# Patient Record
Sex: MALE | Race: WHITE | Employment: OTHER | URBAN - METROPOLITAN AREA
[De-identification: names, ages, dates, MRNs, and addresses within clinical notes are randomized per-mention and may not be internally consistent; named-entity substitution may affect disease eponyms.]

---

## 2017-01-01 ENCOUNTER — APPOINTMENT (OUTPATIENT)
Dept: GENERAL RADIOLOGY | Age: 67
DRG: 329 | End: 2017-01-01
Attending: PHYSICIAN ASSISTANT
Payer: MEDICARE

## 2017-01-01 ENCOUNTER — ANESTHESIA (OUTPATIENT)
Dept: SURGERY | Age: 67
DRG: 329 | End: 2017-01-01
Payer: MEDICARE

## 2017-01-01 ENCOUNTER — APPOINTMENT (OUTPATIENT)
Dept: GENERAL RADIOLOGY | Age: 67
DRG: 981 | End: 2017-01-01
Attending: INTERNAL MEDICINE
Payer: MEDICARE

## 2017-01-01 ENCOUNTER — APPOINTMENT (OUTPATIENT)
Dept: CT IMAGING | Age: 67
DRG: 981 | End: 2017-01-01
Attending: INTERNAL MEDICINE
Payer: MEDICARE

## 2017-01-01 ENCOUNTER — HOSPITAL ENCOUNTER (INPATIENT)
Age: 67
LOS: 5 days | DRG: 981 | End: 2017-10-26
Attending: EMERGENCY MEDICINE | Admitting: INTERNAL MEDICINE
Payer: MEDICARE

## 2017-01-01 ENCOUNTER — HOSPITAL ENCOUNTER (INPATIENT)
Age: 67
LOS: 2 days | Discharge: NURSING, REHAB OR LONG-TERM CARE | DRG: 683 | End: 2017-03-03
Attending: EMERGENCY MEDICINE | Admitting: INTERNAL MEDICINE
Payer: MEDICARE

## 2017-01-01 ENCOUNTER — OFFICE VISIT (OUTPATIENT)
Dept: CARDIOLOGY CLINIC | Age: 67
End: 2017-01-01

## 2017-01-01 ENCOUNTER — APPOINTMENT (OUTPATIENT)
Dept: GENERAL RADIOLOGY | Age: 67
DRG: 329 | End: 2017-01-01
Attending: UROLOGY
Payer: MEDICARE

## 2017-01-01 ENCOUNTER — TELEPHONE (OUTPATIENT)
Dept: CARDIOLOGY CLINIC | Age: 67
End: 2017-01-01

## 2017-01-01 ENCOUNTER — APPOINTMENT (OUTPATIENT)
Dept: CT IMAGING | Age: 67
DRG: 329 | End: 2017-01-01
Attending: PHYSICIAN ASSISTANT
Payer: MEDICARE

## 2017-01-01 ENCOUNTER — PATIENT OUTREACH (OUTPATIENT)
Dept: CASE MANAGEMENT | Age: 67
End: 2017-01-01

## 2017-01-01 ENCOUNTER — HOSPITAL ENCOUNTER (INPATIENT)
Age: 67
LOS: 4 days | Discharge: NURSING, REHAB OR LONG-TERM CARE | DRG: 247 | End: 2017-07-10
Attending: EMERGENCY MEDICINE | Admitting: INTERNAL MEDICINE
Payer: MEDICARE

## 2017-01-01 ENCOUNTER — HOSPITAL ENCOUNTER (OUTPATIENT)
Dept: MRI IMAGING | Age: 67
Discharge: HOME | End: 2017-08-29
Attending: INTERNAL MEDICINE
Payer: MEDICARE

## 2017-01-01 ENCOUNTER — APPOINTMENT (OUTPATIENT)
Dept: GENERAL RADIOLOGY | Age: 67
End: 2017-01-01
Attending: EMERGENCY MEDICINE
Payer: MEDICARE

## 2017-01-01 ENCOUNTER — APPOINTMENT (OUTPATIENT)
Dept: GENERAL RADIOLOGY | Age: 67
DRG: 329 | End: 2017-01-01
Attending: SURGERY
Payer: MEDICARE

## 2017-01-01 ENCOUNTER — HOSPITAL ENCOUNTER (EMERGENCY)
Age: 67
Discharge: HOME | End: 2017-05-26
Attending: EMERGENCY MEDICINE
Payer: MEDICARE

## 2017-01-01 ENCOUNTER — APPOINTMENT (OUTPATIENT)
Dept: GENERAL RADIOLOGY | Age: 67
DRG: 689 | End: 2017-01-01
Attending: EMERGENCY MEDICINE
Payer: MEDICARE

## 2017-01-01 ENCOUNTER — APPOINTMENT (OUTPATIENT)
Dept: GENERAL RADIOLOGY | Age: 67
DRG: 981 | End: 2017-01-01
Attending: EMERGENCY MEDICINE
Payer: MEDICARE

## 2017-01-01 ENCOUNTER — APPOINTMENT (OUTPATIENT)
Dept: GENERAL RADIOLOGY | Age: 67
DRG: 981 | End: 2017-01-01
Attending: RADIOLOGY
Payer: MEDICARE

## 2017-01-01 ENCOUNTER — OFFICE VISIT (OUTPATIENT)
Dept: SURGERY | Age: 67
End: 2017-01-01

## 2017-01-01 ENCOUNTER — APPOINTMENT (OUTPATIENT)
Dept: GENERAL RADIOLOGY | Age: 67
DRG: 247 | End: 2017-01-01
Attending: EMERGENCY MEDICINE
Payer: MEDICARE

## 2017-01-01 ENCOUNTER — APPOINTMENT (OUTPATIENT)
Dept: ULTRASOUND IMAGING | Age: 67
DRG: 683 | End: 2017-01-01
Attending: EMERGENCY MEDICINE
Payer: MEDICARE

## 2017-01-01 ENCOUNTER — APPOINTMENT (OUTPATIENT)
Dept: INTERVENTIONAL RADIOLOGY/VASCULAR | Age: 67
DRG: 981 | End: 2017-01-01
Attending: INTERNAL MEDICINE
Payer: MEDICARE

## 2017-01-01 ENCOUNTER — APPOINTMENT (OUTPATIENT)
Dept: INTERVENTIONAL RADIOLOGY/VASCULAR | Age: 67
DRG: 981 | End: 2017-01-01
Attending: UROLOGY
Payer: MEDICARE

## 2017-01-01 ENCOUNTER — PATIENT OUTREACH (OUTPATIENT)
Dept: INTERNAL MEDICINE CLINIC | Age: 67
End: 2017-01-01

## 2017-01-01 ENCOUNTER — HOSPITAL ENCOUNTER (OUTPATIENT)
Age: 67
Discharge: NURSING, REHAB OR LONG-TERM CARE | End: 2017-03-16
Attending: PHYSICAL MEDICINE & REHABILITATION | Admitting: PHYSICAL MEDICINE & REHABILITATION

## 2017-01-01 ENCOUNTER — HOSPITAL ENCOUNTER (INPATIENT)
Age: 67
LOS: 1 days | Discharge: NURSING, REHAB OR LONG-TERM CARE | DRG: 689 | End: 2017-05-16
Attending: EMERGENCY MEDICINE | Admitting: FAMILY MEDICINE
Payer: MEDICARE

## 2017-01-01 ENCOUNTER — ANESTHESIA EVENT (OUTPATIENT)
Dept: SURGERY | Age: 67
DRG: 329 | End: 2017-01-01
Payer: MEDICARE

## 2017-01-01 ENCOUNTER — HOSPITAL ENCOUNTER (EMERGENCY)
Age: 67
Discharge: HOME | End: 2017-07-30
Attending: EMERGENCY MEDICINE | Admitting: EMERGENCY MEDICINE
Payer: MEDICARE

## 2017-01-01 ENCOUNTER — APPOINTMENT (OUTPATIENT)
Dept: ULTRASOUND IMAGING | Age: 67
DRG: 981 | End: 2017-01-01
Attending: INTERNAL MEDICINE
Payer: MEDICARE

## 2017-01-01 ENCOUNTER — APPOINTMENT (OUTPATIENT)
Dept: CT IMAGING | Age: 67
DRG: 689 | End: 2017-01-01
Attending: FAMILY MEDICINE
Payer: MEDICARE

## 2017-01-01 ENCOUNTER — APPOINTMENT (OUTPATIENT)
Dept: CT IMAGING | Age: 67
DRG: 689 | End: 2017-01-01
Attending: EMERGENCY MEDICINE
Payer: MEDICARE

## 2017-01-01 ENCOUNTER — HOSPITAL ENCOUNTER (INPATIENT)
Age: 67
LOS: 15 days | Discharge: NURSING, REHAB OR LONG-TERM CARE | DRG: 329 | End: 2017-05-03
Attending: EMERGENCY MEDICINE | Admitting: SURGERY
Payer: MEDICARE

## 2017-01-01 VITALS
DIASTOLIC BLOOD PRESSURE: 70 MMHG | WEIGHT: 210 LBS | SYSTOLIC BLOOD PRESSURE: 162 MMHG | HEART RATE: 80 BPM | BODY MASS INDEX: 31.1 KG/M2 | HEIGHT: 69 IN

## 2017-01-01 VITALS
RESPIRATION RATE: 15 BRPM | SYSTOLIC BLOOD PRESSURE: 119 MMHG | HEART RATE: 92 BPM | WEIGHT: 230 LBS | HEIGHT: 73 IN | TEMPERATURE: 98.1 F | BODY MASS INDEX: 30.48 KG/M2 | OXYGEN SATURATION: 95 % | DIASTOLIC BLOOD PRESSURE: 60 MMHG

## 2017-01-01 VITALS
OXYGEN SATURATION: 100 % | HEART RATE: 90 BPM | RESPIRATION RATE: 18 BRPM | DIASTOLIC BLOOD PRESSURE: 86 MMHG | TEMPERATURE: 98 F | SYSTOLIC BLOOD PRESSURE: 120 MMHG | WEIGHT: 220 LBS | BODY MASS INDEX: 31.5 KG/M2 | HEIGHT: 70 IN

## 2017-01-01 VITALS
HEART RATE: 94 BPM | HEIGHT: 68 IN | SYSTOLIC BLOOD PRESSURE: 127 MMHG | OXYGEN SATURATION: 98 % | WEIGHT: 213.1 LBS | TEMPERATURE: 98.1 F | DIASTOLIC BLOOD PRESSURE: 59 MMHG | BODY MASS INDEX: 32.3 KG/M2 | RESPIRATION RATE: 21 BRPM

## 2017-01-01 VITALS
OXYGEN SATURATION: 98 % | BODY MASS INDEX: 31.37 KG/M2 | SYSTOLIC BLOOD PRESSURE: 114 MMHG | TEMPERATURE: 98.1 F | HEIGHT: 68 IN | DIASTOLIC BLOOD PRESSURE: 67 MMHG | HEART RATE: 99 BPM | WEIGHT: 207 LBS | RESPIRATION RATE: 18 BRPM

## 2017-01-01 VITALS
WEIGHT: 217.59 LBS | BODY MASS INDEX: 32.23 KG/M2 | DIASTOLIC BLOOD PRESSURE: 29 MMHG | HEIGHT: 69 IN | TEMPERATURE: 99.2 F | OXYGEN SATURATION: 94 % | SYSTOLIC BLOOD PRESSURE: 50 MMHG

## 2017-01-01 VITALS
HEIGHT: 69 IN | OXYGEN SATURATION: 96 % | SYSTOLIC BLOOD PRESSURE: 127 MMHG | HEART RATE: 101 BPM | RESPIRATION RATE: 16 BRPM | BODY MASS INDEX: 31.1 KG/M2 | WEIGHT: 210 LBS | TEMPERATURE: 98.2 F | DIASTOLIC BLOOD PRESSURE: 57 MMHG

## 2017-01-01 VITALS
DIASTOLIC BLOOD PRESSURE: 63 MMHG | HEART RATE: 83 BPM | HEIGHT: 69 IN | RESPIRATION RATE: 18 BRPM | BODY MASS INDEX: 31.86 KG/M2 | SYSTOLIC BLOOD PRESSURE: 123 MMHG | OXYGEN SATURATION: 97 % | WEIGHT: 215.12 LBS | TEMPERATURE: 98.3 F

## 2017-01-01 VITALS
SYSTOLIC BLOOD PRESSURE: 110 MMHG | BODY MASS INDEX: 31.37 KG/M2 | DIASTOLIC BLOOD PRESSURE: 60 MMHG | HEART RATE: 80 BPM | RESPIRATION RATE: 16 BRPM | HEIGHT: 68 IN | WEIGHT: 207 LBS | OXYGEN SATURATION: 99 %

## 2017-01-01 DIAGNOSIS — K26.9 DUODENAL ULCER: ICD-10-CM

## 2017-01-01 DIAGNOSIS — R31.9 URINARY TRACT INFECTION WITH HEMATURIA, SITE UNSPECIFIED: ICD-10-CM

## 2017-01-01 DIAGNOSIS — R31.9 HEMATURIA: Primary | ICD-10-CM

## 2017-01-01 DIAGNOSIS — E78.5 HYPERLIPIDEMIA, UNSPECIFIED HYPERLIPIDEMIA TYPE: ICD-10-CM

## 2017-01-01 DIAGNOSIS — R31.9 HEMATURIA, UNSPECIFIED TYPE: ICD-10-CM

## 2017-01-01 DIAGNOSIS — N39.0 URINARY TRACT INFECTION WITH HEMATURIA, SITE UNSPECIFIED: ICD-10-CM

## 2017-01-01 DIAGNOSIS — I21.4 NSTEMI (NON-ST ELEVATED MYOCARDIAL INFARCTION) (HCC): Primary | ICD-10-CM

## 2017-01-01 DIAGNOSIS — I20.0 UNSTABLE ANGINA (HCC): Primary | ICD-10-CM

## 2017-01-01 DIAGNOSIS — E87.1 HYPONATREMIA: ICD-10-CM

## 2017-01-01 DIAGNOSIS — I47.1 SVT (SUPRAVENTRICULAR TACHYCARDIA) (HCC): ICD-10-CM

## 2017-01-01 DIAGNOSIS — Z95.5 S/P CORONARY ARTERY STENT PLACEMENT: ICD-10-CM

## 2017-01-01 DIAGNOSIS — E78.5 HYPERLIPIDEMIA, UNSPECIFIED HYPERLIPIDEMIA TYPE: Primary | ICD-10-CM

## 2017-01-01 DIAGNOSIS — E87.5 ACUTE HYPERKALEMIA: ICD-10-CM

## 2017-01-01 DIAGNOSIS — R53.81 DEBILITATED: ICD-10-CM

## 2017-01-01 DIAGNOSIS — Z98.890 HISTORY OF ESOPHAGOGASTRODUODENOSCOPY (EGD): Primary | ICD-10-CM

## 2017-01-01 DIAGNOSIS — E11.9 TYPE 2 DIABETES MELLITUS WITHOUT COMPLICATION, UNSPECIFIED LONG TERM INSULIN USE STATUS: ICD-10-CM

## 2017-01-01 DIAGNOSIS — R93.41 ABNORMAL CT SCAN, BLADDER: ICD-10-CM

## 2017-01-01 DIAGNOSIS — N30.01 ACUTE CYSTITIS WITH HEMATURIA: ICD-10-CM

## 2017-01-01 DIAGNOSIS — R07.9 CHEST PAIN, UNSPECIFIED TYPE: ICD-10-CM

## 2017-01-01 DIAGNOSIS — E87.5 ACUTE HYPERKALEMIA: Primary | ICD-10-CM

## 2017-01-01 DIAGNOSIS — K26.5 PERFORATED DUODENAL BULB ULCER (HCC): Primary | ICD-10-CM

## 2017-01-01 DIAGNOSIS — N30.00 ACUTE CYSTITIS WITHOUT HEMATURIA: Primary | ICD-10-CM

## 2017-01-01 DIAGNOSIS — N17.9 ACUTE RENAL FAILURE, UNSPECIFIED ACUTE RENAL FAILURE TYPE (HCC): ICD-10-CM

## 2017-01-01 DIAGNOSIS — I10 ESSENTIAL HYPERTENSION: ICD-10-CM

## 2017-01-01 DIAGNOSIS — D64.9 ANEMIA, UNSPECIFIED TYPE: ICD-10-CM

## 2017-01-01 DIAGNOSIS — R19.8 PERFORATED VISCUS: Primary | ICD-10-CM

## 2017-01-01 LAB
25(OH)D3 SERPL-MCNC: 31.4 NG/ML (ref 30–100)
ABO + RH BLD: NORMAL
ALBUMIN SERPL BCP-MCNC: 2 G/DL (ref 3.5–5)
ALBUMIN SERPL BCP-MCNC: 2.7 G/DL (ref 3.5–5)
ALBUMIN SERPL BCP-MCNC: 2.9 G/DL (ref 3.5–5)
ALBUMIN SERPL BCP-MCNC: 3.1 G/DL (ref 3.5–5)
ALBUMIN SERPL BCP-MCNC: 3.2 G/DL (ref 3.5–5)
ALBUMIN SERPL BCP-MCNC: 3.3 G/DL (ref 3.5–5)
ALBUMIN SERPL BCP-MCNC: 3.4 G/DL (ref 3.5–5)
ALBUMIN SERPL BCP-MCNC: 3.4 G/DL (ref 3.5–5)
ALBUMIN SERPL BCP-MCNC: 4 G/DL (ref 3.5–5)
ALBUMIN SERPL ELPH-MCNC: 2.8 G/DL (ref 2.9–4.4)
ALBUMIN SERPL-MCNC: 1.9 G/DL (ref 3.5–5)
ALBUMIN SERPL-MCNC: 2.4 G/DL (ref 3.5–5)
ALBUMIN SERPL-MCNC: 2.6 G/DL (ref 3.5–5)
ALBUMIN/GLOB SERPL: 0.4 {RATIO} (ref 1.1–2.2)
ALBUMIN/GLOB SERPL: 0.5 {RATIO} (ref 1.1–2.2)
ALBUMIN/GLOB SERPL: 0.6 {RATIO} (ref 1.1–2.2)
ALBUMIN/GLOB SERPL: 0.7 {RATIO} (ref 1.1–2.2)
ALBUMIN/GLOB SERPL: 0.8 {RATIO} (ref 0.7–1.7)
ALBUMIN/GLOB SERPL: 0.8 {RATIO} (ref 1.1–2.2)
ALBUMIN/GLOB SERPL: 0.8 {RATIO} (ref 1.1–2.2)
ALBUMIN/GLOB SERPL: 0.9 {RATIO} (ref 1.1–2.2)
ALP SERPL-CCNC: 150 U/L (ref 45–117)
ALP SERPL-CCNC: 46 U/L (ref 45–117)
ALP SERPL-CCNC: 57 U/L (ref 45–117)
ALP SERPL-CCNC: 58 U/L (ref 45–117)
ALP SERPL-CCNC: 66 U/L (ref 45–117)
ALP SERPL-CCNC: 67 U/L (ref 45–117)
ALP SERPL-CCNC: 68 U/L (ref 45–117)
ALP SERPL-CCNC: 71 U/L (ref 45–117)
ALP SERPL-CCNC: 72 U/L (ref 45–117)
ALP SERPL-CCNC: 75 U/L (ref 45–117)
ALP SERPL-CCNC: 78 U/L (ref 45–117)
ALP SERPL-CCNC: 88 U/L (ref 45–117)
ALP SERPL-CCNC: 88 U/L (ref 45–117)
ALPHA1 GLOB SERPL ELPH-MCNC: 0.3 G/DL (ref 0–0.4)
ALPHA2 GLOB SERPL ELPH-MCNC: 1 G/DL (ref 0.4–1)
ALT SERPL-CCNC: 13 U/L (ref 12–78)
ALT SERPL-CCNC: 14 U/L (ref 12–78)
ALT SERPL-CCNC: 14 U/L (ref 12–78)
ALT SERPL-CCNC: 16 U/L (ref 12–78)
ALT SERPL-CCNC: 17 U/L (ref 12–78)
ALT SERPL-CCNC: 19 U/L (ref 12–78)
ALT SERPL-CCNC: 19 U/L (ref 12–78)
ALT SERPL-CCNC: 20 U/L (ref 12–78)
ALT SERPL-CCNC: 21 U/L (ref 12–78)
ALT SERPL-CCNC: 23 U/L (ref 12–78)
ALT SERPL-CCNC: 26 U/L (ref 12–78)
ALT SERPL-CCNC: 36 U/L (ref 12–78)
ALT SERPL-CCNC: 41 U/L (ref 12–78)
AMMONIA PLAS-SCNC: <10 UMOL/L
ANA SER QL: NEGATIVE
ANA TITR SER IF: NEGATIVE {TITER}
ANION GAP BLD CALC-SCNC: 10 MMOL/L (ref 5–15)
ANION GAP BLD CALC-SCNC: 11 MMOL/L (ref 5–15)
ANION GAP BLD CALC-SCNC: 11 MMOL/L (ref 5–15)
ANION GAP BLD CALC-SCNC: 12 MMOL/L (ref 5–15)
ANION GAP BLD CALC-SCNC: 13 MMOL/L (ref 5–15)
ANION GAP BLD CALC-SCNC: 13 MMOL/L (ref 5–15)
ANION GAP BLD CALC-SCNC: 20 MMOL/L (ref 5–15)
ANION GAP BLD CALC-SCNC: 5 MMOL/L (ref 5–15)
ANION GAP BLD CALC-SCNC: 6 MMOL/L (ref 5–15)
ANION GAP BLD CALC-SCNC: 6 MMOL/L (ref 5–15)
ANION GAP BLD CALC-SCNC: 7 MMOL/L (ref 5–15)
ANION GAP BLD CALC-SCNC: 8 MMOL/L (ref 5–15)
ANION GAP BLD CALC-SCNC: 9 MMOL/L (ref 5–15)
ANION GAP SERPL CALC-SCNC: 12 MMOL/L (ref 5–15)
ANION GAP SERPL CALC-SCNC: 12 MMOL/L (ref 5–15)
ANION GAP SERPL CALC-SCNC: 19 MMOL/L (ref 5–15)
ANION GAP SERPL CALC-SCNC: 6 MMOL/L (ref 5–15)
ANION GAP SERPL CALC-SCNC: 6 MMOL/L (ref 5–15)
ANION GAP SERPL CALC-SCNC: 8 MMOL/L (ref 5–15)
ANION GAP SERPL CALC-SCNC: 9 MMOL/L (ref 5–15)
APPEARANCE UR: ABNORMAL
APTT PPP: 30.5 SEC (ref 22.1–32.5)
APTT PPP: 44.1 SEC (ref 22.1–32.5)
ARTERIAL PATENCY WRIST A: YES
AST SERPL W P-5'-P-CCNC: 10 U/L (ref 15–37)
AST SERPL W P-5'-P-CCNC: 14 U/L (ref 15–37)
AST SERPL W P-5'-P-CCNC: 14 U/L (ref 15–37)
AST SERPL W P-5'-P-CCNC: 15 U/L (ref 15–37)
AST SERPL W P-5'-P-CCNC: 16 U/L (ref 15–37)
AST SERPL W P-5'-P-CCNC: 18 U/L (ref 15–37)
AST SERPL W P-5'-P-CCNC: 19 U/L (ref 15–37)
AST SERPL W P-5'-P-CCNC: 21 U/L (ref 15–37)
AST SERPL W P-5'-P-CCNC: 23 U/L (ref 15–37)
AST SERPL W P-5'-P-CCNC: 26 U/L (ref 15–37)
AST SERPL-CCNC: 13 U/L (ref 15–37)
AST SERPL-CCNC: 23 U/L (ref 15–37)
AST SERPL-CCNC: 46 U/L (ref 15–37)
ATRIAL RATE: 100 BPM
ATRIAL RATE: 105 BPM
ATRIAL RATE: 133 BPM
ATRIAL RATE: 138 BPM
ATRIAL RATE: 81 BPM
ATRIAL RATE: 82 BPM
ATRIAL RATE: 82 BPM
ATRIAL RATE: 89 BPM
ATRIAL RATE: 92 BPM
B-GLOBULIN SERPL ELPH-MCNC: 0.9 G/DL (ref 0.7–1.3)
BACTERIA SPEC CULT: ABNORMAL
BACTERIA SPEC CULT: NORMAL
BACTERIA URNS QL MICRO: ABNORMAL /HPF
BACTERIA URNS QL MICRO: NEGATIVE /HPF
BASE DEFICIT BLDA-SCNC: 0.8 MMOL/L
BASE DEFICIT BLDA-SCNC: 10.4 MMOL/L
BASE DEFICIT BLDA-SCNC: 13.4 MMOL/L
BASOPHILS # BLD AUTO: 0 K/UL (ref 0–0.1)
BASOPHILS # BLD: 0 % (ref 0–1)
BASOPHILS # BLD: 0 K/UL
BASOPHILS # BLD: 0 K/UL (ref 0–0.1)
BASOPHILS # BLD: 0 K/UL (ref 0–0.1)
BASOPHILS # BLD: 1 % (ref 0–1)
BASOPHILS NFR BLD: 0 %
BASOPHILS NFR BLD: 0 % (ref 0–1)
BASOPHILS NFR BLD: 0 % (ref 0–1)
BDY SITE: ABNORMAL
BILIRUB DIRECT SERPL-MCNC: 0.2 MG/DL (ref 0–0.2)
BILIRUB SERPL-MCNC: 0.3 MG/DL (ref 0.2–1)
BILIRUB SERPL-MCNC: 0.5 MG/DL (ref 0.2–1)
BILIRUB SERPL-MCNC: 0.7 MG/DL (ref 0.2–1)
BILIRUB SERPL-MCNC: 0.8 MG/DL (ref 0.2–1)
BILIRUB SERPL-MCNC: 0.8 MG/DL (ref 0.2–1)
BILIRUB SERPL-MCNC: 0.9 MG/DL (ref 0.2–1)
BILIRUB SERPL-MCNC: 0.9 MG/DL (ref 0.2–1)
BILIRUB SERPL-MCNC: 1.3 MG/DL (ref 0.2–1)
BILIRUB SERPL-MCNC: 1.5 MG/DL (ref 0.2–1)
BILIRUB SERPL-MCNC: 1.6 MG/DL (ref 0.2–1)
BILIRUB SERPL-MCNC: 2 MG/DL (ref 0.2–1)
BILIRUB UR QL CFM: ABNORMAL
BILIRUB UR QL: NEGATIVE
BLD PROD TYP BPU: NORMAL
BLOOD GROUP ANTIBODIES SERPL: NORMAL
BPU ID: NORMAL
BUN BLD-MCNC: 52 MG/DL (ref 9–20)
BUN SERPL-MCNC: 15 MG/DL (ref 6–20)
BUN SERPL-MCNC: 18 MG/DL (ref 6–20)
BUN SERPL-MCNC: 19 MG/DL (ref 6–20)
BUN SERPL-MCNC: 20 MG/DL (ref 6–20)
BUN SERPL-MCNC: 21 MG/DL (ref 6–20)
BUN SERPL-MCNC: 24 MG/DL (ref 6–20)
BUN SERPL-MCNC: 26 MG/DL (ref 6–20)
BUN SERPL-MCNC: 26 MG/DL (ref 6–20)
BUN SERPL-MCNC: 27 MG/DL (ref 6–20)
BUN SERPL-MCNC: 27 MG/DL (ref 6–20)
BUN SERPL-MCNC: 29 MG/DL (ref 6–20)
BUN SERPL-MCNC: 30 MG/DL (ref 6–20)
BUN SERPL-MCNC: 30 MG/DL (ref 6–20)
BUN SERPL-MCNC: 32 MG/DL (ref 6–20)
BUN SERPL-MCNC: 33 MG/DL (ref 6–20)
BUN SERPL-MCNC: 34 MG/DL (ref 6–20)
BUN SERPL-MCNC: 34 MG/DL (ref 6–20)
BUN SERPL-MCNC: 38 MG/DL (ref 6–20)
BUN SERPL-MCNC: 39 MG/DL (ref 6–20)
BUN SERPL-MCNC: 46 MG/DL (ref 6–20)
BUN SERPL-MCNC: 46 MG/DL (ref 6–20)
BUN SERPL-MCNC: 56 MG/DL (ref 6–20)
BUN SERPL-MCNC: 73 MG/DL (ref 6–20)
BUN SERPL-MCNC: 74 MG/DL (ref 6–20)
BUN SERPL-MCNC: 78 MG/DL (ref 6–20)
BUN/CREAT SERPL: 10 (ref 12–20)
BUN/CREAT SERPL: 11 (ref 12–20)
BUN/CREAT SERPL: 13 (ref 12–20)
BUN/CREAT SERPL: 13 (ref 12–20)
BUN/CREAT SERPL: 15 (ref 12–20)
BUN/CREAT SERPL: 16 (ref 12–20)
BUN/CREAT SERPL: 18 (ref 12–20)
BUN/CREAT SERPL: 19 (ref 12–20)
BUN/CREAT SERPL: 19 (ref 12–20)
BUN/CREAT SERPL: 22 (ref 12–20)
BUN/CREAT SERPL: 22 (ref 12–20)
BUN/CREAT SERPL: 23 (ref 12–20)
BUN/CREAT SERPL: 24 (ref 12–20)
BUN/CREAT SERPL: 24 (ref 12–20)
BUN/CREAT SERPL: 25 (ref 12–20)
BUN/CREAT SERPL: 27 (ref 12–20)
BUN/CREAT SERPL: 27 (ref 12–20)
BUN/CREAT SERPL: 28 (ref 12–20)
BUN/CREAT SERPL: 30 (ref 12–20)
BUN/CREAT SERPL: 31 (ref 12–20)
BUN/CREAT SERPL: 8 (ref 12–20)
C-ANCA TITR SER IF: NORMAL TITER
C3 SERPL-MCNC: 144 MG/DL (ref 82–167)
C4 SERPL-MCNC: 26 MG/DL (ref 14–44)
CA-I BLD-MCNC: 1.12 MMOL/L (ref 1.12–1.32)
CALCIUM SERPL-MCNC: 7.2 MG/DL (ref 8.5–10.1)
CALCIUM SERPL-MCNC: 7.4 MG/DL (ref 8.5–10.1)
CALCIUM SERPL-MCNC: 7.4 MG/DL (ref 8.5–10.1)
CALCIUM SERPL-MCNC: 7.7 MG/DL (ref 8.5–10.1)
CALCIUM SERPL-MCNC: 7.9 MG/DL (ref 8.5–10.1)
CALCIUM SERPL-MCNC: 8.3 MG/DL (ref 8.5–10.1)
CALCIUM SERPL-MCNC: 8.3 MG/DL (ref 8.5–10.1)
CALCIUM SERPL-MCNC: 8.4 MG/DL (ref 8.5–10.1)
CALCIUM SERPL-MCNC: 8.5 MG/DL (ref 8.5–10.1)
CALCIUM SERPL-MCNC: 8.5 MG/DL (ref 8.5–10.1)
CALCIUM SERPL-MCNC: 8.6 MG/DL (ref 8.5–10.1)
CALCIUM SERPL-MCNC: 8.7 MG/DL (ref 8.5–10.1)
CALCIUM SERPL-MCNC: 8.7 MG/DL (ref 8.5–10.1)
CALCIUM SERPL-MCNC: 8.8 MG/DL (ref 8.5–10.1)
CALCIUM SERPL-MCNC: 8.9 MG/DL (ref 8.5–10.1)
CALCIUM SERPL-MCNC: 9 MG/DL (ref 8.5–10.1)
CALCIUM SERPL-MCNC: 9.1 MG/DL (ref 8.5–10.1)
CALCIUM SERPL-MCNC: 9.2 MG/DL (ref 8.5–10.1)
CALCIUM SERPL-MCNC: 9.5 MG/DL (ref 8.5–10.1)
CALCIUM SERPL-MCNC: 9.6 MG/DL (ref 8.5–10.1)
CALCIUM SERPL-MCNC: 9.6 MG/DL (ref 8.5–10.1)
CALCULATED P AXIS, ECG09: 20 DEGREES
CALCULATED P AXIS, ECG09: 21 DEGREES
CALCULATED P AXIS, ECG09: 23 DEGREES
CALCULATED P AXIS, ECG09: 30 DEGREES
CALCULATED P AXIS, ECG09: 31 DEGREES
CALCULATED P AXIS, ECG09: 31 DEGREES
CALCULATED P AXIS, ECG09: 36 DEGREES
CALCULATED P AXIS, ECG09: 54 DEGREES
CALCULATED P AXIS, ECG09: 97 DEGREES
CALCULATED R AXIS, ECG10: -24 DEGREES
CALCULATED R AXIS, ECG10: -27 DEGREES
CALCULATED R AXIS, ECG10: -28 DEGREES
CALCULATED R AXIS, ECG10: -30 DEGREES
CALCULATED R AXIS, ECG10: -35 DEGREES
CALCULATED T AXIS, ECG11: -174 DEGREES
CALCULATED T AXIS, ECG11: -6 DEGREES
CALCULATED T AXIS, ECG11: -9 DEGREES
CALCULATED T AXIS, ECG11: 1 DEGREES
CALCULATED T AXIS, ECG11: 15 DEGREES
CALCULATED T AXIS, ECG11: 19 DEGREES
CALCULATED T AXIS, ECG11: 19 DEGREES
CALCULATED T AXIS, ECG11: 41 DEGREES
CALCULATED T AXIS, ECG11: 69 DEGREES
CC UR VC: ABNORMAL
CC UR VC: NORMAL
CHLORIDE BLD-SCNC: 95 MMOL/L (ref 98–107)
CHLORIDE SERPL-SCNC: 100 MMOL/L (ref 97–108)
CHLORIDE SERPL-SCNC: 101 MMOL/L (ref 97–108)
CHLORIDE SERPL-SCNC: 101 MMOL/L (ref 97–108)
CHLORIDE SERPL-SCNC: 102 MMOL/L (ref 97–108)
CHLORIDE SERPL-SCNC: 103 MMOL/L (ref 97–108)
CHLORIDE SERPL-SCNC: 104 MMOL/L (ref 97–108)
CHLORIDE SERPL-SCNC: 105 MMOL/L (ref 97–108)
CHLORIDE SERPL-SCNC: 106 MMOL/L (ref 97–108)
CHLORIDE SERPL-SCNC: 108 MMOL/L (ref 97–108)
CHLORIDE SERPL-SCNC: 108 MMOL/L (ref 97–108)
CHLORIDE SERPL-SCNC: 109 MMOL/L (ref 97–108)
CHLORIDE SERPL-SCNC: 113 MMOL/L (ref 97–108)
CHLORIDE SERPL-SCNC: 113 MMOL/L (ref 97–108)
CHLORIDE SERPL-SCNC: 115 MMOL/L (ref 97–108)
CHLORIDE SERPL-SCNC: 90 MMOL/L (ref 97–108)
CHLORIDE SERPL-SCNC: 95 MMOL/L (ref 97–108)
CHLORIDE SERPL-SCNC: 97 MMOL/L (ref 97–108)
CHOLEST SERPL-MCNC: 249 MG/DL
CK MB CFR SERPL CALC: 11.6 % (ref 0–2.5)
CK MB CFR SERPL CALC: 12.4 % (ref 0–2.5)
CK MB CFR SERPL CALC: 2.5 % (ref 0–2.5)
CK MB CFR SERPL CALC: 6.7 % (ref 0–2.5)
CK MB SERPL-MCNC: 10.1 NG/ML (ref 5–25)
CK MB SERPL-MCNC: 14.5 NG/ML (ref 5–25)
CK MB SERPL-MCNC: 3.9 NG/ML (ref 5–25)
CK MB SERPL-MCNC: 6 NG/ML (ref 5–25)
CK SERPL-CCNC: 117 U/L (ref 39–308)
CK SERPL-CCNC: 156 U/L (ref 39–308)
CK SERPL-CCNC: 36 U/L (ref 39–308)
CK SERPL-CCNC: 61 U/L (ref 39–308)
CK SERPL-CCNC: 68 U/L (ref 39–308)
CK SERPL-CCNC: 87 U/L (ref 39–308)
CK SERPL-CCNC: 89 U/L (ref 39–308)
CO2 BLD-SCNC: 19 MMOL/L (ref 21–32)
CO2 SERPL-SCNC: 15 MMOL/L (ref 21–32)
CO2 SERPL-SCNC: 16 MMOL/L (ref 21–32)
CO2 SERPL-SCNC: 18 MMOL/L (ref 21–32)
CO2 SERPL-SCNC: 18 MMOL/L (ref 21–32)
CO2 SERPL-SCNC: 19 MMOL/L (ref 21–32)
CO2 SERPL-SCNC: 19 MMOL/L (ref 21–32)
CO2 SERPL-SCNC: 20 MMOL/L (ref 21–32)
CO2 SERPL-SCNC: 21 MMOL/L (ref 21–32)
CO2 SERPL-SCNC: 22 MMOL/L (ref 21–32)
CO2 SERPL-SCNC: 23 MMOL/L (ref 21–32)
CO2 SERPL-SCNC: 24 MMOL/L (ref 21–32)
CO2 SERPL-SCNC: 25 MMOL/L (ref 21–32)
CO2 SERPL-SCNC: 26 MMOL/L (ref 21–32)
CO2 SERPL-SCNC: 27 MMOL/L (ref 21–32)
CO2 SERPL-SCNC: 28 MMOL/L (ref 21–32)
CO2 SERPL-SCNC: 31 MMOL/L (ref 21–32)
COLOR UR: ABNORMAL
CORTIS SERPL-MCNC: 33.8 UG/DL
CREAT BLD-MCNC: 2.8 MG/DL (ref 0.6–1.3)
CREAT SERPL-MCNC: 0.93 MG/DL (ref 0.7–1.3)
CREAT SERPL-MCNC: 0.96 MG/DL (ref 0.7–1.3)
CREAT SERPL-MCNC: 1.03 MG/DL (ref 0.7–1.3)
CREAT SERPL-MCNC: 1.03 MG/DL (ref 0.7–1.3)
CREAT SERPL-MCNC: 1.05 MG/DL (ref 0.7–1.3)
CREAT SERPL-MCNC: 1.07 MG/DL (ref 0.7–1.3)
CREAT SERPL-MCNC: 1.08 MG/DL (ref 0.7–1.3)
CREAT SERPL-MCNC: 1.1 MG/DL (ref 0.7–1.3)
CREAT SERPL-MCNC: 1.11 MG/DL (ref 0.7–1.3)
CREAT SERPL-MCNC: 1.13 MG/DL (ref 0.7–1.3)
CREAT SERPL-MCNC: 1.14 MG/DL (ref 0.7–1.3)
CREAT SERPL-MCNC: 1.15 MG/DL (ref 0.7–1.3)
CREAT SERPL-MCNC: 1.17 MG/DL (ref 0.7–1.3)
CREAT SERPL-MCNC: 1.27 MG/DL (ref 0.7–1.3)
CREAT SERPL-MCNC: 1.3 MG/DL (ref 0.7–1.3)
CREAT SERPL-MCNC: 1.31 MG/DL (ref 0.7–1.3)
CREAT SERPL-MCNC: 1.43 MG/DL (ref 0.7–1.3)
CREAT SERPL-MCNC: 1.49 MG/DL (ref 0.7–1.3)
CREAT SERPL-MCNC: 1.83 MG/DL (ref 0.7–1.3)
CREAT SERPL-MCNC: 2 MG/DL (ref 0.7–1.3)
CREAT SERPL-MCNC: 2.05 MG/DL (ref 0.7–1.3)
CREAT SERPL-MCNC: 2.46 MG/DL (ref 0.7–1.3)
CREAT SERPL-MCNC: 2.58 MG/DL (ref 0.7–1.3)
CREAT SERPL-MCNC: 2.63 MG/DL (ref 0.7–1.3)
CREAT SERPL-MCNC: 2.83 MG/DL (ref 0.7–1.3)
CREAT SERPL-MCNC: 3.05 MG/DL (ref 0.7–1.3)
CREAT SERPL-MCNC: 3.15 MG/DL (ref 0.7–1.3)
CREAT SERPL-MCNC: 3.16 MG/DL (ref 0.7–1.3)
CREAT SERPL-MCNC: 3.21 MG/DL (ref 0.7–1.3)
CREAT SERPL-MCNC: 3.9 MG/DL (ref 0.7–1.3)
CREAT SERPL-MCNC: 3.94 MG/DL (ref 0.7–1.3)
CREAT UR-MCNC: 152 MG/DL
CROSSMATCH RESULT,%XM: NORMAL
CRYOGLOB SER QL 1D COLD INC: NORMAL
DIAGNOSIS, 93000: NORMAL
DIFFERENTIAL METHOD BLD: ABNORMAL
DSDNA AB SER-ACNC: 1 IU/ML (ref 0–9)
EOSINOPHIL # BLD: 0 K/UL (ref 0–0.4)
EOSINOPHIL # BLD: 0 K/UL (ref 0–0.4)
EOSINOPHIL # BLD: 0.1 K/UL (ref 0–0.4)
EOSINOPHIL # BLD: 0.2 K/UL
EOSINOPHIL # BLD: 0.2 K/UL (ref 0–0.4)
EOSINOPHIL # BLD: 0.3 K/UL (ref 0–0.4)
EOSINOPHIL # BLD: 0.4 K/UL (ref 0–0.4)
EOSINOPHIL # BLD: 0.4 K/UL (ref 0–0.4)
EOSINOPHIL # BLD: 0.5 K/UL (ref 0–0.4)
EOSINOPHIL #/AREA URNS HPF: 0 /[HPF]
EOSINOPHIL NFR BLD: 0 % (ref 0–7)
EOSINOPHIL NFR BLD: 0 % (ref 0–7)
EOSINOPHIL NFR BLD: 1 % (ref 0–7)
EOSINOPHIL NFR BLD: 2 %
EOSINOPHIL NFR BLD: 3 % (ref 0–7)
EOSINOPHIL NFR BLD: 3 % (ref 0–7)
EOSINOPHIL NFR BLD: 4 % (ref 0–7)
EOSINOPHIL NFR BLD: 6 % (ref 0–7)
EPAP/CPAP/PEEP, PAPEEP: 6
EPAP/CPAP/PEEP, PAPEEP: 6
EPITH CASTS URNS QL MICRO: ABNORMAL /LPF
ERYTHROCYTE [DISTWIDTH] IN BLOOD BY AUTOMATED COUNT: 13.1 % (ref 11.5–14.5)
ERYTHROCYTE [DISTWIDTH] IN BLOOD BY AUTOMATED COUNT: 13.2 % (ref 11.5–14.5)
ERYTHROCYTE [DISTWIDTH] IN BLOOD BY AUTOMATED COUNT: 13.3 % (ref 11.5–14.5)
ERYTHROCYTE [DISTWIDTH] IN BLOOD BY AUTOMATED COUNT: 13.5 % (ref 11.5–14.5)
ERYTHROCYTE [DISTWIDTH] IN BLOOD BY AUTOMATED COUNT: 13.6 % (ref 11.5–14.5)
ERYTHROCYTE [DISTWIDTH] IN BLOOD BY AUTOMATED COUNT: 13.7 % (ref 11.5–14.5)
ERYTHROCYTE [DISTWIDTH] IN BLOOD BY AUTOMATED COUNT: 13.9 % (ref 11.5–14.5)
ERYTHROCYTE [DISTWIDTH] IN BLOOD BY AUTOMATED COUNT: 13.9 % (ref 11.5–14.5)
ERYTHROCYTE [DISTWIDTH] IN BLOOD BY AUTOMATED COUNT: 14.2 % (ref 11.5–14.5)
ERYTHROCYTE [DISTWIDTH] IN BLOOD BY AUTOMATED COUNT: 14.2 % (ref 11.5–14.5)
ERYTHROCYTE [DISTWIDTH] IN BLOOD BY AUTOMATED COUNT: 14.3 % (ref 11.5–14.5)
ERYTHROCYTE [DISTWIDTH] IN BLOOD BY AUTOMATED COUNT: 14.3 % (ref 11.5–14.5)
ERYTHROCYTE [DISTWIDTH] IN BLOOD BY AUTOMATED COUNT: 14.4 % (ref 11.5–14.5)
ERYTHROCYTE [DISTWIDTH] IN BLOOD BY AUTOMATED COUNT: 14.4 % (ref 11.5–14.5)
ERYTHROCYTE [DISTWIDTH] IN BLOOD BY AUTOMATED COUNT: 14.6 % (ref 11.5–14.5)
ERYTHROCYTE [DISTWIDTH] IN BLOOD BY AUTOMATED COUNT: 14.6 % (ref 11.5–14.5)
ERYTHROCYTE [DISTWIDTH] IN BLOOD BY AUTOMATED COUNT: 14.8 % (ref 11.5–14.5)
ERYTHROCYTE [DISTWIDTH] IN BLOOD BY AUTOMATED COUNT: 14.9 % (ref 11.5–14.5)
ERYTHROCYTE [DISTWIDTH] IN BLOOD BY AUTOMATED COUNT: 15 % (ref 11.5–14.5)
ERYTHROCYTE [DISTWIDTH] IN BLOOD BY AUTOMATED COUNT: 15.1 % (ref 11.5–14.5)
ERYTHROCYTE [DISTWIDTH] IN BLOOD BY AUTOMATED COUNT: 15.1 % (ref 11.5–14.5)
ERYTHROCYTE [DISTWIDTH] IN BLOOD BY AUTOMATED COUNT: 15.2 % (ref 11.5–14.5)
ERYTHROCYTE [DISTWIDTH] IN BLOOD BY AUTOMATED COUNT: 15.3 % (ref 11.5–14.5)
ERYTHROCYTE [DISTWIDTH] IN BLOOD BY AUTOMATED COUNT: 15.4 % (ref 11.5–14.5)
ERYTHROCYTE [DISTWIDTH] IN BLOOD BY AUTOMATED COUNT: 15.4 % (ref 11.5–14.5)
ERYTHROCYTE [DISTWIDTH] IN BLOOD BY AUTOMATED COUNT: 15.7 % (ref 11.5–14.5)
ERYTHROCYTE [DISTWIDTH] IN BLOOD BY AUTOMATED COUNT: 16.1 % (ref 11.5–14.5)
EST. AVERAGE GLUCOSE BLD GHB EST-MCNC: 140 MG/DL
EST. AVERAGE GLUCOSE BLD GHB EST-MCNC: 143 MG/DL
EST. AVERAGE GLUCOSE BLD GHB EST-MCNC: 151 MG/DL
FIBRINOGEN PPP-MCNC: 492 MG/DL (ref 200–475)
FIO2 ON VENT: 100 %
GAMMA GLOB SERPL ELPH-MCNC: 1.5 G/DL (ref 0.4–1.8)
GAS FLOW.O2 SETTING OXYMISER: 20 L/MIN
GAS FLOW.O2 SETTING OXYMISER: 20 L/MIN
GAS FLOW.O2 SETTING OXYMISER: 30 L/MIN
GBM IGG SER-ACNC: 6 UNITS (ref 0–20)
GLOBULIN SER CALC-MCNC: 3.7 G/DL (ref 2.2–3.9)
GLOBULIN SER CALC-MCNC: 4 G/DL (ref 2–4)
GLOBULIN SER CALC-MCNC: 4.3 G/DL (ref 2–4)
GLOBULIN SER CALC-MCNC: 4.3 G/DL (ref 2–4)
GLOBULIN SER CALC-MCNC: 4.4 G/DL (ref 2–4)
GLOBULIN SER CALC-MCNC: 4.6 G/DL (ref 2–4)
GLOBULIN SER CALC-MCNC: 4.7 G/DL (ref 2–4)
GLOBULIN SER CALC-MCNC: 5.2 G/DL (ref 2–4)
GLOBULIN SER CALC-MCNC: 5.2 G/DL (ref 2–4)
GLOBULIN SER CALC-MCNC: 5.3 G/DL (ref 2–4)
GLOBULIN SER CALC-MCNC: 5.5 G/DL (ref 2–4)
GLOBULIN SER CALC-MCNC: 5.5 G/DL (ref 2–4)
GLOBULIN SER CALC-MCNC: 5.6 G/DL (ref 2–4)
GLOBULIN SER CALC-MCNC: 5.9 G/DL (ref 2–4)
GLUCOSE BLD STRIP.AUTO-MCNC: 100 MG/DL (ref 65–100)
GLUCOSE BLD STRIP.AUTO-MCNC: 101 MG/DL (ref 65–100)
GLUCOSE BLD STRIP.AUTO-MCNC: 103 MG/DL (ref 65–100)
GLUCOSE BLD STRIP.AUTO-MCNC: 104 MG/DL (ref 65–100)
GLUCOSE BLD STRIP.AUTO-MCNC: 104 MG/DL (ref 65–100)
GLUCOSE BLD STRIP.AUTO-MCNC: 105 MG/DL (ref 65–100)
GLUCOSE BLD STRIP.AUTO-MCNC: 106 MG/DL (ref 65–100)
GLUCOSE BLD STRIP.AUTO-MCNC: 107 MG/DL (ref 65–100)
GLUCOSE BLD STRIP.AUTO-MCNC: 108 MG/DL (ref 65–100)
GLUCOSE BLD STRIP.AUTO-MCNC: 109 MG/DL (ref 65–100)
GLUCOSE BLD STRIP.AUTO-MCNC: 113 MG/DL (ref 65–100)
GLUCOSE BLD STRIP.AUTO-MCNC: 115 MG/DL (ref 65–100)
GLUCOSE BLD STRIP.AUTO-MCNC: 116 MG/DL (ref 65–100)
GLUCOSE BLD STRIP.AUTO-MCNC: 117 MG/DL (ref 65–100)
GLUCOSE BLD STRIP.AUTO-MCNC: 118 MG/DL (ref 65–100)
GLUCOSE BLD STRIP.AUTO-MCNC: 119 MG/DL (ref 65–100)
GLUCOSE BLD STRIP.AUTO-MCNC: 119 MG/DL (ref 65–100)
GLUCOSE BLD STRIP.AUTO-MCNC: 121 MG/DL (ref 65–100)
GLUCOSE BLD STRIP.AUTO-MCNC: 121 MG/DL (ref 65–100)
GLUCOSE BLD STRIP.AUTO-MCNC: 122 MG/DL (ref 65–100)
GLUCOSE BLD STRIP.AUTO-MCNC: 122 MG/DL (ref 65–100)
GLUCOSE BLD STRIP.AUTO-MCNC: 124 MG/DL (ref 65–100)
GLUCOSE BLD STRIP.AUTO-MCNC: 125 MG/DL (ref 65–100)
GLUCOSE BLD STRIP.AUTO-MCNC: 126 MG/DL (ref 65–100)
GLUCOSE BLD STRIP.AUTO-MCNC: 126 MG/DL (ref 65–100)
GLUCOSE BLD STRIP.AUTO-MCNC: 127 MG/DL (ref 65–100)
GLUCOSE BLD STRIP.AUTO-MCNC: 127 MG/DL (ref 65–100)
GLUCOSE BLD STRIP.AUTO-MCNC: 128 MG/DL (ref 65–100)
GLUCOSE BLD STRIP.AUTO-MCNC: 130 MG/DL (ref 65–100)
GLUCOSE BLD STRIP.AUTO-MCNC: 131 MG/DL (ref 65–100)
GLUCOSE BLD STRIP.AUTO-MCNC: 132 MG/DL (ref 65–100)
GLUCOSE BLD STRIP.AUTO-MCNC: 133 MG/DL (ref 65–100)
GLUCOSE BLD STRIP.AUTO-MCNC: 135 MG/DL (ref 65–100)
GLUCOSE BLD STRIP.AUTO-MCNC: 135 MG/DL (ref 65–100)
GLUCOSE BLD STRIP.AUTO-MCNC: 136 MG/DL (ref 65–100)
GLUCOSE BLD STRIP.AUTO-MCNC: 137 MG/DL (ref 65–100)
GLUCOSE BLD STRIP.AUTO-MCNC: 138 MG/DL (ref 65–100)
GLUCOSE BLD STRIP.AUTO-MCNC: 140 MG/DL (ref 65–100)
GLUCOSE BLD STRIP.AUTO-MCNC: 141 MG/DL (ref 65–100)
GLUCOSE BLD STRIP.AUTO-MCNC: 146 MG/DL (ref 65–100)
GLUCOSE BLD STRIP.AUTO-MCNC: 150 MG/DL (ref 65–100)
GLUCOSE BLD STRIP.AUTO-MCNC: 151 MG/DL (ref 65–100)
GLUCOSE BLD STRIP.AUTO-MCNC: 151 MG/DL (ref 65–100)
GLUCOSE BLD STRIP.AUTO-MCNC: 154 MG/DL (ref 65–100)
GLUCOSE BLD STRIP.AUTO-MCNC: 156 MG/DL (ref 65–100)
GLUCOSE BLD STRIP.AUTO-MCNC: 157 MG/DL (ref 65–100)
GLUCOSE BLD STRIP.AUTO-MCNC: 160 MG/DL (ref 65–100)
GLUCOSE BLD STRIP.AUTO-MCNC: 162 MG/DL (ref 65–100)
GLUCOSE BLD STRIP.AUTO-MCNC: 165 MG/DL (ref 65–100)
GLUCOSE BLD STRIP.AUTO-MCNC: 165 MG/DL (ref 65–100)
GLUCOSE BLD STRIP.AUTO-MCNC: 167 MG/DL (ref 65–100)
GLUCOSE BLD STRIP.AUTO-MCNC: 169 MG/DL (ref 65–100)
GLUCOSE BLD STRIP.AUTO-MCNC: 174 MG/DL (ref 65–100)
GLUCOSE BLD STRIP.AUTO-MCNC: 179 MG/DL (ref 65–100)
GLUCOSE BLD STRIP.AUTO-MCNC: 181 MG/DL (ref 65–100)
GLUCOSE BLD STRIP.AUTO-MCNC: 184 MG/DL (ref 65–100)
GLUCOSE BLD STRIP.AUTO-MCNC: 191 MG/DL (ref 65–100)
GLUCOSE BLD STRIP.AUTO-MCNC: 193 MG/DL (ref 65–100)
GLUCOSE BLD STRIP.AUTO-MCNC: 194 MG/DL (ref 65–100)
GLUCOSE BLD STRIP.AUTO-MCNC: 196 MG/DL (ref 65–100)
GLUCOSE BLD STRIP.AUTO-MCNC: 196 MG/DL (ref 65–100)
GLUCOSE BLD STRIP.AUTO-MCNC: 198 MG/DL (ref 65–100)
GLUCOSE BLD STRIP.AUTO-MCNC: 200 MG/DL (ref 65–100)
GLUCOSE BLD STRIP.AUTO-MCNC: 205 MG/DL (ref 65–100)
GLUCOSE BLD STRIP.AUTO-MCNC: 206 MG/DL (ref 65–100)
GLUCOSE BLD STRIP.AUTO-MCNC: 210 MG/DL (ref 65–100)
GLUCOSE BLD STRIP.AUTO-MCNC: 216 MG/DL (ref 65–100)
GLUCOSE BLD STRIP.AUTO-MCNC: 218 MG/DL (ref 65–100)
GLUCOSE BLD STRIP.AUTO-MCNC: 222 MG/DL (ref 65–100)
GLUCOSE BLD STRIP.AUTO-MCNC: 223 MG/DL (ref 65–100)
GLUCOSE BLD STRIP.AUTO-MCNC: 240 MG/DL (ref 65–100)
GLUCOSE BLD STRIP.AUTO-MCNC: 242 MG/DL (ref 65–100)
GLUCOSE BLD STRIP.AUTO-MCNC: 242 MG/DL (ref 65–100)
GLUCOSE BLD STRIP.AUTO-MCNC: 253 MG/DL (ref 65–100)
GLUCOSE BLD STRIP.AUTO-MCNC: 264 MG/DL (ref 65–100)
GLUCOSE BLD STRIP.AUTO-MCNC: 270 MG/DL (ref 65–100)
GLUCOSE BLD STRIP.AUTO-MCNC: 275 MG/DL (ref 65–100)
GLUCOSE BLD STRIP.AUTO-MCNC: 279 MG/DL (ref 65–100)
GLUCOSE BLD STRIP.AUTO-MCNC: 282 MG/DL (ref 65–100)
GLUCOSE BLD STRIP.AUTO-MCNC: 285 MG/DL (ref 65–100)
GLUCOSE BLD STRIP.AUTO-MCNC: 287 MG/DL (ref 65–100)
GLUCOSE BLD STRIP.AUTO-MCNC: 288 MG/DL (ref 65–100)
GLUCOSE BLD STRIP.AUTO-MCNC: 299 MG/DL (ref 65–100)
GLUCOSE BLD STRIP.AUTO-MCNC: 300 MG/DL (ref 65–100)
GLUCOSE BLD STRIP.AUTO-MCNC: 306 MG/DL (ref 65–100)
GLUCOSE BLD STRIP.AUTO-MCNC: 307 MG/DL (ref 65–100)
GLUCOSE BLD STRIP.AUTO-MCNC: 40 MG/DL (ref 65–100)
GLUCOSE BLD STRIP.AUTO-MCNC: 41 MG/DL (ref 65–100)
GLUCOSE BLD STRIP.AUTO-MCNC: 42 MG/DL (ref 65–100)
GLUCOSE BLD STRIP.AUTO-MCNC: 523 MG/DL (ref 65–100)
GLUCOSE BLD STRIP.AUTO-MCNC: 54 MG/DL (ref 65–100)
GLUCOSE BLD STRIP.AUTO-MCNC: 55 MG/DL (ref 65–100)
GLUCOSE BLD STRIP.AUTO-MCNC: 75 MG/DL (ref 65–100)
GLUCOSE BLD STRIP.AUTO-MCNC: 78 MG/DL (ref 65–100)
GLUCOSE BLD STRIP.AUTO-MCNC: 78 MG/DL (ref 65–100)
GLUCOSE BLD STRIP.AUTO-MCNC: 80 MG/DL (ref 65–100)
GLUCOSE BLD STRIP.AUTO-MCNC: 81 MG/DL (ref 65–100)
GLUCOSE BLD STRIP.AUTO-MCNC: 84 MG/DL (ref 65–100)
GLUCOSE BLD STRIP.AUTO-MCNC: 85 MG/DL (ref 65–100)
GLUCOSE BLD STRIP.AUTO-MCNC: 86 MG/DL (ref 65–100)
GLUCOSE BLD STRIP.AUTO-MCNC: 88 MG/DL (ref 65–100)
GLUCOSE BLD STRIP.AUTO-MCNC: 89 MG/DL (ref 65–100)
GLUCOSE BLD STRIP.AUTO-MCNC: 92 MG/DL (ref 65–100)
GLUCOSE BLD STRIP.AUTO-MCNC: 94 MG/DL (ref 65–100)
GLUCOSE BLD STRIP.AUTO-MCNC: 95 MG/DL (ref 65–100)
GLUCOSE BLD STRIP.AUTO-MCNC: 97 MG/DL (ref 65–100)
GLUCOSE BLD STRIP.AUTO-MCNC: 97 MG/DL (ref 65–100)
GLUCOSE BLD-MCNC: 123 MG/DL (ref 65–100)
GLUCOSE SERPL-MCNC: 110 MG/DL (ref 65–100)
GLUCOSE SERPL-MCNC: 113 MG/DL (ref 65–100)
GLUCOSE SERPL-MCNC: 114 MG/DL (ref 65–100)
GLUCOSE SERPL-MCNC: 115 MG/DL (ref 65–100)
GLUCOSE SERPL-MCNC: 119 MG/DL (ref 65–100)
GLUCOSE SERPL-MCNC: 120 MG/DL (ref 65–100)
GLUCOSE SERPL-MCNC: 125 MG/DL (ref 65–100)
GLUCOSE SERPL-MCNC: 130 MG/DL (ref 65–100)
GLUCOSE SERPL-MCNC: 130 MG/DL (ref 65–100)
GLUCOSE SERPL-MCNC: 132 MG/DL (ref 65–100)
GLUCOSE SERPL-MCNC: 138 MG/DL (ref 65–100)
GLUCOSE SERPL-MCNC: 140 MG/DL (ref 65–100)
GLUCOSE SERPL-MCNC: 146 MG/DL (ref 65–100)
GLUCOSE SERPL-MCNC: 153 MG/DL (ref 65–100)
GLUCOSE SERPL-MCNC: 166 MG/DL (ref 65–100)
GLUCOSE SERPL-MCNC: 186 MG/DL (ref 65–100)
GLUCOSE SERPL-MCNC: 188 MG/DL (ref 65–100)
GLUCOSE SERPL-MCNC: 221 MG/DL (ref 65–100)
GLUCOSE SERPL-MCNC: 243 MG/DL (ref 65–100)
GLUCOSE SERPL-MCNC: 268 MG/DL (ref 65–100)
GLUCOSE SERPL-MCNC: 269 MG/DL (ref 65–100)
GLUCOSE SERPL-MCNC: 62 MG/DL (ref 65–100)
GLUCOSE SERPL-MCNC: 77 MG/DL (ref 65–100)
GLUCOSE SERPL-MCNC: 82 MG/DL (ref 65–100)
GLUCOSE SERPL-MCNC: 88 MG/DL (ref 65–100)
GLUCOSE SERPL-MCNC: 89 MG/DL (ref 65–100)
GLUCOSE SERPL-MCNC: 93 MG/DL (ref 65–100)
GLUCOSE SERPL-MCNC: 94 MG/DL (ref 65–100)
GLUCOSE SERPL-MCNC: 94 MG/DL (ref 65–100)
GLUCOSE SERPL-MCNC: 96 MG/DL (ref 65–100)
GLUCOSE SERPL-MCNC: 97 MG/DL (ref 65–100)
GLUCOSE SERPL-MCNC: 98 MG/DL (ref 65–100)
GLUCOSE SERPL-MCNC: 99 MG/DL (ref 65–100)
GLUCOSE UR STRIP.AUTO-MCNC: NEGATIVE MG/DL
H PYLORI IGA SER-ACNC: 13.3 UNITS (ref 0–8.9)
H PYLORI IGG SER IA-ACNC: <0.9 U/ML (ref 0–0.8)
HBA1C MFR BLD: 6.5 % (ref 4.2–6.3)
HBA1C MFR BLD: 6.6 % (ref 4.2–6.3)
HBA1C MFR BLD: 6.9 % (ref 4.2–6.3)
HBV SURFACE AB SER QL: NONREACTIVE
HBV SURFACE AB SER-ACNC: <3.1 MIU/ML
HCO3 BLDA-SCNC: 14 MMOL/L (ref 22–26)
HCO3 BLDA-SCNC: 15 MMOL/L (ref 22–26)
HCO3 BLDA-SCNC: 21 MMOL/L (ref 22–26)
HCT VFR BLD AUTO: 18.6 % (ref 36.6–50.3)
HCT VFR BLD AUTO: 18.8 % (ref 36.6–50.3)
HCT VFR BLD AUTO: 22.8 % (ref 36.6–50.3)
HCT VFR BLD AUTO: 23.3 % (ref 36.6–50.3)
HCT VFR BLD AUTO: 23.3 % (ref 36.6–50.3)
HCT VFR BLD AUTO: 24 % (ref 36.6–50.3)
HCT VFR BLD AUTO: 25.2 % (ref 36.6–50.3)
HCT VFR BLD AUTO: 25.5 % (ref 36.6–50.3)
HCT VFR BLD AUTO: 26.5 % (ref 36.6–50.3)
HCT VFR BLD AUTO: 26.6 % (ref 36.6–50.3)
HCT VFR BLD AUTO: 26.7 % (ref 36.6–50.3)
HCT VFR BLD AUTO: 29.3 % (ref 36.6–50.3)
HCT VFR BLD AUTO: 29.4 % (ref 36.6–50.3)
HCT VFR BLD AUTO: 29.4 % (ref 36.6–50.3)
HCT VFR BLD AUTO: 29.6 % (ref 36.6–50.3)
HCT VFR BLD AUTO: 30 % (ref 36.6–50.3)
HCT VFR BLD AUTO: 30.2 % (ref 36.6–50.3)
HCT VFR BLD AUTO: 30.4 % (ref 36.6–50.3)
HCT VFR BLD AUTO: 30.4 % (ref 36.6–50.3)
HCT VFR BLD AUTO: 30.5 % (ref 36.6–50.3)
HCT VFR BLD AUTO: 30.6 % (ref 36.6–50.3)
HCT VFR BLD AUTO: 31.1 % (ref 36.6–50.3)
HCT VFR BLD AUTO: 31.1 % (ref 36.6–50.3)
HCT VFR BLD AUTO: 31.4 % (ref 36.6–50.3)
HCT VFR BLD AUTO: 31.6 % (ref 36.6–50.3)
HCT VFR BLD AUTO: 31.8 % (ref 36.6–50.3)
HCT VFR BLD AUTO: 32.1 % (ref 36.6–50.3)
HCT VFR BLD AUTO: 32.5 % (ref 36.6–50.3)
HCT VFR BLD AUTO: 34 % (ref 36.6–50.3)
HCT VFR BLD AUTO: 34.1 % (ref 36.6–50.3)
HCT VFR BLD AUTO: 34.4 % (ref 36.6–50.3)
HCT VFR BLD AUTO: 36.1 % (ref 36.6–50.3)
HCT VFR BLD CALC: 35 % (ref 36.6–50.3)
HCV AB SERPL QL IA: NONREACTIVE
HCV COMMENT,HCGAC: NORMAL
HDLC SERPL-MCNC: 30 MG/DL
HDLC SERPL: 8.3 {RATIO} (ref 0–5)
HGB BLD-MCNC: 10 G/DL (ref 12.1–17)
HGB BLD-MCNC: 10.1 G/DL (ref 12.1–17)
HGB BLD-MCNC: 10.1 G/DL (ref 12.1–17)
HGB BLD-MCNC: 10.2 G/DL (ref 12.1–17)
HGB BLD-MCNC: 10.4 G/DL (ref 12.1–17)
HGB BLD-MCNC: 10.4 G/DL (ref 12.1–17)
HGB BLD-MCNC: 10.5 G/DL (ref 12.1–17)
HGB BLD-MCNC: 10.7 G/DL (ref 12.1–17)
HGB BLD-MCNC: 10.7 G/DL (ref 12.1–17)
HGB BLD-MCNC: 10.8 G/DL (ref 12.1–17)
HGB BLD-MCNC: 10.9 G/DL (ref 12.1–17)
HGB BLD-MCNC: 11.3 G/DL (ref 12.1–17)
HGB BLD-MCNC: 11.6 G/DL (ref 12.1–17)
HGB BLD-MCNC: 11.9 GM/DL (ref 12.1–17)
HGB BLD-MCNC: 12.6 G/DL (ref 12.1–17)
HGB BLD-MCNC: 6.1 G/DL (ref 12.1–17)
HGB BLD-MCNC: 6.2 G/DL (ref 12.1–17)
HGB BLD-MCNC: 7.3 G/DL (ref 12.1–17)
HGB BLD-MCNC: 7.6 G/DL (ref 12.1–17)
HGB BLD-MCNC: 7.7 G/DL (ref 12.1–17)
HGB BLD-MCNC: 7.8 G/DL (ref 12.1–17)
HGB BLD-MCNC: 7.9 G/DL (ref 12.1–17)
HGB BLD-MCNC: 8.7 G/DL (ref 12.1–17)
HGB BLD-MCNC: 8.7 G/DL (ref 12.1–17)
HGB BLD-MCNC: 8.8 G/DL (ref 12.1–17)
HGB BLD-MCNC: 8.8 G/DL (ref 12.1–17)
HGB BLD-MCNC: 9 G/DL (ref 12.1–17)
HGB BLD-MCNC: 9.4 G/DL (ref 12.1–17)
HGB BLD-MCNC: 9.5 G/DL (ref 12.1–17)
HGB BLD-MCNC: 9.7 G/DL (ref 12.1–17)
HGB BLD-MCNC: 9.7 G/DL (ref 12.1–17)
HGB BLD-MCNC: 9.9 G/DL (ref 12.1–17)
HGB UR QL STRIP: ABNORMAL
INR PPP: 1.1 (ref 0.9–1.1)
INR PPP: 1.2 (ref 0.9–1.1)
INR PPP: 1.4 (ref 0.9–1.1)
INR PPP: 1.4 (ref 0.9–1.1)
KETONES UR QL STRIP.AUTO: ABNORMAL MG/DL
KETONES UR QL STRIP.AUTO: ABNORMAL MG/DL
KETONES UR QL STRIP.AUTO: NEGATIVE MG/DL
LACTATE SERPL-SCNC: 0.6 MMOL/L (ref 0.4–2)
LACTATE SERPL-SCNC: 1.3 MMOL/L (ref 0.4–2)
LACTATE SERPL-SCNC: 1.3 MMOL/L (ref 0.4–2)
LACTATE SERPL-SCNC: 1.4 MMOL/L (ref 0.4–2)
LACTATE SERPL-SCNC: 1.4 MMOL/L (ref 0.4–2)
LACTATE SERPL-SCNC: 12.6 MMOL/L (ref 0.4–2)
LDLC SERPL CALC-MCNC: 182.8 MG/DL (ref 0–100)
LEUKOCYTE ESTERASE UR QL STRIP.AUTO: ABNORMAL
LIPASE SERPL-CCNC: 177 U/L (ref 73–393)
LIPASE SERPL-CCNC: 228 U/L (ref 73–393)
LIPID PROFILE,FLP: ABNORMAL
LYMPHOCYTES # BLD AUTO: 10 % (ref 12–49)
LYMPHOCYTES # BLD AUTO: 14 % (ref 12–49)
LYMPHOCYTES # BLD AUTO: 16 % (ref 12–49)
LYMPHOCYTES # BLD AUTO: 18 % (ref 12–49)
LYMPHOCYTES # BLD AUTO: 18 % (ref 12–49)
LYMPHOCYTES # BLD AUTO: 23 % (ref 12–49)
LYMPHOCYTES # BLD AUTO: 27 % (ref 12–49)
LYMPHOCYTES # BLD AUTO: 28 % (ref 12–49)
LYMPHOCYTES # BLD AUTO: 4 % (ref 12–49)
LYMPHOCYTES # BLD AUTO: 5 % (ref 12–49)
LYMPHOCYTES # BLD AUTO: 8 % (ref 12–49)
LYMPHOCYTES # BLD: 0.5 K/UL (ref 0.8–3.5)
LYMPHOCYTES # BLD: 0.6 K/UL
LYMPHOCYTES # BLD: 0.8 K/UL (ref 0.8–3.5)
LYMPHOCYTES # BLD: 0.8 K/UL (ref 0.8–3.5)
LYMPHOCYTES # BLD: 1.1 K/UL (ref 0.8–3.5)
LYMPHOCYTES # BLD: 1.3 K/UL (ref 0.8–3.5)
LYMPHOCYTES # BLD: 1.3 K/UL (ref 0.8–3.5)
LYMPHOCYTES # BLD: 1.6 K/UL (ref 0.8–3.5)
LYMPHOCYTES # BLD: 1.6 K/UL (ref 0.8–3.5)
LYMPHOCYTES # BLD: 1.7 K/UL (ref 0.8–3.5)
LYMPHOCYTES # BLD: 1.8 K/UL (ref 0.8–3.5)
LYMPHOCYTES # BLD: 1.9 K/UL (ref 0.8–3.5)
LYMPHOCYTES # BLD: 2 K/UL (ref 0.8–3.5)
LYMPHOCYTES # BLD: 2.5 K/UL (ref 0.8–3.5)
LYMPHOCYTES NFR BLD: 11 % (ref 12–49)
LYMPHOCYTES NFR BLD: 13 % (ref 12–49)
LYMPHOCYTES NFR BLD: 5 %
M PROTEIN SERPL ELPH-MCNC: ABNORMAL G/DL
MAGNESIUM SERPL-MCNC: 1.1 MG/DL (ref 1.6–2.4)
MAGNESIUM SERPL-MCNC: 1.2 MG/DL (ref 1.6–2.4)
MAGNESIUM SERPL-MCNC: 1.3 MG/DL (ref 1.6–2.4)
MAGNESIUM SERPL-MCNC: 1.8 MG/DL (ref 1.6–2.4)
MAGNESIUM SERPL-MCNC: 1.9 MG/DL (ref 1.6–2.4)
MAGNESIUM SERPL-MCNC: 2 MG/DL (ref 1.6–2.4)
MAGNESIUM SERPL-MCNC: 2 MG/DL (ref 1.6–2.4)
MAGNESIUM SERPL-MCNC: 2.2 MG/DL (ref 1.6–2.4)
MAGNESIUM SERPL-MCNC: 2.3 MG/DL (ref 1.6–2.4)
MAGNESIUM SERPL-MCNC: 3.2 MG/DL (ref 1.6–2.4)
MCH RBC QN AUTO: 26.9 PG (ref 26–34)
MCH RBC QN AUTO: 27 PG (ref 26–34)
MCH RBC QN AUTO: 27.3 PG (ref 26–34)
MCH RBC QN AUTO: 27.4 PG (ref 26–34)
MCH RBC QN AUTO: 27.5 PG (ref 26–34)
MCH RBC QN AUTO: 27.8 PG (ref 26–34)
MCH RBC QN AUTO: 27.9 PG (ref 26–34)
MCH RBC QN AUTO: 28.1 PG (ref 26–34)
MCH RBC QN AUTO: 28.3 PG (ref 26–34)
MCH RBC QN AUTO: 28.4 PG (ref 26–34)
MCH RBC QN AUTO: 28.5 PG (ref 26–34)
MCH RBC QN AUTO: 28.8 PG (ref 26–34)
MCH RBC QN AUTO: 28.9 PG (ref 26–34)
MCH RBC QN AUTO: 28.9 PG (ref 26–34)
MCH RBC QN AUTO: 29 PG (ref 26–34)
MCH RBC QN AUTO: 29.1 PG (ref 26–34)
MCH RBC QN AUTO: 29.2 PG (ref 26–34)
MCH RBC QN AUTO: 29.3 PG (ref 26–34)
MCH RBC QN AUTO: 29.3 PG (ref 26–34)
MCH RBC QN AUTO: 29.5 PG (ref 26–34)
MCH RBC QN AUTO: 29.8 PG (ref 26–34)
MCH RBC QN AUTO: 30 PG (ref 26–34)
MCH RBC QN AUTO: 30 PG (ref 26–34)
MCHC RBC AUTO-ENTMCNC: 31.3 G/DL (ref 30–36.5)
MCHC RBC AUTO-ENTMCNC: 31.4 G/DL (ref 30–36.5)
MCHC RBC AUTO-ENTMCNC: 31.7 G/DL (ref 30–36.5)
MCHC RBC AUTO-ENTMCNC: 31.8 G/DL (ref 30–36.5)
MCHC RBC AUTO-ENTMCNC: 31.9 G/DL (ref 30–36.5)
MCHC RBC AUTO-ENTMCNC: 32 G/DL (ref 30–36.5)
MCHC RBC AUTO-ENTMCNC: 32 G/DL (ref 30–36.5)
MCHC RBC AUTO-ENTMCNC: 32.1 G/DL (ref 30–36.5)
MCHC RBC AUTO-ENTMCNC: 32.3 G/DL (ref 30–36.5)
MCHC RBC AUTO-ENTMCNC: 32.4 G/DL (ref 30–36.5)
MCHC RBC AUTO-ENTMCNC: 32.5 G/DL (ref 30–36.5)
MCHC RBC AUTO-ENTMCNC: 32.8 G/DL (ref 30–36.5)
MCHC RBC AUTO-ENTMCNC: 32.8 G/DL (ref 30–36.5)
MCHC RBC AUTO-ENTMCNC: 33 G/DL (ref 30–36.5)
MCHC RBC AUTO-ENTMCNC: 33.1 G/DL (ref 30–36.5)
MCHC RBC AUTO-ENTMCNC: 33.3 G/DL (ref 30–36.5)
MCHC RBC AUTO-ENTMCNC: 33.4 G/DL (ref 30–36.5)
MCHC RBC AUTO-ENTMCNC: 33.5 G/DL (ref 30–36.5)
MCHC RBC AUTO-ENTMCNC: 33.7 G/DL (ref 30–36.5)
MCHC RBC AUTO-ENTMCNC: 33.9 G/DL (ref 30–36.5)
MCHC RBC AUTO-ENTMCNC: 34 G/DL (ref 30–36.5)
MCHC RBC AUTO-ENTMCNC: 34.1 G/DL (ref 30–36.5)
MCHC RBC AUTO-ENTMCNC: 34.1 G/DL (ref 30–36.5)
MCHC RBC AUTO-ENTMCNC: 34.2 G/DL (ref 30–36.5)
MCHC RBC AUTO-ENTMCNC: 34.5 G/DL (ref 30–36.5)
MCHC RBC AUTO-ENTMCNC: 34.8 G/DL (ref 30–36.5)
MCHC RBC AUTO-ENTMCNC: 34.9 G/DL (ref 30–36.5)
MCV RBC AUTO: 82.4 FL (ref 80–99)
MCV RBC AUTO: 83 FL (ref 80–99)
MCV RBC AUTO: 84 FL (ref 80–99)
MCV RBC AUTO: 84.6 FL (ref 80–99)
MCV RBC AUTO: 84.7 FL (ref 80–99)
MCV RBC AUTO: 85.1 FL (ref 80–99)
MCV RBC AUTO: 85.4 FL (ref 80–99)
MCV RBC AUTO: 85.7 FL (ref 80–99)
MCV RBC AUTO: 85.9 FL (ref 80–99)
MCV RBC AUTO: 86 FL (ref 80–99)
MCV RBC AUTO: 86 FL (ref 80–99)
MCV RBC AUTO: 86.1 FL (ref 80–99)
MCV RBC AUTO: 86.2 FL (ref 80–99)
MCV RBC AUTO: 86.2 FL (ref 80–99)
MCV RBC AUTO: 86.4 FL (ref 80–99)
MCV RBC AUTO: 86.4 FL (ref 80–99)
MCV RBC AUTO: 86.5 FL (ref 80–99)
MCV RBC AUTO: 86.6 FL (ref 80–99)
MCV RBC AUTO: 86.6 FL (ref 80–99)
MCV RBC AUTO: 86.7 FL (ref 80–99)
MCV RBC AUTO: 87.2 FL (ref 80–99)
MCV RBC AUTO: 87.5 FL (ref 80–99)
MCV RBC AUTO: 87.6 FL (ref 80–99)
MCV RBC AUTO: 87.6 FL (ref 80–99)
MCV RBC AUTO: 89.9 FL (ref 80–99)
MONOCYTES # BLD: 0.3 K/UL
MONOCYTES # BLD: 0.4 K/UL (ref 0–1)
MONOCYTES # BLD: 0.7 K/UL (ref 0–1)
MONOCYTES # BLD: 0.8 K/UL (ref 0–1)
MONOCYTES # BLD: 0.9 K/UL (ref 0–1)
MONOCYTES # BLD: 1 K/UL (ref 0–1)
MONOCYTES # BLD: 1.1 K/UL (ref 0–1)
MONOCYTES # BLD: 1.4 K/UL (ref 0–1)
MONOCYTES # BLD: 1.8 K/UL (ref 0–1)
MONOCYTES NFR BLD AUTO: 10 % (ref 5–13)
MONOCYTES NFR BLD AUTO: 10 % (ref 5–13)
MONOCYTES NFR BLD AUTO: 11 % (ref 5–13)
MONOCYTES NFR BLD AUTO: 12 % (ref 5–13)
MONOCYTES NFR BLD AUTO: 12 % (ref 5–13)
MONOCYTES NFR BLD AUTO: 14 % (ref 5–13)
MONOCYTES NFR BLD AUTO: 3 % (ref 5–13)
MONOCYTES NFR BLD AUTO: 6 % (ref 5–13)
MONOCYTES NFR BLD AUTO: 8 % (ref 5–13)
MONOCYTES NFR BLD AUTO: 8 % (ref 5–13)
MONOCYTES NFR BLD AUTO: 9 % (ref 5–13)
MONOCYTES NFR BLD: 10 % (ref 5–13)
MONOCYTES NFR BLD: 3 %
MONOCYTES NFR BLD: 9 % (ref 5–13)
MUCOUS THREADS URNS QL MICRO: ABNORMAL /LPF
NEUTS BAND NFR BLD MANUAL: 16 %
NEUTS SEG # BLD: 10.1 K/UL
NEUTS SEG # BLD: 12.5 K/UL (ref 1.8–8)
NEUTS SEG # BLD: 14.7 K/UL (ref 1.8–8)
NEUTS SEG # BLD: 3.7 K/UL (ref 1.8–8)
NEUTS SEG # BLD: 4.7 K/UL (ref 1.8–8)
NEUTS SEG # BLD: 5.5 K/UL (ref 1.8–8)
NEUTS SEG # BLD: 5.9 K/UL (ref 1.8–8)
NEUTS SEG # BLD: 7 K/UL (ref 1.8–8)
NEUTS SEG # BLD: 7.4 K/UL (ref 1.8–8)
NEUTS SEG # BLD: 7.6 K/UL (ref 1.8–8)
NEUTS SEG # BLD: 8.3 K/UL (ref 1.8–8)
NEUTS SEG # BLD: 8.7 K/UL (ref 1.8–8)
NEUTS SEG # BLD: 9.3 K/UL (ref 1.8–8)
NEUTS SEG # BLD: 9.9 K/UL (ref 1.8–8)
NEUTS SEG NFR BLD AUTO: 54 % (ref 32–75)
NEUTS SEG NFR BLD AUTO: 59 % (ref 32–75)
NEUTS SEG NFR BLD AUTO: 60 % (ref 32–75)
NEUTS SEG NFR BLD AUTO: 67 % (ref 32–75)
NEUTS SEG NFR BLD AUTO: 71 % (ref 32–75)
NEUTS SEG NFR BLD AUTO: 73 % (ref 32–75)
NEUTS SEG NFR BLD AUTO: 73 % (ref 32–75)
NEUTS SEG NFR BLD AUTO: 75 % (ref 32–75)
NEUTS SEG NFR BLD AUTO: 85 % (ref 32–75)
NEUTS SEG NFR BLD AUTO: 87 % (ref 32–75)
NEUTS SEG NFR BLD AUTO: 93 % (ref 32–75)
NEUTS SEG NFR BLD: 74 %
NEUTS SEG NFR BLD: 74 % (ref 32–75)
NEUTS SEG NFR BLD: 79 % (ref 32–75)
NITRITE UR QL STRIP.AUTO: NEGATIVE
NITRITE UR QL STRIP.AUTO: POSITIVE
P-ANCA ATYPICAL TITR SER IF: NORMAL TITER
P-ANCA TITR SER IF: NORMAL TITER
P-R INTERVAL, ECG05: 162 MS
P-R INTERVAL, ECG05: 180 MS
P-R INTERVAL, ECG05: 182 MS
P-R INTERVAL, ECG05: 184 MS
P-R INTERVAL, ECG05: 190 MS
P-R INTERVAL, ECG05: 190 MS
P-R INTERVAL, ECG05: 198 MS
P-R INTERVAL, ECG05: 204 MS
P-R INTERVAL, ECG05: 280 MS
PCO2 BLDA: 28 MMHG (ref 35–45)
PCO2 BLDA: 29 MMHG (ref 35–45)
PCO2 BLDA: 46 MMHG (ref 35–45)
PH BLDA: 7.14 [PH] (ref 7.35–7.45)
PH BLDA: 7.32 [PH] (ref 7.35–7.45)
PH BLDA: 7.49 [PH] (ref 7.35–7.45)
PH UR STRIP: 5.5 [PH] (ref 5–8)
PH UR STRIP: 6 [PH] (ref 5–8)
PH UR STRIP: 6 [PH] (ref 5–8)
PH UR STRIP: 7 [PH] (ref 5–8)
PHOSPHATE SERPL-MCNC: 2.6 MG/DL (ref 2.6–4.7)
PHOSPHATE SERPL-MCNC: 2.7 MG/DL (ref 2.6–4.7)
PHOSPHATE SERPL-MCNC: 2.8 MG/DL (ref 2.6–4.7)
PHOSPHATE SERPL-MCNC: 3.2 MG/DL (ref 2.6–4.7)
PHOSPHATE SERPL-MCNC: 3.5 MG/DL (ref 2.6–4.7)
PHOSPHATE SERPL-MCNC: 3.8 MG/DL (ref 2.6–4.7)
PHOSPHATE SERPL-MCNC: 4 MG/DL (ref 2.6–4.7)
PLATELET # BLD AUTO: 148 K/UL (ref 150–400)
PLATELET # BLD AUTO: 204 K/UL (ref 150–400)
PLATELET # BLD AUTO: 220 K/UL (ref 150–400)
PLATELET # BLD AUTO: 227 K/UL (ref 150–400)
PLATELET # BLD AUTO: 236 K/UL (ref 150–400)
PLATELET # BLD AUTO: 247 K/UL (ref 150–400)
PLATELET # BLD AUTO: 256 K/UL (ref 150–400)
PLATELET # BLD AUTO: 261 K/UL (ref 150–400)
PLATELET # BLD AUTO: 263 K/UL (ref 150–400)
PLATELET # BLD AUTO: 267 K/UL (ref 150–400)
PLATELET # BLD AUTO: 280 K/UL (ref 150–400)
PLATELET # BLD AUTO: 280 K/UL (ref 150–400)
PLATELET # BLD AUTO: 281 K/UL (ref 150–400)
PLATELET # BLD AUTO: 284 K/UL (ref 150–400)
PLATELET # BLD AUTO: 292 K/UL (ref 150–400)
PLATELET # BLD AUTO: 302 K/UL (ref 150–400)
PLATELET # BLD AUTO: 304 K/UL (ref 150–400)
PLATELET # BLD AUTO: 305 K/UL (ref 150–400)
PLATELET # BLD AUTO: 306 K/UL (ref 150–400)
PLATELET # BLD AUTO: 324 K/UL (ref 150–400)
PLATELET # BLD AUTO: 356 K/UL (ref 150–400)
PLATELET # BLD AUTO: 358 K/UL (ref 150–400)
PLATELET # BLD AUTO: 391 K/UL (ref 150–400)
PLATELET # BLD AUTO: 418 K/UL (ref 150–400)
PLATELET # BLD AUTO: 466 K/UL (ref 150–400)
PLATELET # BLD AUTO: 467 K/UL (ref 150–400)
PLATELET # BLD AUTO: 475 K/UL (ref 150–400)
PO2 BLDA: 109 MMHG (ref 80–100)
PO2 BLDA: 45 MMHG (ref 80–100)
PO2 BLDA: 60 MMHG (ref 80–100)
POTASSIUM BLD-SCNC: 4.6 MMOL/L (ref 3.5–5.1)
POTASSIUM SERPL-SCNC: 3.4 MMOL/L (ref 3.5–5.1)
POTASSIUM SERPL-SCNC: 4 MMOL/L (ref 3.5–5.1)
POTASSIUM SERPL-SCNC: 4.1 MMOL/L (ref 3.5–5.1)
POTASSIUM SERPL-SCNC: 4.2 MMOL/L (ref 3.5–5.1)
POTASSIUM SERPL-SCNC: 4.3 MMOL/L (ref 3.5–5.1)
POTASSIUM SERPL-SCNC: 4.4 MMOL/L (ref 3.5–5.1)
POTASSIUM SERPL-SCNC: 4.5 MMOL/L (ref 3.5–5.1)
POTASSIUM SERPL-SCNC: 4.6 MMOL/L (ref 3.5–5.1)
POTASSIUM SERPL-SCNC: 4.7 MMOL/L (ref 3.5–5.1)
POTASSIUM SERPL-SCNC: 4.9 MMOL/L (ref 3.5–5.1)
POTASSIUM SERPL-SCNC: 4.9 MMOL/L (ref 3.5–5.1)
POTASSIUM SERPL-SCNC: 5 MMOL/L (ref 3.5–5.1)
POTASSIUM SERPL-SCNC: 5.1 MMOL/L (ref 3.5–5.1)
POTASSIUM SERPL-SCNC: 5.4 MMOL/L (ref 3.5–5.1)
POTASSIUM SERPL-SCNC: 6.1 MMOL/L (ref 3.5–5.1)
PROT SERPL-MCNC: 6.5 G/DL (ref 6.4–8.2)
PROT SERPL-MCNC: 6.5 G/DL (ref 6–8.5)
PROT SERPL-MCNC: 7 G/DL (ref 6.4–8.2)
PROT SERPL-MCNC: 7.3 G/DL (ref 6.4–8.2)
PROT SERPL-MCNC: 7.5 G/DL (ref 6.4–8.2)
PROT SERPL-MCNC: 7.7 G/DL (ref 6.4–8.2)
PROT SERPL-MCNC: 8 G/DL (ref 6.4–8.2)
PROT SERPL-MCNC: 8.1 G/DL (ref 6.4–8.2)
PROT SERPL-MCNC: 8.2 G/DL (ref 6.4–8.2)
PROT SERPL-MCNC: 8.3 G/DL (ref 6.4–8.2)
PROT SERPL-MCNC: 8.3 G/DL (ref 6.4–8.2)
PROT SERPL-MCNC: 8.4 G/DL (ref 6.4–8.2)
PROT SERPL-MCNC: 8.5 G/DL (ref 6.4–8.2)
PROT SERPL-MCNC: 8.8 G/DL (ref 6.4–8.2)
PROT UR STRIP-MCNC: 100 MG/DL
PROT UR STRIP-MCNC: >300 MG/DL
PROTHROMBIN TIME: 11 SEC (ref 9–11.1)
PROTHROMBIN TIME: 12.1 SEC (ref 9–11.1)
PROTHROMBIN TIME: 14.6 SEC (ref 9–11.1)
PROTHROMBIN TIME: 14.7 SEC (ref 9–11.1)
Q-T INTERVAL, ECG07: 162 MS
Q-T INTERVAL, ECG07: 272 MS
Q-T INTERVAL, ECG07: 330 MS
Q-T INTERVAL, ECG07: 336 MS
Q-T INTERVAL, ECG07: 336 MS
Q-T INTERVAL, ECG07: 342 MS
Q-T INTERVAL, ECG07: 348 MS
Q-T INTERVAL, ECG07: 350 MS
Q-T INTERVAL, ECG07: 372 MS
QRS DURATION, ECG06: 70 MS
QRS DURATION, ECG06: 74 MS
QRS DURATION, ECG06: 80 MS
QRS DURATION, ECG06: 82 MS
QRS DURATION, ECG06: 86 MS
QRS DURATION, ECG06: 86 MS
QTC CALCULATION (BEZET), ECG08: 245 MS
QTC CALCULATION (BEZET), ECG08: 404 MS
QTC CALCULATION (BEZET), ECG08: 404 MS
QTC CALCULATION (BEZET), ECG08: 408 MS
QTC CALCULATION (BEZET), ECG08: 415 MS
QTC CALCULATION (BEZET), ECG08: 416 MS
QTC CALCULATION (BEZET), ECG08: 433 MS
QTC CALCULATION (BEZET), ECG08: 434 MS
QTC CALCULATION (BEZET), ECG08: 436 MS
RBC # BLD AUTO: 2.17 M/UL (ref 4.1–5.7)
RBC # BLD AUTO: 2.66 M/UL (ref 4.1–5.7)
RBC # BLD AUTO: 2.67 M/UL (ref 4.1–5.7)
RBC # BLD AUTO: 2.67 M/UL (ref 4.1–5.7)
RBC # BLD AUTO: 2.93 M/UL (ref 4.1–5.7)
RBC # BLD AUTO: 3.05 M/UL (ref 4.1–5.7)
RBC # BLD AUTO: 3.4 M/UL (ref 4.1–5.7)
RBC # BLD AUTO: 3.4 M/UL (ref 4.1–5.7)
RBC # BLD AUTO: 3.42 M/UL (ref 4.1–5.7)
RBC # BLD AUTO: 3.43 M/UL (ref 4.1–5.7)
RBC # BLD AUTO: 3.46 M/UL (ref 4.1–5.7)
RBC # BLD AUTO: 3.47 M/UL (ref 4.1–5.7)
RBC # BLD AUTO: 3.49 M/UL (ref 4.1–5.7)
RBC # BLD AUTO: 3.51 M/UL (ref 4.1–5.7)
RBC # BLD AUTO: 3.53 M/UL (ref 4.1–5.7)
RBC # BLD AUTO: 3.59 M/UL (ref 4.1–5.7)
RBC # BLD AUTO: 3.59 M/UL (ref 4.1–5.7)
RBC # BLD AUTO: 3.6 M/UL (ref 4.1–5.7)
RBC # BLD AUTO: 3.67 M/UL (ref 4.1–5.7)
RBC # BLD AUTO: 3.76 M/UL (ref 4.1–5.7)
RBC # BLD AUTO: 3.77 M/UL (ref 4.1–5.7)
RBC # BLD AUTO: 3.77 M/UL (ref 4.1–5.7)
RBC # BLD AUTO: 3.81 M/UL (ref 4.1–5.7)
RBC # BLD AUTO: 3.96 M/UL (ref 4.1–5.7)
RBC # BLD AUTO: 4 M/UL (ref 4.1–5.7)
RBC # BLD AUTO: 4.06 M/UL (ref 4.1–5.7)
RBC # BLD AUTO: 4.35 M/UL (ref 4.1–5.7)
RBC #/AREA URNS HPF: >100 /HPF (ref 0–5)
RBC #/AREA URNS HPF: ABNORMAL /HPF (ref 0–5)
RBC #/AREA URNS HPF: ABNORMAL /HPF (ref 0–5)
RBC MORPH BLD: ABNORMAL
SAO2 % BLD: 68 % (ref 92–97)
SAO2 % BLD: 90 % (ref 92–97)
SAO2 % BLD: 98 % (ref 92–97)
SAO2% DEVICE SAO2% SENSOR NAME: ABNORMAL
SERVICE CMNT-IMP: ABNORMAL
SERVICE CMNT-IMP: NORMAL
SODIUM BLD-SCNC: 128 MMOL/L (ref 136–145)
SODIUM SERPL-SCNC: 124 MMOL/L (ref 136–145)
SODIUM SERPL-SCNC: 130 MMOL/L (ref 136–145)
SODIUM SERPL-SCNC: 131 MMOL/L (ref 136–145)
SODIUM SERPL-SCNC: 132 MMOL/L (ref 136–145)
SODIUM SERPL-SCNC: 132 MMOL/L (ref 136–145)
SODIUM SERPL-SCNC: 134 MMOL/L (ref 136–145)
SODIUM SERPL-SCNC: 135 MMOL/L (ref 136–145)
SODIUM SERPL-SCNC: 136 MMOL/L (ref 136–145)
SODIUM SERPL-SCNC: 137 MMOL/L (ref 136–145)
SODIUM SERPL-SCNC: 138 MMOL/L (ref 136–145)
SODIUM SERPL-SCNC: 139 MMOL/L (ref 136–145)
SODIUM SERPL-SCNC: 139 MMOL/L (ref 136–145)
SODIUM SERPL-SCNC: 140 MMOL/L (ref 136–145)
SODIUM SERPL-SCNC: 141 MMOL/L (ref 136–145)
SODIUM SERPL-SCNC: 141 MMOL/L (ref 136–145)
SODIUM SERPL-SCNC: 142 MMOL/L (ref 136–145)
SODIUM UR-SCNC: 57 MMOL/L
SP GR UR REFRACTOMETRY: 1.01 (ref 1–1.03)
SP GR UR REFRACTOMETRY: 1.02 (ref 1–1.03)
SPECIMEN EXP DATE BLD: NORMAL
SPECIMEN SITE: ABNORMAL
STATUS OF UNIT,%ST: NORMAL
STREP DNASE B SER-ACNC: 841 U/ML (ref 0–120)
THERAPEUTIC RANGE,PTTT: ABNORMAL SECS (ref 58–77)
THERAPEUTIC RANGE,PTTT: NORMAL SECS (ref 58–77)
TRIGL SERPL-MCNC: 181 MG/DL (ref ?–150)
TROPONIN I SERPL-MCNC: 1.12 NG/ML
TROPONIN I SERPL-MCNC: 2.5 NG/ML
TROPONIN I SERPL-MCNC: 4.54 NG/ML
TROPONIN I SERPL-MCNC: 7.09 NG/ML
TROPONIN I SERPL-MCNC: <0.04 NG/ML
TSH SERPL DL<=0.05 MIU/L-ACNC: 0.46 UIU/ML (ref 0.36–3.74)
TSH SERPL DL<=0.05 MIU/L-ACNC: 0.81 UIU/ML (ref 0.36–3.74)
TSH SERPL DL<=0.05 MIU/L-ACNC: 1.06 UIU/ML (ref 0.36–3.74)
UA: UC IF INDICATED,UAUC: ABNORMAL
UNIT DIVISION, %UDIV: 0
UROBILINOGEN UR QL STRIP.AUTO: 0.2 EU/DL (ref 0.2–1)
UROBILINOGEN UR QL STRIP.AUTO: 1 EU/DL (ref 0.2–1)
VANCOMYCIN SERPL-MCNC: 24.4 UG/ML
VENTILATION MODE VENT: ABNORMAL
VENTILATION MODE VENT: ABNORMAL
VENTRICULAR RATE, ECG03: 100 BPM
VENTRICULAR RATE, ECG03: 105 BPM
VENTRICULAR RATE, ECG03: 133 BPM
VENTRICULAR RATE, ECG03: 138 BPM
VENTRICULAR RATE, ECG03: 81 BPM
VENTRICULAR RATE, ECG03: 82 BPM
VENTRICULAR RATE, ECG03: 82 BPM
VENTRICULAR RATE, ECG03: 89 BPM
VENTRICULAR RATE, ECG03: 92 BPM
VLDLC SERPL CALC-MCNC: 36.2 MG/DL
VT SETTING VENT: 400 ML
VT SETTING VENT: 400 ML
WBC # BLD AUTO: 10 K/UL (ref 4.1–11.1)
WBC # BLD AUTO: 10.4 K/UL (ref 4.1–11.1)
WBC # BLD AUTO: 10.4 K/UL (ref 4.1–11.1)
WBC # BLD AUTO: 10.7 K/UL (ref 4.1–11.1)
WBC # BLD AUTO: 10.8 K/UL (ref 4.1–11.1)
WBC # BLD AUTO: 10.8 K/UL (ref 4.1–11.1)
WBC # BLD AUTO: 10.9 K/UL (ref 4.1–11.1)
WBC # BLD AUTO: 11.2 K/UL (ref 4.1–11.1)
WBC # BLD AUTO: 12.1 K/UL (ref 4.1–11.1)
WBC # BLD AUTO: 12.3 K/UL (ref 4.1–11.1)
WBC # BLD AUTO: 12.9 K/UL (ref 4.1–11.1)
WBC # BLD AUTO: 13.3 K/UL (ref 4.1–11.1)
WBC # BLD AUTO: 13.4 K/UL (ref 4.1–11.1)
WBC # BLD AUTO: 13.5 K/UL (ref 4.1–11.1)
WBC # BLD AUTO: 13.7 K/UL (ref 4.1–11.1)
WBC # BLD AUTO: 14.5 K/UL (ref 4.1–11.1)
WBC # BLD AUTO: 15.7 K/UL (ref 4.1–11.1)
WBC # BLD AUTO: 16.2 K/UL (ref 4.1–11.1)
WBC # BLD AUTO: 16.9 K/UL (ref 4.1–11.1)
WBC # BLD AUTO: 6.8 K/UL (ref 4.1–11.1)
WBC # BLD AUTO: 7.7 K/UL (ref 4.1–11.1)
WBC # BLD AUTO: 8.9 K/UL (ref 4.1–11.1)
WBC # BLD AUTO: 9 K/UL (ref 4.1–11.1)
WBC # BLD AUTO: 9.3 K/UL (ref 4.1–11.1)
WBC # BLD AUTO: 9.4 K/UL (ref 4.1–11.1)
WBC # BLD AUTO: 9.5 K/UL (ref 4.1–11.1)
WBC # BLD AUTO: 9.7 K/UL (ref 4.1–11.1)
WBC MORPH BLD: ABNORMAL
WBC URNS QL MICRO: >100 /HPF (ref 0–4)
WBC URNS QL MICRO: ABNORMAL /HPF (ref 0–4)
YEAST BUDDING URNS QL: PRESENT
YEAST URNS QL MICRO: PRESENT

## 2017-01-01 PROCEDURE — 94762 N-INVAS EAR/PLS OXIMTRY CONT: CPT

## 2017-01-01 PROCEDURE — C1769 GUIDE WIRE: HCPCS

## 2017-01-01 PROCEDURE — 83036 HEMOGLOBIN GLYCOSYLATED A1C: CPT | Performed by: INTERNAL MEDICINE

## 2017-01-01 PROCEDURE — 65660000000 HC RM CCU STEPDOWN

## 2017-01-01 PROCEDURE — C1725 CATH, TRANSLUMIN NON-LASER: HCPCS

## 2017-01-01 PROCEDURE — 82962 GLUCOSE BLOOD TEST: CPT

## 2017-01-01 PROCEDURE — C9113 INJ PANTOPRAZOLE SODIUM, VIA: HCPCS | Performed by: SURGERY

## 2017-01-01 PROCEDURE — 36415 COLL VENOUS BLD VENIPUNCTURE: CPT | Performed by: FAMILY MEDICINE

## 2017-01-01 PROCEDURE — 85027 COMPLETE CBC AUTOMATED: CPT | Performed by: INTERNAL MEDICINE

## 2017-01-01 PROCEDURE — 77030034849

## 2017-01-01 PROCEDURE — 71010 XR CHEST PORT: CPT

## 2017-01-01 PROCEDURE — 77030008771 HC TU NG SALEM SUMP -A

## 2017-01-01 PROCEDURE — 74176 CT ABD & PELVIS W/O CONTRAST: CPT

## 2017-01-01 PROCEDURE — 74011250636 HC RX REV CODE- 250/636: Performed by: EMERGENCY MEDICINE

## 2017-01-01 PROCEDURE — 74011250637 HC RX REV CODE- 250/637: Performed by: PHYSICAL MEDICINE & REHABILITATION

## 2017-01-01 PROCEDURE — 93005 ELECTROCARDIOGRAM TRACING: CPT

## 2017-01-01 PROCEDURE — 80048 BASIC METABOLIC PNL TOTAL CA: CPT | Performed by: FAMILY MEDICINE

## 2017-01-01 PROCEDURE — 74011250637 HC RX REV CODE- 250/637: Performed by: INTERNAL MEDICINE

## 2017-01-01 PROCEDURE — 77030011256 HC DRSG MEPILEX <16IN NO BORD MOLN -A

## 2017-01-01 PROCEDURE — 84100 ASSAY OF PHOSPHORUS: CPT | Performed by: INTERNAL MEDICINE

## 2017-01-01 PROCEDURE — 74011250636 HC RX REV CODE- 250/636

## 2017-01-01 PROCEDURE — 74011250636 HC RX REV CODE- 250/636: Performed by: PHYSICAL MEDICINE & REHABILITATION

## 2017-01-01 PROCEDURE — 90935 HEMODIALYSIS ONE EVALUATION: CPT

## 2017-01-01 PROCEDURE — 74011636637 HC RX REV CODE- 636/637: Performed by: SURGERY

## 2017-01-01 PROCEDURE — 83516 IMMUNOASSAY NONANTIBODY: CPT | Performed by: FAMILY MEDICINE

## 2017-01-01 PROCEDURE — 85025 COMPLETE CBC W/AUTO DIFF WBC: CPT | Performed by: EMERGENCY MEDICINE

## 2017-01-01 PROCEDURE — 74011000250 HC RX REV CODE- 250: Performed by: SURGERY

## 2017-01-01 PROCEDURE — 81001 URINALYSIS AUTO W/SCOPE: CPT | Performed by: INTERNAL MEDICINE

## 2017-01-01 PROCEDURE — 74011000250 HC RX REV CODE- 250: Performed by: INTERNAL MEDICINE

## 2017-01-01 PROCEDURE — 74011636637 HC RX REV CODE- 636/637: Performed by: FAMILY MEDICINE

## 2017-01-01 PROCEDURE — 36592 COLLECT BLOOD FROM PICC: CPT

## 2017-01-01 PROCEDURE — 83735 ASSAY OF MAGNESIUM: CPT | Performed by: INTERNAL MEDICINE

## 2017-01-01 PROCEDURE — 85025 COMPLETE CBC W/AUTO DIFF WBC: CPT | Performed by: INTERNAL MEDICINE

## 2017-01-01 PROCEDURE — 74011250636 HC RX REV CODE- 250/636: Performed by: SURGERY

## 2017-01-01 PROCEDURE — 74011250636 HC RX REV CODE- 250/636: Performed by: FAMILY MEDICINE

## 2017-01-01 PROCEDURE — 87086 URINE CULTURE/COLONY COUNT: CPT | Performed by: INTERNAL MEDICINE

## 2017-01-01 PROCEDURE — 77010033678 HC OXYGEN DAILY

## 2017-01-01 PROCEDURE — 84100 ASSAY OF PHOSPHORUS: CPT | Performed by: FAMILY MEDICINE

## 2017-01-01 PROCEDURE — 76010000149 HC OR TIME 1 TO 1.5 HR: Performed by: UROLOGY

## 2017-01-01 PROCEDURE — B2151ZZ FLUOROSCOPY OF LEFT HEART USING LOW OSMOLAR CONTRAST: ICD-10-PCS | Performed by: INTERNAL MEDICINE

## 2017-01-01 PROCEDURE — 83605 ASSAY OF LACTIC ACID: CPT | Performed by: FAMILY MEDICINE

## 2017-01-01 PROCEDURE — 74011000258 HC RX REV CODE- 258: Performed by: FAMILY MEDICINE

## 2017-01-01 PROCEDURE — 80048 BASIC METABOLIC PNL TOTAL CA: CPT | Performed by: SURGERY

## 2017-01-01 PROCEDURE — 81001 URINALYSIS AUTO W/SCOPE: CPT | Performed by: PHYSICIAN ASSISTANT

## 2017-01-01 PROCEDURE — 36415 COLL VENOUS BLD VENIPUNCTURE: CPT | Performed by: EMERGENCY MEDICINE

## 2017-01-01 PROCEDURE — 80048 BASIC METABOLIC PNL TOTAL CA: CPT | Performed by: INTERNAL MEDICINE

## 2017-01-01 PROCEDURE — 74011250636 HC RX REV CODE- 250/636: Performed by: INTERNAL MEDICINE

## 2017-01-01 PROCEDURE — 74011000258 HC RX REV CODE- 258: Performed by: SURGERY

## 2017-01-01 PROCEDURE — 87075 CULTR BACTERIA EXCEPT BLOOD: CPT | Performed by: RADIOLOGY

## 2017-01-01 PROCEDURE — 36415 COLL VENOUS BLD VENIPUNCTURE: CPT | Performed by: INTERNAL MEDICINE

## 2017-01-01 PROCEDURE — 83690 ASSAY OF LIPASE: CPT | Performed by: PHYSICIAN ASSISTANT

## 2017-01-01 PROCEDURE — 74420 UROGRAPHY RTRGR +-KUB: CPT

## 2017-01-01 PROCEDURE — 97535 SELF CARE MNGMENT TRAINING: CPT | Performed by: OCCUPATIONAL THERAPIST

## 2017-01-01 PROCEDURE — 74011250636 HC RX REV CODE- 250/636: Performed by: ANESTHESIOLOGY

## 2017-01-01 PROCEDURE — G8988 SELF CARE GOAL STATUS: HCPCS | Performed by: OCCUPATIONAL THERAPIST

## 2017-01-01 PROCEDURE — 36415 COLL VENOUS BLD VENIPUNCTURE: CPT | Performed by: SURGERY

## 2017-01-01 PROCEDURE — 84484 ASSAY OF TROPONIN QUANT: CPT | Performed by: EMERGENCY MEDICINE

## 2017-01-01 PROCEDURE — G8987 SELF CARE CURRENT STATUS: HCPCS

## 2017-01-01 PROCEDURE — 85027 COMPLETE CBC AUTOMATED: CPT | Performed by: PHYSICAL MEDICINE & REHABILITATION

## 2017-01-01 PROCEDURE — 80202 ASSAY OF VANCOMYCIN: CPT | Performed by: INTERNAL MEDICINE

## 2017-01-01 PROCEDURE — 77030011267 HC ELECTRD BLD COVD -A: Performed by: SURGERY

## 2017-01-01 PROCEDURE — 84300 ASSAY OF URINE SODIUM: CPT | Performed by: INTERNAL MEDICINE

## 2017-01-01 PROCEDURE — 82595 ASSAY OF CRYOGLOBULIN: CPT | Performed by: FAMILY MEDICINE

## 2017-01-01 PROCEDURE — 77030010507 HC ADH SKN DERMBND J&J -B: Performed by: SURGERY

## 2017-01-01 PROCEDURE — 74011000258 HC RX REV CODE- 258: Performed by: INTERNAL MEDICINE

## 2017-01-01 PROCEDURE — 85610 PROTHROMBIN TIME: CPT | Performed by: INTERNAL MEDICINE

## 2017-01-01 PROCEDURE — 99285 EMERGENCY DEPT VISIT HI MDM: CPT

## 2017-01-01 PROCEDURE — 84484 ASSAY OF TROPONIN QUANT: CPT | Performed by: INTERNAL MEDICINE

## 2017-01-01 PROCEDURE — 96374 THER/PROPH/DIAG INJ IV PUSH: CPT

## 2017-01-01 PROCEDURE — 74011250637 HC RX REV CODE- 250/637: Performed by: NURSE PRACTITIONER

## 2017-01-01 PROCEDURE — 74011250636 HC RX REV CODE- 250/636: Performed by: NURSE PRACTITIONER

## 2017-01-01 PROCEDURE — 74011000250 HC RX REV CODE- 250: Performed by: EMERGENCY MEDICINE

## 2017-01-01 PROCEDURE — 74011636320 HC RX REV CODE- 636/320: Performed by: UROLOGY

## 2017-01-01 PROCEDURE — 36415 COLL VENOUS BLD VENIPUNCTURE: CPT | Performed by: NURSE PRACTITIONER

## 2017-01-01 PROCEDURE — 74011250636 HC RX REV CODE- 250/636: Performed by: RADIOLOGY

## 2017-01-01 PROCEDURE — 77030011266 HC ELECTRD BLD INSL COVD -A: Performed by: SURGERY

## 2017-01-01 PROCEDURE — 76010000132 HC OR TIME 2.5 TO 3 HR: Performed by: SURGERY

## 2017-01-01 PROCEDURE — 74011636320 HC RX REV CODE- 636/320: Performed by: INTERNAL MEDICINE

## 2017-01-01 PROCEDURE — 5A1935Z RESPIRATORY VENTILATION, LESS THAN 24 CONSECUTIVE HOURS: ICD-10-PCS | Performed by: INTERNAL MEDICINE

## 2017-01-01 PROCEDURE — 86160 COMPLEMENT ANTIGEN: CPT | Performed by: FAMILY MEDICINE

## 2017-01-01 PROCEDURE — 74011000250 HC RX REV CODE- 250: Performed by: FAMILY MEDICINE

## 2017-01-01 PROCEDURE — 36569 INSJ PICC 5 YR+ W/O IMAGING: CPT | Performed by: INTERNAL MEDICINE

## 2017-01-01 PROCEDURE — 87086 URINE CULTURE/COLONY COUNT: CPT | Performed by: EMERGENCY MEDICINE

## 2017-01-01 PROCEDURE — 96361 HYDRATE IV INFUSION ADD-ON: CPT

## 2017-01-01 PROCEDURE — 77030018836 HC SOL IRR NACL ICUM -A: Performed by: SURGERY

## 2017-01-01 PROCEDURE — 76060000036 HC ANESTHESIA 2.5 TO 3 HR: Performed by: SURGERY

## 2017-01-01 PROCEDURE — 85730 THROMBOPLASTIN TIME PARTIAL: CPT | Performed by: EMERGENCY MEDICINE

## 2017-01-01 PROCEDURE — 74011000258 HC RX REV CODE- 258: Performed by: EMERGENCY MEDICINE

## 2017-01-01 PROCEDURE — 77030015766

## 2017-01-01 PROCEDURE — G8978 MOBILITY CURRENT STATUS: HCPCS

## 2017-01-01 PROCEDURE — 76937 US GUIDE VASCULAR ACCESS: CPT

## 2017-01-01 PROCEDURE — 82550 ASSAY OF CK (CPK): CPT | Performed by: FAMILY MEDICINE

## 2017-01-01 PROCEDURE — 77030019702 HC WRP THER MENM -C: Performed by: SURGERY

## 2017-01-01 PROCEDURE — 36600 WITHDRAWAL OF ARTERIAL BLOOD: CPT

## 2017-01-01 PROCEDURE — 80053 COMPREHEN METABOLIC PANEL: CPT | Performed by: FAMILY MEDICINE

## 2017-01-01 PROCEDURE — 97116 GAIT TRAINING THERAPY: CPT

## 2017-01-01 PROCEDURE — 77030012407 HC DRN WND BARD -B: Performed by: SURGERY

## 2017-01-01 PROCEDURE — 96365 THER/PROPH/DIAG IV INF INIT: CPT

## 2017-01-01 PROCEDURE — 027034Z DILATION OF CORONARY ARTERY, ONE ARTERY WITH DRUG-ELUTING INTRALUMINAL DEVICE, PERCUTANEOUS APPROACH: ICD-10-PCS | Performed by: INTERNAL MEDICINE

## 2017-01-01 PROCEDURE — 77030010221 HC SPLNT WR POS TELE -B

## 2017-01-01 PROCEDURE — 97530 THERAPEUTIC ACTIVITIES: CPT

## 2017-01-01 PROCEDURE — 74011000250 HC RX REV CODE- 250

## 2017-01-01 PROCEDURE — 81001 URINALYSIS AUTO W/SCOPE: CPT | Performed by: EMERGENCY MEDICINE

## 2017-01-01 PROCEDURE — 0BH17EZ INSERTION OF ENDOTRACHEAL AIRWAY INTO TRACHEA, VIA NATURAL OR ARTIFICIAL OPENING: ICD-10-PCS | Performed by: INTERNAL MEDICINE

## 2017-01-01 PROCEDURE — 77030018846 HC SOL IRR STRL H20 ICUM -A: Performed by: SURGERY

## 2017-01-01 PROCEDURE — 51798 US URINE CAPACITY MEASURE: CPT

## 2017-01-01 PROCEDURE — 82140 ASSAY OF AMMONIA: CPT | Performed by: FAMILY MEDICINE

## 2017-01-01 PROCEDURE — 82803 BLOOD GASES ANY COMBINATION: CPT | Performed by: INTERNAL MEDICINE

## 2017-01-01 PROCEDURE — 86038 ANTINUCLEAR ANTIBODIES: CPT | Performed by: FAMILY MEDICINE

## 2017-01-01 PROCEDURE — 83036 HEMOGLOBIN GLYCOSYLATED A1C: CPT | Performed by: PHYSICAL MEDICINE & REHABILITATION

## 2017-01-01 PROCEDURE — 82550 ASSAY OF CK (CPK): CPT | Performed by: EMERGENCY MEDICINE

## 2017-01-01 PROCEDURE — 77030005520 HC CATH URETH FOL38 BARD -A

## 2017-01-01 PROCEDURE — 74011000250 HC RX REV CODE- 250: Performed by: RADIOLOGY

## 2017-01-01 PROCEDURE — 77030018832 HC SOL IRR H20 ICUM -A: Performed by: UROLOGY

## 2017-01-01 PROCEDURE — 80069 RENAL FUNCTION PANEL: CPT | Performed by: INTERNAL MEDICINE

## 2017-01-01 PROCEDURE — 74000 XR ABD PORT  1 V: CPT

## 2017-01-01 PROCEDURE — 77030037878 HC DRSG MEPILEX >48IN BORD MOLN -B

## 2017-01-01 PROCEDURE — 84443 ASSAY THYROID STIM HORMONE: CPT | Performed by: INTERNAL MEDICINE

## 2017-01-01 PROCEDURE — 77030011640 HC PAD GRND REM COVD -A: Performed by: SURGERY

## 2017-01-01 PROCEDURE — 77030005546 HC CATH URETH FOL 3W BARD -A

## 2017-01-01 PROCEDURE — 97166 OT EVAL MOD COMPLEX 45 MIN: CPT | Performed by: OCCUPATIONAL THERAPIST

## 2017-01-01 PROCEDURE — 77030018836 HC SOL IRR NACL ICUM -A

## 2017-01-01 PROCEDURE — 76060000033 HC ANESTHESIA 1 TO 1.5 HR: Performed by: UROLOGY

## 2017-01-01 PROCEDURE — 81001 URINALYSIS AUTO W/SCOPE: CPT | Performed by: PHYSICAL MEDICINE & REHABILITATION

## 2017-01-01 PROCEDURE — 84100 ASSAY OF PHOSPHORUS: CPT | Performed by: SURGERY

## 2017-01-01 PROCEDURE — 0DH67UZ INSERTION OF FEEDING DEVICE INTO STOMACH, VIA NATURAL OR ARTIFICIAL OPENING: ICD-10-PCS | Performed by: SURGERY

## 2017-01-01 PROCEDURE — 77030020061 HC IV BLD WRMR ADMIN SET 3M -B: Performed by: ANESTHESIOLOGY

## 2017-01-01 PROCEDURE — 77030002966 HC SUT PDS J&J -A: Performed by: SURGERY

## 2017-01-01 PROCEDURE — 77030008684 HC TU ET CUF COVD -B: Performed by: ANESTHESIOLOGY

## 2017-01-01 PROCEDURE — 74011000250 HC RX REV CODE- 250: Performed by: NURSE PRACTITIONER

## 2017-01-01 PROCEDURE — P9045 ALBUMIN (HUMAN), 5%, 250 ML: HCPCS

## 2017-01-01 PROCEDURE — 77030008543 HC TBNG MON PRSS MRTM -A

## 2017-01-01 PROCEDURE — 74011250637 HC RX REV CODE- 250/637: Performed by: FAMILY MEDICINE

## 2017-01-01 PROCEDURE — 77030019698 HC SYR ANGI MDLON MRTM -A

## 2017-01-01 PROCEDURE — 77030010548 HC BG WND DRN ARMD -B

## 2017-01-01 PROCEDURE — 86215 DEOXYRIBONUCLEASE ANTIBODY: CPT | Performed by: FAMILY MEDICINE

## 2017-01-01 PROCEDURE — 83735 ASSAY OF MAGNESIUM: CPT | Performed by: SURGERY

## 2017-01-01 PROCEDURE — 77030004549 HC CATH ANGI DX PRF MRTM -A

## 2017-01-01 PROCEDURE — C1894 INTRO/SHEATH, NON-LASER: HCPCS

## 2017-01-01 PROCEDURE — 36415 COLL VENOUS BLD VENIPUNCTURE: CPT | Performed by: PHYSICIAN ASSISTANT

## 2017-01-01 PROCEDURE — 77030010547 HC BG URIN W/UMETER -A

## 2017-01-01 PROCEDURE — C1758 CATHETER, URETERAL: HCPCS | Performed by: UROLOGY

## 2017-01-01 PROCEDURE — C1752 CATH,HEMODIALYSIS,SHORT-TERM: HCPCS

## 2017-01-01 PROCEDURE — 74011636637 HC RX REV CODE- 636/637: Performed by: NURSE PRACTITIONER

## 2017-01-01 PROCEDURE — 80048 BASIC METABOLIC PNL TOTAL CA: CPT | Performed by: PHYSICAL MEDICINE & REHABILITATION

## 2017-01-01 PROCEDURE — 77030018719 HC DRSG PTCH ANTIMIC J&J -A

## 2017-01-01 PROCEDURE — 87040 BLOOD CULTURE FOR BACTERIA: CPT | Performed by: EMERGENCY MEDICINE

## 2017-01-01 PROCEDURE — 83605 ASSAY OF LACTIC ACID: CPT | Performed by: PHYSICIAN ASSISTANT

## 2017-01-01 PROCEDURE — 02HV33Z INSERTION OF INFUSION DEVICE INTO SUPERIOR VENA CAVA, PERCUTANEOUS APPROACH: ICD-10-PCS | Performed by: SURGERY

## 2017-01-01 PROCEDURE — C9113 INJ PANTOPRAZOLE SODIUM, VIA: HCPCS | Performed by: INTERNAL MEDICINE

## 2017-01-01 PROCEDURE — P9016 RBC LEUKOCYTES REDUCED: HCPCS | Performed by: INTERNAL MEDICINE

## 2017-01-01 PROCEDURE — 85027 COMPLETE CBC AUTOMATED: CPT | Performed by: FAMILY MEDICINE

## 2017-01-01 PROCEDURE — 50432 PLMT NEPHROSTOMY CATHETER: CPT

## 2017-01-01 PROCEDURE — 82306 VITAMIN D 25 HYDROXY: CPT | Performed by: PHYSICAL MEDICINE & REHABILITATION

## 2017-01-01 PROCEDURE — 87086 URINE CULTURE/COLONY COUNT: CPT | Performed by: SURGERY

## 2017-01-01 PROCEDURE — 77030029065 HC DRSG HEMO QCLOT ZMED -B

## 2017-01-01 PROCEDURE — 80053 COMPREHEN METABOLIC PANEL: CPT | Performed by: EMERGENCY MEDICINE

## 2017-01-01 PROCEDURE — 83735 ASSAY OF MAGNESIUM: CPT | Performed by: EMERGENCY MEDICINE

## 2017-01-01 PROCEDURE — 74011250637 HC RX REV CODE- 250/637

## 2017-01-01 PROCEDURE — 97112 NEUROMUSCULAR REEDUCATION: CPT

## 2017-01-01 PROCEDURE — B2111ZZ FLUOROSCOPY OF MULTIPLE CORONARY ARTERIES USING LOW OSMOLAR CONTRAST: ICD-10-PCS | Performed by: INTERNAL MEDICINE

## 2017-01-01 PROCEDURE — 82550 ASSAY OF CK (CPK): CPT | Performed by: INTERNAL MEDICINE

## 2017-01-01 PROCEDURE — 83605 ASSAY OF LACTIC ACID: CPT | Performed by: INTERNAL MEDICINE

## 2017-01-01 PROCEDURE — 77030005538 HC CATH URETH FOL44 BARD -B

## 2017-01-01 PROCEDURE — 74000 XR ABD (KUB): CPT

## 2017-01-01 PROCEDURE — 80053 COMPREHEN METABOLIC PANEL: CPT | Performed by: PHYSICIAN ASSISTANT

## 2017-01-01 PROCEDURE — 0T768DZ DILATION OF RIGHT URETER WITH INTRALUMINAL DEVICE, VIA NATURAL OR ARTIFICIAL OPENING ENDOSCOPIC: ICD-10-PCS | Performed by: UROLOGY

## 2017-01-01 PROCEDURE — C1887 CATHETER, GUIDING: HCPCS

## 2017-01-01 PROCEDURE — 0T133JD BYPASS RIGHT KIDNEY PELVIS TO CUTANEOUS WITH SYNTHETIC SUBSTITUTE, PERCUTANEOUS APPROACH: ICD-10-PCS | Performed by: RADIOLOGY

## 2017-01-01 PROCEDURE — 74011250637 HC RX REV CODE- 250/637: Performed by: EMERGENCY MEDICINE

## 2017-01-01 PROCEDURE — 77030010545

## 2017-01-01 PROCEDURE — 96375 TX/PRO/DX INJ NEW DRUG ADDON: CPT

## 2017-01-01 PROCEDURE — 36600 WITHDRAWAL OF ARTERIAL BLOOD: CPT | Performed by: INTERNAL MEDICINE

## 2017-01-01 PROCEDURE — 36569 INSJ PICC 5 YR+ W/O IMAGING: CPT | Performed by: SURGERY

## 2017-01-01 PROCEDURE — 77030032490 HC SLV COMPR SCD KNE COVD -B: Performed by: UROLOGY

## 2017-01-01 PROCEDURE — 77030019569 HC BND COMPR RAD TERU -B

## 2017-01-01 PROCEDURE — 70450 CT HEAD/BRAIN W/O DYE: CPT

## 2017-01-01 PROCEDURE — 83690 ASSAY OF LIPASE: CPT | Performed by: EMERGENCY MEDICINE

## 2017-01-01 PROCEDURE — 77030018846 HC SOL IRR STRL H20 ICUM -A

## 2017-01-01 PROCEDURE — 97162 PT EVAL MOD COMPLEX 30 MIN: CPT

## 2017-01-01 PROCEDURE — C1751 CATH, INF, PER/CENT/MIDLINE: HCPCS

## 2017-01-01 PROCEDURE — 83735 ASSAY OF MAGNESIUM: CPT | Performed by: FAMILY MEDICINE

## 2017-01-01 PROCEDURE — 77030032490 HC SLV COMPR SCD KNE COVD -B: Performed by: SURGERY

## 2017-01-01 PROCEDURE — 83605 ASSAY OF LACTIC ACID: CPT | Performed by: EMERGENCY MEDICINE

## 2017-01-01 PROCEDURE — 85018 HEMOGLOBIN: CPT | Performed by: INTERNAL MEDICINE

## 2017-01-01 PROCEDURE — 77030009378 HC DEV TORQ OLCT F/GWIRE MANIP COOK -A

## 2017-01-01 PROCEDURE — 94002 VENT MGMT INPAT INIT DAY: CPT

## 2017-01-01 PROCEDURE — 77030019563 HC DEV ATTCH FEED HOLL -A

## 2017-01-01 PROCEDURE — C2617 STENT, NON-COR, TEM W/O DEL: HCPCS | Performed by: UROLOGY

## 2017-01-01 PROCEDURE — 0DU907Z SUPPLEMENT DUODENUM WITH AUTOLOGOUS TISSUE SUBSTITUTE, OPEN APPROACH: ICD-10-PCS | Performed by: SURGERY

## 2017-01-01 PROCEDURE — 97165 OT EVAL LOW COMPLEX 30 MIN: CPT

## 2017-01-01 PROCEDURE — 77030008771 HC TU NG SALEM SUMP -A: Performed by: ANESTHESIOLOGY

## 2017-01-01 PROCEDURE — 77030002996 HC SUT SLK J&J -A

## 2017-01-01 PROCEDURE — 82533 TOTAL CORTISOL: CPT | Performed by: INTERNAL MEDICINE

## 2017-01-01 PROCEDURE — 93306 TTE W/DOPPLER COMPLETE: CPT

## 2017-01-01 PROCEDURE — 74011000258 HC RX REV CODE- 258: Performed by: NURSE PRACTITIONER

## 2017-01-01 PROCEDURE — 87040 BLOOD CULTURE FOR BACTERIA: CPT | Performed by: INTERNAL MEDICINE

## 2017-01-01 PROCEDURE — 94003 VENT MGMT INPAT SUBQ DAY: CPT

## 2017-01-01 PROCEDURE — 36415 COLL VENOUS BLD VENIPUNCTURE: CPT | Performed by: PHYSICAL MEDICINE & REHABILITATION

## 2017-01-01 PROCEDURE — G8979 MOBILITY GOAL STATUS: HCPCS

## 2017-01-01 PROCEDURE — 74011000258 HC RX REV CODE- 258

## 2017-01-01 PROCEDURE — 86706 HEP B SURFACE ANTIBODY: CPT | Performed by: INTERNAL MEDICINE

## 2017-01-01 PROCEDURE — 74011636320 HC RX REV CODE- 636/320

## 2017-01-01 PROCEDURE — 87086 URINE CULTURE/COLONY COUNT: CPT | Performed by: PHYSICIAN ASSISTANT

## 2017-01-01 PROCEDURE — 74011250636 HC RX REV CODE- 250/636: Performed by: UROLOGY

## 2017-01-01 PROCEDURE — 36430 TRANSFUSION BLD/BLD COMPNT: CPT

## 2017-01-01 PROCEDURE — C1874 STENT, COATED/COV W/DEL SYS: HCPCS

## 2017-01-01 PROCEDURE — 3E0436Z INTRODUCTION OF NUTRITIONAL SUBSTANCE INTO CENTRAL VEIN, PERCUTANEOUS APPROACH: ICD-10-PCS | Performed by: SURGERY

## 2017-01-01 PROCEDURE — 85025 COMPLETE CBC W/AUTO DIFF WBC: CPT | Performed by: PHYSICIAN ASSISTANT

## 2017-01-01 PROCEDURE — 02H633Z INSERTION OF INFUSION DEVICE INTO RIGHT ATRIUM, PERCUTANEOUS APPROACH: ICD-10-PCS | Performed by: RADIOLOGY

## 2017-01-01 PROCEDURE — 77030012468 HC VLV BLEEDBK CNTRL ABBT -B

## 2017-01-01 PROCEDURE — 99218 HC RM OBSERVATION: CPT

## 2017-01-01 PROCEDURE — 74011250637 HC RX REV CODE- 250/637: Performed by: SURGERY

## 2017-01-01 PROCEDURE — 85025 COMPLETE CBC W/AUTO DIFF WBC: CPT | Performed by: SURGERY

## 2017-01-01 PROCEDURE — C1729 CATH, DRAINAGE: HCPCS

## 2017-01-01 PROCEDURE — 77030019908 HC STETH ESOPH SIMS -A: Performed by: ANESTHESIOLOGY

## 2017-01-01 PROCEDURE — 80061 LIPID PANEL: CPT | Performed by: FAMILY MEDICINE

## 2017-01-01 PROCEDURE — 74011250636 HC RX REV CODE- 250/636: Performed by: PHYSICIAN ASSISTANT

## 2017-01-01 PROCEDURE — 77030018832 HC SOL IRR H20 ICUM -A: Performed by: SURGERY

## 2017-01-01 PROCEDURE — 87205 SMEAR GRAM STAIN: CPT | Performed by: RADIOLOGY

## 2017-01-01 PROCEDURE — 76210000006 HC OR PH I REC 0.5 TO 1 HR: Performed by: SURGERY

## 2017-01-01 PROCEDURE — 77030013567 HC DRN WND RESERV BARD -A: Performed by: SURGERY

## 2017-01-01 PROCEDURE — 83036 HEMOGLOBIN GLYCOSYLATED A1C: CPT | Performed by: HOSPITALIST

## 2017-01-01 PROCEDURE — 77030018786 HC NDL GD F/USND BARD -B

## 2017-01-01 PROCEDURE — 74011000258 HC RX REV CODE- 258: Performed by: GENERAL ACUTE CARE HOSPITAL

## 2017-01-01 PROCEDURE — 74011250637 HC RX REV CODE- 250/637: Performed by: HOSPITALIST

## 2017-01-01 PROCEDURE — 80053 COMPREHEN METABOLIC PANEL: CPT | Performed by: PHYSICAL MEDICINE & REHABILITATION

## 2017-01-01 PROCEDURE — 76210000016 HC OR PH I REC 1 TO 1.5 HR: Performed by: UROLOGY

## 2017-01-01 PROCEDURE — 85014 HEMATOCRIT: CPT | Performed by: INTERNAL MEDICINE

## 2017-01-01 PROCEDURE — 99153 MOD SED SAME PHYS/QHP EA: CPT

## 2017-01-01 PROCEDURE — 97161 PT EVAL LOW COMPLEX 20 MIN: CPT

## 2017-01-01 PROCEDURE — 83735 ASSAY OF MAGNESIUM: CPT | Performed by: PHYSICAL MEDICINE & REHABILITATION

## 2017-01-01 PROCEDURE — 86225 DNA ANTIBODY NATIVE: CPT | Performed by: FAMILY MEDICINE

## 2017-01-01 PROCEDURE — 87205 SMEAR GRAM STAIN: CPT | Performed by: INTERNAL MEDICINE

## 2017-01-01 PROCEDURE — 74011636320 HC RX REV CODE- 636/320: Performed by: RADIOLOGY

## 2017-01-01 PROCEDURE — 85610 PROTHROMBIN TIME: CPT | Performed by: EMERGENCY MEDICINE

## 2017-01-01 PROCEDURE — 30233N1 TRANSFUSION OF NONAUTOLOGOUS RED BLOOD CELLS INTO PERIPHERAL VEIN, PERCUTANEOUS APPROACH: ICD-10-PCS | Performed by: INTERNAL MEDICINE

## 2017-01-01 PROCEDURE — 51700 IRRIGATION OF BLADDER: CPT

## 2017-01-01 PROCEDURE — 86677 HELICOBACTER PYLORI ANTIBODY: CPT | Performed by: SURGERY

## 2017-01-01 PROCEDURE — 4A023N7 MEASUREMENT OF CARDIAC SAMPLING AND PRESSURE, LEFT HEART, PERCUTANEOUS APPROACH: ICD-10-PCS | Performed by: INTERNAL MEDICINE

## 2017-01-01 PROCEDURE — 83735 ASSAY OF MAGNESIUM: CPT | Performed by: NURSE PRACTITIONER

## 2017-01-01 PROCEDURE — 84155 ASSAY OF PROTEIN SERUM: CPT | Performed by: FAMILY MEDICINE

## 2017-01-01 PROCEDURE — C1892 INTRO/SHEATH,FIXED,PEEL-AWAY: HCPCS

## 2017-01-01 PROCEDURE — P9047 ALBUMIN (HUMAN), 25%, 50ML: HCPCS | Performed by: INTERNAL MEDICINE

## 2017-01-01 PROCEDURE — 80053 COMPREHEN METABOLIC PANEL: CPT | Performed by: INTERNAL MEDICINE

## 2017-01-01 PROCEDURE — G8987 SELF CARE CURRENT STATUS: HCPCS | Performed by: OCCUPATIONAL THERAPIST

## 2017-01-01 PROCEDURE — 80047 BASIC METABLC PNL IONIZED CA: CPT

## 2017-01-01 PROCEDURE — 86900 BLOOD TYPING SEROLOGIC ABO: CPT | Performed by: INTERNAL MEDICINE

## 2017-01-01 PROCEDURE — 74011000250 HC RX REV CODE- 250: Performed by: GENERAL ACUTE CARE HOSPITAL

## 2017-01-01 PROCEDURE — 77030021678 HC GLIDESCP STAT DISP VERT -B

## 2017-01-01 PROCEDURE — A9579 GAD-BASE MR CONTRAST NOS,1ML: HCPCS | Performed by: INTERNAL MEDICINE

## 2017-01-01 PROCEDURE — 87086 URINE CULTURE/COLONY COUNT: CPT | Performed by: PHYSICAL MEDICINE & REHABILITATION

## 2017-01-01 PROCEDURE — C1769 GUIDE WIRE: HCPCS | Performed by: SURGERY

## 2017-01-01 PROCEDURE — 86706 HEP B SURFACE ANTIBODY: CPT | Performed by: FAMILY MEDICINE

## 2017-01-01 PROCEDURE — 84443 ASSAY THYROID STIM HORMONE: CPT | Performed by: FAMILY MEDICINE

## 2017-01-01 PROCEDURE — 85027 COMPLETE CBC AUTOMATED: CPT | Performed by: SURGERY

## 2017-01-01 PROCEDURE — 76001 XR FLUOROSCOPY OVER 60 MINUTES: CPT

## 2017-01-01 PROCEDURE — 77030012961 HC IRR KT CYSTO/TUR ICUM -A: Performed by: UROLOGY

## 2017-01-01 PROCEDURE — 77030008027

## 2017-01-01 PROCEDURE — 82570 ASSAY OF URINE CREATININE: CPT | Performed by: INTERNAL MEDICINE

## 2017-01-01 PROCEDURE — 80048 BASIC METABOLIC PNL TOTAL CA: CPT | Performed by: NURSE PRACTITIONER

## 2017-01-01 PROCEDURE — 71020 XR CHEST PA LAT: CPT

## 2017-01-01 PROCEDURE — 80053 COMPREHEN METABOLIC PANEL: CPT | Performed by: SURGERY

## 2017-01-01 PROCEDURE — 76770 US EXAM ABDO BACK WALL COMP: CPT

## 2017-01-01 PROCEDURE — 82330 ASSAY OF CALCIUM: CPT | Performed by: INTERNAL MEDICINE

## 2017-01-01 PROCEDURE — 77030019697 HC SYR ANGI INFL MRTM -B

## 2017-01-01 PROCEDURE — 51702 INSERT TEMP BLADDER CATH: CPT

## 2017-01-01 PROCEDURE — 97530 THERAPEUTIC ACTIVITIES: CPT | Performed by: OCCUPATIONAL THERAPIST

## 2017-01-01 PROCEDURE — 77030008467 HC STPLR SKN COVD -B: Performed by: SURGERY

## 2017-01-01 PROCEDURE — 80076 HEPATIC FUNCTION PANEL: CPT | Performed by: INTERNAL MEDICINE

## 2017-01-01 PROCEDURE — 77030021678 HC GLIDESCP STAT DISP VERT -B: Performed by: ANESTHESIOLOGY

## 2017-01-01 PROCEDURE — 74011000250 HC RX REV CODE- 250: Performed by: PHYSICAL MEDICINE & REHABILITATION

## 2017-01-01 PROCEDURE — 87040 BLOOD CULTURE FOR BACTERIA: CPT | Performed by: SURGERY

## 2017-01-01 PROCEDURE — 88305 TISSUE EXAM BY PATHOLOGIST: CPT | Performed by: SURGERY

## 2017-01-01 PROCEDURE — 75561 CARDIAC MRI FOR MORPH W/DYE: CPT

## 2017-01-01 PROCEDURE — 97110 THERAPEUTIC EXERCISES: CPT

## 2017-01-01 PROCEDURE — 86803 HEPATITIS C AB TEST: CPT | Performed by: FAMILY MEDICINE

## 2017-01-01 PROCEDURE — 77030034849: Performed by: SURGERY

## 2017-01-01 PROCEDURE — 84484 ASSAY OF TROPONIN QUANT: CPT | Performed by: PHYSICIAN ASSISTANT

## 2017-01-01 PROCEDURE — 0DB90ZX EXCISION OF DUODENUM, OPEN APPROACH, DIAGNOSTIC: ICD-10-PCS | Performed by: SURGERY

## 2017-01-01 PROCEDURE — BT1D1ZZ FLUOROSCOPY OF RIGHT KIDNEY, URETER AND BLADDER USING LOW OSMOLAR CONTRAST: ICD-10-PCS | Performed by: UROLOGY

## 2017-01-01 PROCEDURE — 97535 SELF CARE MNGMENT TRAINING: CPT

## 2017-01-01 PROCEDURE — 84443 ASSAY THYROID STIM HORMONE: CPT | Performed by: SURGERY

## 2017-01-01 PROCEDURE — 86256 FLUORESCENT ANTIBODY TITER: CPT | Performed by: FAMILY MEDICINE

## 2017-01-01 PROCEDURE — 77030019927 HC TBNG IRR CYSTO BAXT -A: Performed by: SURGERY

## 2017-01-01 PROCEDURE — 85025 COMPLETE CBC W/AUTO DIFF WBC: CPT | Performed by: FAMILY MEDICINE

## 2017-01-01 PROCEDURE — 77030027138 HC INCENT SPIROMETER -A

## 2017-01-01 PROCEDURE — 77030013079 HC BLNKT BAIR HGGR 3M -A: Performed by: ANESTHESIOLOGY

## 2017-01-01 PROCEDURE — 77030002996 HC SUT SLK J&J -A: Performed by: SURGERY

## 2017-01-01 PROCEDURE — 86923 COMPATIBILITY TEST ELECTRIC: CPT | Performed by: INTERNAL MEDICINE

## 2017-01-01 PROCEDURE — 0T143JD BYPASS LEFT KIDNEY PELVIS TO CUTANEOUS WITH SYNTHETIC SUBSTITUTE, PERCUTANEOUS APPROACH: ICD-10-PCS | Performed by: RADIOLOGY

## 2017-01-01 PROCEDURE — 97165 OT EVAL LOW COMPLEX 30 MIN: CPT | Performed by: OCCUPATIONAL THERAPIST

## 2017-01-01 PROCEDURE — 77030031139 HC SUT VCRL2 J&J -A: Performed by: SURGERY

## 2017-01-01 DEVICE — URETERAL STENT
Type: IMPLANTABLE DEVICE | Site: URETER | Status: FUNCTIONAL
Brand: CONTOUR™

## 2017-01-01 RX ORDER — MORPHINE SULFATE 10 MG/ML
INJECTION, SOLUTION INTRAMUSCULAR; INTRAVENOUS AS NEEDED
Status: DISCONTINUED | OUTPATIENT
Start: 2017-01-01 | End: 2017-01-01 | Stop reason: HOSPADM

## 2017-01-01 RX ORDER — LEVOFLOXACIN 500 MG/1
500 TABLET, FILM COATED ORAL DAILY
COMMUNITY
End: 2017-01-01

## 2017-01-01 RX ORDER — ONDANSETRON 2 MG/ML
4 INJECTION INTRAMUSCULAR; INTRAVENOUS
Status: DISCONTINUED | OUTPATIENT
Start: 2017-01-01 | End: 2017-01-01 | Stop reason: HOSPADM

## 2017-01-01 RX ORDER — HEPARIN 100 UNIT/ML
300 SYRINGE INTRAVENOUS AS NEEDED
Status: DISCONTINUED | OUTPATIENT
Start: 2017-01-01 | End: 2017-01-01 | Stop reason: HOSPADM

## 2017-01-01 RX ORDER — SODIUM BICARBONATE 1 MEQ/ML
50 SYRINGE (ML) INTRAVENOUS
Status: COMPLETED | OUTPATIENT
Start: 2017-01-01 | End: 2017-01-01

## 2017-01-01 RX ORDER — SODIUM BICARBONATE 1 MEQ/ML
50 SYRINGE (ML) INTRAVENOUS ONCE
Status: DISCONTINUED | OUTPATIENT
Start: 2017-01-01 | End: 2017-01-01 | Stop reason: HOSPADM

## 2017-01-01 RX ORDER — SODIUM BICARBONATE 1 MEQ/ML
50 SYRINGE (ML) INTRAVENOUS ONCE
Status: COMPLETED | OUTPATIENT
Start: 2017-01-01 | End: 2017-01-01

## 2017-01-01 RX ORDER — ONDANSETRON 2 MG/ML
INJECTION INTRAMUSCULAR; INTRAVENOUS AS NEEDED
Status: DISCONTINUED | OUTPATIENT
Start: 2017-01-01 | End: 2017-01-01 | Stop reason: HOSPADM

## 2017-01-01 RX ORDER — MAGNESIUM SULFATE 100 %
4 CRYSTALS MISCELLANEOUS AS NEEDED
Status: DISCONTINUED | OUTPATIENT
Start: 2017-01-01 | End: 2017-01-01 | Stop reason: SDUPTHER

## 2017-01-01 RX ORDER — LIDOCAINE HYDROCHLORIDE 10 MG/ML
INJECTION INFILTRATION; PERINEURAL
Status: DISCONTINUED
Start: 2017-01-01 | End: 2017-01-01 | Stop reason: HOSPADM

## 2017-01-01 RX ORDER — AMOXICILLIN 250 MG
1 CAPSULE ORAL DAILY
Status: DISCONTINUED | OUTPATIENT
Start: 2017-01-01 | End: 2017-01-01 | Stop reason: HOSPADM

## 2017-01-01 RX ORDER — SODIUM CHLORIDE 0.9 % (FLUSH) 0.9 %
5-10 SYRINGE (ML) INJECTION EVERY 8 HOURS
Status: DISCONTINUED | OUTPATIENT
Start: 2017-01-01 | End: 2017-01-01 | Stop reason: HOSPADM

## 2017-01-01 RX ORDER — METOPROLOL TARTRATE 25 MG/1
12.5 TABLET, FILM COATED ORAL EVERY 12 HOURS
Status: DISCONTINUED | OUTPATIENT
Start: 2017-01-01 | End: 2017-01-01

## 2017-01-01 RX ORDER — GABAPENTIN 300 MG/1
300 CAPSULE ORAL 2 TIMES DAILY
Status: DISCONTINUED | OUTPATIENT
Start: 2017-01-01 | End: 2017-01-01 | Stop reason: HOSPADM

## 2017-01-01 RX ORDER — METOPROLOL TARTRATE 5 MG/5ML
2.5 INJECTION INTRAVENOUS
Status: DISCONTINUED | OUTPATIENT
Start: 2017-01-01 | End: 2017-01-01 | Stop reason: HOSPADM

## 2017-01-01 RX ORDER — HEPARIN SODIUM 200 [USP'U]/100ML
200 INJECTION, SOLUTION INTRAVENOUS ONCE
Status: DISPENSED | OUTPATIENT
Start: 2017-01-01 | End: 2017-01-01

## 2017-01-01 RX ORDER — HEPARIN SODIUM 200 [USP'U]/100ML
500 INJECTION, SOLUTION INTRAVENOUS ONCE
Status: COMPLETED | OUTPATIENT
Start: 2017-01-01 | End: 2017-01-01

## 2017-01-01 RX ORDER — METOPROLOL TARTRATE 25 MG/1
12.5 TABLET, FILM COATED ORAL 2 TIMES DAILY
COMMUNITY

## 2017-01-01 RX ORDER — SODIUM BICARBONATE 1 MEQ/ML
SYRINGE (ML) INTRAVENOUS
Status: DISCONTINUED
Start: 2017-01-01 | End: 2017-01-01 | Stop reason: HOSPADM

## 2017-01-01 RX ORDER — METOPROLOL TARTRATE 25 MG/1
12.5 TABLET, FILM COATED ORAL 2 TIMES DAILY
Status: DISCONTINUED | OUTPATIENT
Start: 2017-01-01 | End: 2017-01-01 | Stop reason: HOSPADM

## 2017-01-01 RX ORDER — MIDAZOLAM HYDROCHLORIDE 1 MG/ML
.5-2 INJECTION, SOLUTION INTRAMUSCULAR; INTRAVENOUS
Status: DISCONTINUED | OUTPATIENT
Start: 2017-01-01 | End: 2017-01-01

## 2017-01-01 RX ORDER — FLUCONAZOLE 100 MG/1
100 TABLET ORAL DAILY
Qty: 2 TAB | Refills: 0 | Status: SHIPPED
Start: 2017-01-01 | End: 2017-01-01

## 2017-01-01 RX ORDER — FACIAL-BODY WIPES
10 EACH TOPICAL
Status: ON HOLD | COMMUNITY
End: 2017-01-01

## 2017-01-01 RX ORDER — METOPROLOL TARTRATE 50 MG/1
50 TABLET ORAL EVERY 12 HOURS
Status: DISCONTINUED | OUTPATIENT
Start: 2017-01-01 | End: 2017-01-01 | Stop reason: HOSPADM

## 2017-01-01 RX ORDER — HEPARIN 100 UNIT/ML
300 SYRINGE INTRAVENOUS AS NEEDED
Status: CANCELLED | OUTPATIENT
Start: 2017-01-01

## 2017-01-01 RX ORDER — METOPROLOL TARTRATE 25 MG/1
25 TABLET, FILM COATED ORAL 2 TIMES DAILY
Status: DISCONTINUED | OUTPATIENT
Start: 2017-01-01 | End: 2017-01-01 | Stop reason: HOSPADM

## 2017-01-01 RX ORDER — ENOXAPARIN SODIUM 100 MG/ML
40 INJECTION SUBCUTANEOUS DAILY
Status: DISCONTINUED | OUTPATIENT
Start: 2017-01-01 | End: 2017-01-01 | Stop reason: HOSPADM

## 2017-01-01 RX ORDER — FLUCONAZOLE 150 MG/1
150 TABLET ORAL
Qty: 1 TAB | Refills: 1 | Status: SHIPPED | OUTPATIENT
Start: 2017-01-01 | End: 2017-01-01

## 2017-01-01 RX ORDER — LIDOCAINE HYDROCHLORIDE 20 MG/ML
20 INJECTION, SOLUTION INFILTRATION; PERINEURAL ONCE
Status: COMPLETED | OUTPATIENT
Start: 2017-01-01 | End: 2017-01-01

## 2017-01-01 RX ORDER — DEXTROSE 50 % IN WATER (D50W) INTRAVENOUS SYRINGE
12.5-25 AS NEEDED
Status: DISCONTINUED | OUTPATIENT
Start: 2017-01-01 | End: 2017-01-01 | Stop reason: HOSPADM

## 2017-01-01 RX ORDER — DEXTROSE, SODIUM CHLORIDE, AND POTASSIUM CHLORIDE 5; .45; .15 G/100ML; G/100ML; G/100ML
25 INJECTION INTRAVENOUS CONTINUOUS
Status: DISCONTINUED | OUTPATIENT
Start: 2017-01-01 | End: 2017-01-01

## 2017-01-01 RX ORDER — OXYBUTYNIN CHLORIDE 5 MG/1
10 TABLET, EXTENDED RELEASE ORAL DAILY
Status: DISCONTINUED | OUTPATIENT
Start: 2017-01-01 | End: 2017-01-01 | Stop reason: HOSPADM

## 2017-01-01 RX ORDER — COLESEVELAM 180 1/1
1250 TABLET ORAL 2 TIMES DAILY WITH MEALS
Status: DISCONTINUED | OUTPATIENT
Start: 2017-01-01 | End: 2017-01-01

## 2017-01-01 RX ORDER — ATORVASTATIN CALCIUM 40 MG/1
40 TABLET, FILM COATED ORAL
Status: DISCONTINUED | OUTPATIENT
Start: 2017-01-01 | End: 2017-01-01 | Stop reason: HOSPADM

## 2017-01-01 RX ORDER — SUCCINYLCHOLINE CHLORIDE 20 MG/ML
INJECTION INTRAMUSCULAR; INTRAVENOUS AS NEEDED
Status: DISCONTINUED | OUTPATIENT
Start: 2017-01-01 | End: 2017-01-01 | Stop reason: HOSPADM

## 2017-01-01 RX ORDER — LIDOCAINE HYDROCHLORIDE 10 MG/ML
1-30 INJECTION, SOLUTION EPIDURAL; INFILTRATION; INTRACAUDAL; PERINEURAL
Status: DISCONTINUED | OUTPATIENT
Start: 2017-01-01 | End: 2017-01-01

## 2017-01-01 RX ORDER — SODIUM CHLORIDE 0.9 % (FLUSH) 0.9 %
5-10 SYRINGE (ML) INJECTION AS NEEDED
Status: DISCONTINUED | OUTPATIENT
Start: 2017-01-01 | End: 2017-01-01 | Stop reason: HOSPADM

## 2017-01-01 RX ORDER — ACETAMINOPHEN 500 MG
500 TABLET ORAL
Status: DISCONTINUED | OUTPATIENT
Start: 2017-01-01 | End: 2017-01-01 | Stop reason: HOSPADM

## 2017-01-01 RX ORDER — ALBUMIN HUMAN 50 G/1000ML
SOLUTION INTRAVENOUS AS NEEDED
Status: DISCONTINUED | OUTPATIENT
Start: 2017-01-01 | End: 2017-01-01 | Stop reason: HOSPADM

## 2017-01-01 RX ORDER — PHENAZOPYRIDINE HYDROCHLORIDE 100 MG/1
200 TABLET, FILM COATED ORAL
Status: COMPLETED | OUTPATIENT
Start: 2017-01-01 | End: 2017-01-01

## 2017-01-01 RX ORDER — ALBUMIN HUMAN 50 G/1000ML
50 SOLUTION INTRAVENOUS
Status: COMPLETED | OUTPATIENT
Start: 2017-01-01 | End: 2017-01-01

## 2017-01-01 RX ORDER — TAMSULOSIN HYDROCHLORIDE 0.4 MG/1
0.4 CAPSULE ORAL DAILY
Status: DISCONTINUED | OUTPATIENT
Start: 2017-01-01 | End: 2017-01-01

## 2017-01-01 RX ORDER — INSULIN LISPRO 100 [IU]/ML
INJECTION, SOLUTION INTRAVENOUS; SUBCUTANEOUS
Status: DISCONTINUED | OUTPATIENT
Start: 2017-01-01 | End: 2017-01-01

## 2017-01-01 RX ORDER — CEPHALEXIN 500 MG/1
500 CAPSULE ORAL 4 TIMES DAILY
Qty: 28 CAP | Refills: 0 | Status: SHIPPED | OUTPATIENT
Start: 2017-01-01 | End: 2017-01-01

## 2017-01-01 RX ORDER — METOPROLOL TARTRATE 5 MG/5ML
15 INJECTION INTRAVENOUS ONCE
Status: COMPLETED | OUTPATIENT
Start: 2017-01-01 | End: 2017-01-01

## 2017-01-01 RX ORDER — LIDOCAINE HYDROCHLORIDE 20 MG/ML
INJECTION, SOLUTION EPIDURAL; INFILTRATION; INTRACAUDAL; PERINEURAL AS NEEDED
Status: DISCONTINUED | OUTPATIENT
Start: 2017-01-01 | End: 2017-01-01 | Stop reason: HOSPADM

## 2017-01-01 RX ORDER — DEXTROSE 50 % IN WATER (D50W) INTRAVENOUS SYRINGE
12.5-25 AS NEEDED
Status: DISCONTINUED | OUTPATIENT
Start: 2017-01-01 | End: 2017-01-01 | Stop reason: SDUPTHER

## 2017-01-01 RX ORDER — LIDOCAINE HYDROCHLORIDE 10 MG/ML
INJECTION, SOLUTION EPIDURAL; INFILTRATION; INTRACAUDAL; PERINEURAL
Status: COMPLETED
Start: 2017-01-01 | End: 2017-01-01

## 2017-01-01 RX ORDER — INSULIN LISPRO 100 [IU]/ML
INJECTION, SOLUTION INTRAVENOUS; SUBCUTANEOUS EVERY 6 HOURS
Status: DISCONTINUED | OUTPATIENT
Start: 2017-01-01 | End: 2017-01-01

## 2017-01-01 RX ORDER — PROPOFOL 10 MG/ML
INJECTION, EMULSION INTRAVENOUS AS NEEDED
Status: DISCONTINUED | OUTPATIENT
Start: 2017-01-01 | End: 2017-01-01 | Stop reason: HOSPADM

## 2017-01-01 RX ORDER — NALOXONE HYDROCHLORIDE 0.4 MG/ML
0.4 INJECTION, SOLUTION INTRAMUSCULAR; INTRAVENOUS; SUBCUTANEOUS AS NEEDED
Status: DISCONTINUED | OUTPATIENT
Start: 2017-01-01 | End: 2017-01-01 | Stop reason: HOSPADM

## 2017-01-01 RX ORDER — AMOXICILLIN 500 MG/1
1000 TABLET, FILM COATED ORAL 2 TIMES DAILY
Qty: 40 TAB | Refills: 0 | Status: SHIPPED | OUTPATIENT
Start: 2017-01-01 | End: 2017-01-01

## 2017-01-01 RX ORDER — FLUCONAZOLE 100 MG/1
150 TABLET ORAL
Status: COMPLETED | OUTPATIENT
Start: 2017-01-01 | End: 2017-01-01

## 2017-01-01 RX ORDER — SODIUM CHLORIDE 0.9 % (FLUSH) 0.9 %
10 SYRINGE (ML) INJECTION AS NEEDED
Status: DISCONTINUED | OUTPATIENT
Start: 2017-01-01 | End: 2017-01-01 | Stop reason: HOSPADM

## 2017-01-01 RX ORDER — PHENYLEPHRINE HYDROCHLORIDE 10 MG/ML
INJECTION INTRAVENOUS
Status: COMPLETED
Start: 2017-01-01 | End: 2017-01-01

## 2017-01-01 RX ORDER — DILTIAZEM HYDROCHLORIDE 120 MG/1
120 CAPSULE, COATED, EXTENDED RELEASE ORAL DAILY
Status: DISCONTINUED | OUTPATIENT
Start: 2017-01-01 | End: 2017-01-01 | Stop reason: HOSPADM

## 2017-01-01 RX ORDER — DIGOXIN 0.25 MG/ML
500 INJECTION INTRAMUSCULAR; INTRAVENOUS
Status: DISCONTINUED | OUTPATIENT
Start: 2017-01-01 | End: 2017-01-01

## 2017-01-01 RX ORDER — SODIUM CHLORIDE 0.9 % (FLUSH) 0.9 %
10 SYRINGE (ML) INJECTION EVERY 24 HOURS
Status: CANCELLED | OUTPATIENT
Start: 2017-01-01

## 2017-01-01 RX ORDER — METOPROLOL TARTRATE 5 MG/5ML
5 INJECTION INTRAVENOUS EVERY 6 HOURS
Status: DISCONTINUED | OUTPATIENT
Start: 2017-01-01 | End: 2017-01-01

## 2017-01-01 RX ORDER — SODIUM CHLORIDE 9 MG/ML
100 INJECTION, SOLUTION INTRAVENOUS CONTINUOUS
Status: DISCONTINUED | OUTPATIENT
Start: 2017-01-01 | End: 2017-01-01

## 2017-01-01 RX ORDER — SODIUM CHLORIDE 9 MG/ML
250 INJECTION, SOLUTION INTRAVENOUS AS NEEDED
Status: DISCONTINUED | OUTPATIENT
Start: 2017-01-01 | End: 2017-01-01 | Stop reason: HOSPADM

## 2017-01-01 RX ORDER — SODIUM CHLORIDE, SODIUM LACTATE, POTASSIUM CHLORIDE, CALCIUM CHLORIDE 600; 310; 30; 20 MG/100ML; MG/100ML; MG/100ML; MG/100ML
25 INJECTION, SOLUTION INTRAVENOUS CONTINUOUS
Status: DISCONTINUED | OUTPATIENT
Start: 2017-01-01 | End: 2017-01-01 | Stop reason: HOSPADM

## 2017-01-01 RX ORDER — FACIAL-BODY WIPES
10 EACH TOPICAL DAILY PRN
Status: DISCONTINUED | OUTPATIENT
Start: 2017-01-01 | End: 2017-01-01 | Stop reason: HOSPADM

## 2017-01-01 RX ORDER — CALCIUM CHLORIDE INJECTION 100 MG/ML
INJECTION, SOLUTION INTRAVENOUS
Status: COMPLETED | OUTPATIENT
Start: 2017-01-01 | End: 2017-01-01

## 2017-01-01 RX ORDER — BUMETANIDE 0.25 MG/ML
1 INJECTION INTRAMUSCULAR; INTRAVENOUS ONCE
Status: COMPLETED | OUTPATIENT
Start: 2017-01-01 | End: 2017-01-01

## 2017-01-01 RX ORDER — FUROSEMIDE 10 MG/ML
20 INJECTION INTRAMUSCULAR; INTRAVENOUS ONCE
Status: COMPLETED | OUTPATIENT
Start: 2017-01-01 | End: 2017-01-01

## 2017-01-01 RX ORDER — FENTANYL CITRATE 50 UG/ML
50 INJECTION, SOLUTION INTRAMUSCULAR; INTRAVENOUS AS NEEDED
Status: CANCELLED | OUTPATIENT
Start: 2017-01-01

## 2017-01-01 RX ORDER — METOPROLOL TARTRATE 5 MG/5ML
7.5 INJECTION INTRAVENOUS EVERY 6 HOURS
Status: DISCONTINUED | OUTPATIENT
Start: 2017-01-01 | End: 2017-01-01

## 2017-01-01 RX ORDER — HEPARIN SODIUM 1000 [USP'U]/ML
INJECTION, SOLUTION INTRAVENOUS; SUBCUTANEOUS
Status: COMPLETED
Start: 2017-01-01 | End: 2017-01-01

## 2017-01-01 RX ORDER — COLESEVELAM 180 1/1
1250 TABLET ORAL 2 TIMES DAILY WITH MEALS
Status: DISCONTINUED | OUTPATIENT
Start: 2017-01-01 | End: 2017-01-01 | Stop reason: HOSPADM

## 2017-01-01 RX ORDER — TIMOLOL MALEATE 2.5 MG/ML
1-2 SOLUTION/ DROPS OPHTHALMIC 2 TIMES DAILY
Status: DISCONTINUED | OUTPATIENT
Start: 2017-01-01 | End: 2017-01-01 | Stop reason: HOSPADM

## 2017-01-01 RX ORDER — ENOXAPARIN SODIUM 100 MG/ML
40 INJECTION SUBCUTANEOUS EVERY 24 HOURS
Status: DISCONTINUED | OUTPATIENT
Start: 2017-01-01 | End: 2017-01-01 | Stop reason: HOSPADM

## 2017-01-01 RX ORDER — INSULIN LISPRO 100 [IU]/ML
INJECTION, SOLUTION INTRAVENOUS; SUBCUTANEOUS
Status: DISCONTINUED | OUTPATIENT
Start: 2017-01-01 | End: 2017-01-01 | Stop reason: HOSPADM

## 2017-01-01 RX ORDER — METOPROLOL TARTRATE 25 MG/1
12.5 TABLET, FILM COATED ORAL ONCE
Status: COMPLETED | OUTPATIENT
Start: 2017-01-01 | End: 2017-01-01

## 2017-01-01 RX ORDER — GABAPENTIN 300 MG/1
300 CAPSULE ORAL EVERY 12 HOURS
Status: DISCONTINUED | OUTPATIENT
Start: 2017-01-01 | End: 2017-01-01

## 2017-01-01 RX ORDER — ASPIRIN 325 MG
TABLET ORAL
Status: DISCONTINUED
Start: 2017-01-01 | End: 2017-01-01 | Stop reason: HOSPADM

## 2017-01-01 RX ORDER — SODIUM CHLORIDE 450 MG/100ML
100 INJECTION, SOLUTION INTRAVENOUS CONTINUOUS
Status: DISCONTINUED | OUTPATIENT
Start: 2017-01-01 | End: 2017-01-01

## 2017-01-01 RX ORDER — LEVOFLOXACIN 250 MG/1
250 TABLET ORAL EVERY 24 HOURS
Status: COMPLETED | OUTPATIENT
Start: 2017-01-01 | End: 2017-01-01

## 2017-01-01 RX ORDER — LIDOCAINE HYDROCHLORIDE 10 MG/ML
0.1 INJECTION, SOLUTION EPIDURAL; INFILTRATION; INTRACAUDAL; PERINEURAL AS NEEDED
Status: DISCONTINUED | OUTPATIENT
Start: 2017-01-01 | End: 2017-01-01 | Stop reason: HOSPADM

## 2017-01-01 RX ORDER — MIDAZOLAM HYDROCHLORIDE 1 MG/ML
INJECTION, SOLUTION INTRAMUSCULAR; INTRAVENOUS
Status: COMPLETED
Start: 2017-01-01 | End: 2017-01-01

## 2017-01-01 RX ORDER — PANTOPRAZOLE SODIUM 40 MG/1
40 TABLET, DELAYED RELEASE ORAL 2 TIMES DAILY
Status: DISCONTINUED | OUTPATIENT
Start: 2017-01-01 | End: 2017-01-01

## 2017-01-01 RX ORDER — SODIUM CHLORIDE 0.9 % (FLUSH) 0.9 %
10-40 SYRINGE (ML) INJECTION EVERY 8 HOURS
Status: DISCONTINUED | OUTPATIENT
Start: 2017-01-01 | End: 2017-01-01 | Stop reason: HOSPADM

## 2017-01-01 RX ORDER — SODIUM CHLORIDE, SODIUM LACTATE, POTASSIUM CHLORIDE, CALCIUM CHLORIDE 600; 310; 30; 20 MG/100ML; MG/100ML; MG/100ML; MG/100ML
25 INJECTION, SOLUTION INTRAVENOUS CONTINUOUS
Status: CANCELLED | OUTPATIENT
Start: 2017-01-01 | End: 2017-01-01

## 2017-01-01 RX ORDER — SODIUM CHLORIDE 9 MG/ML
250 INJECTION, SOLUTION INTRAVENOUS AS NEEDED
Status: DISCONTINUED | OUTPATIENT
Start: 2017-01-01 | End: 2017-01-01 | Stop reason: ALTCHOICE

## 2017-01-01 RX ORDER — SODIUM CHLORIDE 0.9 % (FLUSH) 0.9 %
10-30 SYRINGE (ML) INJECTION AS NEEDED
Status: CANCELLED | OUTPATIENT
Start: 2017-01-01

## 2017-01-01 RX ORDER — FENTANYL CITRATE 50 UG/ML
50 INJECTION, SOLUTION INTRAMUSCULAR; INTRAVENOUS AS NEEDED
Status: DISCONTINUED | OUTPATIENT
Start: 2017-01-01 | End: 2017-01-01 | Stop reason: HOSPADM

## 2017-01-01 RX ORDER — SODIUM BICARBONATE 1 MEQ/ML
SYRINGE (ML) INTRAVENOUS
Status: COMPLETED | OUTPATIENT
Start: 2017-01-01 | End: 2017-01-01

## 2017-01-01 RX ORDER — ONDANSETRON 2 MG/ML
1 INJECTION INTRAMUSCULAR; INTRAVENOUS ONCE
Status: COMPLETED | OUTPATIENT
Start: 2017-01-01 | End: 2017-01-01

## 2017-01-01 RX ORDER — SODIUM CHLORIDE 9 MG/ML
75 INJECTION, SOLUTION INTRAVENOUS CONTINUOUS
Status: DISCONTINUED | OUTPATIENT
Start: 2017-01-01 | End: 2017-01-01

## 2017-01-01 RX ORDER — FLUCONAZOLE 2 MG/ML
200 INJECTION, SOLUTION INTRAVENOUS EVERY 24 HOURS
Status: DISCONTINUED | OUTPATIENT
Start: 2017-01-01 | End: 2017-01-01

## 2017-01-01 RX ORDER — HYDROMORPHONE HYDROCHLORIDE 1 MG/ML
0.5 INJECTION, SOLUTION INTRAMUSCULAR; INTRAVENOUS; SUBCUTANEOUS
Status: DISCONTINUED | OUTPATIENT
Start: 2017-01-01 | End: 2017-01-01 | Stop reason: HOSPADM

## 2017-01-01 RX ORDER — TIMOLOL MALEATE 2.5 MG/ML
1 SOLUTION/ DROPS OPHTHALMIC 2 TIMES DAILY
Status: DISCONTINUED | OUTPATIENT
Start: 2017-01-01 | End: 2017-01-01 | Stop reason: HOSPADM

## 2017-01-01 RX ORDER — LEVOFLOXACIN 500 MG/1
500 TABLET, FILM COATED ORAL ONCE
Status: COMPLETED | OUTPATIENT
Start: 2017-01-01 | End: 2017-01-01

## 2017-01-01 RX ORDER — ONDANSETRON 2 MG/ML
4 INJECTION INTRAMUSCULAR; INTRAVENOUS
Status: COMPLETED | OUTPATIENT
Start: 2017-01-01 | End: 2017-01-01

## 2017-01-01 RX ORDER — HEPARIN SODIUM 200 [USP'U]/100ML
INJECTION, SOLUTION INTRAVENOUS
Status: COMPLETED
Start: 2017-01-01 | End: 2017-01-01

## 2017-01-01 RX ORDER — SODIUM BICARBONATE 1 MEQ/ML
SYRINGE (ML) INTRAVENOUS
Status: DISPENSED
Start: 2017-01-01 | End: 2017-01-01

## 2017-01-01 RX ORDER — LEVOFLOXACIN 5 MG/ML
500 INJECTION, SOLUTION INTRAVENOUS ONCE
Status: COMPLETED | OUTPATIENT
Start: 2017-01-01 | End: 2017-01-01

## 2017-01-01 RX ORDER — CALCIUM GLUCONATE 94 MG/ML
1 INJECTION, SOLUTION INTRAVENOUS
Status: COMPLETED | OUTPATIENT
Start: 2017-01-01 | End: 2017-01-01

## 2017-01-01 RX ORDER — PROPOFOL 10 MG/ML
5-50 VIAL (ML) INTRAVENOUS
Status: DISCONTINUED | OUTPATIENT
Start: 2017-01-01 | End: 2017-01-01 | Stop reason: HOSPADM

## 2017-01-01 RX ORDER — SODIUM CHLORIDE 50 MG/ML
1 SOLUTION/ DROPS OPHTHALMIC 3 TIMES DAILY
COMMUNITY

## 2017-01-01 RX ORDER — FINASTERIDE 5 MG/1
5 TABLET, FILM COATED ORAL DAILY
Status: DISCONTINUED | OUTPATIENT
Start: 2017-01-01 | End: 2017-01-01

## 2017-01-01 RX ORDER — PANTOPRAZOLE SODIUM 40 MG/1
40 TABLET, DELAYED RELEASE ORAL 2 TIMES DAILY
COMMUNITY

## 2017-01-01 RX ORDER — MAGNESIUM SULFATE 100 %
16 CRYSTALS MISCELLANEOUS AS NEEDED
Status: DISCONTINUED | OUTPATIENT
Start: 2017-01-01 | End: 2017-01-01 | Stop reason: HOSPADM

## 2017-01-01 RX ORDER — LIDOCAINE HYDROCHLORIDE 20 MG/ML
30 INJECTION, SOLUTION INFILTRATION; PERINEURAL ONCE
Status: ACTIVE | OUTPATIENT
Start: 2017-01-01 | End: 2017-01-01

## 2017-01-01 RX ORDER — ASPIRIN 81 MG/1
81 TABLET ORAL DAILY
Status: DISCONTINUED | OUTPATIENT
Start: 2017-01-01 | End: 2017-01-01

## 2017-01-01 RX ORDER — VERAPAMIL HYDROCHLORIDE 2.5 MG/ML
2.5 INJECTION, SOLUTION INTRAVENOUS ONCE
Status: COMPLETED | OUTPATIENT
Start: 2017-01-01 | End: 2017-01-01

## 2017-01-01 RX ORDER — MAGNESIUM SULFATE HEPTAHYDRATE 40 MG/ML
2 INJECTION, SOLUTION INTRAVENOUS
Status: COMPLETED | OUTPATIENT
Start: 2017-01-01 | End: 2017-01-01

## 2017-01-01 RX ORDER — HYDROMORPHONE HYDROCHLORIDE 1 MG/ML
.2-.5 INJECTION, SOLUTION INTRAMUSCULAR; INTRAVENOUS; SUBCUTANEOUS
Status: DISCONTINUED | OUTPATIENT
Start: 2017-01-01 | End: 2017-01-01 | Stop reason: HOSPADM

## 2017-01-01 RX ORDER — LABETALOL HYDROCHLORIDE 5 MG/ML
10 INJECTION, SOLUTION INTRAVENOUS
Status: DISCONTINUED | OUTPATIENT
Start: 2017-01-01 | End: 2017-01-01 | Stop reason: HOSPADM

## 2017-01-01 RX ORDER — BUTALBITAL, ACETAMINOPHEN AND CAFFEINE 50; 325; 40 MG/1; MG/1; MG/1
1 TABLET ORAL
Status: ON HOLD | COMMUNITY
End: 2017-01-01

## 2017-01-01 RX ORDER — CYCLOSPORINE 0.5 MG/ML
1 EMULSION OPHTHALMIC 2 TIMES DAILY
Status: DISCONTINUED | OUTPATIENT
Start: 2017-01-01 | End: 2017-01-01 | Stop reason: HOSPADM

## 2017-01-01 RX ORDER — INSULIN LISPRO 100 [IU]/ML
INJECTION, SOLUTION INTRAVENOUS; SUBCUTANEOUS
Status: DISCONTINUED | OUTPATIENT
Start: 2017-01-01 | End: 2017-01-01 | Stop reason: SDUPTHER

## 2017-01-01 RX ORDER — ASPIRIN 325 MG
325 TABLET ORAL ONCE
Status: COMPLETED | OUTPATIENT
Start: 2017-01-01 | End: 2017-01-01

## 2017-01-01 RX ORDER — MAGNESIUM SULFATE 1 G/100ML
1 INJECTION INTRAVENOUS ONCE
Status: DISCONTINUED | OUTPATIENT
Start: 2017-01-01 | End: 2017-01-01 | Stop reason: SDUPTHER

## 2017-01-01 RX ORDER — SODIUM BICARBONATE 1 MEQ/ML
100 SYRINGE (ML) INTRAVENOUS ONCE
Status: DISCONTINUED | OUTPATIENT
Start: 2017-01-01 | End: 2017-01-01 | Stop reason: HOSPADM

## 2017-01-01 RX ORDER — METOPROLOL TARTRATE 25 MG/1
12.5 TABLET, FILM COATED ORAL 2 TIMES DAILY
Status: DISCONTINUED | OUTPATIENT
Start: 2017-01-01 | End: 2017-01-01

## 2017-01-01 RX ORDER — MAGNESIUM SULFATE 100 %
4 CRYSTALS MISCELLANEOUS AS NEEDED
Status: DISCONTINUED | OUTPATIENT
Start: 2017-01-01 | End: 2017-01-01 | Stop reason: HOSPADM

## 2017-01-01 RX ORDER — SODIUM CHLORIDE 0.9 % (FLUSH) 0.9 %
SYRINGE (ML) INJECTION
Status: COMPLETED
Start: 2017-01-01 | End: 2017-01-01

## 2017-01-01 RX ORDER — ADHESIVE BANDAGE
30 BANDAGE TOPICAL DAILY PRN
Status: ON HOLD | COMMUNITY
End: 2017-01-01

## 2017-01-01 RX ORDER — ATORVASTATIN CALCIUM 40 MG/1
40 TABLET, FILM COATED ORAL
Status: DISCONTINUED | OUTPATIENT
Start: 2017-01-01 | End: 2017-01-01

## 2017-01-01 RX ORDER — FENTANYL CITRATE 50 UG/ML
25 INJECTION, SOLUTION INTRAMUSCULAR; INTRAVENOUS
Status: CANCELLED | OUTPATIENT
Start: 2017-01-01

## 2017-01-01 RX ORDER — PROPOFOL 10 MG/ML
INJECTION, EMULSION INTRAVENOUS
Status: COMPLETED
Start: 2017-01-01 | End: 2017-01-01

## 2017-01-01 RX ORDER — LISINOPRIL 5 MG/1
10 TABLET ORAL DAILY
Status: DISCONTINUED | OUTPATIENT
Start: 2017-01-01 | End: 2017-01-01 | Stop reason: HOSPADM

## 2017-01-01 RX ORDER — DILTIAZEM HYDROCHLORIDE 5 MG/ML
10 INJECTION INTRAVENOUS
Status: DISCONTINUED | OUTPATIENT
Start: 2017-01-01 | End: 2017-01-01

## 2017-01-01 RX ORDER — PHENAZOPYRIDINE HYDROCHLORIDE 100 MG/1
100 TABLET, FILM COATED ORAL 3 TIMES DAILY
Qty: 6 TAB | Refills: 0 | Status: SHIPPED | OUTPATIENT
Start: 2017-01-01 | End: 2017-01-01

## 2017-01-01 RX ORDER — SODIUM CHLORIDE 0.9 % (FLUSH) 0.9 %
10 SYRINGE (ML) INJECTION EVERY 24 HOURS
Status: DISCONTINUED | OUTPATIENT
Start: 2017-01-01 | End: 2017-01-01 | Stop reason: HOSPADM

## 2017-01-01 RX ORDER — MORPHINE SULFATE 4 MG/ML
2 INJECTION, SOLUTION INTRAMUSCULAR; INTRAVENOUS
Status: DISCONTINUED | OUTPATIENT
Start: 2017-01-01 | End: 2017-01-01

## 2017-01-01 RX ORDER — MULTIVIT WITH MINERALS/HERBS
1 TABLET ORAL DAILY
COMMUNITY

## 2017-01-01 RX ORDER — FLUCONAZOLE 100 MG/1
100 TABLET ORAL DAILY
Status: DISCONTINUED | OUTPATIENT
Start: 2017-01-01 | End: 2017-01-01 | Stop reason: HOSPADM

## 2017-01-01 RX ORDER — ACETAMINOPHEN 325 MG/1
650 TABLET ORAL
Status: DISCONTINUED | OUTPATIENT
Start: 2017-01-01 | End: 2017-01-01 | Stop reason: HOSPADM

## 2017-01-01 RX ORDER — MORPHINE SULFATE 4 MG/ML
2 INJECTION INTRAVENOUS
Status: DISCONTINUED | OUTPATIENT
Start: 2017-01-01 | End: 2017-01-01

## 2017-01-01 RX ORDER — ONDANSETRON 2 MG/ML
1 INJECTION INTRAMUSCULAR; INTRAVENOUS ONCE
Status: DISCONTINUED | OUTPATIENT
Start: 2017-01-01 | End: 2017-01-01

## 2017-01-01 RX ORDER — NEOSTIGMINE METHYLSULFATE 1 MG/ML
INJECTION INTRAVENOUS AS NEEDED
Status: DISCONTINUED | OUTPATIENT
Start: 2017-01-01 | End: 2017-01-01 | Stop reason: HOSPADM

## 2017-01-01 RX ORDER — KETOROLAC TROMETHAMINE 30 MG/ML
15 INJECTION, SOLUTION INTRAMUSCULAR; INTRAVENOUS
Status: COMPLETED | OUTPATIENT
Start: 2017-01-01 | End: 2017-01-01

## 2017-01-01 RX ORDER — VANCOMYCIN HYDROCHLORIDE
1250
Status: DISCONTINUED | OUTPATIENT
Start: 2017-01-01 | End: 2017-01-01 | Stop reason: DRUGHIGH

## 2017-01-01 RX ORDER — VERAPAMIL HYDROCHLORIDE 2.5 MG/ML
INJECTION, SOLUTION INTRAVENOUS
Status: COMPLETED
Start: 2017-01-01 | End: 2017-01-01

## 2017-01-01 RX ORDER — OXYBUTYNIN CHLORIDE 5 MG/1
5 TABLET, EXTENDED RELEASE ORAL DAILY
Status: DISCONTINUED | OUTPATIENT
Start: 2017-01-01 | End: 2017-01-01

## 2017-01-01 RX ORDER — METOPROLOL TARTRATE 5 MG/5ML
10 INJECTION INTRAVENOUS EVERY 6 HOURS
Status: DISCONTINUED | OUTPATIENT
Start: 2017-01-01 | End: 2017-01-01

## 2017-01-01 RX ORDER — ALBUMIN HUMAN 250 G/1000ML
12.5 SOLUTION INTRAVENOUS EVERY 6 HOURS
Status: COMPLETED | OUTPATIENT
Start: 2017-01-01 | End: 2017-01-01

## 2017-01-01 RX ORDER — ACETAMINOPHEN 650 MG/1
650 SUPPOSITORY RECTAL
Status: DISCONTINUED | OUTPATIENT
Start: 2017-01-01 | End: 2017-01-01 | Stop reason: HOSPADM

## 2017-01-01 RX ORDER — OXYCODONE AND ACETAMINOPHEN 5; 325 MG/1; MG/1
1 TABLET ORAL
Status: DISCONTINUED | OUTPATIENT
Start: 2017-01-01 | End: 2017-01-01 | Stop reason: HOSPADM

## 2017-01-01 RX ORDER — DILTIAZEM HYDROCHLORIDE 30 MG/1
30 TABLET, FILM COATED ORAL ONCE
Qty: 1 TAB | Refills: 0 | Status: SHIPPED | OUTPATIENT
Start: 2017-01-01 | End: 2017-01-01

## 2017-01-01 RX ORDER — SODIUM CHLORIDE 9 MG/ML
125 INJECTION, SOLUTION INTRAVENOUS CONTINUOUS
Status: DISCONTINUED | OUTPATIENT
Start: 2017-01-01 | End: 2017-01-01

## 2017-01-01 RX ORDER — DILTIAZEM HYDROCHLORIDE 120 MG/1
120 CAPSULE, COATED, EXTENDED RELEASE ORAL DAILY
Qty: 30 CAP | Refills: 4 | Status: SHIPPED | OUTPATIENT
Start: 2017-01-01 | End: 2017-01-01

## 2017-01-01 RX ORDER — ALBUMIN HUMAN 50 G/1000ML
SOLUTION INTRAVENOUS
Status: COMPLETED
Start: 2017-01-01 | End: 2017-01-01

## 2017-01-01 RX ORDER — METFORMIN HYDROCHLORIDE 500 MG/1
500 TABLET ORAL
COMMUNITY

## 2017-01-01 RX ORDER — MIDAZOLAM HYDROCHLORIDE 1 MG/ML
INJECTION, SOLUTION INTRAMUSCULAR; INTRAVENOUS AS NEEDED
Status: DISCONTINUED | OUTPATIENT
Start: 2017-01-01 | End: 2017-01-01 | Stop reason: HOSPADM

## 2017-01-01 RX ORDER — MIDAZOLAM HYDROCHLORIDE 1 MG/ML
1 INJECTION, SOLUTION INTRAMUSCULAR; INTRAVENOUS AS NEEDED
Status: DISCONTINUED | OUTPATIENT
Start: 2017-01-01 | End: 2017-01-01 | Stop reason: HOSPADM

## 2017-01-01 RX ORDER — HEPARIN SODIUM 1000 [USP'U]/ML
1000-10000 INJECTION, SOLUTION INTRAVENOUS; SUBCUTANEOUS
Status: DISCONTINUED | OUTPATIENT
Start: 2017-01-01 | End: 2017-01-01

## 2017-01-01 RX ORDER — DEXTROSE 50 % IN WATER (D50W) INTRAVENOUS SYRINGE
25 AS NEEDED
Status: DISCONTINUED | OUTPATIENT
Start: 2017-01-01 | End: 2017-01-01 | Stop reason: HOSPADM

## 2017-01-01 RX ORDER — HEPARIN 100 UNIT/ML
500 SYRINGE INTRAVENOUS ONCE
Status: ACTIVE | OUTPATIENT
Start: 2017-01-01 | End: 2017-01-01

## 2017-01-01 RX ORDER — CEPHALEXIN 500 MG/1
500 CAPSULE ORAL 3 TIMES DAILY
Qty: 15 CAP | Refills: 0 | Status: ON HOLD | OUTPATIENT
Start: 2017-01-01 | End: 2017-01-01

## 2017-01-01 RX ORDER — CYCLOSPORINE 0.5 MG/ML
1 EMULSION OPHTHALMIC 2 TIMES DAILY
COMMUNITY

## 2017-01-01 RX ORDER — LISINOPRIL 10 MG/1
10 TABLET ORAL DAILY
COMMUNITY
End: 2017-01-01

## 2017-01-01 RX ORDER — ONDANSETRON 4 MG/1
4 TABLET, ORALLY DISINTEGRATING ORAL
Status: ON HOLD | COMMUNITY
End: 2017-01-01

## 2017-01-01 RX ORDER — DIPHENHYDRAMINE HYDROCHLORIDE 50 MG/ML
12.5 INJECTION, SOLUTION INTRAMUSCULAR; INTRAVENOUS AS NEEDED
Status: CANCELLED | OUTPATIENT
Start: 2017-01-01 | End: 2017-01-01

## 2017-01-01 RX ORDER — SENNOSIDES 8.6 MG/1
2 TABLET ORAL
Status: ON HOLD | COMMUNITY
End: 2017-01-01

## 2017-01-01 RX ORDER — METOPROLOL TARTRATE 5 MG/5ML
2.5 INJECTION INTRAVENOUS ONCE
Status: COMPLETED | OUTPATIENT
Start: 2017-01-01 | End: 2017-01-01

## 2017-01-01 RX ORDER — INSULIN LISPRO 100 [IU]/ML
INJECTION, SOLUTION INTRAVENOUS; SUBCUTANEOUS EVERY 6 HOURS
Status: DISCONTINUED | OUTPATIENT
Start: 2017-01-01 | End: 2017-01-01 | Stop reason: HOSPADM

## 2017-01-01 RX ORDER — DIPHENHYDRAMINE HYDROCHLORIDE 50 MG/ML
12.5 INJECTION, SOLUTION INTRAMUSCULAR; INTRAVENOUS AS NEEDED
Status: DISCONTINUED | OUTPATIENT
Start: 2017-01-01 | End: 2017-01-01 | Stop reason: HOSPADM

## 2017-01-01 RX ORDER — HYDROCODONE BITARTRATE AND ACETAMINOPHEN 5; 325 MG/1; MG/1
1 TABLET ORAL
Status: DISCONTINUED | OUTPATIENT
Start: 2017-01-01 | End: 2017-01-01 | Stop reason: HOSPADM

## 2017-01-01 RX ORDER — SODIUM CHLORIDE 9 MG/ML
250 INJECTION, SOLUTION INTRAVENOUS AS NEEDED
Status: DISCONTINUED | OUTPATIENT
Start: 2017-01-01 | End: 2017-01-01 | Stop reason: SDUPTHER

## 2017-01-01 RX ORDER — HEPARIN SODIUM 200 [USP'U]/100ML
200 INJECTION, SOLUTION INTRAVENOUS ONCE
Status: COMPLETED | OUTPATIENT
Start: 2017-01-01 | End: 2017-01-01

## 2017-01-01 RX ORDER — SODIUM CHLORIDE 0.9 % (FLUSH) 0.9 %
10-40 SYRINGE (ML) INJECTION EVERY 8 HOURS
Status: CANCELLED | OUTPATIENT
Start: 2017-01-01

## 2017-01-01 RX ORDER — ACETAMINOPHEN 500 MG
500 TABLET ORAL
COMMUNITY

## 2017-01-01 RX ORDER — SODIUM CHLORIDE, SODIUM LACTATE, POTASSIUM CHLORIDE, CALCIUM CHLORIDE 600; 310; 30; 20 MG/100ML; MG/100ML; MG/100ML; MG/100ML
25 INJECTION, SOLUTION INTRAVENOUS CONTINUOUS
Status: CANCELLED | OUTPATIENT
Start: 2017-01-01

## 2017-01-01 RX ORDER — AMLODIPINE BESYLATE 5 MG/1
5 TABLET ORAL DAILY
Status: ON HOLD | COMMUNITY
End: 2017-01-01

## 2017-01-01 RX ORDER — AMLODIPINE BESYLATE 5 MG/1
5 TABLET ORAL DAILY
Status: DISCONTINUED | OUTPATIENT
Start: 2017-01-01 | End: 2017-01-01 | Stop reason: HOSPADM

## 2017-01-01 RX ORDER — SUMATRIPTAN 50 MG/1
50 TABLET, FILM COATED ORAL
Status: ON HOLD | COMMUNITY
End: 2017-01-01

## 2017-01-01 RX ORDER — METOPROLOL TARTRATE 5 MG/5ML
INJECTION INTRAVENOUS
Status: COMPLETED
Start: 2017-01-01 | End: 2017-01-01

## 2017-01-01 RX ORDER — FLUCONAZOLE 2 MG/ML
100 INJECTION, SOLUTION INTRAVENOUS EVERY 24 HOURS
Status: DISCONTINUED | OUTPATIENT
Start: 2017-01-01 | End: 2017-01-01

## 2017-01-01 RX ORDER — CALCIUM CHLORIDE INJECTION 100 MG/ML
INJECTION, SOLUTION INTRAVENOUS
Status: COMPLETED
Start: 2017-01-01 | End: 2017-01-01

## 2017-01-01 RX ORDER — METOPROLOL TARTRATE 25 MG/1
25 TABLET, FILM COATED ORAL EVERY 12 HOURS
Status: DISCONTINUED | OUTPATIENT
Start: 2017-01-01 | End: 2017-01-01

## 2017-01-01 RX ORDER — SODIUM CHLORIDE AND POTASSIUM CHLORIDE .9; .15 G/100ML; G/100ML
SOLUTION INTRAVENOUS CONTINUOUS
Status: DISCONTINUED | OUTPATIENT
Start: 2017-01-01 | End: 2017-01-01

## 2017-01-01 RX ORDER — FLUCONAZOLE 100 MG/1
100 TABLET ORAL DAILY
Status: COMPLETED | OUTPATIENT
Start: 2017-01-01 | End: 2017-01-01

## 2017-01-01 RX ORDER — GABAPENTIN 300 MG/1
300 CAPSULE ORAL 2 TIMES DAILY
Status: ON HOLD | COMMUNITY
End: 2017-01-01

## 2017-01-01 RX ORDER — FENTANYL CITRATE 50 UG/ML
INJECTION, SOLUTION INTRAMUSCULAR; INTRAVENOUS AS NEEDED
Status: DISCONTINUED | OUTPATIENT
Start: 2017-01-01 | End: 2017-01-01 | Stop reason: HOSPADM

## 2017-01-01 RX ORDER — NITROFURANTOIN 25; 75 MG/1; MG/1
100 CAPSULE ORAL DAILY
COMMUNITY
End: 2017-01-01

## 2017-01-01 RX ORDER — METOPROLOL TARTRATE 5 MG/5ML
2.5 INJECTION INTRAVENOUS EVERY 6 HOURS
Status: DISCONTINUED | OUTPATIENT
Start: 2017-01-01 | End: 2017-01-01

## 2017-01-01 RX ORDER — POLYETHYLENE GLYCOL 3350 17 G/17G
17 POWDER, FOR SOLUTION ORAL DAILY PRN
Status: DISCONTINUED | OUTPATIENT
Start: 2017-01-01 | End: 2017-01-01 | Stop reason: HOSPADM

## 2017-01-01 RX ORDER — ONDANSETRON 2 MG/ML
4 INJECTION INTRAMUSCULAR; INTRAVENOUS AS NEEDED
Status: DISCONTINUED | OUTPATIENT
Start: 2017-01-01 | End: 2017-01-01 | Stop reason: HOSPADM

## 2017-01-01 RX ORDER — FLUCONAZOLE 100 MG/1
100 TABLET ORAL DAILY
Qty: 7 TAB | Refills: 0 | Status: SHIPPED | OUTPATIENT
Start: 2017-01-01 | End: 2017-01-01

## 2017-01-01 RX ORDER — MUPIROCIN 20 MG/G
OINTMENT TOPICAL EVERY 12 HOURS
Status: DISCONTINUED | OUTPATIENT
Start: 2017-01-01 | End: 2017-01-01 | Stop reason: HOSPADM

## 2017-01-01 RX ORDER — SODIUM CHLORIDE 0.9 % (FLUSH) 0.9 %
10-30 SYRINGE (ML) INJECTION AS NEEDED
Status: DISCONTINUED | OUTPATIENT
Start: 2017-01-01 | End: 2017-01-01 | Stop reason: HOSPADM

## 2017-01-01 RX ORDER — FLUCONAZOLE 100 MG/1
100 TABLET ORAL DAILY
Qty: 6 TAB | Refills: 0 | Status: SHIPPED | OUTPATIENT
Start: 2017-01-01 | End: 2017-01-01

## 2017-01-01 RX ORDER — FENTANYL CITRATE 50 UG/ML
25 INJECTION, SOLUTION INTRAMUSCULAR; INTRAVENOUS
Status: DISCONTINUED | OUTPATIENT
Start: 2017-01-01 | End: 2017-01-01 | Stop reason: HOSPADM

## 2017-01-01 RX ORDER — OXYCODONE HYDROCHLORIDE 10 MG/1
10 TABLET ORAL
Status: ON HOLD | COMMUNITY
End: 2017-01-01

## 2017-01-01 RX ORDER — PANTOPRAZOLE SODIUM 40 MG/1
40 TABLET, DELAYED RELEASE ORAL 2 TIMES DAILY
Qty: 60 TAB | Refills: 1 | Status: SHIPPED | OUTPATIENT
Start: 2017-01-01 | End: 2017-01-01

## 2017-01-01 RX ORDER — ASPIRIN 81 MG/1
81 TABLET ORAL DAILY
Status: DISCONTINUED | OUTPATIENT
Start: 2017-01-01 | End: 2017-01-01 | Stop reason: HOSPADM

## 2017-01-01 RX ORDER — POLYETHYLENE GLYCOL 3350 17 G/17G
17 POWDER, FOR SOLUTION ORAL DAILY
Status: DISCONTINUED | OUTPATIENT
Start: 2017-01-01 | End: 2017-01-01

## 2017-01-01 RX ORDER — LIDOCAINE HYDROCHLORIDE 10 MG/ML
0.1 INJECTION, SOLUTION EPIDURAL; INFILTRATION; INTRACAUDAL; PERINEURAL AS NEEDED
Status: CANCELLED | OUTPATIENT
Start: 2017-01-01

## 2017-01-01 RX ORDER — HYDROCODONE BITARTRATE AND ACETAMINOPHEN 5; 325 MG/1; MG/1
1 TABLET ORAL
Status: ON HOLD | COMMUNITY
End: 2017-01-01

## 2017-01-01 RX ORDER — MORPHINE SULFATE 4 MG/ML
2 INJECTION, SOLUTION INTRAMUSCULAR; INTRAVENOUS
Status: COMPLETED | OUTPATIENT
Start: 2017-01-01 | End: 2017-01-01

## 2017-01-01 RX ORDER — ROCURONIUM BROMIDE 10 MG/ML
INJECTION, SOLUTION INTRAVENOUS AS NEEDED
Status: DISCONTINUED | OUTPATIENT
Start: 2017-01-01 | End: 2017-01-01 | Stop reason: HOSPADM

## 2017-01-01 RX ORDER — CLARITHROMYCIN 500 MG/1
500 TABLET, FILM COATED ORAL 2 TIMES DAILY
Qty: 20 TAB | Refills: 0 | Status: SHIPPED | OUTPATIENT
Start: 2017-01-01 | End: 2017-01-01

## 2017-01-01 RX ORDER — CEFAZOLIN SODIUM IN 0.9 % NACL 2 G/100 ML
2 PLASTIC BAG, INJECTION (ML) INTRAVENOUS ONCE
Status: COMPLETED | OUTPATIENT
Start: 2017-01-01 | End: 2017-01-01

## 2017-01-01 RX ORDER — LISINOPRIL 20 MG/1
20 TABLET ORAL DAILY
COMMUNITY

## 2017-01-01 RX ORDER — ETOMIDATE 2 MG/ML
0.1 INJECTION INTRAVENOUS ONCE
Status: COMPLETED | OUTPATIENT
Start: 2017-01-01 | End: 2017-01-01

## 2017-01-01 RX ORDER — DEXAMETHASONE SODIUM PHOSPHATE 4 MG/ML
INJECTION, SOLUTION INTRA-ARTICULAR; INTRALESIONAL; INTRAMUSCULAR; INTRAVENOUS; SOFT TISSUE AS NEEDED
Status: DISCONTINUED | OUTPATIENT
Start: 2017-01-01 | End: 2017-01-01 | Stop reason: HOSPADM

## 2017-01-01 RX ORDER — METOPROLOL TARTRATE 25 MG/1
25 TABLET, FILM COATED ORAL 2 TIMES DAILY
COMMUNITY
End: 2017-01-01

## 2017-01-01 RX ORDER — ETOMIDATE 2 MG/ML
INJECTION INTRAVENOUS
Status: COMPLETED
Start: 2017-01-01 | End: 2017-01-01

## 2017-01-01 RX ORDER — HYDROCORTISONE SODIUM SUCCINATE 100 MG/2ML
50 INJECTION, POWDER, FOR SOLUTION INTRAMUSCULAR; INTRAVENOUS EVERY 8 HOURS
Status: DISCONTINUED | OUTPATIENT
Start: 2017-01-01 | End: 2017-01-01 | Stop reason: HOSPADM

## 2017-01-01 RX ORDER — GABAPENTIN 300 MG/1
300 CAPSULE ORAL 2 TIMES DAILY
Qty: 60 CAP | Refills: 11 | Status: SHIPPED | OUTPATIENT
Start: 2017-01-01

## 2017-01-01 RX ORDER — POLYETHYLENE GLYCOL 3350 17 G/17G
17 POWDER, FOR SOLUTION ORAL
Status: ON HOLD | COMMUNITY
End: 2017-01-01

## 2017-01-01 RX ORDER — LANOLIN ALCOHOL/MO/W.PET/CERES
800 CREAM (GRAM) TOPICAL 2 TIMES DAILY
Status: COMPLETED | OUTPATIENT
Start: 2017-01-01 | End: 2017-01-01

## 2017-01-01 RX ORDER — DILTIAZEM HYDROCHLORIDE 30 MG/1
30 TABLET, FILM COATED ORAL EVERY 6 HOURS
Status: DISCONTINUED | OUTPATIENT
Start: 2017-01-01 | End: 2017-01-01 | Stop reason: HOSPADM

## 2017-01-01 RX ORDER — DEXTROSE 50 % IN WATER (D50W) INTRAVENOUS SYRINGE
12.5-25 AS NEEDED
Status: DISCONTINUED | OUTPATIENT
Start: 2017-01-01 | End: 2017-01-01 | Stop reason: CLARIF

## 2017-01-01 RX ORDER — FENTANYL CITRATE 50 UG/ML
50-100 INJECTION, SOLUTION INTRAMUSCULAR; INTRAVENOUS AS NEEDED
Status: DISPENSED | OUTPATIENT
Start: 2017-01-01 | End: 2017-01-01

## 2017-01-01 RX ORDER — SODIUM POLYSTYRENE SULFONATE 15 G/60ML
30 SUSPENSION ORAL; RECTAL
Status: COMPLETED | OUTPATIENT
Start: 2017-01-01 | End: 2017-01-01

## 2017-01-01 RX ORDER — AMOXICILLIN 250 MG
2 CAPSULE ORAL
Status: ON HOLD | COMMUNITY
End: 2017-01-01

## 2017-01-01 RX ORDER — LISINOPRIL 20 MG/1
20 TABLET ORAL DAILY
Status: DISCONTINUED | OUTPATIENT
Start: 2017-01-01 | End: 2017-01-01

## 2017-01-01 RX ORDER — MIDAZOLAM HYDROCHLORIDE 1 MG/ML
1 INJECTION, SOLUTION INTRAMUSCULAR; INTRAVENOUS AS NEEDED
Status: CANCELLED | OUTPATIENT
Start: 2017-01-01

## 2017-01-01 RX ORDER — DEXTROSE MONOHYDRATE 100 MG/ML
INJECTION, SOLUTION INTRAVENOUS
Status: COMPLETED
Start: 2017-01-01 | End: 2017-01-01

## 2017-01-01 RX ORDER — FENTANYL CITRATE 50 UG/ML
25-50 INJECTION, SOLUTION INTRAMUSCULAR; INTRAVENOUS
Status: DISCONTINUED | OUTPATIENT
Start: 2017-01-01 | End: 2017-01-01

## 2017-01-01 RX ORDER — ONDANSETRON 2 MG/ML
4 INJECTION INTRAMUSCULAR; INTRAVENOUS AS NEEDED
Status: CANCELLED | OUTPATIENT
Start: 2017-01-01

## 2017-01-01 RX ORDER — ENOXAPARIN SODIUM 100 MG/ML
30 INJECTION SUBCUTANEOUS EVERY 24 HOURS
Status: DISCONTINUED | OUTPATIENT
Start: 2017-01-01 | End: 2017-01-01

## 2017-01-01 RX ORDER — ACETAMINOPHEN 10 MG/ML
INJECTION, SOLUTION INTRAVENOUS AS NEEDED
Status: DISCONTINUED | OUTPATIENT
Start: 2017-01-01 | End: 2017-01-01 | Stop reason: HOSPADM

## 2017-01-01 RX ORDER — SODIUM CHLORIDE 0.9 % (FLUSH) 0.9 %
20 SYRINGE (ML) INJECTION EVERY 24 HOURS
Status: DISCONTINUED | OUTPATIENT
Start: 2017-01-01 | End: 2017-01-01 | Stop reason: HOSPADM

## 2017-01-01 RX ORDER — MAGNESIUM SULFATE 1 G/100ML
1 INJECTION INTRAVENOUS ONCE
Status: COMPLETED | OUTPATIENT
Start: 2017-01-01 | End: 2017-01-01

## 2017-01-01 RX ORDER — DEXTROSE, SODIUM CHLORIDE, AND POTASSIUM CHLORIDE 5; .45; .15 G/100ML; G/100ML; G/100ML
125 INJECTION INTRAVENOUS CONTINUOUS
Status: CANCELLED | OUTPATIENT
Start: 2017-01-01

## 2017-01-01 RX ORDER — CLARITHROMYCIN 500 MG/1
500 TABLET, FILM COATED ORAL 2 TIMES DAILY
Status: DISCONTINUED | OUTPATIENT
Start: 2017-01-01 | End: 2017-01-01 | Stop reason: HOSPADM

## 2017-01-01 RX ORDER — FENTANYL CITRATE 50 UG/ML
INJECTION, SOLUTION INTRAMUSCULAR; INTRAVENOUS
Status: COMPLETED
Start: 2017-01-01 | End: 2017-01-01

## 2017-01-01 RX ORDER — HEPARIN 100 UNIT/ML
500 SYRINGE INTRAVENOUS ONCE
Status: DISPENSED | OUTPATIENT
Start: 2017-01-01 | End: 2017-01-01

## 2017-01-01 RX ORDER — GLYCOPYRROLATE 0.2 MG/ML
INJECTION INTRAMUSCULAR; INTRAVENOUS AS NEEDED
Status: DISCONTINUED | OUTPATIENT
Start: 2017-01-01 | End: 2017-01-01 | Stop reason: HOSPADM

## 2017-01-01 RX ORDER — HYDROMORPHONE HYDROCHLORIDE 1 MG/ML
.2-.5 INJECTION, SOLUTION INTRAMUSCULAR; INTRAVENOUS; SUBCUTANEOUS
Status: CANCELLED | OUTPATIENT
Start: 2017-01-01

## 2017-01-01 RX ORDER — MAGNESIUM SULFATE HEPTAHYDRATE 40 MG/ML
2 INJECTION, SOLUTION INTRAVENOUS ONCE
Status: COMPLETED | OUTPATIENT
Start: 2017-01-01 | End: 2017-01-01

## 2017-01-01 RX ORDER — METOPROLOL TARTRATE 5 MG/5ML
5 INJECTION INTRAVENOUS
Status: COMPLETED | OUTPATIENT
Start: 2017-01-01 | End: 2017-01-01

## 2017-01-01 RX ORDER — METOPROLOL TARTRATE 50 MG/1
50 TABLET ORAL EVERY 12 HOURS
Qty: 60 TAB | Refills: 4 | Status: SHIPPED | OUTPATIENT
Start: 2017-01-01 | End: 2017-01-01

## 2017-01-01 RX ORDER — DILTIAZEM HYDROCHLORIDE 5 MG/ML
10 INJECTION INTRAVENOUS
Status: COMPLETED | OUTPATIENT
Start: 2017-01-01 | End: 2017-01-01

## 2017-01-01 RX ADMIN — GABAPENTIN 300 MG: 300 CAPSULE ORAL at 18:26

## 2017-01-01 RX ADMIN — Medication 150 MCG/HR: at 03:07

## 2017-01-01 RX ADMIN — HYDROMORPHONE HYDROCHLORIDE 0.5 MG: 1 INJECTION, SOLUTION INTRAMUSCULAR; INTRAVENOUS; SUBCUTANEOUS at 12:25

## 2017-01-01 RX ADMIN — METOPROLOL TARTRATE 2.5 MG: 5 INJECTION INTRAVENOUS at 02:50

## 2017-01-01 RX ADMIN — INSULIN LISPRO 7 UNITS: 100 INJECTION, SOLUTION INTRAVENOUS; SUBCUTANEOUS at 11:49

## 2017-01-01 RX ADMIN — INSULIN LISPRO 5 UNITS: 100 INJECTION, SOLUTION INTRAVENOUS; SUBCUTANEOUS at 12:15

## 2017-01-01 RX ADMIN — ASPIRIN 81 MG: 81 TABLET, COATED ORAL at 08:09

## 2017-01-01 RX ADMIN — TIMOLOL MALEATE 1 DROP: 2.5 SOLUTION OPHTHALMIC at 21:39

## 2017-01-01 RX ADMIN — TIMOLOL MALEATE 1 DROP: 2.5 SOLUTION OPHTHALMIC at 17:33

## 2017-01-01 RX ADMIN — ASPIRIN 81 MG: 81 TABLET, COATED ORAL at 08:41

## 2017-01-01 RX ADMIN — TIMOLOL MALEATE 1 DROP: 2.5 SOLUTION OPHTHALMIC at 08:10

## 2017-01-01 RX ADMIN — SODIUM CHLORIDE 40 MG: 9 INJECTION INTRAMUSCULAR; INTRAVENOUS; SUBCUTANEOUS at 09:06

## 2017-01-01 RX ADMIN — LEVOFLOXACIN 250 MG: 250 TABLET, FILM COATED ORAL at 09:20

## 2017-01-01 RX ADMIN — Medication 800 MG: at 08:35

## 2017-01-01 RX ADMIN — METOPROLOL TARTRATE 12.5 MG: 25 TABLET ORAL at 21:44

## 2017-01-01 RX ADMIN — ASPIRIN 81 MG: 81 TABLET, COATED ORAL at 08:27

## 2017-01-01 RX ADMIN — METOPROLOL TARTRATE 12.5 MG: 25 TABLET ORAL at 17:43

## 2017-01-01 RX ADMIN — TIMOLOL MALEATE 2 DROP: 2.5 SOLUTION OPHTHALMIC at 08:25

## 2017-01-01 RX ADMIN — Medication 10 ML: at 13:25

## 2017-01-01 RX ADMIN — FLUCONAZOLE 200 MG: 2 INJECTION, SOLUTION INTRAVENOUS at 16:35

## 2017-01-01 RX ADMIN — TIMOLOL MALEATE 1 DROP: 2.5 SOLUTION OPHTHALMIC at 22:56

## 2017-01-01 RX ADMIN — Medication 10 ML: at 22:00

## 2017-01-01 RX ADMIN — TIMOLOL MALEATE 2 DROP: 2.5 SOLUTION OPHTHALMIC at 10:07

## 2017-01-01 RX ADMIN — TIMOLOL MALEATE 1 DROP: 2.5 SOLUTION OPHTHALMIC at 20:16

## 2017-01-01 RX ADMIN — INSULIN LISPRO 3 UNITS: 100 INJECTION, SOLUTION INTRAVENOUS; SUBCUTANEOUS at 00:01

## 2017-01-01 RX ADMIN — Medication 10 ML: at 13:39

## 2017-01-01 RX ADMIN — VANCOMYCIN HYDROCHLORIDE 2250 MG: 10 INJECTION, POWDER, LYOPHILIZED, FOR SOLUTION INTRAVENOUS at 15:47

## 2017-01-01 RX ADMIN — COLESEVELAM HYDROCHLORIDE 1250 MG: 625 TABLET, FILM COATED ORAL at 08:47

## 2017-01-01 RX ADMIN — SODIUM CHLORIDE 40 MG: 9 INJECTION INTRAMUSCULAR; INTRAVENOUS; SUBCUTANEOUS at 08:22

## 2017-01-01 RX ADMIN — INSULIN LISPRO 2 UNITS: 100 INJECTION, SOLUTION INTRAVENOUS; SUBCUTANEOUS at 11:51

## 2017-01-01 RX ADMIN — Medication 800 MG: at 21:45

## 2017-01-01 RX ADMIN — PIPERACILLIN SODIUM,TAZOBACTAM SODIUM 3.38 G: 3; .375 INJECTION, POWDER, FOR SOLUTION INTRAVENOUS at 13:35

## 2017-01-01 RX ADMIN — Medication 10 ML: at 13:05

## 2017-01-01 RX ADMIN — COLESEVELAM HYDROCHLORIDE 1250 MG: 625 TABLET, FILM COATED ORAL at 09:06

## 2017-01-01 RX ADMIN — PIPERACILLIN SODIUM,TAZOBACTAM SODIUM 3.38 G: 3; .375 INJECTION, POWDER, FOR SOLUTION INTRAVENOUS at 05:44

## 2017-01-01 RX ADMIN — COLESEVELAM HYDROCHLORIDE 1250 MG: 625 TABLET, FILM COATED ORAL at 17:54

## 2017-01-01 RX ADMIN — HYDROMORPHONE HYDROCHLORIDE 0.5 MG: 1 INJECTION, SOLUTION INTRAMUSCULAR; INTRAVENOUS; SUBCUTANEOUS at 13:36

## 2017-01-01 RX ADMIN — TIMOLOL MALEATE 1 DROP: 2.5 SOLUTION OPHTHALMIC at 23:47

## 2017-01-01 RX ADMIN — ATORVASTATIN CALCIUM 40 MG: 40 TABLET, FILM COATED ORAL at 21:18

## 2017-01-01 RX ADMIN — NOREPINEPHRINE BITARTRATE 10 MCG/MIN: 1 INJECTION, SOLUTION, CONCENTRATE INTRAVENOUS at 22:00

## 2017-01-01 RX ADMIN — PANTOPRAZOLE SODIUM 40 MG: 40 TABLET, DELAYED RELEASE ORAL at 20:58

## 2017-01-01 RX ADMIN — PHENAZOPYRIDINE HYDROCHLORIDE 200 MG: 100 TABLET ORAL at 21:57

## 2017-01-01 RX ADMIN — Medication 10 ML: at 21:44

## 2017-01-01 RX ADMIN — INSULIN LISPRO 3 UNITS: 100 INJECTION, SOLUTION INTRAVENOUS; SUBCUTANEOUS at 12:16

## 2017-01-01 RX ADMIN — CALCIUM CHLORIDE 1 G: 100 INJECTION, SOLUTION INTRAVENOUS at 01:47

## 2017-01-01 RX ADMIN — Medication 10 ML: at 13:12

## 2017-01-01 RX ADMIN — SODIUM CHLORIDE 40 MG: 9 INJECTION INTRAMUSCULAR; INTRAVENOUS; SUBCUTANEOUS at 09:30

## 2017-01-01 RX ADMIN — METOPROLOL TARTRATE 12.5 MG: 25 TABLET ORAL at 18:12

## 2017-01-01 RX ADMIN — METOPROLOL TARTRATE 7.5 MG: 5 INJECTION INTRAVENOUS at 20:08

## 2017-01-01 RX ADMIN — MAGNESIUM SULFATE HEPTAHYDRATE: 500 INJECTION, SOLUTION INTRAMUSCULAR; INTRAVENOUS at 18:24

## 2017-01-01 RX ADMIN — Medication 30 ML: at 20:49

## 2017-01-01 RX ADMIN — DILTIAZEM HYDROCHLORIDE 30 MG: 30 TABLET, FILM COATED ORAL at 05:50

## 2017-01-01 RX ADMIN — TIMOLOL MALEATE 1 DROP: 2.5 SOLUTION OPHTHALMIC at 08:39

## 2017-01-01 RX ADMIN — INSULIN LISPRO 2 UNITS: 100 INJECTION, SOLUTION INTRAVENOUS; SUBCUTANEOUS at 23:47

## 2017-01-01 RX ADMIN — TIMOLOL MALEATE 1 DROP: 2.5 SOLUTION OPHTHALMIC at 09:00

## 2017-01-01 RX ADMIN — METOPROLOL TARTRATE 10 MG: 5 INJECTION INTRAVENOUS at 21:41

## 2017-01-01 RX ADMIN — SODIUM CHLORIDE 40 MG: 9 INJECTION INTRAMUSCULAR; INTRAVENOUS; SUBCUTANEOUS at 21:52

## 2017-01-01 RX ADMIN — Medication 10 ML: at 14:15

## 2017-01-01 RX ADMIN — AMLODIPINE BESYLATE 5 MG: 5 TABLET ORAL at 08:20

## 2017-01-01 RX ADMIN — Medication 10 ML: at 05:20

## 2017-01-01 RX ADMIN — NOREPINEPHRINE BITARTRATE 50 MCG/MIN: 1 INJECTION, SOLUTION, CONCENTRATE INTRAVENOUS at 01:18

## 2017-01-01 RX ADMIN — TIMOLOL MALEATE 1 DROP: 2.5 SOLUTION OPHTHALMIC at 20:43

## 2017-01-01 RX ADMIN — PIPERACILLIN SODIUM,TAZOBACTAM SODIUM 3.38 G: 3; .375 INJECTION, POWDER, FOR SOLUTION INTRAVENOUS at 05:51

## 2017-01-01 RX ADMIN — SODIUM CHLORIDE: 900 INJECTION, SOLUTION INTRAVENOUS at 00:58

## 2017-01-01 RX ADMIN — METOPROLOL TARTRATE 12.5 MG: 25 TABLET ORAL at 12:22

## 2017-01-01 RX ADMIN — METOPROLOL TARTRATE 5 MG: 5 INJECTION INTRAVENOUS at 12:40

## 2017-01-01 RX ADMIN — ATORVASTATIN CALCIUM 40 MG: 40 TABLET, FILM COATED ORAL at 21:59

## 2017-01-01 RX ADMIN — FENTANYL CITRATE 125 MCG: 50 INJECTION, SOLUTION INTRAMUSCULAR; INTRAVENOUS at 00:29

## 2017-01-01 RX ADMIN — METOPROLOL TARTRATE 50 MG: 50 TABLET ORAL at 21:30

## 2017-01-01 RX ADMIN — ENOXAPARIN SODIUM 30 MG: 30 INJECTION SUBCUTANEOUS at 08:09

## 2017-01-01 RX ADMIN — COLESEVELAM HYDROCHLORIDE 1250 MG: 625 TABLET, FILM COATED ORAL at 16:52

## 2017-01-01 RX ADMIN — METOPROLOL TARTRATE 10 MG: 5 INJECTION INTRAVENOUS at 18:46

## 2017-01-01 RX ADMIN — PHENYLEPHRINE HYDROCHLORIDE 300 MCG/MIN: 10 INJECTION INTRAVENOUS at 22:05

## 2017-01-01 RX ADMIN — TIMOLOL MALEATE 1 DROP: 2.5 SOLUTION OPHTHALMIC at 22:20

## 2017-01-01 RX ADMIN — PANTOPRAZOLE SODIUM 40 MG: 40 TABLET, DELAYED RELEASE ORAL at 17:30

## 2017-01-01 RX ADMIN — NOREPINEPHRINE BITARTRATE 5 MCG/MIN: 1 INJECTION, SOLUTION, CONCENTRATE INTRAVENOUS at 00:51

## 2017-01-01 RX ADMIN — OXYBUTYNIN CHLORIDE 5 MG: 5 TABLET, EXTENDED RELEASE ORAL at 08:26

## 2017-01-01 RX ADMIN — CLARITHROMYCIN 500 MG: 500 TABLET ORAL at 18:46

## 2017-01-01 RX ADMIN — ATORVASTATIN CALCIUM 40 MG: 40 TABLET, FILM COATED ORAL at 22:20

## 2017-01-01 RX ADMIN — Medication 10 ML: at 07:27

## 2017-01-01 RX ADMIN — Medication 10 ML: at 21:26

## 2017-01-01 RX ADMIN — METOPROLOL TARTRATE 10 MG: 5 INJECTION INTRAVENOUS at 00:58

## 2017-01-01 RX ADMIN — TICAGRELOR 90 MG: 90 TABLET ORAL at 14:57

## 2017-01-01 RX ADMIN — INSULIN LISPRO 2 UNITS: 100 INJECTION, SOLUTION INTRAVENOUS; SUBCUTANEOUS at 06:34

## 2017-01-01 RX ADMIN — INSULIN LISPRO 2 UNITS: 100 INJECTION, SOLUTION INTRAVENOUS; SUBCUTANEOUS at 18:44

## 2017-01-01 RX ADMIN — Medication 10 ML: at 21:30

## 2017-01-01 RX ADMIN — INSULIN LISPRO 5 UNITS: 100 INJECTION, SOLUTION INTRAVENOUS; SUBCUTANEOUS at 18:04

## 2017-01-01 RX ADMIN — TIMOLOL MALEATE 2 DROP: 2.5 SOLUTION OPHTHALMIC at 09:34

## 2017-01-01 RX ADMIN — COLESEVELAM HYDROCHLORIDE 1250 MG: 625 TABLET, FILM COATED ORAL at 17:16

## 2017-01-01 RX ADMIN — SODIUM CHLORIDE 40 MG: 9 INJECTION INTRAMUSCULAR; INTRAVENOUS; SUBCUTANEOUS at 09:18

## 2017-01-01 RX ADMIN — AMLODIPINE BESYLATE 5 MG: 5 TABLET ORAL at 08:59

## 2017-01-01 RX ADMIN — INSULIN LISPRO 3 UNITS: 100 INJECTION, SOLUTION INTRAVENOUS; SUBCUTANEOUS at 17:44

## 2017-01-01 RX ADMIN — PHENYLEPHRINE HYDROCHLORIDE 125 MCG/MIN: 10 INJECTION INTRAVENOUS at 17:27

## 2017-01-01 RX ADMIN — PIPERACILLIN SODIUM,TAZOBACTAM SODIUM 3.38 G: 3; .375 INJECTION, POWDER, FOR SOLUTION INTRAVENOUS at 21:16

## 2017-01-01 RX ADMIN — DILTIAZEM HYDROCHLORIDE 30 MG: 30 TABLET, FILM COATED ORAL at 12:09

## 2017-01-01 RX ADMIN — CYCLOSPORINE 1 DROP: 0.5 EMULSION OPHTHALMIC at 23:28

## 2017-01-01 RX ADMIN — SODIUM CHLORIDE 125 ML/HR: 900 INJECTION, SOLUTION INTRAVENOUS at 09:01

## 2017-01-01 RX ADMIN — ROCURONIUM BROMIDE 15 MG: 10 INJECTION, SOLUTION INTRAVENOUS at 01:50

## 2017-01-01 RX ADMIN — HYDROMORPHONE HYDROCHLORIDE 0.5 MG: 1 INJECTION, SOLUTION INTRAMUSCULAR; INTRAVENOUS; SUBCUTANEOUS at 14:20

## 2017-01-01 RX ADMIN — TIMOLOL MALEATE 1 DROP: 2.5 SOLUTION OPHTHALMIC at 08:48

## 2017-01-01 RX ADMIN — COLESEVELAM HYDROCHLORIDE 1250 MG: 625 TABLET, FILM COATED ORAL at 08:54

## 2017-01-01 RX ADMIN — WATER: 1000 INJECTION, SOLUTION INTRAVENOUS at 09:46

## 2017-01-01 RX ADMIN — CYCLOSPORINE 1 DROP: 0.5 EMULSION OPHTHALMIC at 10:32

## 2017-01-01 RX ADMIN — DILTIAZEM HYDROCHLORIDE 30 MG: 30 TABLET, FILM COATED ORAL at 13:21

## 2017-01-01 RX ADMIN — WATER: 1000 INJECTION, SOLUTION INTRAVENOUS at 10:32

## 2017-01-01 RX ADMIN — SODIUM CHLORIDE 1000 ML: 900 INJECTION, SOLUTION INTRAVENOUS at 00:13

## 2017-01-01 RX ADMIN — INSULIN LISPRO 3 UNITS: 100 INJECTION, SOLUTION INTRAVENOUS; SUBCUTANEOUS at 18:23

## 2017-01-01 RX ADMIN — LEVOFLOXACIN 500 MG: 5 INJECTION, SOLUTION INTRAVENOUS at 17:27

## 2017-01-01 RX ADMIN — Medication 10 ML: at 13:54

## 2017-01-01 RX ADMIN — MUPIROCIN: 20 OINTMENT TOPICAL at 10:05

## 2017-01-01 RX ADMIN — Medication 10 ML: at 08:24

## 2017-01-01 RX ADMIN — PANTOPRAZOLE SODIUM 40 MG: 40 TABLET, DELAYED RELEASE ORAL at 11:20

## 2017-01-01 RX ADMIN — METOPROLOL TARTRATE 5 MG: 5 INJECTION INTRAVENOUS at 08:37

## 2017-01-01 RX ADMIN — HYDROMORPHONE HYDROCHLORIDE 0.5 MG: 1 INJECTION, SOLUTION INTRAMUSCULAR; INTRAVENOUS; SUBCUTANEOUS at 16:35

## 2017-01-01 RX ADMIN — TIMOLOL MALEATE 1 DROP: 2.5 SOLUTION OPHTHALMIC at 18:22

## 2017-01-01 RX ADMIN — Medication 10 ML: at 17:26

## 2017-01-01 RX ADMIN — SODIUM CHLORIDE 40 MG: 9 INJECTION INTRAMUSCULAR; INTRAVENOUS; SUBCUTANEOUS at 21:00

## 2017-01-01 RX ADMIN — METOPROLOL TARTRATE 2.5 MG: 5 INJECTION INTRAVENOUS at 21:52

## 2017-01-01 RX ADMIN — SODIUM CHLORIDE 40 MG: 9 INJECTION INTRAMUSCULAR; INTRAVENOUS; SUBCUTANEOUS at 08:12

## 2017-01-01 RX ADMIN — VANCOMYCIN HYDROCHLORIDE 1250 MG: 10 INJECTION, POWDER, LYOPHILIZED, FOR SOLUTION INTRAVENOUS at 05:33

## 2017-01-01 RX ADMIN — PROPOFOL 20 MCG/KG/MIN: 10 INJECTION, EMULSION INTRAVENOUS at 14:14

## 2017-01-01 RX ADMIN — SODIUM BICARBONATE 50 MEQ: 84 INJECTION, SOLUTION INTRAVENOUS at 01:26

## 2017-01-01 RX ADMIN — TIMOLOL MALEATE 1 DROP: 2.5 SOLUTION OPHTHALMIC at 09:20

## 2017-01-01 RX ADMIN — ASPIRIN 81 MG: 81 TABLET, COATED ORAL at 08:20

## 2017-01-01 RX ADMIN — ASPIRIN 81 MG: 81 TABLET, COATED ORAL at 09:11

## 2017-01-01 RX ADMIN — HYDROMORPHONE HYDROCHLORIDE 0.5 MG: 1 INJECTION, SOLUTION INTRAMUSCULAR; INTRAVENOUS; SUBCUTANEOUS at 14:02

## 2017-01-01 RX ADMIN — DEXTROSE MONOHYDRATE, SODIUM CHLORIDE, AND POTASSIUM CHLORIDE 75 ML/HR: 50; 4.5; 1.49 INJECTION, SOLUTION INTRAVENOUS at 05:55

## 2017-01-01 RX ADMIN — ATORVASTATIN CALCIUM 40 MG: 40 TABLET, FILM COATED ORAL at 22:22

## 2017-01-01 RX ADMIN — TIMOLOL MALEATE 2 DROP: 2.5 SOLUTION OPHTHALMIC at 22:41

## 2017-01-01 RX ADMIN — COLESEVELAM HYDROCHLORIDE 1250 MG: 625 TABLET, FILM COATED ORAL at 08:20

## 2017-01-01 RX ADMIN — GABAPENTIN 300 MG: 300 CAPSULE ORAL at 11:20

## 2017-01-01 RX ADMIN — SENNOSIDES AND DOCUSATE SODIUM 1 TABLET: 8.6; 5 TABLET ORAL at 08:33

## 2017-01-01 RX ADMIN — ATORVASTATIN CALCIUM 40 MG: 40 TABLET, FILM COATED ORAL at 21:32

## 2017-01-01 RX ADMIN — ONDANSETRON 4 MG: 2 INJECTION INTRAMUSCULAR; INTRAVENOUS at 01:13

## 2017-01-01 RX ADMIN — PIPERACILLIN SODIUM,TAZOBACTAM SODIUM 3.38 G: 3; .375 INJECTION, POWDER, FOR SOLUTION INTRAVENOUS at 21:45

## 2017-01-01 RX ADMIN — DEXTROSE MONOHYDRATE 250 ML: 10 INJECTION, SOLUTION INTRAVENOUS at 01:50

## 2017-01-01 RX ADMIN — METOPROLOL TARTRATE 5 MG: 5 INJECTION INTRAVENOUS at 23:45

## 2017-01-01 RX ADMIN — SODIUM CHLORIDE 40 MG: 9 INJECTION INTRAMUSCULAR; INTRAVENOUS; SUBCUTANEOUS at 20:10

## 2017-01-01 RX ADMIN — Medication 10 ML: at 13:50

## 2017-01-01 RX ADMIN — SODIUM CHLORIDE 40 MG: 9 INJECTION INTRAMUSCULAR; INTRAVENOUS; SUBCUTANEOUS at 21:25

## 2017-01-01 RX ADMIN — HYDROMORPHONE HYDROCHLORIDE 0.5 MG: 1 INJECTION, SOLUTION INTRAMUSCULAR; INTRAVENOUS; SUBCUTANEOUS at 02:45

## 2017-01-01 RX ADMIN — IOPAMIDOL 18 ML: 755 INJECTION, SOLUTION INTRAVENOUS at 12:50

## 2017-01-01 RX ADMIN — Medication 10 ML: at 06:44

## 2017-01-01 RX ADMIN — METOPROLOL TARTRATE 10 MG: 5 INJECTION INTRAVENOUS at 08:32

## 2017-01-01 RX ADMIN — Medication 10 ML: at 05:11

## 2017-01-01 RX ADMIN — POLYETHYLENE GLYCOL 3350 17 G: 17 POWDER, FOR SOLUTION ORAL at 19:12

## 2017-01-01 RX ADMIN — Medication 10 ML: at 07:57

## 2017-01-01 RX ADMIN — VERAPAMIL HYDROCHLORIDE 2.5 MG: 2.5 INJECTION, SOLUTION INTRAVENOUS at 12:36

## 2017-01-01 RX ADMIN — PHENYLEPHRINE HYDROCHLORIDE 300 MCG/MIN: 10 INJECTION INTRAVENOUS at 20:27

## 2017-01-01 RX ADMIN — PIPERACILLIN SODIUM,TAZOBACTAM SODIUM 3.38 G: 3; .375 INJECTION, POWDER, FOR SOLUTION INTRAVENOUS at 13:53

## 2017-01-01 RX ADMIN — Medication 10 ML: at 08:38

## 2017-01-01 RX ADMIN — TIMOLOL MALEATE 1 DROP: 2.5 SOLUTION OPHTHALMIC at 08:27

## 2017-01-01 RX ADMIN — CLARITHROMYCIN 500 MG: 500 TABLET ORAL at 18:19

## 2017-01-01 RX ADMIN — ENOXAPARIN SODIUM 40 MG: 40 INJECTION SUBCUTANEOUS at 08:30

## 2017-01-01 RX ADMIN — SODIUM CHLORIDE 40 MG: 9 INJECTION INTRAMUSCULAR; INTRAVENOUS; SUBCUTANEOUS at 20:41

## 2017-01-01 RX ADMIN — Medication 10 ML: at 14:24

## 2017-01-01 RX ADMIN — METOPROLOL TARTRATE 10 MG: 5 INJECTION INTRAVENOUS at 20:23

## 2017-01-01 RX ADMIN — SODIUM CHLORIDE 125 ML/HR: 900 INJECTION, SOLUTION INTRAVENOUS at 20:16

## 2017-01-01 RX ADMIN — PIPERACILLIN SODIUM,TAZOBACTAM SODIUM 3.38 G: 3; .375 INJECTION, POWDER, FOR SOLUTION INTRAVENOUS at 06:34

## 2017-01-01 RX ADMIN — ACETAMINOPHEN 650 MG: 325 TABLET ORAL at 18:04

## 2017-01-01 RX ADMIN — ATORVASTATIN CALCIUM 40 MG: 40 TABLET, FILM COATED ORAL at 20:58

## 2017-01-01 RX ADMIN — MORPHINE SULFATE 2 MG: 4 INJECTION INTRAVENOUS at 23:50

## 2017-01-01 RX ADMIN — MAGNESIUM SULFATE HEPTAHYDRATE 2 G: 40 INJECTION, SOLUTION INTRAVENOUS at 00:40

## 2017-01-01 RX ADMIN — HYDROMORPHONE HYDROCHLORIDE 0.5 MG: 1 INJECTION, SOLUTION INTRAMUSCULAR; INTRAVENOUS; SUBCUTANEOUS at 13:50

## 2017-01-01 RX ADMIN — Medication 10 ML: at 02:39

## 2017-01-01 RX ADMIN — METOPROLOL TARTRATE 12.5 MG: 25 TABLET ORAL at 18:26

## 2017-01-01 RX ADMIN — TIMOLOL MALEATE 1 DROP: 2.5 SOLUTION OPHTHALMIC at 09:43

## 2017-01-01 RX ADMIN — HYDROMORPHONE HYDROCHLORIDE 0.5 MG: 1 INJECTION, SOLUTION INTRAMUSCULAR; INTRAVENOUS; SUBCUTANEOUS at 23:13

## 2017-01-01 RX ADMIN — ATORVASTATIN CALCIUM 40 MG: 40 TABLET, FILM COATED ORAL at 20:22

## 2017-01-01 RX ADMIN — Medication 10 ML: at 04:02

## 2017-01-01 RX ADMIN — SENNOSIDES AND DOCUSATE SODIUM 1 TABLET: 8.6; 5 TABLET ORAL at 08:27

## 2017-01-01 RX ADMIN — TIMOLOL MALEATE 1 DROP: 2.5 SOLUTION OPHTHALMIC at 21:05

## 2017-01-01 RX ADMIN — METOPROLOL TARTRATE 12.5 MG: 25 TABLET ORAL at 08:30

## 2017-01-01 RX ADMIN — PROPOFOL 100 MG: 10 INJECTION, EMULSION INTRAVENOUS at 11:18

## 2017-01-01 RX ADMIN — MAGNESIUM SULFATE HEPTAHYDRATE: 500 INJECTION, SOLUTION INTRAMUSCULAR; INTRAVENOUS at 18:36

## 2017-01-01 RX ADMIN — TIMOLOL MALEATE 1 DROP: 2.5 SOLUTION OPHTHALMIC at 08:58

## 2017-01-01 RX ADMIN — METOPROLOL TARTRATE 5 MG: 5 INJECTION INTRAVENOUS at 15:34

## 2017-01-01 RX ADMIN — MUPIROCIN: 20 OINTMENT TOPICAL at 20:42

## 2017-01-01 RX ADMIN — METOPROLOL TARTRATE 10 MG: 5 INJECTION INTRAVENOUS at 03:18

## 2017-01-01 RX ADMIN — Medication 10 ML: at 13:38

## 2017-01-01 RX ADMIN — DEXTROSE MONOHYDRATE, SODIUM CHLORIDE, AND POTASSIUM CHLORIDE 50 ML/HR: 50; 4.5; 1.49 INJECTION, SOLUTION INTRAVENOUS at 13:24

## 2017-01-01 RX ADMIN — HEPARIN SODIUM 1000 UNITS: 200 INJECTION, SOLUTION INTRAVENOUS at 12:34

## 2017-01-01 RX ADMIN — Medication 40 ML: at 14:15

## 2017-01-01 RX ADMIN — INSULIN LISPRO 2 UNITS: 100 INJECTION, SOLUTION INTRAVENOUS; SUBCUTANEOUS at 12:27

## 2017-01-01 RX ADMIN — METOPROLOL TARTRATE 10 MG: 5 INJECTION INTRAVENOUS at 14:20

## 2017-01-01 RX ADMIN — PIPERACILLIN SODIUM,TAZOBACTAM SODIUM 3.38 G: 3; .375 INJECTION, POWDER, FOR SOLUTION INTRAVENOUS at 06:14

## 2017-01-01 RX ADMIN — GABAPENTIN 300 MG: 300 CAPSULE ORAL at 21:08

## 2017-01-01 RX ADMIN — ENOXAPARIN SODIUM 40 MG: 40 INJECTION SUBCUTANEOUS at 08:47

## 2017-01-01 RX ADMIN — Medication 10 ML: at 17:20

## 2017-01-01 RX ADMIN — CLARITHROMYCIN 500 MG: 500 TABLET ORAL at 18:51

## 2017-01-01 RX ADMIN — SODIUM CHLORIDE 100 ML/HR: 450 INJECTION, SOLUTION INTRAVENOUS at 12:28

## 2017-01-01 RX ADMIN — Medication 10 ML: at 14:20

## 2017-01-01 RX ADMIN — METOPROLOL TARTRATE 25 MG: 25 TABLET ORAL at 21:22

## 2017-01-01 RX ADMIN — COLESEVELAM HYDROCHLORIDE 1250 MG: 625 TABLET, FILM COATED ORAL at 08:27

## 2017-01-01 RX ADMIN — METOPROLOL TARTRATE 12.5 MG: 25 TABLET ORAL at 21:03

## 2017-01-01 RX ADMIN — ASPIRIN 81 MG: 81 TABLET, COATED ORAL at 08:51

## 2017-01-01 RX ADMIN — ENOXAPARIN SODIUM 40 MG: 40 INJECTION SUBCUTANEOUS at 08:11

## 2017-01-01 RX ADMIN — METOPROLOL TARTRATE 5 MG: 5 INJECTION INTRAVENOUS at 21:25

## 2017-01-01 RX ADMIN — LIDOCAINE HYDROCHLORIDE 1 ML: 10 INJECTION, SOLUTION EPIDURAL; INFILTRATION; INTRACAUDAL; PERINEURAL at 12:36

## 2017-01-01 RX ADMIN — MIDAZOLAM HYDROCHLORIDE 1 MG: 1 INJECTION, SOLUTION INTRAMUSCULAR; INTRAVENOUS at 13:45

## 2017-01-01 RX ADMIN — ATORVASTATIN CALCIUM 40 MG: 40 TABLET, FILM COATED ORAL at 21:40

## 2017-01-01 RX ADMIN — COLESEVELAM HYDROCHLORIDE 1250 MG: 625 TABLET, FILM COATED ORAL at 11:18

## 2017-01-01 RX ADMIN — PIPERACILLIN SODIUM,TAZOBACTAM SODIUM 3.38 G: 3; .375 INJECTION, POWDER, FOR SOLUTION INTRAVENOUS at 13:25

## 2017-01-01 RX ADMIN — INSULIN HUMAN 2 UNITS: 100 INJECTION, SOLUTION PARENTERAL at 13:16

## 2017-01-01 RX ADMIN — SENNOSIDES AND DOCUSATE SODIUM 1 TABLET: 8.6; 5 TABLET ORAL at 08:41

## 2017-01-01 RX ADMIN — TIMOLOL MALEATE 2 DROP: 2.5 SOLUTION OPHTHALMIC at 08:09

## 2017-01-01 RX ADMIN — LEVOFLOXACIN 250 MG: 250 TABLET, FILM COATED ORAL at 09:35

## 2017-01-01 RX ADMIN — INSULIN LISPRO 3 UNITS: 100 INJECTION, SOLUTION INTRAVENOUS; SUBCUTANEOUS at 12:41

## 2017-01-01 RX ADMIN — COLESEVELAM HYDROCHLORIDE 1250 MG: 625 TABLET, FILM COATED ORAL at 18:21

## 2017-01-01 RX ADMIN — ETOMIDATE 9.52 MG: 2 INJECTION INTRAVENOUS at 14:14

## 2017-01-01 RX ADMIN — POLYETHYLENE GLYCOL 3350 17 G: 17 POWDER, FOR SOLUTION ORAL at 11:22

## 2017-01-01 RX ADMIN — TIMOLOL MALEATE 1 DROP: 2.5 SOLUTION OPHTHALMIC at 20:22

## 2017-01-01 RX ADMIN — METOPROLOL TARTRATE 5 MG: 5 INJECTION INTRAVENOUS at 11:31

## 2017-01-01 RX ADMIN — HYDROMORPHONE HYDROCHLORIDE 0.5 MG: 1 INJECTION, SOLUTION INTRAMUSCULAR; INTRAVENOUS; SUBCUTANEOUS at 08:42

## 2017-01-01 RX ADMIN — ASPIRIN 81 MG: 81 TABLET, COATED ORAL at 08:30

## 2017-01-01 RX ADMIN — GABAPENTIN 300 MG: 300 CAPSULE ORAL at 08:20

## 2017-01-01 RX ADMIN — Medication 10 ML: at 22:09

## 2017-01-01 RX ADMIN — HYDROMORPHONE HYDROCHLORIDE 0.5 MG: 1 INJECTION, SOLUTION INTRAMUSCULAR; INTRAVENOUS; SUBCUTANEOUS at 14:41

## 2017-01-01 RX ADMIN — DEXTROSE MONOHYDRATE, SODIUM CHLORIDE, AND POTASSIUM CHLORIDE 75 ML/HR: 50; 4.5; 1.49 INJECTION, SOLUTION INTRAVENOUS at 11:26

## 2017-01-01 RX ADMIN — SODIUM CHLORIDE 40 MG: 9 INJECTION INTRAMUSCULAR; INTRAVENOUS; SUBCUTANEOUS at 21:21

## 2017-01-01 RX ADMIN — TIMOLOL MALEATE 1 DROP: 2.5 SOLUTION OPHTHALMIC at 08:47

## 2017-01-01 RX ADMIN — METOPROLOL TARTRATE 10 MG: 5 INJECTION INTRAVENOUS at 02:29

## 2017-01-01 RX ADMIN — CYCLOSPORINE 1 DROP: 0.5 EMULSION OPHTHALMIC at 19:02

## 2017-01-01 RX ADMIN — CLARITHROMYCIN 500 MG: 500 TABLET ORAL at 18:44

## 2017-01-01 RX ADMIN — LIDOCAINE HYDROCHLORIDE 18 MG: 20 INJECTION, SOLUTION INFILTRATION; PERINEURAL at 16:00

## 2017-01-01 RX ADMIN — DEXTROSE MONOHYDRATE, SODIUM CHLORIDE, AND POTASSIUM CHLORIDE 50 ML/HR: 50; 4.5; 1.49 INJECTION, SOLUTION INTRAVENOUS at 11:08

## 2017-01-01 RX ADMIN — ATORVASTATIN CALCIUM 40 MG: 40 TABLET, FILM COATED ORAL at 22:54

## 2017-01-01 RX ADMIN — CEFTRIAXONE 1 G: 1 INJECTION, POWDER, FOR SOLUTION INTRAMUSCULAR; INTRAVENOUS at 02:33

## 2017-01-01 RX ADMIN — PANTOPRAZOLE SODIUM 40 MG: 40 TABLET, DELAYED RELEASE ORAL at 21:03

## 2017-01-01 RX ADMIN — COLESEVELAM HYDROCHLORIDE 1250 MG: 625 TABLET, FILM COATED ORAL at 17:43

## 2017-01-01 RX ADMIN — TICAGRELOR 90 MG: 90 TABLET ORAL at 17:42

## 2017-01-01 RX ADMIN — HYDROMORPHONE HYDROCHLORIDE 0.5 MG: 1 INJECTION, SOLUTION INTRAMUSCULAR; INTRAVENOUS; SUBCUTANEOUS at 07:55

## 2017-01-01 RX ADMIN — LEVOFLOXACIN 500 MG: 500 TABLET, FILM COATED ORAL at 17:15

## 2017-01-01 RX ADMIN — TIMOLOL MALEATE 1 DROP: 2.5 SOLUTION OPHTHALMIC at 21:45

## 2017-01-01 RX ADMIN — INSULIN LISPRO 2 UNITS: 100 INJECTION, SOLUTION INTRAVENOUS; SUBCUTANEOUS at 23:53

## 2017-01-01 RX ADMIN — METOPROLOL TARTRATE 5 MG: 5 INJECTION INTRAVENOUS at 02:30

## 2017-01-01 RX ADMIN — COLESEVELAM HYDROCHLORIDE 1250 MG: 625 TABLET, FILM COATED ORAL at 17:23

## 2017-01-01 RX ADMIN — TICAGRELOR 90 MG: 90 TABLET ORAL at 00:35

## 2017-01-01 RX ADMIN — SODIUM CHLORIDE 75 ML/HR: 900 INJECTION, SOLUTION INTRAVENOUS at 08:58

## 2017-01-01 RX ADMIN — FUROSEMIDE 20 MG: 10 INJECTION, SOLUTION INTRAMUSCULAR; INTRAVENOUS at 13:35

## 2017-01-01 RX ADMIN — FLUCONAZOLE 200 MG: 2 INJECTION, SOLUTION INTRAVENOUS at 16:05

## 2017-01-01 RX ADMIN — METOPROLOL TARTRATE 5 MG: 5 INJECTION INTRAVENOUS at 04:00

## 2017-01-01 RX ADMIN — TIMOLOL MALEATE 1 DROP: 2.5 SOLUTION OPHTHALMIC at 08:53

## 2017-01-01 RX ADMIN — METOPROLOL TARTRATE 7.5 MG: 5 INJECTION INTRAVENOUS at 13:51

## 2017-01-01 RX ADMIN — SODIUM CHLORIDE 40 MG: 9 INJECTION INTRAMUSCULAR; INTRAVENOUS; SUBCUTANEOUS at 10:17

## 2017-01-01 RX ADMIN — COLESEVELAM HYDROCHLORIDE 1250 MG: 625 TABLET, FILM COATED ORAL at 17:38

## 2017-01-01 RX ADMIN — Medication 10 ML: at 06:29

## 2017-01-01 RX ADMIN — METOPROLOL TARTRATE 5 MG: 5 INJECTION INTRAVENOUS at 19:23

## 2017-01-01 RX ADMIN — Medication 20 ML: at 12:34

## 2017-01-01 RX ADMIN — Medication 10 ML: at 06:32

## 2017-01-01 RX ADMIN — Medication 10 ML: at 21:23

## 2017-01-01 RX ADMIN — FLUCONAZOLE 200 MG: 2 INJECTION, SOLUTION INTRAVENOUS at 16:53

## 2017-01-01 RX ADMIN — PROPOFOL 15 MCG/KG/MIN: 10 INJECTION, EMULSION INTRAVENOUS at 17:28

## 2017-01-01 RX ADMIN — OXYCODONE HYDROCHLORIDE AND ACETAMINOPHEN 1 TABLET: 5; 325 TABLET ORAL at 08:54

## 2017-01-01 RX ADMIN — SENNOSIDES AND DOCUSATE SODIUM 1 TABLET: 8.6; 5 TABLET ORAL at 09:23

## 2017-01-01 RX ADMIN — ROCURONIUM BROMIDE 5 MG: 10 INJECTION, SOLUTION INTRAVENOUS at 02:20

## 2017-01-01 RX ADMIN — Medication 10 ML: at 15:09

## 2017-01-01 RX ADMIN — PIPERACILLIN SODIUM,TAZOBACTAM SODIUM 3.38 G: 3; .375 INJECTION, POWDER, FOR SOLUTION INTRAVENOUS at 14:30

## 2017-01-01 RX ADMIN — CYCLOSPORINE 1 DROP: 0.5 EMULSION OPHTHALMIC at 13:17

## 2017-01-01 RX ADMIN — ENOXAPARIN SODIUM 40 MG: 40 INJECTION SUBCUTANEOUS at 08:34

## 2017-01-01 RX ADMIN — COLESEVELAM HYDROCHLORIDE 1250 MG: 625 TABLET, FILM COATED ORAL at 17:01

## 2017-01-01 RX ADMIN — OXYBUTYNIN CHLORIDE 5 MG: 5 TABLET, EXTENDED RELEASE ORAL at 08:55

## 2017-01-01 RX ADMIN — Medication 10 ML: at 21:52

## 2017-01-01 RX ADMIN — Medication 800 MG: at 12:36

## 2017-01-01 RX ADMIN — Medication 10 ML: at 14:16

## 2017-01-01 RX ADMIN — METOPROLOL TARTRATE 12.5 MG: 25 TABLET ORAL at 21:00

## 2017-01-01 RX ADMIN — HYDROMORPHONE HYDROCHLORIDE 0.5 MG: 1 INJECTION, SOLUTION INTRAMUSCULAR; INTRAVENOUS; SUBCUTANEOUS at 16:02

## 2017-01-01 RX ADMIN — METOPROLOL TARTRATE 10 MG: 5 INJECTION INTRAVENOUS at 09:12

## 2017-01-01 RX ADMIN — COLESEVELAM HYDROCHLORIDE 1250 MG: 625 TABLET, FILM COATED ORAL at 18:12

## 2017-01-01 RX ADMIN — CLARITHROMYCIN 500 MG: 500 TABLET ORAL at 09:05

## 2017-01-01 RX ADMIN — FLUCONAZOLE 200 MG: 2 INJECTION, SOLUTION INTRAVENOUS at 18:04

## 2017-01-01 RX ADMIN — INSULIN LISPRO 7 UNITS: 100 INJECTION, SOLUTION INTRAVENOUS; SUBCUTANEOUS at 05:57

## 2017-01-01 RX ADMIN — PERFLUTREN 2 ML: 6.52 INJECTION, SUSPENSION INTRAVENOUS at 11:48

## 2017-01-01 RX ADMIN — TIMOLOL MALEATE 1 DROP: 2.5 SOLUTION OPHTHALMIC at 20:50

## 2017-01-01 RX ADMIN — METOPROLOL TARTRATE 10 MG: 5 INJECTION INTRAVENOUS at 20:35

## 2017-01-01 RX ADMIN — ENOXAPARIN SODIUM 40 MG: 40 INJECTION SUBCUTANEOUS at 09:18

## 2017-01-01 RX ADMIN — I.V. FAT EMULSION 21 ML/HR: 20 EMULSION INTRAVENOUS at 17:56

## 2017-01-01 RX ADMIN — ROCURONIUM BROMIDE 40 MG: 10 INJECTION, SOLUTION INTRAVENOUS at 00:29

## 2017-01-01 RX ADMIN — SENNOSIDES AND DOCUSATE SODIUM 1 TABLET: 8.6; 5 TABLET ORAL at 08:20

## 2017-01-01 RX ADMIN — ENOXAPARIN SODIUM 40 MG: 40 INJECTION SUBCUTANEOUS at 08:51

## 2017-01-01 RX ADMIN — TIMOLOL MALEATE 1 DROP: 2.5 SOLUTION OPHTHALMIC at 20:35

## 2017-01-01 RX ADMIN — PIPERACILLIN SODIUM,TAZOBACTAM SODIUM 3.38 G: 3; .375 INJECTION, POWDER, FOR SOLUTION INTRAVENOUS at 13:16

## 2017-01-01 RX ADMIN — Medication 10 ML: at 09:02

## 2017-01-01 RX ADMIN — Medication 10 ML: at 13:55

## 2017-01-01 RX ADMIN — HYDROMORPHONE HYDROCHLORIDE 0.5 MG: 1 INJECTION, SOLUTION INTRAMUSCULAR; INTRAVENOUS; SUBCUTANEOUS at 22:08

## 2017-01-01 RX ADMIN — Medication 10 ML: at 21:59

## 2017-01-01 RX ADMIN — COLESEVELAM HYDROCHLORIDE 1250 MG: 625 TABLET, FILM COATED ORAL at 09:11

## 2017-01-01 RX ADMIN — ENOXAPARIN SODIUM 40 MG: 40 INJECTION SUBCUTANEOUS at 15:01

## 2017-01-01 RX ADMIN — HEPARIN SODIUM 2500 UNITS: 1000 INJECTION, SOLUTION INTRAVENOUS; SUBCUTANEOUS at 12:37

## 2017-01-01 RX ADMIN — OXYCODONE HYDROCHLORIDE AND ACETAMINOPHEN 1 TABLET: 5; 325 TABLET ORAL at 15:19

## 2017-01-01 RX ADMIN — PIPERACILLIN SODIUM,TAZOBACTAM SODIUM 3.38 G: 3; .375 INJECTION, POWDER, FOR SOLUTION INTRAVENOUS at 13:46

## 2017-01-01 RX ADMIN — INSULIN LISPRO 2 UNITS: 100 INJECTION, SOLUTION INTRAVENOUS; SUBCUTANEOUS at 23:41

## 2017-01-01 RX ADMIN — TIMOLOL MALEATE 1 DROP: 2.5 SOLUTION OPHTHALMIC at 21:40

## 2017-01-01 RX ADMIN — AMIODARONE HYDROCHLORIDE 1 MG/MIN: 50 INJECTION, SOLUTION INTRAVENOUS at 12:55

## 2017-01-01 RX ADMIN — ACETAMINOPHEN 650 MG: 325 TABLET ORAL at 21:41

## 2017-01-01 RX ADMIN — COLESEVELAM HYDROCHLORIDE 1250 MG: 625 TABLET, FILM COATED ORAL at 17:24

## 2017-01-01 RX ADMIN — SODIUM CHLORIDE 500 ML: 900 INJECTION, SOLUTION INTRAVENOUS at 11:59

## 2017-01-01 RX ADMIN — METOPROLOL TARTRATE 2.5 MG: 5 INJECTION INTRAVENOUS at 07:55

## 2017-01-01 RX ADMIN — METOPROLOL TARTRATE 12.5 MG: 25 TABLET ORAL at 21:36

## 2017-01-01 RX ADMIN — TIMOLOL MALEATE 1 DROP: 2.5 SOLUTION OPHTHALMIC at 12:50

## 2017-01-01 RX ADMIN — Medication 40 ML: at 05:57

## 2017-01-01 RX ADMIN — DILTIAZEM HYDROCHLORIDE 30 MG: 30 TABLET, FILM COATED ORAL at 22:58

## 2017-01-01 RX ADMIN — FENTANYL CITRATE 50 MCG: 50 INJECTION, SOLUTION INTRAMUSCULAR; INTRAVENOUS at 11:18

## 2017-01-01 RX ADMIN — COLESEVELAM HYDROCHLORIDE 1250 MG: 625 TABLET, FILM COATED ORAL at 08:35

## 2017-01-01 RX ADMIN — FLUCONAZOLE 200 MG: 2 INJECTION, SOLUTION INTRAVENOUS at 17:32

## 2017-01-01 RX ADMIN — SODIUM CHLORIDE 40 MG: 9 INJECTION INTRAMUSCULAR; INTRAVENOUS; SUBCUTANEOUS at 09:34

## 2017-01-01 RX ADMIN — Medication 10 ML: at 12:42

## 2017-01-01 RX ADMIN — INSULIN LISPRO 5 UNITS: 100 INJECTION, SOLUTION INTRAVENOUS; SUBCUTANEOUS at 11:49

## 2017-01-01 RX ADMIN — FLUCONAZOLE 150 MG: 100 TABLET ORAL at 21:36

## 2017-01-01 RX ADMIN — PIPERACILLIN SODIUM,TAZOBACTAM SODIUM 3.38 G: 3; .375 INJECTION, POWDER, FOR SOLUTION INTRAVENOUS at 05:50

## 2017-01-01 RX ADMIN — CLARITHROMYCIN 500 MG: 500 TABLET ORAL at 18:31

## 2017-01-01 RX ADMIN — Medication 20 ML: at 18:44

## 2017-01-01 RX ADMIN — Medication 10 ML: at 06:43

## 2017-01-01 RX ADMIN — METOPROLOL TARTRATE 12.5 MG: 25 TABLET ORAL at 08:59

## 2017-01-01 RX ADMIN — TIMOLOL MALEATE 1 DROP: 2.5 SOLUTION OPHTHALMIC at 21:42

## 2017-01-01 RX ADMIN — GADOPENTETATE DIMEGLUMINE 20 ML: 469.01 INJECTION INTRAVENOUS at 13:16

## 2017-01-01 RX ADMIN — TIMOLOL MALEATE 1 DROP: 2.5 SOLUTION OPHTHALMIC at 08:37

## 2017-01-01 RX ADMIN — FLUCONAZOLE 100 MG: 100 TABLET ORAL at 09:23

## 2017-01-01 RX ADMIN — METOPROLOL TARTRATE 12.5 MG: 25 TABLET ORAL at 22:00

## 2017-01-01 RX ADMIN — PHENYLEPHRINE HYDROCHLORIDE 300 MCG/MIN: 10 INJECTION INTRAVENOUS at 03:33

## 2017-01-01 RX ADMIN — ATORVASTATIN CALCIUM 40 MG: 40 TABLET, FILM COATED ORAL at 01:27

## 2017-01-01 RX ADMIN — Medication 20 ML: at 12:42

## 2017-01-01 RX ADMIN — TIMOLOL MALEATE 1 DROP: 2.5 SOLUTION OPHTHALMIC at 10:32

## 2017-01-01 RX ADMIN — CEFTRIAXONE 1 G: 1 INJECTION, POWDER, FOR SOLUTION INTRAMUSCULAR; INTRAVENOUS at 01:54

## 2017-01-01 RX ADMIN — CEFTRIAXONE 1 G: 1 INJECTION, POWDER, FOR SOLUTION INTRAMUSCULAR; INTRAVENOUS at 19:53

## 2017-01-01 RX ADMIN — INSULIN LISPRO 5 UNITS: 100 INJECTION, SOLUTION INTRAVENOUS; SUBCUTANEOUS at 23:32

## 2017-01-01 RX ADMIN — HYDROMORPHONE HYDROCHLORIDE 0.5 MG: 1 INJECTION, SOLUTION INTRAMUSCULAR; INTRAVENOUS; SUBCUTANEOUS at 02:30

## 2017-01-01 RX ADMIN — SODIUM CHLORIDE 100 ML/HR: 900 INJECTION, SOLUTION INTRAVENOUS at 13:14

## 2017-01-01 RX ADMIN — SODIUM CHLORIDE 2000 ML: 900 INJECTION, SOLUTION INTRAVENOUS at 05:03

## 2017-01-01 RX ADMIN — SODIUM BICARBONATE 50 MEQ: 84 INJECTION, SOLUTION INTRAVENOUS at 16:44

## 2017-01-01 RX ADMIN — Medication 800 MG: at 08:41

## 2017-01-01 RX ADMIN — METOPROLOL TARTRATE 2.5 MG: 5 INJECTION INTRAVENOUS at 10:23

## 2017-01-01 RX ADMIN — MIDAZOLAM HYDROCHLORIDE 1 MG: 1 INJECTION, SOLUTION INTRAMUSCULAR; INTRAVENOUS at 13:52

## 2017-01-01 RX ADMIN — PIPERACILLIN SODIUM,TAZOBACTAM SODIUM 3.38 G: 3; .375 INJECTION, POWDER, FOR SOLUTION INTRAVENOUS at 13:21

## 2017-01-01 RX ADMIN — ATORVASTATIN CALCIUM 40 MG: 40 TABLET, FILM COATED ORAL at 20:50

## 2017-01-01 RX ADMIN — HYDROMORPHONE HYDROCHLORIDE 0.5 MG: 1 INJECTION, SOLUTION INTRAMUSCULAR; INTRAVENOUS; SUBCUTANEOUS at 08:08

## 2017-01-01 RX ADMIN — CEFEPIME HYDROCHLORIDE 2 G: 2 INJECTION, POWDER, FOR SOLUTION INTRAVENOUS at 23:27

## 2017-01-01 RX ADMIN — Medication 20 ML: at 13:17

## 2017-01-01 RX ADMIN — SENNOSIDES AND DOCUSATE SODIUM 1 TABLET: 8.6; 5 TABLET ORAL at 08:51

## 2017-01-01 RX ADMIN — METOPROLOL TARTRATE 10 MG: 5 INJECTION INTRAVENOUS at 04:31

## 2017-01-01 RX ADMIN — ENOXAPARIN SODIUM 40 MG: 40 INJECTION SUBCUTANEOUS at 08:41

## 2017-01-01 RX ADMIN — FLUCONAZOLE 100 MG: 100 TABLET ORAL at 12:20

## 2017-01-01 RX ADMIN — SODIUM CHLORIDE, POTASSIUM CHLORIDE, SODIUM LACTATE AND CALCIUM CHLORIDE: 600; 310; 30; 20 INJECTION, SOLUTION INTRAVENOUS at 11:00

## 2017-01-01 RX ADMIN — SODIUM CHLORIDE 40 MG: 9 INJECTION INTRAMUSCULAR; INTRAVENOUS; SUBCUTANEOUS at 08:45

## 2017-01-01 RX ADMIN — METOPROLOL TARTRATE 10 MG: 5 INJECTION INTRAVENOUS at 06:31

## 2017-01-01 RX ADMIN — SODIUM CHLORIDE 40 MG: 9 INJECTION INTRAMUSCULAR; INTRAVENOUS; SUBCUTANEOUS at 09:10

## 2017-01-01 RX ADMIN — ASPIRIN 81 MG: 81 TABLET, COATED ORAL at 08:55

## 2017-01-01 RX ADMIN — COLESEVELAM HYDROCHLORIDE 1250 MG: 625 TABLET, FILM COATED ORAL at 18:26

## 2017-01-01 RX ADMIN — SODIUM CHLORIDE 125 ML/HR: 900 INJECTION, SOLUTION INTRAVENOUS at 13:26

## 2017-01-01 RX ADMIN — ASPIRIN 81 MG: 81 TABLET, COATED ORAL at 09:07

## 2017-01-01 RX ADMIN — ASPIRIN 81 MG: 81 TABLET, COATED ORAL at 08:35

## 2017-01-01 RX ADMIN — ATORVASTATIN CALCIUM 40 MG: 40 TABLET, FILM COATED ORAL at 21:00

## 2017-01-01 RX ADMIN — SODIUM CHLORIDE 100 ML/HR: 900 INJECTION, SOLUTION INTRAVENOUS at 02:39

## 2017-01-01 RX ADMIN — TICAGRELOR 90 MG: 90 TABLET ORAL at 13:07

## 2017-01-01 RX ADMIN — AMIODARONE HYDROCHLORIDE 150 MG: 50 INJECTION, SOLUTION INTRAVENOUS at 12:31

## 2017-01-01 RX ADMIN — METOPROLOL TARTRATE 10 MG: 5 INJECTION INTRAVENOUS at 11:48

## 2017-01-01 RX ADMIN — PIPERACILLIN SODIUM,TAZOBACTAM SODIUM 3.38 G: 3; .375 INJECTION, POWDER, FOR SOLUTION INTRAVENOUS at 13:15

## 2017-01-01 RX ADMIN — Medication 40 ML: at 15:32

## 2017-01-01 RX ADMIN — TIMOLOL MALEATE 2 DROP: 2.5 SOLUTION OPHTHALMIC at 09:18

## 2017-01-01 RX ADMIN — FLUCONAZOLE 200 MG: 2 INJECTION, SOLUTION INTRAVENOUS at 16:44

## 2017-01-01 RX ADMIN — TIMOLOL MALEATE 2 DROP: 2.5 SOLUTION OPHTHALMIC at 09:13

## 2017-01-01 RX ADMIN — FLUCONAZOLE 100 MG: 100 TABLET ORAL at 08:35

## 2017-01-01 RX ADMIN — LIDOCAINE HYDROCHLORIDE 60 MG: 20 INJECTION, SOLUTION EPIDURAL; INFILTRATION; INTRACAUDAL; PERINEURAL at 00:29

## 2017-01-01 RX ADMIN — LIDOCAINE HYDROCHLORIDE 60 MG: 20 INJECTION, SOLUTION EPIDURAL; INFILTRATION; INTRACAUDAL; PERINEURAL at 11:18

## 2017-01-01 RX ADMIN — ALBUMIN (HUMAN) 12.5 G: 0.25 INJECTION, SOLUTION INTRAVENOUS at 23:46

## 2017-01-01 RX ADMIN — TIMOLOL MALEATE 2 DROP: 2.5 SOLUTION OPHTHALMIC at 08:47

## 2017-01-01 RX ADMIN — METOPROLOL TARTRATE 10 MG: 5 INJECTION INTRAVENOUS at 03:30

## 2017-01-01 RX ADMIN — CALCIUM GLUCONATE 1 G: 94 INJECTION, SOLUTION INTRAVENOUS at 16:23

## 2017-01-01 RX ADMIN — PIPERACILLIN SODIUM,TAZOBACTAM SODIUM 3.38 G: 3; .375 INJECTION, POWDER, FOR SOLUTION INTRAVENOUS at 05:19

## 2017-01-01 RX ADMIN — Medication 10 ML: at 08:45

## 2017-01-01 RX ADMIN — PIPERACILLIN SODIUM,TAZOBACTAM SODIUM 3.38 G: 3; .375 INJECTION, POWDER, FOR SOLUTION INTRAVENOUS at 14:20

## 2017-01-01 RX ADMIN — COLESEVELAM HYDROCHLORIDE 1250 MG: 625 TABLET, FILM COATED ORAL at 08:33

## 2017-01-01 RX ADMIN — ATORVASTATIN CALCIUM 40 MG: 40 TABLET, FILM COATED ORAL at 22:36

## 2017-01-01 RX ADMIN — CLARITHROMYCIN 500 MG: 500 TABLET ORAL at 12:41

## 2017-01-01 RX ADMIN — Medication 20 ML: at 13:05

## 2017-01-01 RX ADMIN — TICAGRELOR 90 MG: 90 TABLET ORAL at 11:20

## 2017-01-01 RX ADMIN — INSULIN LISPRO 3 UNITS: 100 INJECTION, SOLUTION INTRAVENOUS; SUBCUTANEOUS at 23:57

## 2017-01-01 RX ADMIN — HYDROMORPHONE HYDROCHLORIDE 0.5 MG: 1 INJECTION, SOLUTION INTRAMUSCULAR; INTRAVENOUS; SUBCUTANEOUS at 15:32

## 2017-01-01 RX ADMIN — COLESEVELAM HYDROCHLORIDE 1250 MG: 625 TABLET, FILM COATED ORAL at 17:30

## 2017-01-01 RX ADMIN — DEXAMETHASONE SODIUM PHOSPHATE 10 MG: 4 INJECTION, SOLUTION INTRA-ARTICULAR; INTRALESIONAL; INTRAMUSCULAR; INTRAVENOUS; SOFT TISSUE at 01:13

## 2017-01-01 RX ADMIN — COLESEVELAM HYDROCHLORIDE 1250 MG: 625 TABLET, FILM COATED ORAL at 08:46

## 2017-01-01 RX ADMIN — PIPERACILLIN SODIUM,TAZOBACTAM SODIUM 3.38 G: 3; .375 INJECTION, POWDER, FOR SOLUTION INTRAVENOUS at 06:20

## 2017-01-01 RX ADMIN — COLESEVELAM HYDROCHLORIDE 1250 MG: 625 TABLET, FILM COATED ORAL at 09:19

## 2017-01-01 RX ADMIN — Medication 10 ML: at 22:34

## 2017-01-01 RX ADMIN — METOPROLOL TARTRATE 12.5 MG: 25 TABLET ORAL at 09:12

## 2017-01-01 RX ADMIN — INSULIN LISPRO 2 UNITS: 100 INJECTION, SOLUTION INTRAVENOUS; SUBCUTANEOUS at 15:09

## 2017-01-01 RX ADMIN — TIMOLOL MALEATE 1 DROP: 2.5 SOLUTION OPHTHALMIC at 22:09

## 2017-01-01 RX ADMIN — METOPROLOL TARTRATE 50 MG: 50 TABLET ORAL at 09:04

## 2017-01-01 RX ADMIN — RETINOL, ERGOCALCIFEROL, .ALPHA.-TOCOPHEROL ACETATE, DL-, PHYTONADIONE, ASCORBIC ACID, NIACINAMIDE, RIBOFLAVIN 5-PHOSPHATE SODIUM, THIAMINE HYDROCHLORIDE, PYRIDOXINE HYDROCHLORIDE, DEXPANTHENOL, BIOTIN, FOLIC ACID, AND CYANOCOBALAMIN: KIT at 19:37

## 2017-01-01 RX ADMIN — Medication 40 ML: at 21:17

## 2017-01-01 RX ADMIN — TIMOLOL MALEATE 1 DROP: 2.5 SOLUTION OPHTHALMIC at 21:53

## 2017-01-01 RX ADMIN — Medication 10 ML: at 14:17

## 2017-01-01 RX ADMIN — METOPROLOL TARTRATE 5 MG: 5 INJECTION INTRAVENOUS at 13:12

## 2017-01-01 RX ADMIN — HYDROMORPHONE HYDROCHLORIDE 0.5 MG: 1 INJECTION, SOLUTION INTRAMUSCULAR; INTRAVENOUS; SUBCUTANEOUS at 17:22

## 2017-01-01 RX ADMIN — ENOXAPARIN SODIUM 40 MG: 40 INJECTION SUBCUTANEOUS at 09:36

## 2017-01-01 RX ADMIN — MAGNESIUM SULFATE HEPTAHYDRATE 2 G: 40 INJECTION, SOLUTION INTRAVENOUS at 15:15

## 2017-01-01 RX ADMIN — METOPROLOL TARTRATE 12.5 MG: 25 TABLET ORAL at 17:25

## 2017-01-01 RX ADMIN — SENNOSIDES AND DOCUSATE SODIUM 1 TABLET: 8.6; 5 TABLET ORAL at 08:47

## 2017-01-01 RX ADMIN — PIPERACILLIN SODIUM,TAZOBACTAM SODIUM 3.38 G: 3; .375 INJECTION, POWDER, FOR SOLUTION INTRAVENOUS at 21:42

## 2017-01-01 RX ADMIN — PIPERACILLIN SODIUM,TAZOBACTAM SODIUM 3.38 G: 3; .375 INJECTION, POWDER, FOR SOLUTION INTRAVENOUS at 21:22

## 2017-01-01 RX ADMIN — TIMOLOL MALEATE 1 DROP: 2.5 SOLUTION OPHTHALMIC at 08:55

## 2017-01-01 RX ADMIN — METOPROLOL TARTRATE 7.5 MG: 5 INJECTION INTRAVENOUS at 04:05

## 2017-01-01 RX ADMIN — SUCCINYLCHOLINE CHLORIDE 160 MG: 20 INJECTION INTRAMUSCULAR; INTRAVENOUS at 11:18

## 2017-01-01 RX ADMIN — ACETAMINOPHEN 650 MG: 650 SUPPOSITORY RECTAL at 04:23

## 2017-01-01 RX ADMIN — Medication 10 ML: at 14:57

## 2017-01-01 RX ADMIN — TIMOLOL MALEATE 1 DROP: 2.5 SOLUTION OPHTHALMIC at 08:42

## 2017-01-01 RX ADMIN — Medication 10 ML: at 06:34

## 2017-01-01 RX ADMIN — FENTANYL CITRATE 100 MCG: 50 INJECTION, SOLUTION INTRAMUSCULAR; INTRAVENOUS at 15:43

## 2017-01-01 RX ADMIN — SODIUM CHLORIDE 1000 ML: 900 INJECTION, SOLUTION INTRAVENOUS at 13:01

## 2017-01-01 RX ADMIN — INSULIN LISPRO 3 UNITS: 100 INJECTION, SOLUTION INTRAVENOUS; SUBCUTANEOUS at 06:26

## 2017-01-01 RX ADMIN — Medication 20 ML: at 13:37

## 2017-01-01 RX ADMIN — TIMOLOL MALEATE 1 DROP: 2.5 SOLUTION OPHTHALMIC at 22:21

## 2017-01-01 RX ADMIN — METOPROLOL TARTRATE 12.5 MG: 25 TABLET ORAL at 10:28

## 2017-01-01 RX ADMIN — METOPROLOL TARTRATE 2.5 MG: 5 INJECTION INTRAVENOUS at 02:30

## 2017-01-01 RX ADMIN — COLESEVELAM HYDROCHLORIDE 1250 MG: 625 TABLET, FILM COATED ORAL at 17:29

## 2017-01-01 RX ADMIN — METOPROLOL TARTRATE 7.5 MG: 5 INJECTION INTRAVENOUS at 02:39

## 2017-01-01 RX ADMIN — VASOPRESSIN 0.04 UNITS/MIN: 20 INJECTION INTRAVENOUS at 01:59

## 2017-01-01 RX ADMIN — COLESEVELAM HYDROCHLORIDE 1250 MG: 625 TABLET, FILM COATED ORAL at 16:01

## 2017-01-01 RX ADMIN — TICAGRELOR 90 MG: 90 TABLET ORAL at 15:22

## 2017-01-01 RX ADMIN — PIPERACILLIN SODIUM,TAZOBACTAM SODIUM 3.38 G: 3; .375 INJECTION, POWDER, FOR SOLUTION INTRAVENOUS at 22:11

## 2017-01-01 RX ADMIN — TIMOLOL MALEATE 1 DROP: 2.5 SOLUTION OPHTHALMIC at 21:31

## 2017-01-01 RX ADMIN — METOPROLOL TARTRATE 12.5 MG: 25 TABLET ORAL at 08:26

## 2017-01-01 RX ADMIN — SENNOSIDES AND DOCUSATE SODIUM 1 TABLET: 8.6; 5 TABLET ORAL at 08:35

## 2017-01-01 RX ADMIN — TIMOLOL MALEATE 1 DROP: 2.5 SOLUTION OPHTHALMIC at 21:23

## 2017-01-01 RX ADMIN — ONDANSETRON 4 MG: 2 INJECTION INTRAMUSCULAR; INTRAVENOUS at 11:56

## 2017-01-01 RX ADMIN — METOPROLOL TARTRATE 10 MG: 5 INJECTION INTRAVENOUS at 13:10

## 2017-01-01 RX ADMIN — MUPIROCIN: 20 OINTMENT TOPICAL at 21:08

## 2017-01-01 RX ADMIN — Medication 20 ML: at 13:13

## 2017-01-01 RX ADMIN — ENOXAPARIN SODIUM 40 MG: 40 INJECTION SUBCUTANEOUS at 08:45

## 2017-01-01 RX ADMIN — SODIUM CHLORIDE 100 ML/HR: 900 INJECTION, SOLUTION INTRAVENOUS at 23:38

## 2017-01-01 RX ADMIN — Medication 10 ML: at 13:51

## 2017-01-01 RX ADMIN — ACETAMINOPHEN 650 MG: 325 TABLET ORAL at 10:11

## 2017-01-01 RX ADMIN — LEVOFLOXACIN 250 MG: 250 TABLET, FILM COATED ORAL at 08:30

## 2017-01-01 RX ADMIN — I.V. FAT EMULSION 21 ML/HR: 20 EMULSION INTRAVENOUS at 18:48

## 2017-01-01 RX ADMIN — AMIODARONE HYDROCHLORIDE 0.5 MG/MIN: 50 INJECTION, SOLUTION INTRAVENOUS at 20:14

## 2017-01-01 RX ADMIN — Medication 10 ML: at 05:45

## 2017-01-01 RX ADMIN — Medication 30 ML: at 00:01

## 2017-01-01 RX ADMIN — ENOXAPARIN SODIUM 40 MG: 40 INJECTION SUBCUTANEOUS at 07:55

## 2017-01-01 RX ADMIN — MAGNESIUM SULFATE HEPTAHYDRATE: 500 INJECTION, SOLUTION INTRAMUSCULAR; INTRAVENOUS at 18:20

## 2017-01-01 RX ADMIN — PIPERACILLIN SODIUM,TAZOBACTAM SODIUM 3.38 G: 3; .375 INJECTION, POWDER, FOR SOLUTION INTRAVENOUS at 14:12

## 2017-01-01 RX ADMIN — CALCIUM CHLORIDE 1 G: 100 INJECTION, SOLUTION INTRAVENOUS at 02:00

## 2017-01-01 RX ADMIN — ENOXAPARIN SODIUM 40 MG: 40 INJECTION SUBCUTANEOUS at 08:22

## 2017-01-01 RX ADMIN — TIMOLOL MALEATE 1 DROP: 2.5 SOLUTION OPHTHALMIC at 18:12

## 2017-01-01 RX ADMIN — MAGNESIUM SULFATE HEPTAHYDRATE: 500 INJECTION, SOLUTION INTRAMUSCULAR; INTRAVENOUS at 17:56

## 2017-01-01 RX ADMIN — INSULIN LISPRO 5 UNITS: 100 INJECTION, SOLUTION INTRAVENOUS; SUBCUTANEOUS at 00:58

## 2017-01-01 RX ADMIN — Medication 10 ML: at 03:48

## 2017-01-01 RX ADMIN — ASPIRIN 81 MG: 81 TABLET, COATED ORAL at 09:19

## 2017-01-01 RX ADMIN — INSULIN LISPRO 3 UNITS: 100 INJECTION, SOLUTION INTRAVENOUS; SUBCUTANEOUS at 11:25

## 2017-01-01 RX ADMIN — METOPROLOL TARTRATE 12.5 MG: 25 TABLET ORAL at 21:42

## 2017-01-01 RX ADMIN — I.V. FAT EMULSION 21 ML/HR: 20 EMULSION INTRAVENOUS at 19:37

## 2017-01-01 RX ADMIN — METOPROLOL TARTRATE 12.5 MG: 25 TABLET ORAL at 08:21

## 2017-01-01 RX ADMIN — METOPROLOL TARTRATE 12.5 MG: 25 TABLET ORAL at 21:40

## 2017-01-01 RX ADMIN — METOPROLOL TARTRATE 5 MG: 5 INJECTION INTRAVENOUS at 21:59

## 2017-01-01 RX ADMIN — ATORVASTATIN CALCIUM 40 MG: 40 TABLET, FILM COATED ORAL at 21:10

## 2017-01-01 RX ADMIN — ATORVASTATIN CALCIUM 40 MG: 40 TABLET, FILM COATED ORAL at 21:35

## 2017-01-01 RX ADMIN — ENOXAPARIN SODIUM 40 MG: 40 INJECTION SUBCUTANEOUS at 08:26

## 2017-01-01 RX ADMIN — FLUCONAZOLE 100 MG: 100 TABLET ORAL at 08:41

## 2017-01-01 RX ADMIN — Medication 10 ML: at 13:26

## 2017-01-01 RX ADMIN — TIMOLOL MALEATE 2 DROP: 2.5 SOLUTION OPHTHALMIC at 08:10

## 2017-01-01 RX ADMIN — TIMOLOL MALEATE 1 DROP: 2.5 SOLUTION OPHTHALMIC at 22:03

## 2017-01-01 RX ADMIN — INSULIN LISPRO 5 UNITS: 100 INJECTION, SOLUTION INTRAVENOUS; SUBCUTANEOUS at 11:39

## 2017-01-01 RX ADMIN — ENOXAPARIN SODIUM 40 MG: 40 INJECTION SUBCUTANEOUS at 09:19

## 2017-01-01 RX ADMIN — Medication 10 ML: at 13:13

## 2017-01-01 RX ADMIN — METOPROLOL TARTRATE 12.5 MG: 25 TABLET ORAL at 08:33

## 2017-01-01 RX ADMIN — FLUCONAZOLE 100 MG: 100 TABLET ORAL at 08:30

## 2017-01-01 RX ADMIN — ACETAMINOPHEN 650 MG: 325 TABLET, FILM COATED ORAL at 14:47

## 2017-01-01 RX ADMIN — Medication 20 ML: at 13:50

## 2017-01-01 RX ADMIN — COLESEVELAM HYDROCHLORIDE 1250 MG: 625 TABLET, FILM COATED ORAL at 08:55

## 2017-01-01 RX ADMIN — Medication 10 ML: at 05:51

## 2017-01-01 RX ADMIN — Medication 10 ML: at 13:18

## 2017-01-01 RX ADMIN — Medication 50 MCG/HR: at 19:24

## 2017-01-01 RX ADMIN — MIDAZOLAM HYDROCHLORIDE 1 MG: 1 INJECTION, SOLUTION INTRAMUSCULAR; INTRAVENOUS at 12:24

## 2017-01-01 RX ADMIN — INSULIN LISPRO 5 UNITS: 100 INJECTION, SOLUTION INTRAVENOUS; SUBCUTANEOUS at 18:43

## 2017-01-01 RX ADMIN — SODIUM CHLORIDE 40 MG: 9 INJECTION INTRAMUSCULAR; INTRAVENOUS; SUBCUTANEOUS at 10:34

## 2017-01-01 RX ADMIN — METOPROLOL TARTRATE 7.5 MG: 5 INJECTION INTRAVENOUS at 08:44

## 2017-01-01 RX ADMIN — SODIUM CHLORIDE 40 MG: 9 INJECTION INTRAMUSCULAR; INTRAVENOUS; SUBCUTANEOUS at 08:37

## 2017-01-01 RX ADMIN — HYDROMORPHONE HYDROCHLORIDE 0.5 MG: 1 INJECTION, SOLUTION INTRAMUSCULAR; INTRAVENOUS; SUBCUTANEOUS at 23:53

## 2017-01-01 RX ADMIN — HYDROMORPHONE HYDROCHLORIDE 0.5 MG: 1 INJECTION, SOLUTION INTRAMUSCULAR; INTRAVENOUS; SUBCUTANEOUS at 10:48

## 2017-01-01 RX ADMIN — Medication 10 ML: at 20:59

## 2017-01-01 RX ADMIN — HYDROMORPHONE HYDROCHLORIDE 0.5 MG: 1 INJECTION, SOLUTION INTRAMUSCULAR; INTRAVENOUS; SUBCUTANEOUS at 18:28

## 2017-01-01 RX ADMIN — ASPIRIN 81 MG: 81 TABLET, COATED ORAL at 08:59

## 2017-01-01 RX ADMIN — Medication 10 ML: at 06:15

## 2017-01-01 RX ADMIN — SENNOSIDES AND DOCUSATE SODIUM 1 TABLET: 8.6; 5 TABLET ORAL at 09:35

## 2017-01-01 RX ADMIN — Medication 10 ML: at 01:29

## 2017-01-01 RX ADMIN — ENOXAPARIN SODIUM 40 MG: 40 INJECTION SUBCUTANEOUS at 08:36

## 2017-01-01 RX ADMIN — DILTIAZEM HYDROCHLORIDE 10 MG: 5 INJECTION INTRAVENOUS at 05:11

## 2017-01-01 RX ADMIN — SODIUM CHLORIDE 40 MG: 9 INJECTION INTRAMUSCULAR; INTRAVENOUS; SUBCUTANEOUS at 20:42

## 2017-01-01 RX ADMIN — NITROGLYCERIN 200 MCG: 5 INJECTION, SOLUTION INTRAVENOUS at 12:37

## 2017-01-01 RX ADMIN — Medication 10 ML: at 18:21

## 2017-01-01 RX ADMIN — LISINOPRIL 20 MG: 20 TABLET ORAL at 08:20

## 2017-01-01 RX ADMIN — ALBUMIN (HUMAN) 50 G: 12.5 INJECTION, SOLUTION INTRAVENOUS at 22:48

## 2017-01-01 RX ADMIN — Medication 10 ML: at 16:02

## 2017-01-01 RX ADMIN — HEPARIN SODIUM 7500 UNITS: 1000 INJECTION, SOLUTION INTRAVENOUS; SUBCUTANEOUS at 13:03

## 2017-01-01 RX ADMIN — PHENYLEPHRINE HYDROCHLORIDE 30000 MCG: 10 INJECTION INTRAVENOUS at 00:29

## 2017-01-01 RX ADMIN — Medication 10 ML: at 15:50

## 2017-01-01 RX ADMIN — LEVOFLOXACIN 250 MG: 250 TABLET, FILM COATED ORAL at 08:33

## 2017-01-01 RX ADMIN — AMIODARONE HYDROCHLORIDE 0.5 MG/MIN: 50 INJECTION, SOLUTION INTRAVENOUS at 10:33

## 2017-01-01 RX ADMIN — HEPARIN SODIUM 5000 UNITS: 1000 INJECTION, SOLUTION INTRAVENOUS; SUBCUTANEOUS at 13:20

## 2017-01-01 RX ADMIN — Medication 40 ML: at 14:20

## 2017-01-01 RX ADMIN — INSULIN HUMAN 2 UNITS: 100 INJECTION, SOLUTION PARENTERAL at 12:46

## 2017-01-01 RX ADMIN — PANTOPRAZOLE SODIUM 40 MG: 40 TABLET, DELAYED RELEASE ORAL at 13:07

## 2017-01-01 RX ADMIN — TIMOLOL MALEATE 1 DROP: 2.5 SOLUTION OPHTHALMIC at 08:29

## 2017-01-01 RX ADMIN — LEVOFLOXACIN 250 MG: 250 TABLET, FILM COATED ORAL at 08:47

## 2017-01-01 RX ADMIN — MIDAZOLAM HYDROCHLORIDE 1 MG: 1 INJECTION, SOLUTION INTRAMUSCULAR; INTRAVENOUS at 11:18

## 2017-01-01 RX ADMIN — INSULIN LISPRO 5 UNITS: 100 INJECTION, SOLUTION INTRAVENOUS; SUBCUTANEOUS at 17:47

## 2017-01-01 RX ADMIN — PIPERACILLIN SODIUM,TAZOBACTAM SODIUM 3.38 G: 3; .375 INJECTION, POWDER, FOR SOLUTION INTRAVENOUS at 22:41

## 2017-01-01 RX ADMIN — HYDROMORPHONE HYDROCHLORIDE 0.5 MG: 1 INJECTION, SOLUTION INTRAMUSCULAR; INTRAVENOUS; SUBCUTANEOUS at 02:46

## 2017-01-01 RX ADMIN — ENOXAPARIN SODIUM 40 MG: 40 INJECTION SUBCUTANEOUS at 10:06

## 2017-01-01 RX ADMIN — SODIUM CHLORIDE 40 MG: 9 INJECTION INTRAMUSCULAR; INTRAVENOUS; SUBCUTANEOUS at 08:08

## 2017-01-01 RX ADMIN — INSULIN LISPRO 5 UNITS: 100 INJECTION, SOLUTION INTRAVENOUS; SUBCUTANEOUS at 06:30

## 2017-01-01 RX ADMIN — HYDROMORPHONE HYDROCHLORIDE 0.5 MG: 1 INJECTION, SOLUTION INTRAMUSCULAR; INTRAVENOUS; SUBCUTANEOUS at 11:46

## 2017-01-01 RX ADMIN — SODIUM CHLORIDE: 900 INJECTION, SOLUTION INTRAVENOUS at 00:16

## 2017-01-01 RX ADMIN — METOPROLOL TARTRATE 5 MG: 5 INJECTION INTRAVENOUS at 17:13

## 2017-01-01 RX ADMIN — METOPROLOL TARTRATE 10 MG: 5 INJECTION INTRAVENOUS at 08:16

## 2017-01-01 RX ADMIN — Medication 10 ML: at 21:04

## 2017-01-01 RX ADMIN — ALBUMIN HUMAN 50 G: 50 SOLUTION INTRAVENOUS at 22:48

## 2017-01-01 RX ADMIN — METOPROLOL TARTRATE 12.5 MG: 25 TABLET ORAL at 09:35

## 2017-01-01 RX ADMIN — Medication 20 ML: at 18:35

## 2017-01-01 RX ADMIN — FLUCONAZOLE 100 MG: 100 TABLET ORAL at 08:33

## 2017-01-01 RX ADMIN — Medication 10 ML: at 13:17

## 2017-01-01 RX ADMIN — PIPERACILLIN SODIUM,TAZOBACTAM SODIUM 3.38 G: 3; .375 INJECTION, POWDER, FOR SOLUTION INTRAVENOUS at 22:08

## 2017-01-01 RX ADMIN — CEFEPIME HYDROCHLORIDE 1 G: 1 INJECTION, POWDER, FOR SOLUTION INTRAMUSCULAR; INTRAVENOUS at 23:51

## 2017-01-01 RX ADMIN — METOPROLOL TARTRATE 5 MG: 5 INJECTION INTRAVENOUS at 12:28

## 2017-01-01 RX ADMIN — COLESEVELAM HYDROCHLORIDE 1250 MG: 625 TABLET, FILM COATED ORAL at 08:29

## 2017-01-01 RX ADMIN — SODIUM POLYSTYRENE SULFONATE 30 G: 15 SUSPENSION ORAL; RECTAL at 16:49

## 2017-01-01 RX ADMIN — Medication 40 ML: at 21:46

## 2017-01-01 RX ADMIN — HYDROMORPHONE HYDROCHLORIDE 0.5 MG: 1 INJECTION, SOLUTION INTRAMUSCULAR; INTRAVENOUS; SUBCUTANEOUS at 18:42

## 2017-01-01 RX ADMIN — PIPERACILLIN SODIUM,TAZOBACTAM SODIUM 3.38 G: 3; .375 INJECTION, POWDER, FOR SOLUTION INTRAVENOUS at 15:29

## 2017-01-01 RX ADMIN — ALBUMIN (HUMAN) 12.5 G: 0.25 INJECTION, SOLUTION INTRAVENOUS at 17:13

## 2017-01-01 RX ADMIN — METOPROLOL TARTRATE 7.5 MG: 5 INJECTION INTRAVENOUS at 22:11

## 2017-01-01 RX ADMIN — METOPROLOL TARTRATE 12.5 MG: 25 TABLET ORAL at 08:19

## 2017-01-01 RX ADMIN — HYDROMORPHONE HYDROCHLORIDE 0.5 MG: 1 INJECTION, SOLUTION INTRAMUSCULAR; INTRAVENOUS; SUBCUTANEOUS at 20:09

## 2017-01-01 RX ADMIN — SODIUM CHLORIDE 40 MG: 9 INJECTION INTRAMUSCULAR; INTRAVENOUS; SUBCUTANEOUS at 20:09

## 2017-01-01 RX ADMIN — SENNOSIDES AND DOCUSATE SODIUM 1 TABLET: 8.6; 5 TABLET ORAL at 08:56

## 2017-01-01 RX ADMIN — LISINOPRIL 10 MG: 5 TABLET ORAL at 11:27

## 2017-01-01 RX ADMIN — IOPAMIDOL 120 ML: 755 INJECTION, SOLUTION INTRAVENOUS at 13:32

## 2017-01-01 RX ADMIN — NOREPINEPHRINE BITARTRATE 75 MCG/MIN: 1 INJECTION, SOLUTION, CONCENTRATE INTRAVENOUS at 03:13

## 2017-01-01 RX ADMIN — Medication 10 ML: at 06:13

## 2017-01-01 RX ADMIN — PIPERACILLIN SODIUM,TAZOBACTAM SODIUM 3.38 G: 3; .375 INJECTION, POWDER, FOR SOLUTION INTRAVENOUS at 23:08

## 2017-01-01 RX ADMIN — METOPROLOL TARTRATE 12.5 MG: 25 TABLET ORAL at 08:51

## 2017-01-01 RX ADMIN — Medication 10 ML: at 16:30

## 2017-01-01 RX ADMIN — Medication 10 ML: at 15:45

## 2017-01-01 RX ADMIN — PHENYLEPHRINE HYDROCHLORIDE 125 MCG/MIN: 10 INJECTION INTRAVENOUS at 17:57

## 2017-01-01 RX ADMIN — PIPERACILLIN SODIUM,TAZOBACTAM SODIUM 3.38 G: 3; .375 INJECTION, POWDER, FOR SOLUTION INTRAVENOUS at 05:27

## 2017-01-01 RX ADMIN — Medication 10 ML: at 03:43

## 2017-01-01 RX ADMIN — METOPROLOL TARTRATE 7.5 MG: 5 INJECTION INTRAVENOUS at 03:56

## 2017-01-01 RX ADMIN — IOPAMIDOL 50 ML: 755 INJECTION, SOLUTION INTRAVENOUS at 14:12

## 2017-01-01 RX ADMIN — Medication 10 ML: at 08:41

## 2017-01-01 RX ADMIN — METOPROLOL TARTRATE 12.5 MG: 25 TABLET ORAL at 09:19

## 2017-01-01 RX ADMIN — TIMOLOL MALEATE 2 DROP: 2.5 SOLUTION OPHTHALMIC at 20:42

## 2017-01-01 RX ADMIN — ALBUMIN (HUMAN) 12.5 G: 0.25 INJECTION, SOLUTION INTRAVENOUS at 12:00

## 2017-01-01 RX ADMIN — CEFEPIME HYDROCHLORIDE 2 G: 2 INJECTION, POWDER, FOR SOLUTION INTRAVENOUS at 23:30

## 2017-01-01 RX ADMIN — NOREPINEPHRINE BITARTRATE 7 MCG/MIN: 1 INJECTION, SOLUTION, CONCENTRATE INTRAVENOUS at 12:29

## 2017-01-01 RX ADMIN — PIPERACILLIN SODIUM,TAZOBACTAM SODIUM 3.38 G: 3; .375 INJECTION, POWDER, FOR SOLUTION INTRAVENOUS at 06:28

## 2017-01-01 RX ADMIN — Medication 30 ML: at 05:50

## 2017-01-01 RX ADMIN — HYDROMORPHONE HYDROCHLORIDE 0.5 MG: 1 INJECTION, SOLUTION INTRAMUSCULAR; INTRAVENOUS; SUBCUTANEOUS at 16:51

## 2017-01-01 RX ADMIN — FLUCONAZOLE 100 MG: 100 TABLET ORAL at 08:19

## 2017-01-01 RX ADMIN — MUPIROCIN: 20 OINTMENT TOPICAL at 10:32

## 2017-01-01 RX ADMIN — INSULIN LISPRO 2 UNITS: 100 INJECTION, SOLUTION INTRAVENOUS; SUBCUTANEOUS at 12:42

## 2017-01-01 RX ADMIN — COLESEVELAM HYDROCHLORIDE 1250 MG: 625 TABLET, FILM COATED ORAL at 09:35

## 2017-01-01 RX ADMIN — Medication 10 ML: at 21:43

## 2017-01-01 RX ADMIN — METOPROLOL TARTRATE 25 MG: 25 TABLET ORAL at 10:06

## 2017-01-01 RX ADMIN — ENOXAPARIN SODIUM 40 MG: 40 INJECTION SUBCUTANEOUS at 08:25

## 2017-01-01 RX ADMIN — CLARITHROMYCIN 500 MG: 500 TABLET ORAL at 08:22

## 2017-01-01 RX ADMIN — GLYCOPYRROLATE 0.6 MG: 0.2 INJECTION INTRAMUSCULAR; INTRAVENOUS at 02:35

## 2017-01-01 RX ADMIN — GABAPENTIN 300 MG: 300 CAPSULE ORAL at 18:12

## 2017-01-01 RX ADMIN — PIPERACILLIN SODIUM,TAZOBACTAM SODIUM 3.38 G: 3; .375 INJECTION, POWDER, FOR SOLUTION INTRAVENOUS at 06:36

## 2017-01-01 RX ADMIN — HYDROMORPHONE HYDROCHLORIDE 0.5 MG: 1 INJECTION, SOLUTION INTRAMUSCULAR; INTRAVENOUS; SUBCUTANEOUS at 00:01

## 2017-01-01 RX ADMIN — CLARITHROMYCIN 500 MG: 500 TABLET ORAL at 09:30

## 2017-01-01 RX ADMIN — Medication 10 ML: at 15:05

## 2017-01-01 RX ADMIN — ASPIRIN 81 MG: 81 TABLET, COATED ORAL at 09:23

## 2017-01-01 RX ADMIN — TICAGRELOR 90 MG: 90 TABLET ORAL at 15:09

## 2017-01-01 RX ADMIN — Medication 10 ML: at 04:18

## 2017-01-01 RX ADMIN — ENOXAPARIN SODIUM 40 MG: 40 INJECTION SUBCUTANEOUS at 08:29

## 2017-01-01 RX ADMIN — TIMOLOL MALEATE 1 DROP: 2.5 SOLUTION OPHTHALMIC at 08:23

## 2017-01-01 RX ADMIN — HYDROMORPHONE HYDROCHLORIDE 0.5 MG: 1 INJECTION, SOLUTION INTRAMUSCULAR; INTRAVENOUS; SUBCUTANEOUS at 20:04

## 2017-01-01 RX ADMIN — LEVOFLOXACIN 250 MG: 250 TABLET, FILM COATED ORAL at 08:20

## 2017-01-01 RX ADMIN — SENNOSIDES AND DOCUSATE SODIUM 1 TABLET: 8.6; 5 TABLET ORAL at 09:20

## 2017-01-01 RX ADMIN — Medication 10 ML: at 21:53

## 2017-01-01 RX ADMIN — ASPIRIN 81 MG: 81 TABLET, COATED ORAL at 08:54

## 2017-01-01 RX ADMIN — COLESEVELAM HYDROCHLORIDE 1250 MG: 625 TABLET, FILM COATED ORAL at 17:13

## 2017-01-01 RX ADMIN — COLESEVELAM HYDROCHLORIDE 1250 MG: 625 TABLET, FILM COATED ORAL at 08:09

## 2017-01-01 RX ADMIN — METOPROLOL TARTRATE 12.5 MG: 25 TABLET ORAL at 08:55

## 2017-01-01 RX ADMIN — FENTANYL CITRATE 50 MCG: 50 INJECTION, SOLUTION INTRAMUSCULAR; INTRAVENOUS at 12:24

## 2017-01-01 RX ADMIN — PIPERACILLIN SODIUM,TAZOBACTAM SODIUM 3.38 G: 3; .375 INJECTION, POWDER, FOR SOLUTION INTRAVENOUS at 15:05

## 2017-01-01 RX ADMIN — Medication 10 ML: at 08:46

## 2017-01-01 RX ADMIN — GABAPENTIN 300 MG: 300 CAPSULE ORAL at 08:59

## 2017-01-01 RX ADMIN — METOPROLOL TARTRATE 5 MG: 5 INJECTION INTRAVENOUS at 08:25

## 2017-01-01 RX ADMIN — GABAPENTIN 300 MG: 300 CAPSULE ORAL at 09:11

## 2017-01-01 RX ADMIN — CEFTRIAXONE 1 G: 1 INJECTION, POWDER, FOR SOLUTION INTRAMUSCULAR; INTRAVENOUS at 20:43

## 2017-01-01 RX ADMIN — SODIUM BICARBONATE 100 MEQ: 84 INJECTION, SOLUTION INTRAVENOUS at 07:05

## 2017-01-01 RX ADMIN — HYDROMORPHONE HYDROCHLORIDE 0.5 MG: 1 INJECTION, SOLUTION INTRAMUSCULAR; INTRAVENOUS; SUBCUTANEOUS at 20:41

## 2017-01-01 RX ADMIN — MORPHINE SULFATE 2 MG: 4 INJECTION, SOLUTION INTRAMUSCULAR; INTRAVENOUS at 00:59

## 2017-01-01 RX ADMIN — MAGNESIUM SULFATE HEPTAHYDRATE: 500 INJECTION, SOLUTION INTRAMUSCULAR; INTRAVENOUS at 18:44

## 2017-01-01 RX ADMIN — DEXTROSE MONOHYDRATE, SODIUM CHLORIDE, AND POTASSIUM CHLORIDE 50 ML/HR: 50; 4.5; 1.49 INJECTION, SOLUTION INTRAVENOUS at 05:27

## 2017-01-01 RX ADMIN — PHENYLEPHRINE HYDROCHLORIDE 15 MCG/MIN: 10 INJECTION INTRAVENOUS at 12:53

## 2017-01-01 RX ADMIN — CEFTRIAXONE 1 G: 1 INJECTION, POWDER, FOR SOLUTION INTRAMUSCULAR; INTRAVENOUS at 19:52

## 2017-01-01 RX ADMIN — PIPERACILLIN SODIUM,TAZOBACTAM SODIUM 3.38 G: 3; .375 INJECTION, POWDER, FOR SOLUTION INTRAVENOUS at 22:31

## 2017-01-01 RX ADMIN — PIPERACILLIN SODIUM,TAZOBACTAM SODIUM 3.38 G: 3; .375 INJECTION, POWDER, FOR SOLUTION INTRAVENOUS at 22:00

## 2017-01-01 RX ADMIN — ASPIRIN 81 MG: 81 TABLET, COATED ORAL at 08:33

## 2017-01-01 RX ADMIN — DILTIAZEM HYDROCHLORIDE 30 MG: 30 TABLET, FILM COATED ORAL at 18:31

## 2017-01-01 RX ADMIN — PIPERACILLIN SODIUM,TAZOBACTAM SODIUM 3.38 G: 3; .375 INJECTION, POWDER, FOR SOLUTION INTRAVENOUS at 06:47

## 2017-01-01 RX ADMIN — Medication 800 MG: at 20:35

## 2017-01-01 RX ADMIN — CYCLOSPORINE 1 DROP: 0.5 EMULSION OPHTHALMIC at 09:55

## 2017-01-01 RX ADMIN — METOPROLOL TARTRATE 12.5 MG: 25 TABLET ORAL at 11:19

## 2017-01-01 RX ADMIN — TIMOLOL MALEATE 1 DROP: 2.5 SOLUTION OPHTHALMIC at 22:01

## 2017-01-01 RX ADMIN — TIMOLOL MALEATE 1 DROP: 2.5 SOLUTION OPHTHALMIC at 09:24

## 2017-01-01 RX ADMIN — HYDROMORPHONE HYDROCHLORIDE 0.5 MG: 1 INJECTION, SOLUTION INTRAMUSCULAR; INTRAVENOUS; SUBCUTANEOUS at 17:45

## 2017-01-01 RX ADMIN — BUMETANIDE 1 MG: 0.25 INJECTION INTRAMUSCULAR; INTRAVENOUS at 06:16

## 2017-01-01 RX ADMIN — CLARITHROMYCIN 500 MG: 500 TABLET ORAL at 08:37

## 2017-01-01 RX ADMIN — Medication 10 ML: at 14:21

## 2017-01-01 RX ADMIN — PROPOFOL 10 MCG/KG/MIN: 10 INJECTION, EMULSION INTRAVENOUS at 17:57

## 2017-01-01 RX ADMIN — MAGNESIUM SULFATE HEPTAHYDRATE: 500 INJECTION, SOLUTION INTRAMUSCULAR; INTRAVENOUS at 18:48

## 2017-01-01 RX ADMIN — NEOSTIGMINE METHYLSULFATE 3 MG: 1 INJECTION INTRAVENOUS at 02:35

## 2017-01-01 RX ADMIN — METOPROLOL TARTRATE 12.5 MG: 25 TABLET ORAL at 08:48

## 2017-01-01 RX ADMIN — ENOXAPARIN SODIUM 40 MG: 40 INJECTION SUBCUTANEOUS at 09:23

## 2017-01-01 RX ADMIN — Medication 10 ML: at 22:42

## 2017-01-01 RX ADMIN — Medication 10 ML: at 21:57

## 2017-01-01 RX ADMIN — INSULIN LISPRO 2 UNITS: 100 INJECTION, SOLUTION INTRAVENOUS; SUBCUTANEOUS at 18:19

## 2017-01-01 RX ADMIN — CEFAZOLIN 2 G: 10 INJECTION, POWDER, FOR SOLUTION INTRAVENOUS; PARENTERAL at 14:40

## 2017-01-01 RX ADMIN — PIPERACILLIN SODIUM,TAZOBACTAM SODIUM 3.38 G: 3; .375 INJECTION, POWDER, FOR SOLUTION INTRAVENOUS at 14:19

## 2017-01-01 RX ADMIN — IOPAMIDOL 40 ML: 755 INJECTION, SOLUTION INTRAVENOUS at 16:00

## 2017-01-01 RX ADMIN — MAGNESIUM SULFATE HEPTAHYDRATE: 500 INJECTION, SOLUTION INTRAMUSCULAR; INTRAVENOUS at 18:05

## 2017-01-01 RX ADMIN — ENOXAPARIN SODIUM 30 MG: 30 INJECTION SUBCUTANEOUS at 08:57

## 2017-01-01 RX ADMIN — Medication 10 ML: at 21:22

## 2017-01-01 RX ADMIN — FLUCONAZOLE 100 MG: 100 TABLET ORAL at 15:19

## 2017-01-01 RX ADMIN — METOPROLOL TARTRATE 50 MG: 50 TABLET ORAL at 08:37

## 2017-01-01 RX ADMIN — Medication 10 ML: at 12:10

## 2017-01-01 RX ADMIN — DEXTROSE MONOHYDRATE, SODIUM CHLORIDE, AND POTASSIUM CHLORIDE 50 ML/HR: 50; 4.5; 1.49 INJECTION, SOLUTION INTRAVENOUS at 06:20

## 2017-01-01 RX ADMIN — FENTANYL CITRATE 50 MCG: 50 INJECTION, SOLUTION INTRAMUSCULAR; INTRAVENOUS at 02:40

## 2017-01-01 RX ADMIN — CEFTRIAXONE 1 G: 1 INJECTION, POWDER, FOR SOLUTION INTRAMUSCULAR; INTRAVENOUS at 05:41

## 2017-01-01 RX ADMIN — ATORVASTATIN CALCIUM 40 MG: 40 TABLET, FILM COATED ORAL at 21:08

## 2017-01-01 RX ADMIN — METOPROLOL TARTRATE 12.5 MG: 25 TABLET ORAL at 21:32

## 2017-01-01 RX ADMIN — SODIUM CHLORIDE 40 MG: 9 INJECTION INTRAMUSCULAR; INTRAVENOUS; SUBCUTANEOUS at 20:23

## 2017-01-01 RX ADMIN — SODIUM CHLORIDE 75 ML/HR: 900 INJECTION, SOLUTION INTRAVENOUS at 16:21

## 2017-01-01 RX ADMIN — INSULIN LISPRO 3 UNITS: 100 INJECTION, SOLUTION INTRAVENOUS; SUBCUTANEOUS at 05:27

## 2017-01-01 RX ADMIN — CYCLOSPORINE 1 DROP: 0.5 EMULSION OPHTHALMIC at 17:32

## 2017-01-01 RX ADMIN — MORPHINE SULFATE 3 MG: 10 INJECTION, SOLUTION INTRAMUSCULAR; INTRAVENOUS at 02:49

## 2017-01-01 RX ADMIN — Medication 10 ML: at 20:49

## 2017-01-01 RX ADMIN — METOPROLOL TARTRATE 12.5 MG: 25 TABLET ORAL at 20:22

## 2017-01-01 RX ADMIN — METOPROLOL TARTRATE 5 MG: 5 INJECTION INTRAVENOUS at 23:33

## 2017-01-01 RX ADMIN — SODIUM CHLORIDE 40 MG: 9 INJECTION INTRAMUSCULAR; INTRAVENOUS; SUBCUTANEOUS at 20:56

## 2017-01-01 RX ADMIN — ATORVASTATIN CALCIUM 40 MG: 40 TABLET, FILM COATED ORAL at 21:45

## 2017-01-01 RX ADMIN — Medication 10 ML: at 09:15

## 2017-01-01 RX ADMIN — SODIUM CHLORIDE 75 ML/HR: 900 INJECTION, SOLUTION INTRAVENOUS at 23:58

## 2017-01-01 RX ADMIN — ACETAMINOPHEN 650 MG: 325 TABLET ORAL at 23:33

## 2017-01-01 RX ADMIN — HEPARIN SODIUM IN SODIUM CHLORIDE 200 UNITS: 200 INJECTION INTRAVENOUS at 16:00

## 2017-01-01 RX ADMIN — ASPIRIN 325 MG ORAL TABLET 325 MG: 325 PILL ORAL at 14:16

## 2017-01-01 RX ADMIN — TIMOLOL MALEATE 1 DROP: 2.5 SOLUTION OPHTHALMIC at 09:07

## 2017-01-01 RX ADMIN — MAGNESIUM SULFATE HEPTAHYDRATE: 500 INJECTION, SOLUTION INTRAMUSCULAR; INTRAVENOUS at 17:30

## 2017-01-01 RX ADMIN — TIMOLOL MALEATE 1 DROP: 2.5 SOLUTION OPHTHALMIC at 21:44

## 2017-01-01 RX ADMIN — WATER: 1000 INJECTION, SOLUTION INTRAVENOUS at 02:07

## 2017-01-01 RX ADMIN — Medication 10 ML: at 12:34

## 2017-01-01 RX ADMIN — LEVOFLOXACIN 250 MG: 250 TABLET, FILM COATED ORAL at 08:46

## 2017-01-01 RX ADMIN — SODIUM CHLORIDE 40 MG: 9 INJECTION INTRAMUSCULAR; INTRAVENOUS; SUBCUTANEOUS at 21:30

## 2017-01-01 RX ADMIN — GABAPENTIN 300 MG: 300 CAPSULE ORAL at 21:35

## 2017-01-01 RX ADMIN — NITROGLYCERIN 0.5 INCH: 20 OINTMENT TOPICAL at 09:06

## 2017-01-01 RX ADMIN — SODIUM CHLORIDE 40 MG: 9 INJECTION INTRAMUSCULAR; INTRAVENOUS; SUBCUTANEOUS at 21:51

## 2017-01-01 RX ADMIN — Medication 10 ML: at 17:22

## 2017-01-01 RX ADMIN — LISINOPRIL 10 MG: 5 TABLET ORAL at 08:59

## 2017-01-01 RX ADMIN — MORPHINE SULFATE 2 MG: 4 INJECTION INTRAVENOUS at 17:13

## 2017-01-01 RX ADMIN — I.V. FAT EMULSION 21 ML/HR: 20 EMULSION INTRAVENOUS at 18:32

## 2017-01-01 RX ADMIN — PIPERACILLIN SODIUM,TAZOBACTAM SODIUM 3.38 G: 3; .375 INJECTION, POWDER, FOR SOLUTION INTRAVENOUS at 13:37

## 2017-01-01 RX ADMIN — INSULIN LISPRO 2 UNITS: 100 INJECTION, SOLUTION INTRAVENOUS; SUBCUTANEOUS at 18:43

## 2017-01-01 RX ADMIN — Medication 20 ML: at 13:39

## 2017-01-01 RX ADMIN — SODIUM CHLORIDE 75 ML/HR: 900 INJECTION, SOLUTION INTRAVENOUS at 20:52

## 2017-01-01 RX ADMIN — SODIUM BICARBONATE 50 MEQ: 84 INJECTION, SOLUTION INTRAVENOUS at 03:36

## 2017-01-01 RX ADMIN — SODIUM CHLORIDE 40 MG: 9 INJECTION INTRAMUSCULAR; INTRAVENOUS; SUBCUTANEOUS at 08:42

## 2017-01-01 RX ADMIN — METOPROLOL TARTRATE 50 MG: 50 TABLET ORAL at 21:00

## 2017-01-01 RX ADMIN — ATORVASTATIN CALCIUM 40 MG: 40 TABLET, FILM COATED ORAL at 21:58

## 2017-01-01 RX ADMIN — WATER: 1000 INJECTION, SOLUTION INTRAVENOUS at 01:41

## 2017-01-01 RX ADMIN — TICAGRELOR 180 MG: 90 TABLET ORAL at 14:16

## 2017-01-01 RX ADMIN — CYCLOSPORINE 1 DROP: 0.5 EMULSION OPHTHALMIC at 21:38

## 2017-01-01 RX ADMIN — SODIUM CHLORIDE 40 MG: 9 INJECTION INTRAMUSCULAR; INTRAVENOUS; SUBCUTANEOUS at 03:43

## 2017-01-01 RX ADMIN — MORPHINE SULFATE 2 MG: 4 INJECTION, SOLUTION INTRAMUSCULAR; INTRAVENOUS at 08:01

## 2017-01-01 RX ADMIN — ENOXAPARIN SODIUM 40 MG: 40 INJECTION SUBCUTANEOUS at 07:53

## 2017-01-01 RX ADMIN — Medication 10 ML: at 06:21

## 2017-01-01 RX ADMIN — SODIUM CHLORIDE 100 ML/HR: 900 INJECTION, SOLUTION INTRAVENOUS at 01:13

## 2017-01-01 RX ADMIN — HYDROMORPHONE HYDROCHLORIDE 0.5 MG: 1 INJECTION, SOLUTION INTRAMUSCULAR; INTRAVENOUS; SUBCUTANEOUS at 17:58

## 2017-01-01 RX ADMIN — METOPROLOL TARTRATE 7.5 MG: 5 INJECTION INTRAVENOUS at 20:41

## 2017-01-01 RX ADMIN — ETOMIDATE 9.52 MG: 2 INJECTION, SOLUTION INTRAVENOUS at 14:14

## 2017-01-01 RX ADMIN — METOPROLOL TARTRATE 7.5 MG: 5 INJECTION INTRAVENOUS at 08:43

## 2017-01-01 RX ADMIN — PIPERACILLIN SODIUM,TAZOBACTAM SODIUM 3.38 G: 3; .375 INJECTION, POWDER, FOR SOLUTION INTRAVENOUS at 21:53

## 2017-01-01 RX ADMIN — SODIUM CHLORIDE 40 MG: 9 INJECTION INTRAMUSCULAR; INTRAVENOUS; SUBCUTANEOUS at 22:08

## 2017-01-01 RX ADMIN — CYCLOSPORINE 1 DROP: 0.5 EMULSION OPHTHALMIC at 11:21

## 2017-01-01 RX ADMIN — Medication 30 ML: at 05:28

## 2017-01-01 RX ADMIN — METOPROLOL TARTRATE 12.5 MG: 25 TABLET ORAL at 22:20

## 2017-01-01 RX ADMIN — Medication 10 ML: at 12:30

## 2017-01-01 RX ADMIN — MAGNESIUM SULFATE HEPTAHYDRATE 2 G: 40 INJECTION, SOLUTION INTRAVENOUS at 15:44

## 2017-01-01 RX ADMIN — TIMOLOL MALEATE 1 DROP: 2.5 SOLUTION OPHTHALMIC at 17:46

## 2017-01-01 RX ADMIN — PIPERACILLIN SODIUM,TAZOBACTAM SODIUM 3.38 G: 3; .375 INJECTION, POWDER, FOR SOLUTION INTRAVENOUS at 06:23

## 2017-01-01 RX ADMIN — CLARITHROMYCIN 500 MG: 500 TABLET ORAL at 09:17

## 2017-01-01 RX ADMIN — Medication 10 ML: at 03:52

## 2017-01-01 RX ADMIN — METOPROLOL TARTRATE 25 MG: 25 TABLET ORAL at 13:07

## 2017-01-01 RX ADMIN — Medication 30 ML: at 04:31

## 2017-01-01 RX ADMIN — METOPROLOL TARTRATE 10 MG: 5 INJECTION INTRAVENOUS at 13:41

## 2017-01-01 RX ADMIN — INSULIN LISPRO 2 UNITS: 100 INJECTION, SOLUTION INTRAVENOUS; SUBCUTANEOUS at 05:59

## 2017-01-01 RX ADMIN — Medication 20 ML: at 13:54

## 2017-01-01 RX ADMIN — AMLODIPINE BESYLATE 5 MG: 5 TABLET ORAL at 09:07

## 2017-01-01 RX ADMIN — METOPROLOL TARTRATE 12.5 MG: 25 TABLET ORAL at 08:46

## 2017-01-01 RX ADMIN — ATORVASTATIN CALCIUM 40 MG: 40 TABLET, FILM COATED ORAL at 20:35

## 2017-01-01 RX ADMIN — ENOXAPARIN SODIUM 40 MG: 40 INJECTION SUBCUTANEOUS at 08:43

## 2017-01-01 RX ADMIN — SODIUM CHLORIDE 75 ML/HR: 900 INJECTION, SOLUTION INTRAVENOUS at 16:22

## 2017-01-01 RX ADMIN — TIMOLOL MALEATE 1 DROP: 2.5 SOLUTION OPHTHALMIC at 11:58

## 2017-01-01 RX ADMIN — SODIUM CHLORIDE 40 MG: 9 INJECTION INTRAMUSCULAR; INTRAVENOUS; SUBCUTANEOUS at 22:11

## 2017-01-01 RX ADMIN — LIDOCAINE HYDROCHLORIDE 40 ML: 20 SOLUTION ORAL; TOPICAL at 10:46

## 2017-01-01 RX ADMIN — GABAPENTIN 300 MG: 300 CAPSULE ORAL at 09:07

## 2017-01-01 RX ADMIN — GABAPENTIN 300 MG: 300 CAPSULE ORAL at 13:07

## 2017-01-01 RX ADMIN — FLUCONAZOLE 100 MG: 100 TABLET ORAL at 09:35

## 2017-01-01 RX ADMIN — TICAGRELOR 90 MG: 90 TABLET ORAL at 03:44

## 2017-01-01 RX ADMIN — PIPERACILLIN SODIUM,TAZOBACTAM SODIUM 3.38 G: 3; .375 INJECTION, POWDER, FOR SOLUTION INTRAVENOUS at 06:30

## 2017-01-01 RX ADMIN — SODIUM CHLORIDE: 900 INJECTION, SOLUTION INTRAVENOUS at 02:21

## 2017-01-01 RX ADMIN — Medication 10 ML: at 06:14

## 2017-01-01 RX ADMIN — HYDROMORPHONE HYDROCHLORIDE 0.5 MG: 1 INJECTION, SOLUTION INTRAMUSCULAR; INTRAVENOUS; SUBCUTANEOUS at 11:13

## 2017-01-01 RX ADMIN — METOPROLOL TARTRATE 7.5 MG: 5 INJECTION INTRAVENOUS at 08:37

## 2017-01-01 RX ADMIN — VERAPAMIL HYDROCHLORIDE 2.5 MG: 2.5 INJECTION INTRAVENOUS at 12:36

## 2017-01-01 RX ADMIN — ENOXAPARIN SODIUM 40 MG: 40 INJECTION SUBCUTANEOUS at 08:55

## 2017-01-01 RX ADMIN — Medication 10 ML: at 05:46

## 2017-01-01 RX ADMIN — ASPIRIN 81 MG: 81 TABLET, COATED ORAL at 09:35

## 2017-01-01 RX ADMIN — TICAGRELOR 90 MG: 90 TABLET ORAL at 03:52

## 2017-01-01 RX ADMIN — ASPIRIN 81 MG: 81 TABLET, COATED ORAL at 08:47

## 2017-01-01 RX ADMIN — METOPROLOL TARTRATE 12.5 MG: 25 TABLET ORAL at 22:22

## 2017-01-01 RX ADMIN — HYDROMORPHONE HYDROCHLORIDE 0.5 MG: 1 INJECTION, SOLUTION INTRAMUSCULAR; INTRAVENOUS; SUBCUTANEOUS at 05:33

## 2017-01-01 RX ADMIN — ATORVASTATIN CALCIUM 40 MG: 40 TABLET, FILM COATED ORAL at 21:04

## 2017-01-01 RX ADMIN — CALCIUM CHLORIDE 1 G: 100 INJECTION, SOLUTION INTRAVENOUS at 07:05

## 2017-01-01 RX ADMIN — COLESEVELAM HYDROCHLORIDE 1250 MG: 625 TABLET, FILM COATED ORAL at 13:15

## 2017-01-01 RX ADMIN — METOPROLOL TARTRATE 50 MG: 50 TABLET ORAL at 12:34

## 2017-01-01 RX ADMIN — SODIUM CHLORIDE 40 MG: 9 INJECTION INTRAMUSCULAR; INTRAVENOUS; SUBCUTANEOUS at 08:32

## 2017-01-01 RX ADMIN — Medication 800 MG: at 20:50

## 2017-01-01 RX ADMIN — INSULIN LISPRO 2 UNITS: 100 INJECTION, SOLUTION INTRAVENOUS; SUBCUTANEOUS at 18:26

## 2017-01-01 RX ADMIN — NOREPINEPHRINE BITARTRATE 75 MCG/MIN: 1 INJECTION, SOLUTION, CONCENTRATE INTRAVENOUS at 05:19

## 2017-01-01 RX ADMIN — SODIUM CHLORIDE 500 ML: 900 INJECTION, SOLUTION INTRAVENOUS at 21:38

## 2017-01-01 RX ADMIN — PIPERACILLIN SODIUM,TAZOBACTAM SODIUM 3.38 G: 3; .375 INJECTION, POWDER, FOR SOLUTION INTRAVENOUS at 21:25

## 2017-01-01 RX ADMIN — PROPOFOL 200 MG: 10 INJECTION, EMULSION INTRAVENOUS at 00:29

## 2017-01-01 RX ADMIN — SODIUM CHLORIDE 1000 ML: 900 INJECTION, SOLUTION INTRAVENOUS at 21:34

## 2017-01-01 RX ADMIN — Medication 20 ML: at 12:11

## 2017-01-01 RX ADMIN — INSULIN LISPRO 3 UNITS: 100 INJECTION, SOLUTION INTRAVENOUS; SUBCUTANEOUS at 06:00

## 2017-01-01 RX ADMIN — ONDANSETRON 4 MG: 2 INJECTION INTRAMUSCULAR; INTRAVENOUS at 08:00

## 2017-01-01 RX ADMIN — TIMOLOL MALEATE 1 DROP: 2.5 SOLUTION OPHTHALMIC at 09:36

## 2017-01-01 RX ADMIN — INSULIN LISPRO 7 UNITS: 100 INJECTION, SOLUTION INTRAVENOUS; SUBCUTANEOUS at 18:13

## 2017-01-01 RX ADMIN — SENNOSIDES AND DOCUSATE SODIUM 1 TABLET: 8.6; 5 TABLET ORAL at 08:46

## 2017-01-01 RX ADMIN — TIMOLOL MALEATE 1 DROP: 2.5 SOLUTION OPHTHALMIC at 08:36

## 2017-01-01 RX ADMIN — PIPERACILLIN SODIUM,TAZOBACTAM SODIUM 3.38 G: 3; .375 INJECTION, POWDER, FOR SOLUTION INTRAVENOUS at 05:57

## 2017-01-01 RX ADMIN — ACETAMINOPHEN 1000 MG: 10 INJECTION, SOLUTION INTRAVENOUS at 01:35

## 2017-01-01 RX ADMIN — TIMOLOL MALEATE 2 DROP: 2.5 SOLUTION OPHTHALMIC at 21:53

## 2017-01-01 RX ADMIN — SODIUM BICARBONATE 50 MEQ: 84 INJECTION INTRAVENOUS at 09:15

## 2017-01-01 RX ADMIN — GABAPENTIN 300 MG: 300 CAPSULE ORAL at 17:42

## 2017-01-01 RX ADMIN — COLESEVELAM HYDROCHLORIDE 1250 MG: 625 TABLET, FILM COATED ORAL at 08:51

## 2017-01-01 RX ADMIN — ATORVASTATIN CALCIUM 40 MG: 40 TABLET, FILM COATED ORAL at 21:42

## 2017-01-01 RX ADMIN — PIPERACILLIN SODIUM,TAZOBACTAM SODIUM 3.38 G: 3; .375 INJECTION, POWDER, FOR SOLUTION INTRAVENOUS at 14:39

## 2017-01-01 RX ADMIN — MIDAZOLAM HYDROCHLORIDE 0.5 MG: 1 INJECTION, SOLUTION INTRAMUSCULAR; INTRAVENOUS at 00:22

## 2017-01-01 RX ADMIN — SODIUM CHLORIDE 40 MG: 9 INJECTION INTRAMUSCULAR; INTRAVENOUS; SUBCUTANEOUS at 20:35

## 2017-01-01 RX ADMIN — LISINOPRIL 10 MG: 5 TABLET ORAL at 09:11

## 2017-01-01 RX ADMIN — Medication 30 ML: at 20:35

## 2017-01-01 RX ADMIN — Medication 10 ML: at 13:15

## 2017-01-01 RX ADMIN — TIMOLOL MALEATE 2 DROP: 2.5 SOLUTION OPHTHALMIC at 08:22

## 2017-01-01 RX ADMIN — SODIUM CHLORIDE, PRESERVATIVE FREE 300 UNITS: 5 INJECTION INTRAVENOUS at 04:31

## 2017-01-01 RX ADMIN — TICAGRELOR 90 MG: 90 TABLET ORAL at 03:42

## 2017-01-01 RX ADMIN — PIPERACILLIN SODIUM,TAZOBACTAM SODIUM 3.38 G: 3; .375 INJECTION, POWDER, FOR SOLUTION INTRAVENOUS at 21:51

## 2017-01-01 RX ADMIN — ROCURONIUM BROMIDE 10 MG: 10 INJECTION, SOLUTION INTRAVENOUS at 00:48

## 2017-01-01 RX ADMIN — ENOXAPARIN SODIUM 40 MG: 40 INJECTION SUBCUTANEOUS at 09:05

## 2017-01-01 RX ADMIN — TIMOLOL MALEATE 1 DROP: 2.5 SOLUTION OPHTHALMIC at 18:26

## 2017-01-01 RX ADMIN — PIPERACILLIN SODIUM,TAZOBACTAM SODIUM 3.38 G: 3; .375 INJECTION, POWDER, FOR SOLUTION INTRAVENOUS at 21:00

## 2017-01-01 RX ADMIN — Medication 10 ML: at 22:54

## 2017-01-01 RX ADMIN — MIDAZOLAM HYDROCHLORIDE 1 MG: 1 INJECTION, SOLUTION INTRAMUSCULAR; INTRAVENOUS at 12:56

## 2017-01-01 RX ADMIN — TIMOLOL MALEATE 1 DROP: 2.5 SOLUTION OPHTHALMIC at 09:12

## 2017-01-01 RX ADMIN — FENTANYL CITRATE 75 MCG: 50 INJECTION, SOLUTION INTRAMUSCULAR; INTRAVENOUS at 02:20

## 2017-01-01 RX ADMIN — PROPOFOL 15 MCG/KG/MIN: 10 INJECTION, EMULSION INTRAVENOUS at 14:35

## 2017-01-01 RX ADMIN — TIMOLOL MALEATE 1 DROP: 2.5 SOLUTION OPHTHALMIC at 11:22

## 2017-01-01 RX ADMIN — TICAGRELOR 90 MG: 90 TABLET ORAL at 02:41

## 2017-01-01 RX ADMIN — SODIUM CHLORIDE 40 MG: 9 INJECTION INTRAMUSCULAR; INTRAVENOUS; SUBCUTANEOUS at 09:55

## 2017-01-01 RX ADMIN — PIPERACILLIN SODIUM,TAZOBACTAM SODIUM 3.38 G: 3; .375 INJECTION, POWDER, FOR SOLUTION INTRAVENOUS at 20:50

## 2017-01-01 RX ADMIN — Medication 20 ML: at 21:00

## 2017-01-01 RX ADMIN — Medication 10 ML: at 06:38

## 2017-01-01 RX ADMIN — SODIUM CHLORIDE AND POTASSIUM CHLORIDE: 9; 1.49 INJECTION, SOLUTION INTRAVENOUS at 13:48

## 2017-01-01 RX ADMIN — Medication 10 ML: at 13:37

## 2017-01-01 RX ADMIN — MAGNESIUM SULFATE HEPTAHYDRATE 2 G: 40 INJECTION, SOLUTION INTRAVENOUS at 20:53

## 2017-01-01 RX ADMIN — FLUCONAZOLE 150 MG: 100 TABLET ORAL at 19:53

## 2017-01-01 RX ADMIN — FENTANYL CITRATE 50 MCG: 50 INJECTION, SOLUTION INTRAMUSCULAR; INTRAVENOUS at 13:27

## 2017-01-01 RX ADMIN — PHENYLEPHRINE HYDROCHLORIDE 75 MCG/MIN: 10 INJECTION INTRAVENOUS at 14:35

## 2017-01-01 RX ADMIN — PIPERACILLIN SODIUM,TAZOBACTAM SODIUM 3.38 G: 3; .375 INJECTION, POWDER, FOR SOLUTION INTRAVENOUS at 06:31

## 2017-01-01 RX ADMIN — SODIUM CHLORIDE 125 ML/HR: 900 INJECTION, SOLUTION INTRAVENOUS at 12:10

## 2017-01-01 RX ADMIN — Medication 30 ML: at 22:08

## 2017-01-01 RX ADMIN — ACETAMINOPHEN 650 MG: 650 SUPPOSITORY RECTAL at 21:59

## 2017-01-01 RX ADMIN — PIPERACILLIN SODIUM,TAZOBACTAM SODIUM 3.38 G: 3; .375 INJECTION, POWDER, FOR SOLUTION INTRAVENOUS at 03:20

## 2017-01-01 RX ADMIN — LISINOPRIL 10 MG: 5 TABLET ORAL at 09:07

## 2017-01-01 RX ADMIN — METOPROLOL TARTRATE 5 MG: 5 INJECTION INTRAVENOUS at 15:09

## 2017-01-01 RX ADMIN — METOPROLOL TARTRATE 5 MG: 5 INJECTION INTRAVENOUS at 05:36

## 2017-01-01 RX ADMIN — Medication 10 ML: at 13:22

## 2017-01-01 RX ADMIN — MAGNESIUM SULFATE HEPTAHYDRATE: 500 INJECTION, SOLUTION INTRAMUSCULAR; INTRAVENOUS at 18:21

## 2017-01-01 RX ADMIN — TIMOLOL MALEATE 1 DROP: 2.5 SOLUTION OPHTHALMIC at 20:23

## 2017-01-01 RX ADMIN — PIPERACILLIN SODIUM,TAZOBACTAM SODIUM 3.38 G: 3; .375 INJECTION, POWDER, FOR SOLUTION INTRAVENOUS at 21:29

## 2017-01-01 RX ADMIN — MAGNESIUM SULFATE HEPTAHYDRATE 1 G: 1 INJECTION, SOLUTION INTRAVENOUS at 12:26

## 2017-01-01 RX ADMIN — INSULIN LISPRO 5 UNITS: 100 INJECTION, SOLUTION INTRAVENOUS; SUBCUTANEOUS at 06:19

## 2017-01-01 RX ADMIN — ASPIRIN 81 MG: 81 TABLET, COATED ORAL at 11:19

## 2017-01-01 RX ADMIN — METOPROLOL TARTRATE 7.5 MG: 5 INJECTION INTRAVENOUS at 13:35

## 2017-01-01 RX ADMIN — Medication 10 ML: at 05:25

## 2017-01-01 RX ADMIN — SODIUM CHLORIDE 125 ML/HR: 900 INJECTION, SOLUTION INTRAVENOUS at 06:13

## 2017-01-01 RX ADMIN — COLESEVELAM HYDROCHLORIDE 1250 MG: 625 TABLET, FILM COATED ORAL at 09:23

## 2017-01-01 RX ADMIN — Medication 20 ML: at 06:20

## 2017-01-01 RX ADMIN — SODIUM CHLORIDE 40 MG: 9 INJECTION INTRAMUSCULAR; INTRAVENOUS; SUBCUTANEOUS at 21:57

## 2017-01-01 RX ADMIN — PROPOFOL 20 MCG/KG/MIN: 10 INJECTION, EMULSION INTRAVENOUS at 19:25

## 2017-01-01 RX ADMIN — MUPIROCIN: 20 OINTMENT TOPICAL at 20:17

## 2017-01-01 RX ADMIN — INSULIN LISPRO 3 UNITS: 100 INJECTION, SOLUTION INTRAVENOUS; SUBCUTANEOUS at 12:15

## 2017-01-01 RX ADMIN — ALBUMIN (HUMAN) 12.5 G: 0.25 INJECTION, SOLUTION INTRAVENOUS at 05:39

## 2017-01-01 RX ADMIN — COLESEVELAM HYDROCHLORIDE 1250 MG: 625 TABLET, FILM COATED ORAL at 08:58

## 2017-01-01 RX ADMIN — SODIUM CHLORIDE 500 ML: 900 INJECTION, SOLUTION INTRAVENOUS at 03:00

## 2017-01-01 RX ADMIN — SODIUM CHLORIDE 40 MG: 9 INJECTION INTRAMUSCULAR; INTRAVENOUS; SUBCUTANEOUS at 21:42

## 2017-01-01 RX ADMIN — ALBUMIN HUMAN 250 ML: 50 SOLUTION INTRAVENOUS at 01:51

## 2017-01-01 RX ADMIN — GABAPENTIN 300 MG: 300 CAPSULE ORAL at 01:27

## 2017-01-01 RX ADMIN — TIMOLOL MALEATE 1 DROP: 2.5 SOLUTION OPHTHALMIC at 08:19

## 2017-01-01 RX ADMIN — Medication 10 ML: at 14:30

## 2017-01-01 RX ADMIN — TIMOLOL MALEATE 1 DROP: 2.5 SOLUTION OPHTHALMIC at 01:28

## 2017-01-01 RX ADMIN — METOPROLOL TARTRATE 10 MG: 5 INJECTION INTRAVENOUS at 20:56

## 2017-01-01 RX ADMIN — DILTIAZEM HYDROCHLORIDE 30 MG: 30 TABLET, FILM COATED ORAL at 05:36

## 2017-01-01 RX ADMIN — COLESEVELAM HYDROCHLORIDE 1250 MG: 625 TABLET, FILM COATED ORAL at 08:41

## 2017-01-01 RX ADMIN — KETOROLAC TROMETHAMINE 15 MG: 30 INJECTION, SOLUTION INTRAMUSCULAR at 10:48

## 2017-01-01 RX ADMIN — AMIODARONE HYDROCHLORIDE 150 MG: 50 INJECTION, SOLUTION INTRAVENOUS at 12:24

## 2017-01-01 RX ADMIN — ENOXAPARIN SODIUM 40 MG: 40 INJECTION SUBCUTANEOUS at 08:19

## 2017-01-01 RX ADMIN — INSULIN LISPRO 7 UNITS: 100 INJECTION, SOLUTION INTRAVENOUS; SUBCUTANEOUS at 06:24

## 2017-01-01 RX ADMIN — WATER: 1000 INJECTION, SOLUTION INTRAVENOUS at 19:02

## 2017-01-01 RX ADMIN — HYDROMORPHONE HYDROCHLORIDE 0.5 MG: 1 INJECTION, SOLUTION INTRAMUSCULAR; INTRAVENOUS; SUBCUTANEOUS at 08:10

## 2017-01-01 RX ADMIN — METOPROLOL TARTRATE 12.5 MG: 25 TABLET ORAL at 09:06

## 2017-01-01 RX ADMIN — COLESEVELAM HYDROCHLORIDE 1250 MG: 625 TABLET, FILM COATED ORAL at 17:26

## 2017-01-01 RX ADMIN — AMLODIPINE BESYLATE 5 MG: 5 TABLET ORAL at 09:11

## 2017-01-01 RX ADMIN — TIMOLOL MALEATE 1 DROP: 2.5 SOLUTION OPHTHALMIC at 21:04

## 2017-03-01 PROBLEM — N17.9 ACUTE RENAL FAILURE (ARF) (HCC): Status: ACTIVE | Noted: 2017-01-01

## 2017-03-01 PROBLEM — E87.5 HYPERKALEMIA: Status: ACTIVE | Noted: 2017-01-01

## 2017-03-01 NOTE — IP AVS SNAPSHOT
[address] [facility] 
[phone] Patient: Emma Mon MRN: [id number] QPC:5/10/[birth year]60 You are allergic to the following Allergen Reactions Bactrim (Sulfamethoxazole-Trimethoprim) Nausea and Vomiting Recent Documentation Height  
  
  
  
  
  
 1.778 m Emergency Contacts Name Discharge Info Relation Home Work Mobile Padmini Martinez  Other Relative [6] [phone] About your hospitalization You were admitted on:  March 1, 2017 You last received care in the:  [facility] 2 CARDIOPULMONARY CARE You were discharged on:  March 3, 2017 Unit phone number:  [phone] Why you were hospitalized Your primary diagnosis was:  Acute Renal Failure (Arf) (Hcc) Your diagnoses also included:  Hypertension, Dm (Diabetes Mellitus) (Hcc), H/O: Cva (Cerebrovascular Accident), Recurrent Uti, Nephrolithiasis, Hyperkalemia, Hyperlipidemia, History Of Stroke, Uti (Urinary Tract Infection) Providers Seen During Your Hospitalizations Provider Role Specialty Primary office phone Rebecca Cano MD Attending Provider Emergency Medicine [phone] Mitzy Atkins MD Attending Provider Hospitalist [phone] Your Primary Care Physician (PCP) Primary Care Physician Office Phone Office Fax [facility], [address] [place] [address] [phone] Follow-up Information Follow up With Details Comments Contact Info Michael Valdez MD In 2 weeks  [address] [facility] [address] 
[phone] Augustine Holstein, MD In 3 weeks or earlier with problem Ray Sharma [address] [facility] 
[phone] Current Discharge Medication List  
  
START taking these medications Dose & Instructions Dispensing Information Comments Morning Noon Evening Bedtime  
 fluconazole 100 mg tablet Commonly known as:  DIFLUCAN Your next dose is: Today, Tomorrow Other:  _________ Dose:  100 mg Take 1 Tab by mouth daily for 7 days. FDA advises cautious prescribing of oral fluconazole in pregnancy. Quantity:  7 Tab Refills:  0 CONTINUE these medications which have NOT CHANGED Dose & Instructions Dispensing Information Comments Morning Noon Evening Bedtime  
 aspirin delayed-release 81 mg tablet Your next dose is: Today, Tomorrow Other:  _________ Dose:  81 mg Take 81 mg by mouth daily. Refills:  0  
     
   
   
   
  
 atorvastatin 40 mg tablet Commonly known as:  LIPITOR Your next dose is: Today, Tomorrow Other:  _________ TAKE 1 TABLET BY MOUTH AT BEDTIME Quantity:  90 Tab Refills:  1  
     
   
   
   
  
 colesevelam 625 mg tablet Commonly known as:  [facility] Your next dose is: Today, Tomorrow Other:  _________ Dose:  1250 mg Take 2 Tabs by mouth two (2) times daily (with meals). Quantity:  360 Tab Refills:  1  
     
   
   
   
  
 hydrocortisone 1 % lotion Commonly known as:  ALA-ONEIL Your next dose is: Today, Tomorrow Other:  _________ Apply  to affected area two (2) times a day. use thin layer Refills:  0  
     
   
   
   
  
 ketoconazole 2 % topical cream  
Commonly known as:  NIZORAL Your next dose is: Today, Tomorrow Other:  _________ Apply  to affected area daily. Refills:  0  
     
   
   
   
  
 timolol 0.25 % ophthalmic solution Commonly known as:  [employer] Blend Your next dose is: Today, Tomorrow Other:  _________ Dose:  1-2 Drop Administer 1-2 Drops to both eyes two (2) times a day. use in affected eye(s) Refills:  0 STOP taking these medications   
 hydroCHLOROthiazide 12.5 mg tablet Commonly known as:  HYDRODIURIL  
   
  
 lisinopril 20 mg tablet Commonly known as:  PRINIVIL, ZESTRIL  
   
  
 metFORMIN 1,000 mg tablet Commonly known as:  GLUCOPHAGE  
   
  
 nitrofurantoin 100 mg capsule Commonly known as:  MACRODANTIN  
   
  
 RESTASIS 0.05 % ophthalmic emulsion Generic drug:  cycloSPORINE Where to Get Your Medications Information on where to get these meds will be given to you by the nurse or doctor. ! Ask your nurse or doctor about these medications  
  fluconazole 100 mg tablet Discharge Instructions Patient Discharge Instructions Pt Name  Paula Santacruz Date of Birth 1950 Age  79 y.o. Medical Record Number  [id number] PCP Marylee Register, MD   
Admit date:  3/1/2017 @    [place] [address] Room Number  2210/01 Date of Discharge 3/3/2017 Admission Diagnoses:     Acute renal failure (ARF) ([address] Allergies Allergen Reactions  Bactrim [Sulfamethoxazole-Trimethoprim] Nausea and Vomiting You were admitted to [address] for  Acute renal failure (ARF) ([address] YOUR OTHER MEDICAL DIAGNOSES INCLUDE (BUT NOT LIMITED TO ): 
Present on Admission:  Acute renal failure (ARF) ([address]  Hypertension  DM (diabetes mellitus) ([address]  H/O 
 Recurrent UTI  Nephrolithiasis  Hyperkalemia  Hyperlipidemia  History of stroke  UTI (urinary tract infection) DIET:  Diabetic Diet Recommended activity: Activity as tolerated Follow up : Follow-up Information Follow up With Details Comments Contact Info Argenis Cartwright MD In 2 weeks  40 [facility] [address] 
[phone] Audrey ePtersen MD In 3 weeks or earlier with problem [facility]  [address] 
[phone] ** Please check BMP on Monday to check his renal function. ** The patient is no longer on Metformin.   Please check his blood glucose qac and hs, follow your facility insulin sliding scale protocol. Skilled nursing facility MD responsible for above upon discharge. · It is important that you take the medication exactly as they are prescribed. · Keep your medication in the bottles provided by the pharmacist and keep a list of the medication names, dosages, and times to be taken in your wallet. · Do not take other medications without consulting your doctor. ADDITIONAL INFORMATION: If you experience any of the following symptoms or have any health problem not listed below, then please call your primary care physician or return to the emergency room if you cannot get hold of your doctor: Fever, chills, nausea, vomiting, diarrhea, change in mentation, falling, bleeding, shortness of breath. I understand that if any problems occur once I am discharged, I am supposed to call my Primary care physician for further care or seek help in the Emergency Department at the nearest Healthcare facility. I have had an opportunity to discuss my clinical issues with my doctor and nursing staff. I understand and acknowledge receipt of the above instructions. Physician's or R.N.'s Signature                                                            Date/Time Patient or Representative Signature                                                 Date/Time Discharge Orders None Introducing [facility]! Kasandra Fleming introduces [employer] patient portal. Now you can access parts of your medical record, email your doctor's office, and request medication refills online.    
 
1. In your internet browser, go to [email or web address] 2. Click on the First Time User? Click Here link in the Sign In box. You will see the New Member Sign Up page. 3. Enter your [employer] Access Code exactly as it appears below. You will not need to use this code after youve completed the sign-up process. If you do not sign up before the expiration date, you must request a new code. · [employer] Access Code: [email or web address] Expires: 5/30/2017  3:02 PM 
 
4. Enter the last four digits of your Social Security Number (xxxx) and Date of Birth (mm/dd/yyyy) as indicated and click Submit. You will be taken to the next sign-up page. 5. Create a [employer] ID. This will be your [employer] login ID and cannot be changed, so think of one that is secure and easy to remember. 6. Create a [employer] password. You can change your password at any time. 7. Enter your Password Reset Question and Answer. This can be used at a later time if you forget your password. 8. Enter your e-mail address. You will receive e-mail notification when new information is available in [address] 9. Click Sign Up. You can now view and download portions of your medical record. 10. Click the Download Summary menu link to download a portable copy of your medical information. If you have questions, please visit the Frequently Asked Questions section of the [employer] website. Remember, [employer] is NOT to be used for urgent needs. For medical emergencies, dial 911. Now available from your iPhone and Android! General Information Please provide this summary of care documentation to your next provider. Patient Signature:  ____________________________________________________________ Date:  ____________________________________________________________  
  
Joelene Batman Provider Signature:  ____________________________________________________________ Date:  ____________________________________________________________

## 2017-03-01 NOTE — ED NOTES
Pt presents stating that he had an allergic reaction to bactrim. Pt stated he took 1 bactrim tab at home and vomitted x 1. Per pt's sister, pt has been on medication x 1 week.

## 2017-03-01 NOTE — H&P
Hospitalist Admission Note    NAME:  Regulo Fowler   :   1950   MRN:   [id number]     Date of admit:  3/1/2017    PCP: Magdiel Stapleton MD    Assessment/Plan:      Acute renal failure (ARF) ([address] (3/1/2017) POA admit BUN/Creat 78/3.16  Hyperkalemia POA K 6.1  Likely related to the bactrim, ? hypovolemia  Hold ACE In and HCTZ  Renal US to access for hydronephrosis  IVF  Stop the bactrim  Hold the HCTZ, lisinopril  Renal consult  Serial labs later today and in the AM  Tele till K normal    DM type 2 without complications POA  Hold metformin with the renal failure  Check HgBa1c  SSI, diabetic diet    Essential HTN POA  BP borderline low, hold all anti hypertensives  PRN hydralazine    History of nephrolithiasis US with no stones or hydronephrosis    Hyperlipidemia Resume statin in AM if CPK stable    History of stroke in  with residual right weakness    DVT prophylaxis: lovenox SQ     Stress ulcer prophylaxis: Not indicated    Code status: Full code     Next of Kin: sister and brother    Subjective:   CHIEF COMPLAINT:  \"I felt weak today and started throwing up today\"    HISTORY OF PRESENT ILLNESS:      79 y.o.  male:  Baseline right weakness due to old CVA in 18's  Sister noted 10 days ago that urine was cloudy and foul-smelling, episodes of gross hematuria  No fevers or weakness or N/V at time  Saw PCP, urine sample sent and PO bactrim started 1 week, has been taking regularly  Otherwise well until today, got up to go podiatry appointment, became progressively diffusely weak. Will trying to get into the car, felt to ground and could not get up.  No LOC or head trauma  Sister tried to get up, but could not, EMS called and got up  Shortly thereafter he developed several episodes of N/V with blood or coffee grounds  With the vomiting, leaning to the right side and eyes seemed to be rolling back into head  He was brought by EMS to the ED  No HA, new acute focal motor or sensory changes, fevers/chills, no unusual back pain, no abd pain or diarrhea  No further N/V in the ED  No dysuria    ED HD stable, admit BUN/creat 78/3.16, prior baseline 2016 1.0 to 1.1  K+ 6.1 without EKG changes  IV Insulin and glucose, IV NaHCO3, Po kayexylate  We were called to admit the patient      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    small cecal polyp    DM (diabetes mellitus) ([facility] 12/14/2009    Hypercholesterolemia     Hyperlipidemia 12/14/2009    Hypertension 12/14/2009    Nephrolithiasis 12/14/2009    Nephrolithiasis 12/14/2009    Recurrent UTI 12/14/2009    Stroke [facility])         Past Surgical History:   Procedure Laterality Date    COLORECTAL SCRN; HI RISK IND  1/15/2014         ENDOSCOPY, COLON, DIAGNOSTIC  9/2/2008    small cecal tubular adenoma removed    HX HEENT      tonsillectomy    HX UROLOGICAL      for kidney stones       Social History:  Social History   Substance Use Topics    Smoking status: Former Smoker, quit 1980s    Smokeless tobacco: Not on file    Alcohol use No   Lives alone, sister checks on frequently    Full code, sister and brother are NOK    FAMILY HISTORY:  Family History   Problem Relation Age of Onset    Hypertension Mother     Elevated Lipids Mother     Heart Disease Mother     Diabetes Father     Hypertension Father     Elevated Lipids Sister     Hypertension Sister     Heart Disease Brother     Stroke Maternal Grandfather     Stroke Paternal Grandfather    No children of own    Allergies   Allergen Reactions    Bactrim [Sulfamethoxazole-Trimethoprim] Nausea and Vomiting        Prior to Admission medications    Medication Sig Start Date End Date Taking? Authorizing Provider   lisinopril (PRINIVIL, ZESTRIL) 20 mg tablet Take 1 Tab by mouth daily.  9/29/16  Yes Tobias Head MD   atorvastatin (LIPITOR) 40 mg tablet TAKE 1 TABLET BY MOUTH AT BEDTIME 7/11/16  Yes Amilcar Randolph MD   hydrochlorothiazide (HYDRODIURIL) 12.5 mg tablet Take 1 Tab by mouth daily. 7/11/16  Yes Jeny Diaz MD   [facility]) 625 mg tablet Take 2 Tabs by mouth two (2) times daily (with meals). 6/13/16  Yes Kristina Nogueira MD   metFORMIN (GLUCOPHAGE) 1,000 mg tablet Take 0.5 Tabs by mouth daily. 2/12/16  Yes Kristina Nogueira MD   RESTASIS 0.05 % ophthalmic emulsion Administer 1 Drop to both eyes two (2) times a day. 6/12/15  Yes Historical Provider   aspirin delayed-release 81 mg tablet Take 81 mg by mouth daily. Yes Historical Provider   nitrofurantoin (MACROBID) 100 mg capsule Take 100 mg by mouth nightly. Yes Historical Provider   timolol (BETIMOL) 0.25 % ophthalmic solution Administer 1-2 Drops to both eyes two (2) times a day. use in affected eye(s)   Yes Historical Provider   ketoconazole (NIZORAL) 2 % topical cream Apply  to affected area daily. Yes Historical Provider   hydrocortisone (ALA-ONEIL) 1 % lotion Apply  to affected area two (2) times a day.  use thin layer    Yes Historical Provider       REVIEW OF SYSTEMS:  Bold equals positive    Yes Total of 12 systems reviewed as follows:  Constitutional: fever Chills  weight loss, dizziness upon standing, generalized weakness  Eyes:   Diplopia visual changes  eye pain  ENT:   coryza  Sore throat Dysphagia  Respiratory:  cough or sputum  Hemoptysis dyspnea  Cards:  chest pain  orthopnea LE edema  GI:   Nausea/vomiting Diarrhea abdominal pain    melena  BRBPR  Genitourinary:  frequency  dysuria hematuria  Integument:  Rash Ulcers  Nodules  Hematologic:  Easy bruising, negative gum/nose bleeding  Endocrine: Polyuria/poldipsia heat/cold intolerance  Musculoskel: Myalgias Joint pain/swelling/redness Gout  Neurological:  Headaches  acute focal motor or sensory changes    Objective:   VITALS:    Patient Vitals for the past 24 hrs:   Temp Pulse Resp BP SpO2   03/01/17 2030 - 81 21 149/77 -   03/01/17 2000 - 93 25 142/49 98 %   03/01/17 1951 - 86 18 - 100 %   03/1950 - 87 17 122/54 - 17 1839 - (!) 103 17 - 100 %   17 1833 - 96 16 116/55 -   17 1815 - 95 18 126/63 -   17 1800 - 83 21 105/52 -   17 1730 - 98 18 116/56 -   17 1715 - 100 14 108/71 99 %   17 1700 - 79 15 114/41 100 %   17 1645 - 81 18 136/58 99 %   17 1631 - 79 18 - 98 %   17 1630 - - - 124/61 -   17 1615 - - - 121/43 99 %   17 1600 - - - 102/46 99 %   17 1545 - - - (!) 106/34 99 %   17 1515 - - - 100/52 95 %   17 1431 97.7 °F (36.5 °C) 83 16 102/76 96 %     Temp (24hrs), Av.7 °F (36.5 °C), Min:97.7 °F (36.5 °C), Max:97.7 °F (36.5 °C)      O2 Device: Room air    PHYSICAL EXAM:   General:    Alert, cooperative in no distress, not vomiting when I saw  HEENT: Normocephalic, atraumatic    PERRL, EOMI  Sclera no icterus    Nasal mucosa without masses or discharge  Hearing intact to voice    Oropharynx without erythema or exudate  No thrush  Gillespie, moist  MM  Neck:  No meningismus, trachea midline, no carotid bruits     Thyroid not enlarged, no nodules or tenderness  Lungs:   Clear to auscultation bilaterally. No wheezing or rales    No accessory muscle use or retractions. Heart:   Regular rate and rhythm,  no murmur or gallop. No LE edema  Abdomen:   Soft, non-tender. Not distended. Bowel sounds normal.     No masses, No Hepatosplenomegaly, No Rebound or guarding  Lymph nodes: No cervical or inguinal MALENA  Musculoskeletal:  No Joint swelling, erythema, warmth.  No Cyanosis or clubbing    Brace on right right toe  Skin:      No rashes     Not Jaundiced   No nodules or thickening  Neurologic: Alert and oriented X 3, follows commands, speech slow but fluent     Cranial nerves 2 to 12 intact    Slight right weakness         LAB DATA REVIEWED:    Recent Results (from the past 24 hour(s))   CBC WITH AUTOMATED DIFF    Collection Time: 17  2:53 PM   Result Value Ref Range    WBC 10.9 4.1 - 11.1 K/uL    RBC 3.96 (L) 4.10 - 5.70 M/uL    HGB 11.6 (L) 12.1 - 17.0 g/dL    HCT 34.0 (L) 36.6 - 50.3 %    MCV 85.9 80.0 - 99.0 FL    MCH 29.3 26.0 - 34.0 PG    MCHC 34.1 30.0 - 36.5 g/dL    RDW 13.3 11.5 - 14.5 %    PLATELET 482 987 - 322 K/uL    NEUTROPHILS 85 (H) 32 - 75 %    LYMPHOCYTES 8 (L) 12 - 49 %    MONOCYTES 6 5 - 13 %    EOSINOPHILS 1 0 - 7 %    BASOPHILS 0 0 - 1 %    ABS. NEUTROPHILS 9.3 (H) 1.8 - 8.0 K/UL    ABS. LYMPHOCYTES 0.8 0.8 - 3.5 K/UL    ABS. MONOCYTES 0.7 0.0 - 1.0 K/UL    ABS. EOSINOPHILS 0.1 0.0 - 0.4 K/UL    ABS. BASOPHILS 0.0 0.0 - 0.1 K/UL   METABOLIC PANEL, COMPREHENSIVE    Collection Time: 03/01/17  2:53 PM   Result Value Ref Range    Sodium 130 (L) 136 - 145 mmol/L    Potassium 6.1 (H) 3.5 - 5.1 mmol/L    Chloride 100 97 - 108 mmol/L    CO2 21 21 - 32 mmol/L    Anion gap 9 5 - 15 mmol/L    Glucose 166 (H) 65 - 100 mg/dL    BUN 78 (H) 6 - 20 MG/DL    Creatinine 3.16 (H) 0.70 - 1.30 MG/DL    BUN/Creatinine ratio 25 (H) 12 - 20      GFR est AA 24 (L) >60 ml/min/1.73m2    GFR est non-AA 20 (L) >60 ml/min/1.73m2    Calcium 9.6 8.5 - 10.1 MG/DL    Bilirubin, total 0.8 0.2 - 1.0 MG/DL    ALT (SGPT) 26 12 - 78 U/L    AST (SGOT) 18 15 - 37 U/L    Alk. phosphatase 68 45 - 117 U/L    Protein, total 8.3 (H) 6.4 - 8.2 g/dL    Albumin 4.0 3.5 - 5.0 g/dL    Globulin 4.3 (H) 2.0 - 4.0 g/dL    A-G Ratio 0.9 (L) 1.1 - 2.2     CK W/ CKMB & INDEX    Collection Time: 03/01/17  2:53 PM   Result Value Ref Range     39 - 308 U/L    CK - MB 3.9 (H) <3.6 NG/ML    CK-MB Index 2.5 0 - 2.5     TROPONIN I    Collection Time: 03/01/17  2:53 PM   Result Value Ref Range    Troponin-I, Qt. <0.04 <0.05 ng/mL   LIPASE    Collection Time: 03/01/17  2:53 PM   Result Value Ref Range    Lipase 228 73 - 393 U/L   LACTIC ACID, PLASMA    Collection Time: 03/01/17  3:35 PM   Result Value Ref Range    Lactic acid 1.3 0.4 - 2.0 MMOL/L       I have reviewed the results of all available imaging studies.     EKG as read by me shows  NSR rate 81, LAD, normal axis  Normal intervals, QRS 0.086, no ischemic ST-T changes,no peaked T waves    Renal US FINDINGS:   The right and left kidneys measure 10.6 and 11.6 cm, respectively. No  hydronephrosis. Right renal calculus measures up to 2.6 cm. Mixed cystic and  solid right renal lesion is ill-defined but unchanged. The cortical thickness  and echogenicity are preserved. The corticomedullary differentiation is within  normal limits. The bladder is incompletely distended. Left ureteral jet is visible. Right  ureteral jet is not visible. Aorta and aortic bifurcation are obscured by bowel  gas. IVC is patent. IMPRESSION:   1. No hydronephrosis. 2. Right nephrolithiasis. 3. Nonspecific right renal mixed cystic and solid lesion. When patient's renal  function returns to a GFR greater than 30, recommend dedicated renal CT or MRI.    ___________________________________________________    Inpatient is warranted for this patient because they presents with Nausea, weakness. I have a high level of concern for Acute renal failure, Hyperkalemia  I anticipate the stay in the hospital will span at least 2 midnights. My assessment of the clinical condition and my plan of care is as outlined above  __________________________________________________    Care Plan discussed with:    Patient, sister, ED Doc     I saw the patient personally, took a history and did a complete physical exam at the bedside. I performed complex decision making in coming up with a diagnostic and treatment plan for the patient. I reviewed the patient's past medical records, current laboratory and radiology results, and actual Xray films/EKG. I have also discussed this case with the involved ED physician.     Risk of deterioration:  High    Total Time Coordinating Admission:   65   minutes    Total Critical Care Time:     Admitting Physician: Alexia Carlisle MD

## 2017-03-01 NOTE — ED PROVIDER NOTES
HPI Comments: Lan Marin is a 79 y.o. male, pmhx significant for recurrent UTI, DM (controlled with metformin), HTN, CVA with L sided deficits, who presents via EMS to the ED c/o sudden onset nausea and vomiting x 1 episode with associated fatigue s/p taking dose of bactrim x a few hours PTA. Pt reports a fall today. Per pt's sister, the pt has been taking bactrim for a UTI dx'd by his PCP for 1 week after she noticed foul smelling urine and hematuria 2 weeks ago. Pt has not been eating a normal amount of food for the past 2 days, but encouraged him to eat breakfast this morning as he has an appointment with his podiatrist. However, when he was walking out to his car, he fell due to fatigue, so his sister called EMS as a precaution. When he was waiting upon their arrival, pt vomited and looked more pale than usual. Of note, pt's sister does not believe that the bactrim has been effective against his UTI due to the fact that his urine is still foul smelling. Additionally of note, the pt reports that it has been taking him longer to start urinating since onset of his urinary sxs than at baseline. [facility] for [facility]TI in November 2016 and was treated with omnicef, which pt endorses working well for his sxs. Pt specifically denies fever, chills, medication changes, abd pain or heart issues. Pt is feeling better currently. PCP: Robert Sun MD      Social Hx: -tobacco (former), -EtOH, -Illicit Drugs   FHx: no pertinent family hx   Medication Allergies: bactrim      There are no other complaints, changes, or physical findings at this time. The history is provided by the patient, the EMS personnel and a relative.         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    small cecal polyp    DM (diabetes mellitus) ([facility] [address] 12/14/2009    Hypercholesterolemia     Hyperlipidemia 12/14/2009    Hypertension 12/14/2009    Nephrolithiasis 12/14/2009    Nephrolithiasis 12/14/2009    Recurrent UTI 12/14/2009    Stroke [facility])        Past Surgical History:   Procedure Laterality Date    COLORECTAL SCRN; HI RISK IND  1/15/2014         ENDOSCOPY, COLON, DIAGNOSTIC  9/2/2008    small cecal tubular adenoma removed    HX HEENT      tonsillectomy    HX UROLOGICAL      for kidney stones         Family History:   Problem Relation Age of Onset    Hypertension Mother     Elevated Lipids Mother     Heart Disease Mother     Diabetes Father     Hypertension Father    [facility] Elevated Lipids Sister     Hypertension Sister     Heart Disease Brother     Stroke Maternal Grandfather     Stroke Paternal Grandfather        Social History     Social History    Marital status: SINGLE     Spouse name: N/A    Number of children: N/A    Years of education: N/A     Occupational History    Not on file. Social History Main Topics    Smoking status: Former Smoker    Smokeless tobacco: Not on file    Alcohol use No    Drug use: No    Sexual activity: Not Currently     Birth control/ protection: Abstinence     Other Topics Concern    Not on file     Social History Narrative         ALLERGIES: Bactrim [sulfamethoxazole-trimethoprim]    Review of Systems   Constitutional: Positive for fatigue. Negative for activity change, chills and fever. HENT: Negative for congestion and sore throat. Eyes: Negative for pain and redness. Respiratory: Negative for cough, chest tightness and shortness of breath. Cardiovascular: Negative for chest pain and palpitations. Gastrointestinal: Positive for nausea and vomiting. Negative for abdominal pain and diarrhea. Genitourinary: Positive for difficulty urinating (slow). Negative for dysuria, frequency and urgency. Musculoskeletal: Negative for back pain and neck pain. Skin: Positive for pallor. Negative for rash. Neurological: Negative for syncope, light-headedness and headaches. Psychiatric/Behavioral: Negative for confusion.    All other systems reviewed and are negative. Patient Vitals for the past 12 hrs:   Temp Pulse Resp BP SpO2   03/01/17 1800 - 83 21 105/52 -   03/01/17 1730 - 98 18 116/56 -   03/01/17 1715 - 100 14 108/71 99 %   03/01/17 1700 - 79 15 114/41 100 %   03/01/17 1645 - 81 18 136/58 99 %   03/01/17 1631 - 79 18 - 98 %   03/01/17 1630 - - - 124/61 -   03/01/17 1615 - - - 121/43 99 %   03/01/17 1600 - - - 102/46 99 %   03/01/17 1545 - - - (!) 106/34 99 %   03/01/17 1515 - - - 100/52 95 %   03/01/17 1431 97.7 °F (36.5 °C) 83 16 102/76 96 %       Physical Exam   Constitutional: He is oriented to person, place, and time. He appears well-developed and well-nourished. No distress. HENT:   Head: Normocephalic. Nose: Nose normal.   Mouth/Throat: Oropharynx is clear and moist. No oropharyngeal exudate. Eyes: Conjunctivae are normal. Pupils are equal, round, and reactive to light. No scleral icterus. Neck: Normal range of motion. Neck supple. No JVD present. No tracheal deviation present. No thyromegaly present. Cardiovascular: Normal rate, regular rhythm and intact distal pulses. Exam reveals no gallop and no friction rub. No murmur heard. Pulmonary/Chest: Effort normal and breath sounds normal. No stridor. No respiratory distress. He has no wheezes. He has no rales. Abdominal: Soft. Bowel sounds are normal. He exhibits no distension. There is no tenderness. There is no rebound and no guarding. Musculoskeletal: Normal range of motion. He exhibits no edema. Leg brace RLE   Lymphadenopathy:     He has no cervical adenopathy. Neurological: He is alert and oriented to person, place, and time. No cranial nerve deficit. He exhibits normal muscle tone. Skin: Skin is warm and dry. No rash noted. He is not diaphoretic. No erythema. Psychiatric: He has a normal mood and affect. His behavior is normal.   Nursing note and vitals reviewed.        MDM  Number of Diagnoses or Management Options  Acute cystitis with hematuria: Acute hyperkalemia:   Acute renal failure, unspecified acute renal failure type [facility]):   Diagnosis management comments: DDx: UTI, metabolic abnormality        Amount and/or Complexity of Data Reviewed  Clinical lab tests: reviewed and ordered  Tests in the medicine section of CPT®: reviewed and ordered  Obtain history from someone other than the patient: yes (Sister, EMS)  Review and summarize past medical records: yes  Discuss the patient with other providers: yes (Nephrology, hospitalist  )  Independent visualization of images, tracings, or specimens: yes    Risk of Complications, Morbidity, and/or Mortality  General comments: Pt well appearing; afebrile; vss; normal lactate; +uti with acute renal insufficiency and hyperkalemia; hydrating with ns; gave kayexalate, calcium gluconate and sodium bicarb; consulted nephrology; no acute ekg changes; will admit to hospitalist; Ally Fowler MD      Critical Care  Total time providing critical care: 30-74 minutes    Patient Progress  Patient progress: stable    ED Course       Procedures      ED EKG interpretation: 15:26  Rhythm: normal sinus rhythm; and regular . Rate (approx.): 81; Axis: left axis deviation ; WI Interval: normal; QRS interval: normal ; ST/T wave: non-specific changes; This EKG was interpreted by Ally Fowler MD,ED Provider. CONSULT NOTE:   3:57 PM  Ally Fowler MD spoke with Dr. Liliya Sweet,   Specialty: Nephrology  Discussed pt's hx, disposition, and available diagnostic and imaging results. Reviewed care plans. Consultant will evaluate the pt. Written by ANMOL Thompson, as dictated by Ally Fowler MD.    CONSULT NOTE:   4:17 PM  Ally Fowler MD spoke with Dr. Trena Baldwin ,   Specialty: Hospitalist  Discussed pt's hx, disposition, and available diagnostic and imaging results. Reviewed care plans. Consultant will evaluate pt for admission.   Written by ANMOL Thompson, as dictated by Ally Fowler MD.    4:19 PM  No evidence of urinary retention on bladder scan  Written by Tristen Iniguez ED Scribe as dictated by Robina Barksdale MD    CRITICAL CARE NOTE :    6:42 PM      IMPENDING DETERIORATION -Cardiovascular and Metabolic    ASSOCIATED RISK FACTORS - Hypotension, Dysrhythmia and Metabolic changes    MANAGEMENT- Bedside Assessment and Supervision of Care    INTERPRETATION -  Xrays, ECG and Blood Pressure    INTERVENTIONS - hemodynamic mngmt and Metobolic interventions    CASE REVIEW - Hospitalist and Medical Sub-Specialist    TREATMENT RESPONSE -Improved    PERFORMED BY - Self        NOTES   :      I have spent 50 minutes of critical care time involved in lab review, consultations with specialist, family decision- making, bedside attention and documentation. During this entire length of time I was immediately available to the patient . Critical Care: The reason for providing this level of medical care for this critically ill patient was due to a critical illness that impaired one or more vital organ systems, such that there was a high probability of imminent or life threatening deterioration in the patient's condition. This care involved high complexity decision making to assess, manipulate, and support vital system functions, to treat this degree of vital organ system failure, and to prevent further life threatening deterioration of the patients condition.   Robina Barksdale MD      LABORATORY TESTS:  Recent Results (from the past 12 hour(s))   CBC WITH AUTOMATED DIFF    Collection Time: 03/01/17  2:53 PM   Result Value Ref Range    WBC 10.9 4.1 - 11.1 K/uL    RBC 3.96 (L) 4.10 - 5.70 M/uL    HGB 11.6 (L) 12.1 - 17.0 g/dL    HCT 34.0 (L) 36.6 - 50.3 %    MCV 85.9 80.0 - 99.0 FL    MCH 29.3 26.0 - 34.0 PG    MCHC 34.1 30.0 - 36.5 g/dL    RDW 13.3 11.5 - 14.5 %    PLATELET 681 125 - 118 K/uL    NEUTROPHILS 85 (H) 32 - 75 %    LYMPHOCYTES 8 (L) 12 - 49 %    MONOCYTES 6 5 - 13 %    EOSINOPHILS 1 0 - 7 %    BASOPHILS 0 0 - 1 %    ABS. NEUTROPHILS 9.3 (H) 1.8 - 8.0 K/UL    ABS. LYMPHOCYTES 0.8 0.8 - 3.5 K/UL    ABS. MONOCYTES 0.7 0.0 - 1.0 K/UL    ABS. EOSINOPHILS 0.1 0.0 - 0.4 K/UL    ABS. BASOPHILS 0.0 0.0 - 0.1 K/UL   METABOLIC PANEL, COMPREHENSIVE    Collection Time: 03/01/17  2:53 PM   Result Value Ref Range    Sodium 130 (L) 136 - 145 mmol/L    Potassium 6.1 (H) 3.5 - 5.1 mmol/L    Chloride 100 97 - 108 mmol/L    CO2 21 21 - 32 mmol/L    Anion gap 9 5 - 15 mmol/L    Glucose 166 (H) 65 - 100 mg/dL    BUN 78 (H) 6 - 20 MG/DL    Creatinine 3.16 (H) 0.70 - 1.30 MG/DL    BUN/Creatinine ratio 25 (H) 12 - 20      GFR est AA 24 (L) >60 ml/min/1.73m2    GFR est non-AA 20 (L) >60 ml/min/1.73m2    Calcium 9.6 8.5 - 10.1 MG/DL    Bilirubin, total 0.8 0.2 - 1.0 MG/DL    ALT (SGPT) 26 12 - 78 U/L    AST (SGOT) 18 15 - 37 U/L    Alk.  phosphatase 68 45 - 117 U/L    Protein, total 8.3 (H) 6.4 - 8.2 g/dL    Albumin 4.0 3.5 - 5.0 g/dL    Globulin 4.3 (H) 2.0 - 4.0 g/dL    A-G Ratio 0.9 (L) 1.1 - 2.2     CK W/ CKMB & INDEX    Collection Time: 03/01/17  2:53 PM   Result Value Ref Range     39 - 308 U/L    CK - MB 3.9 (H) <3.6 NG/ML    CK-MB Index 2.5 0 - 2.5     TROPONIN I    Collection Time: 03/01/17  2:53 PM   Result Value Ref Range    Troponin-I, Qt. <0.04 <0.05 ng/mL   LIPASE    Collection Time: 03/01/17  2:53 PM   Result Value Ref Range    Lipase 228 73 - 393 U/L   LACTIC ACID, PLASMA    Collection Time: 03/01/17  3:35 PM   Result Value Ref Range    Lactic acid 1.3 0.4 - 2.0 MMOL/L   URINALYSIS W/MICROSCOPIC    Collection Time: 03/01/17  5:24 PM   Result Value Ref Range    Color YELLOW/STRAW      Appearance TURBID (A) CLEAR      Specific gravity 1.025 1.003 - 1.030      pH (UA) 5.5 5.0 - 8.0      Protein 100 (A) NEG mg/dL    Glucose NEGATIVE  NEG mg/dL    Ketone NEGATIVE  NEG mg/dL    Bilirubin NEGATIVE  NEG      Blood LARGE (A) NEG      Urobilinogen 0.2 0.2 - 1.0 EU/dL    Nitrites NEGATIVE  NEG      Leukocyte Esterase LARGE (A) NEG      WBC >100 (H) 0 - 4 /hpf    RBC 10-20 0 - 5 /hpf    Epithelial cells FEW FEW /lpf    Bacteria NEGATIVE  NEG /hpf    Budding Yeast PRESENT (A) NEG      Yeast w/hyphae PRESENT (A) NEG         IMAGING RESULTS:  US RETROPERITONEUM COMP    (Results Pending)       MEDICATIONS GIVEN:  Medications   0.9% sodium chloride infusion (75 mL/hr IntraVENous New Bag 3/1/17 1621)   sodium chloride (NS) flush 5-10 mL (not administered)   sodium chloride (NS) flush 5-10 mL (not administered)   acetaminophen (TYLENOL) tablet 650 mg (not administered)   oxyCODONE-acetaminophen (PERCOCET) 5-325 mg per tablet 1 Tab (not administered)   naloxone (NARCAN) injection 0.4 mg (not administered)   ondansetron (ZOFRAN) injection 4 mg (not administered)   bisacodyl (DULCOLAX) suppository 10 mg (not administered)   enoxaparin (LOVENOX) injection 30 mg (not administered)   glucose chewable tablet 16 g (not administered)   dextrose (D50W) injection syrg 12.5-25 g (not administered)   glucagon (GLUCAGEN) injection 1 mg (not administered)   insulin lispro (HUMALOG) injection (not administered)   aspirin delayed-release tablet 81 mg (not administered)   cefTRIAXone (ROCEPHIN) 1 g in 0.9% sodium chloride (MBP/ADV) 50 mL (not administered)   fluconazole (DIFLUCAN) tablet 150 mg (not administered)   sodium polystyrene (KAYEXALATE) 15 gram/60 mL oral suspension 30 g (30 g Oral Given 3/1/17 1649)   sodium bicarbonate 8.4 % (1 mEq/mL) injection 50 mEq (50 mEq IntraVENous Given 3/1/17 1644)   calcium gluconate injection 1 g (1 g IntraVENous Given 3/1/17 1623)       IMPRESSION:  1. Acute hyperkalemia    2. Acute renal failure, unspecified acute renal failure type [facility])        PLAN: Admit to Hospitalist  4:19 PM  Patient is being admitted to the hospital by Dr. Jammie Malhotra. The results of their tests and reasons for their admission have been discussed with them and/or available family.   They convey agreement and understanding for the need to be admitted and for their admission diagnosis. Written by Sandy Gonsales, ED Scribe, as dictated by Ree Poon MD.    This note is prepared by Bin Zhang acting as scribe for MD Ree Cesar MD : The scribe's documentation has been prepared under my direction and personally reviewed by me in its entirety. I confirm that the note above accurately reflects all work, treatment, procedures, and medical decision making performed by me.

## 2017-03-01 NOTE — CONSULTS
Charlie Pedraza     NAME:Rolf Clark  PQJ:[id number]   NMI:[id number]   -                      Consult received and d/w Dr. Sherrie Burgess. Will see the patient shortly. Colin Felipe, [address] [facility]  [facility]  [address], [address], [address] [facility]  Phone - [phone]         Fax - [phone] [facility] Office  [address] [place] [address]  Phone - [phone]        Fax - [phone]     www. [email or web address] [email or web address]

## 2017-03-01 NOTE — IP AVS SNAPSHOT
Current Discharge Medication List  
  
Take these medications at their scheduled times Dose & Instructions Dispensing Information Comments Morning Noon Evening Bedtime  
 aspirin delayed-release 81 mg tablet Your next dose is: Today, Tomorrow Other:  ____________ Dose:  81 mg Take 81 mg by mouth daily. Refills:  0  
     
   
   
   
  
 colesevelam 625 mg tablet Commonly known as:  [facility] Your next dose is: Today, Tomorrow Other:  ____________ Dose:  1250 mg Take 2 Tabs by mouth two (2) times daily (with meals). Quantity:  360 Tab Refills:  1  
     
   
   
   
  
 fluconazole 100 mg tablet Commonly known as:  DIFLUCAN Your next dose is: Today, Tomorrow Other:  ____________ Dose:  100 mg Take 1 Tab by mouth daily for 7 days. FDA advises cautious prescribing of oral fluconazole in pregnancy. Quantity:  7 Tab Refills:  0  
     
   
   
   
  
 hydrocortisone 1 % lotion Commonly known as:  ALA-ONEIL Your next dose is: Today, Tomorrow Other:  ____________ Apply  to affected area two (2) times a day. use thin layer Refills:  0  
     
   
   
   
  
 ketoconazole 2 % topical cream  
Commonly known as:  NIZORAL Your next dose is: Today, Tomorrow Other:  ____________ Apply  to affected area daily. Refills:  0  
     
   
   
   
  
 timolol 0.25 % ophthalmic solution Commonly known as:  Windell Litter Your next dose is: Today, Tomorrow Other:  ____________ Dose:  1-2 Drop Administer 1-2 Drops to both eyes two (2) times a day. use in affected eye(s) Refills:  0 Take these medications as directed Dose & Instructions Dispensing Information Comments Morning Noon Evening Bedtime  
 atorvastatin 40 mg tablet Commonly known as:  LIPITOR Your next dose is: Today, Tomorrow Other:  ____________ TAKE 1 TABLET BY MOUTH AT BEDTIME Quantity:  90 Tab Refills:  1 Where to Get Your Medications Information about where to get these medications is not yet available ! Ask your nurse or doctor about these medications  
  fluconazole 100 mg tablet

## 2017-03-01 NOTE — CONSULTS
Acute Kidney Insufficiency Consult    Subjective:     Lulú Salgado is a 79 y.o.  male who has been admitted to the hospital for acute renal failure and hyperkalemia  . Patient was referred for acute renal disease as evidenced by increased serum creatinine     The patient denies any h/o renal disease but does have a h/o diabetes, hypertension, a CVA, and kidney stones. He was diagnosed with a UTI last week and placed on Bactrim. He lives alone, but his sister checks on him several times a week. She noticed that he did not eat well yesterday. He says he may not have eaten much for the last 2 days. This morning he slipped and fell. He then had some N/V. He denies any N/V until this morning. His medications include lisinopril, HCTZ, and Motrin. His Bun/creat was 20/1.1 in 11/16 but was 78/3.2 this morning with a K of 6.1. His Na was 130. (of note, his K was 5.0 in 6/16 when his Na was 132 and Bun/creat 41/1.1).           Patient Active Problem List    Diagnosis Date Noted    [facility] 03/01/2017    Hyperkalemia 03/01/2017    Hypertension 12/14/2009    Hyperlipidemia 12/14/2009    DM (diabetes mellitus) ([facility] 12/14/2009    H/O 12/14/2009    Recurrent UTI 12/14/2009    Nephrolithiasis 12/14/2009     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    small cecal polyp    DM (diabetes mellitus) ([facility] 12/14/2009    Hypercholesterolemia     Hyperlipidemia 12/14/2009    Hypertension 12/14/2009    Nephrolithiasis 12/14/2009    Nephrolithiasis 12/14/2009    Recurrent UTI 12/14/2009    Stroke [facility])       Past Surgical History:   Procedure Laterality Date    COLORECTAL SCRN; HI RISK IND  1/15/2014         ENDOSCOPY, COLON, DIAGNOSTIC  9/2/2008    small cecal tubular adenoma removed    HX HEENT      tonsillectomy    HX UROLOGICAL      for kidney stones     Family History   Problem Relation Age of Onset    Hypertension Mother     Elevated Lipids Mother     Heart Disease Mother     Diabetes Father     Hypertension Father    [facility] Elevated Lipids Sister     Hypertension Sister     Heart Disease Brother     Stroke Maternal Grandfather     Stroke Paternal Grandfather       Social History   Substance Use Topics    Smoking status: Former Smoker    Smokeless tobacco: Not on file    Alcohol use No      Allergies   Allergen Reactions    Bactrim [Sulfamethoxazole-Trimethoprim] Nausea and Vomiting      Prior to Admission medications    Medication Sig Start Date End Date Taking? Authorizing Provider   lisinopril (PRINIVIL, ZESTRIL) 20 mg tablet Take 1 Tab by mouth daily. 9/29/16  Yes Char Dale MD   atorvastatin (LIPITOR) 40 mg tablet TAKE 1 TABLET BY MOUTH AT BEDTIME 7/11/16  Yes Finley Bosworth, MD   hydrochlorothiazide (HYDRODIURIL) 12.5 mg tablet Take 1 Tab by mouth daily. 7/11/16  Yes Finley Bosworth, MD   [facility]) 625 mg tablet Take 2 Tabs by mouth two (2) times daily (with meals). 6/13/16  Yes Char Dale MD   metFORMIN (GLUCOPHAGE) 1,000 mg tablet Take 0.5 Tabs by mouth daily. 2/12/16  Yes Char Dale MD   RESTASIS 0.05 % ophthalmic emulsion Administer 1 Drop to both eyes two (2) times a day. 6/12/15  Yes Historical Provider   aspirin delayed-release 81 mg tablet Take 81 mg by mouth daily. Yes Historical Provider   nitrofurantoin (MACROBID) 100 mg capsule Take 100 mg by mouth nightly. Yes Historical Provider   timolol (BETIMOL) 0.25 % ophthalmic solution Administer 1-2 Drops to both eyes two (2) times a day. use in affected eye(s)   Yes Historical Provider   ketoconazole (NIZORAL) 2 % topical cream Apply  to affected area daily. Yes Historical Provider   hydrocortisone (ALA-ONEIL) 1 % lotion Apply  to affected area two (2) times a day.  use thin layer    Yes Historical Provider     Current Facility-Administered Medications   Medication Dose Route Frequency    0.9% sodium chloride infusion 100 mL/hr IntraVENous CONTINUOUS    sodium chloride (NS) flush 5-10 mL  5-10 mL IntraVENous Q8H    sodium chloride (NS) flush 5-10 mL  5-10 mL IntraVENous PRN    acetaminophen (TYLENOL) tablet 650 mg  650 mg Oral Q6H PRN    oxyCODONE-acetaminophen (PERCOCET) 5-325 mg per tablet 1 Tab  1 Tab Oral Q6H PRN    naloxone (NARCAN) injection 0.4 mg  0.4 mg IntraVENous PRN    ondansetron (ZOFRAN) injection 4 mg  4 mg IntraVENous Q4H PRN    bisacodyl (DULCOLAX) suppository 10 mg  10 mg Rectal DAILY PRN    [START ON 3/2/2017] enoxaparin (LOVENOX) injection 30 mg  30 mg SubCUTAneous Q24H    glucose chewable tablet 16 g  4 Tab Oral PRN    dextrose (D50W) injection syrg 12.5-25 g  12.5-25 g IntraVENous PRN    glucagon (GLUCAGEN) injection 1 mg  1 mg IntraMUSCular PRN    insulin lispro (HUMALOG) injection   SubCUTAneous AC&HS    [START ON 3/2/2017] aspirin delayed-release tablet 81 mg  81 mg Oral DAILY     Current Outpatient Prescriptions   Medication Sig    lisinopril (PRINIVIL, ZESTRIL) 20 mg tablet Take 1 Tab by mouth daily.  atorvastatin (LIPITOR) 40 mg tablet TAKE 1 TABLET BY MOUTH AT BEDTIME    hydrochlorothiazide (HYDRODIURIL) 12.5 mg tablet Take 1 Tab by mouth daily.  colesevelam (WELCHOL) 625 mg tablet Take 2 Tabs by mouth two (2) times daily (with meals).  metFORMIN (GLUCOPHAGE) 1,000 mg tablet Take 0.5 Tabs by mouth daily.  RESTASIS 0.05 % ophthalmic emulsion Administer 1 Drop to both eyes two (2) times a day.  aspirin delayed-release 81 mg tablet Take 81 mg by mouth daily.  nitrofurantoin (MACROBID) 100 mg capsule Take 100 mg by mouth nightly.  timolol (BETIMOL) 0.25 % ophthalmic solution Administer 1-2 Drops to both eyes two (2) times a day. use in affected eye(s)    ketoconazole (NIZORAL) 2 % topical cream Apply  to affected area daily.  hydrocortisone (ALA-ONEIL) 1 % lotion Apply  to affected area two (2) times a day.  use thin layer        Review of Systems:  Constitutional: positive for anorexia  Ears, nose, mouth, throat, and face: negative  Respiratory: negative  Cardiovascular: negative  Gastrointestinal: positive for nausea and vomiting  Genitourinary:negative  Musculoskeletal:negative  Neurological: no changes from baseline  Skin: no rashes    Objective:     Patient Vitals for the past 8 hrs:   BP Temp Pulse Resp SpO2 Height Weight   17 1631 - - 79 18 98 % - -   17 1630 124/61 - - - - - -   17 1615 121/43 - - - 99 % - -   17 1600 102/46 - - - 99 % - -   17 1545 (!) 106/34 - - - 99 % - -   17 1515 100/52 - - - 95 % - -   17 1431 102/76 97.7 °F (36.5 °C) 83 16 96 % 5' 10\" (1.778 m) 99.8 kg (220 lb)      Temp (24hrs), Av.7 °F (36.5 °C), Min:97.7 °F (36.5 °C), Max:97.7 °F (36.5 °C)    Intake and Output:          Physical Exam:  Visit Vitals    /52    Pulse 83    Temp 97.7 °F (36.5 °C)    Resp 21    Ht 5' 10\" (1.778 m)    Wt 99.8 kg (220 lb)    SpO2 99%    BMI 31.57 kg/m2     General appearance: alert, cooperative, no distress, moderately obese  Head: Normocephalic, without obvious abnormality, atraumatic  Back: symmetric, no curvature. ROM normal. No CVA tenderness. Lungs: clear to auscultation bilaterally  Heart: regular rate and rhythm  Abdomen: soft and non-tender  Extremities: no edema  Skin: no rashes noted  Neurologic:somewhat slow mentation and poor historian  --presumably his baseline?   Musculoskeletal: brace on left leg, o/w grossly unremarkable    Data Review:   CBC:   Lab Results   Component Value Date/Time    WBC 10.9 2017 02:53 PM    RBC 3.96 2017 02:53 PM    HGB 11.6 2017 02:53 PM    HCT 34.0 2017 02:53 PM    PLATELET 380  02:53 PM   , CMP:   Lab Results   Component Value Date/Time    Glucose 166 2017 02:53 PM    Sodium 130 2017 02:53 PM    Potassium 6.1 2017 02:53 PM    Chloride 100 2017 02:53 PM    CO2 21 2017 02:53 PM    BUN 78 2017 02:53 PM    Creatinine 3.16 03/01/2017 02:53 PM    Calcium 9.6 03/01/2017 02:53 PM    Anion gap 9 03/01/2017 02:53 PM    BUN/Creatinine ratio 25 03/01/2017 02:53 PM    AST (SGOT) 18 03/01/2017 02:53 PM    Alk. phosphatase 68 03/01/2017 02:53 PM    Protein, total 8.3 03/01/2017 02:53 PM    Albumin 4.0 03/01/2017 02:53 PM    Globulin 4.3 03/01/2017 02:53 PM    A-G Ratio 0.9 03/01/2017 02:53 PM       Reviewed: labs    Assessment:     Acute Kidney Insufficiency secondary to Pre-renal Azotemia seems most likely. The patient has been taking a NSAID and has been on a diuretic and ACE-I. His po intake has been poor. However, interstitial nephritis secondary to Bactrim is also possible. Hyperkalemia secondary to an ACE-I, NSAID, and renal failure. Hyponatremia    Recent UTI. Hypertension (12/14/2009)      DM (diabetes mellitus)       Recurrent UTI (12/14/2009)      Overview: Followed by Dr. Duncan          Nephrolithiasis (12/14/2009)      Overview: Followed by Dr. Lin Hearn    H/o a CVA            Plan:     1. Labs/Imaging/Procedures: UA and urine lytes and urine creatinine. We also need a renal ultrasound. Hold ACE-I, diuretics, and NSAIDS. Kayexalate given along with calcium and bicarb. Will give IV fluids. Dietary limit on K. Recheck labs. D/w the patient and his sister. Chart reviewed. Pertinent Notes Reviewed. Medications list Personally Reviewed. Akira Clayton, [address] [facility]  [facility] SYSTM FRANCISCAN [facility] LENKA [address], [address]  [place], [address]  Phone - [phone]    Fax - [phone]  [email or web address]                                         [facility]   [address] [address]  Phone - [phone]  Fax - [phone]. [employer]

## 2017-03-01 NOTE — ED NOTES
Bedside shift change report given to [facility], RN (oncoming nurse). Report included the following information SBAR. Pt in bed resting comfortably.

## 2017-03-02 NOTE — PROGRESS NOTES
0730: [facility]    0800: Pt. Sacrum in pink but blanchable. Will turn Q2 hours, and apply a foam pad. Pt.'s heels are pink but blanchable. Will get patient up to chair and float heels when in bed.

## 2017-03-02 NOTE — PROGRESS NOTES
Problem: Mobility Impaired (Adult and Pediatric)  Goal: *Acute Goals and Plan of Care (Insert Text)  Physical Therapy Goals  Initiated 3/2/2017  1. Patient will move from supine to sit and sit to supine , scoot up and down and roll side to side in bed with modified independence within 7 day(s). 2. Patient will transfer from bed to chair and chair to bed with minimal assistance/contact guard assist using the least restrictive device within 7 day(s). 3. Patient will perform sit to stand with modified independence within 7 day(s). 4. Patient will ambulate with minimal assistance/contact guard assist for 50 feet with the least restrictive device within 7 day(s). 5. Patient will increase Tinetti score by 5 points to 8/28 in order to improve safety in standing within 7 days. PHYSICAL THERAPY EVALUATION  Patient: Silvano Navarro (40 y.o. male)  Date: 3/2/2017  Primary Diagnosis: Acute renal failure (ARF) ([facility])        Precautions:  Fall, Bed Alarm      ASSESSMENT :  Based on the objective data described below, the patient presents with grossly decreased strength, balance, coordination, chronic (R) hemiparesis, and decreased safety awareness. Patient had (L) CVA in 1989 for which he received rehab and SAH. He has been living independently since ambulating with SPC and (R) AFO. Patient's sister was present throughout the session to provide insight on functional mobility. Patient required cuing to position (R) LE appropriately before standing after he stood with (R) leg too far away from him on first trial and pushed posteriorly back into the chair. Mod-maxA x 2 for first full stand, after which patient improved to needing only modA of 1. Ability to position (R) leg appropriately also improved, but patient had to manually assist with other extremities. Ambulated 3 x 5 feet with chair follow due to severely unsteady and inefficient gait pattern and need for maxA.   Attempted 1 trial with [facility] which patient used ineffectively too far away from [facility]. Subsequent trials with RW were slightly improved, but patient still had difficulty WS, stepping, and coordinating AD. He demonstrated excessive M/L trunk sway and weight shifting that was ineffective in facilitating forward progression of LE more than 1 inch at a time; (R) foot never cleared floor during ambulation. Patient was unable to complete target stepping, but should be attempted next session to improve step length. Activities to cue terminal hip extension would also be beneficial as patient has a tendency to lean forward in standing. Patient is impulsive and scored 3/28 on Tinetti, indicating \"high fall risk\" and is unsafe to return home at this point even with 24/7 supervision. Recommending IPR on discharge considering patient's neuro history and PLOF. He can tolerate 3 hr therapy daily. Patient will benefit from skilled intervention to address the above impairments. Patients rehabilitation potential is considered to be Fair  Factors which may influence rehabilitation potential include:   [ ]         None noted  [X]         Mental ability/status  [ ]         Medical condition  [X]         Home/family situation and support systems --lives alone, but supportive sister  [ ]         Safety awareness  [ ]         Pain tolerance/management  [ ]         Other:        PLAN :  Recommendations and Planned Interventions:  [X]           Bed Mobility Training             [X]    Neuromuscular Re-Education  [X]           Transfer Training                   [ ]    Orthotic/Prosthetic Training  [X]           Gait Training                         [ ]    Modalities  [X]           Therapeutic Exercises           [ ]    Edema Management/Control  [X]           Therapeutic Activities            [X]    Patient and Family Training/Education  [ ]           Other (comment):     Frequency/Duration: Patient will be followed by physical therapy  5 times a week to address goals.   Discharge Recommendations: Inpatient Rehab  Further Equipment Recommendations for Discharge: TBD       SUBJECTIVE:   Patient stated Alright\" to every questions and statement      OBJECTIVE DATA SUMMARY:   HISTORY:    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     small cecal polyp    DM (diabetes mellitus) ([facility] 12/14/2009    Hypercholesterolemia      Hyperlipidemia 12/14/2009    Hypertension 12/14/2009    Nephrolithiasis 12/14/2009    Nephrolithiasis 12/14/2009    Recurrent UTI 12/14/2009    [facility]roke [facility])       Past Surgical History:   Procedure Laterality Date    COLORECTAL SCRN; HI RISK IND   1/15/2014          ENDOSCOPY, COLON, DIAGNOSTIC   9/2/2008     small cecal tubular adenoma removed    HX HEENT         tonsillectomy    HX UROLOGICAL         for kidney stones     Prior Level of Function/Home Situation: Lives alone, but sister lives close by and checks on him often. She only assisted with bathing, but otherwise patient was independent with all ADLs and IADLs including ambulation with SPC. Personal factors and/or comorbidities impacting plan of care: s/p CVA 1989--(R) hemiparesis. Poor insight/safety awareness.        Home Situation  Home Environment: Private residence  # Steps to Enter: 4  Rails to Enter: Yes  Hand Rails : Right  One/Two Story Residence: One story  Living Alone: Yes  Support Systems: Family member(s)  Patient Expects to be Discharged to[de-identified] Private residence  Current DME Used/Available at Home: Cane, straight, Shower chair, Grab bars, Raised toilet seat  Tub or Shower Type: Tub/Shower combination     EXAMINATION/PRESENTATION/DECISION MAKING:   Critical Behavior:  Neurologic State: Alert  Orientation Level: Oriented X4  Cognition: Appropriate for age attention/concentration, Follows commands        Strength:    Strength: Generally decreased, functional        Tone & Sensation:                  Sensation: Intact               Range Of Motion:  AROM: Generally decreased, functional        Coordination:  Coordination: Grossly decreased, non-functional     Functional Mobility:  Bed Mobility:     Supine to Sit: Moderate assistance     Scooting: Moderate assistance  Transfers:  Sit to Stand:  Moderate assistance;Maximum assistance (max-modA x 2 trial 1, then modA x 1 for remainder)  Stand to Sit: Moderate assistance     Balance:   Sitting: Impaired  Sitting - Static: Good (unsupported)  Sitting - Dynamic: Fair (occasional)  Standing: Impaired  Standing - Static: Fair  Standing - Dynamic : Poor  Ambulation/Gait Training:  Distance (ft): 15 Feet (ft) (3x5)  Assistive Device: Walker, rolling;Gait belt;Cane, straight (1 trail with cane; 2 with walker)  Ambulation - Level of Assistance: Maximum assistance     Gait Description (WDL): Exceptions to WDL  Gait Abnormalities: Hemiplegic;Decreased step clearance;Shuffling gait;Trunk sway increased (excessive M/L trunk movement without forward progresssion)        Base of Support: Widened;Center of gravity altered     Speed/Florina: Shuffled;Pace decreased (<100 feet/min)  Step Length: Right shortened;Left shortened  Swing Pattern: Right asymmetrical                    Functional Measure:  Tinetti test:      Sitting Balance: 1  Arises: 0  Attempts to Rise: 0  Immediate Standing Balance: 0  Standing Balance: 0  Nudged: 0  Eyes Closed: 0  Turn 360 Degrees - Continuous/Discontinuous: 0  Turn 360 Degrees - Steady/Unsteady: 0  Sitting Down: 0  Balance Score: 1  Indication of Gait: 0  R Step Length/Height: 0  L Step Length/Height: 0  R Foot Clearance: 0  L Foot Clearance: 1  Step Symmetry: 0  Step Continuity: 0  Path: 1  Trunk: 0  Walking Time: 0  Gait Score: 2  Total Score: 3         Tinetti Test and G-code impairment scale:  Percentage of Impairment CH     0%    CI     1-19% CJ     20-39% CK     40-59% CL     60-79% CM     80-99% CN      100%   Tinetti  Score 0-28 28 23-27 17-22 12-16 6-11 1-5 0          Tinetti Tool Score Risk of Falls  <19 = High Fall Risk  19-24 = Moderate Fall Risk  25-28 = Low Fall Risk  Mary LOZOYA. Performance-Oriented Assessment of Mobility Problems in Elderly Patients. [address]; [id number]. (Scoring Description: PT Bulletin Feb. 10, 1993)     Older adults: Aminah Arreola et al, 2009; n = 1000 [facility] elderly evaluated with ABC, QUITA, ADL, and IADL)  · Mean QUITA score for males aged 69-68 years = 26.21(3.40)  · Mean QUITA score for females age 69-68 years = 25.16(4.30)  · Mean QUITA score for males over 80 years = 23.29(6.02)  · Mean QUITA score for females over 80 years = 17.20(8.32)            G codes: In compliance with CMSs Claims Based Outcome Reporting, the following G-code set was chosen for this patient based on their primary functional limitation being treated: The outcome measure chosen to determine the severity of the functional limitation was the Tinetti with a score of 3/28 which was correlated with the impairment scale.       · Mobility - Walking and Moving Around:               - CURRENT STATUS:    CM - 80%-99% impaired, limited or restricted               - GOAL STATUS:           CL - 60%-79% impaired, limited or restricted               - D/C STATUS:                       ---------------To be determined---------------      Physical Therapy Evaluation Charge Determination   History Examination Presentation Decision-Making   HIGH Complexity :3+ comorbidities / personal factors will impact the outcome/ POC  MEDIUM Complexity : 3 Standardized tests and measures addressing body structure, function, activity limitation and / or participation in recreation  MEDIUM Complexity : Evolving with changing characteristics  MEDIUM Complexity : FOTO score of 26-74      Based on the above components, the patient evaluation is determined to be of the following complexity level: MEDIUM     Pain:  Pain Scale 1: Numeric (0 - 10)  Pain Intensity 1: 0              Activity Tolerance:   Fair  Please refer to the flowsheet for vital signs taken during this treatment. After treatment:   [X]         Patient left in no apparent distress sitting up in chair  [ ]         Patient left in no apparent distress in bed  [X]         Call bell left within reach  [X]         Nursing notified  [X]         Caregiver present  [ ]         Bed alarm activated--not on in chair on arrival.      COMMUNICATION/EDUCATION:   The patients plan of care was discussed with: Occupational Therapist and Registered Nurse.  [X]         Fall prevention education was provided and the patient/caregiver indicated understanding. [X]         Patient/family have participated as able in goal setting and plan of care. [X]         Patient/family agree to work toward stated goals and plan of care. [ ]         Patient understands intent and goals of therapy, but is neutral about his/her participation. [ ]         Patient is unable to participate in goal setting and plan of care. Regarding student involvement in patient care:  A student participated in this treatment session. Per CMS Medicare statements and APTA guidelines I certify that the following was true:  1. I was present and directly observed the entire session. 2. I made all skilled judgments and clinical decisions regarding care. 3. I am the practitioner responsible for assessment, treatment, and documentation.      Thank you for this referral.  Karuna Love , SPT   Time Calculation: 35 mins

## 2017-03-02 NOTE — PROGRESS NOTES
[email or web address]                  Phone - [phone]   Renal Progress Note    Subjective:   Patient: Lulú Salgado                    YOB: 1950               Date- 3/2/2017          Reason for visit: ARF and hyperkalemia    Admission Date: 3/1/2017         Acute Kidney Insufficiency secondary to Pre-renal Azotemia seems most likely. The patient has been taking a NSAID and has been on a diuretic and ACE-I. His po intake has been poor. However, interstitial nephritis secondary to Bactrim is also possible. Renal function slightly improved on 3/2 with the Bun/creat down to 73/2.5.     Hyperkalemia secondary to an ACE-I, NSAID, and renal failure.     Hyponatremia     Recent UTI.     Hypertension (2009)     DM (diabetes mellitus)      Recurrent UTI (2009)  Overview: Followed by Dr. Duncan      Nephrolithiasis (2009)  Overview: Followed by Dr. Genia Berger     H/o a CVA    Right renal calculus measures up to 2.6 cm. Mixed cystic and  solid right renal lesion is ill-defined but unchanged            Plan:        Continue to hold ACE-I, diuretics, and NSAIDS. Continue IV fluids. Will lift dietary K restriction if K still good tomorrow. Recheck labs in AM.    F/U with urology after discharge regarding the lesion in the right kidney. D/w the patient and Dr. Jameel Pereyra (however, the patient does not appear to understand most of what is going on). Complaint: none.  Patient feeling well    Review of Systems  Constitutional: negative  Ears, nose, mouth, throat, and face: negative  Respiratory: negative  Cardiovascular: negative  Gastrointestinal: negative  Musculoskeletal:negative  Neurological: negative  Skin: no rashes    Objective:     Visit Vitals    /62 (BP 1 Location: Left arm, BP Patient Position: At rest)    Pulse 92    Temp 98.1 °F (36.7 °C)    Resp 16    Ht 5' 10\" (1.778 m)    Wt 99.8 kg (220 lb)    SpO2 100%    BMI 31.57 kg/m2     Temp (24hrs), Av.9 °F (36.6 °C), Min:97.7 °F (36.5 °C), Max:98.1 °F (36.7 °C)      03/02 0701 - 03/02 1900  In: 240 [P.O.:240]  Out: 686 [BSOOV:657]  02/28 1901 - 03/02 0700  In: 0406 [P.O.:240; I.V.:925]  Out: 280 [Urine:280]    Physical Exam:  General:  alert, cooperative, no distress, up in a chair. Eye: anicteric sclera  Neck: supple; no JVD  Neuro: no acute changes. Confusion noted but per sister, this is his baseline. Lungs:  clear to auscultation bilaterally  Heart:  regular rate and rhythm; no gallop or rub  Abdomen:  soft, non-tender. Bowel sounds normal. No masses,  no organomegaly  Skin:  No rashes noted  Psych: seems content  --normal affect and mood  Abdomen:  soft, non-tender. Extremities/Edema: none    Data Review:     Recent Labs      03/02/17   0237  03/01/17   2307  03/01/17   1453   WBC  9.7   --   10.9   HGB  10.2*   --   11.6*   NA  136  134*  130*   K  4.3  4.9  6.1*   CL  104  103  100   CO2  22  20*  21   BUN  73*  74*  78*   CREA  2.46*  2.63*  3.16*   CA  9.1  9.6  9.6   ALB  3.3*   --   4.0   PHOS  4.0   --    --    MG  2.0   --    --        Assessment:     Active Problems:    Hypertension (12/14/2009)      DM (diabetes mellitus) ([facility] (12/14/2009)      H/O (12/14/2009)      Recurrent UTI (12/14/2009)      Overview: Followed by Rachelle Navarro            Nephrolithiasis (12/14/2009)      Overview: Followed by Dr. Ken Khalil            Acute renal failure ([facility]F) [facility] (3/1/2017)      Hyperkalemia (3/1/2017)      Chart reviewed. Pertinent Notes Reviewed. Medications list Personally Reviewed. Lisha Sheets, [address] [facility]  [facility] [address], [address]  [place], [address] [place]  Phone - [phone]    Fax - [phone]  [email or web address]                                         [facility]   [address] [address]  Phone - [phone]  Fax - [phone]. [facility]

## 2017-03-02 NOTE — PROGRESS NOTES
TRANSFER - IN REPORT:    Verbal report received from [facility] Tressa (name) on July Sanchez  being received from ED (unit) for routine progression of care      Report consisted of patients Situation, Background, Assessment and   Recommendations(SBAR). Information from the following report(s) SBAR, Kardex, ED Summary, Intake/Output, MAR, Recent Results and Med Rec Status was reviewed with the receiving nurse. Opportunity for questions and clarification was provided. Assessment completed upon patients arrival to unit and care assumed.

## 2017-03-02 NOTE — PROGRESS NOTES
Hospitalist Progress Note    NAME: Awilda Hendrickson   :  1950   MRN:  [id number]       Interim Hospital Summary: 79 y.o. male whom presented on 3/1/2017 with      Assessment / Plan:  Acute renal failure (ARF) ([address] (3/1/2017) POA   - trending down a little; admit BUN/Creat 78/3.16 (today: 73/2.46)  - Nephrology following; pre-renal azotemia secondary to NSAID, diuretics, ACE, and AIN from Bactrim  - ACE, Diuretics, and NSAIDS are on hold  - Renal U/S: No hydronephrosis. Right nephrolithiasis. Nonspecific right renal mixed cystic and solid lesion. When patient's renal function returns to a GFR greater than 30, recommend dedicated renal CT or MRI; pt has been advised to follow up with  as outpatient (he was followed by Dr. Alexander Duty as outpatient in the past)    Hyperkalemia POA K 6.1  - resolved; K 4.3 (was 6.1 on admission)  - Hold the HCTZ, lisinopril     DM type 2 without complications POA  - Hold metformin with the renal failure  - Hgb A1C 6.5   - blood glucose in 110's, check qac and hs blood glucose, SSI, diabetic diet     Essential HTN POA  -  /s-130's/ 60's  -  PRN hydralazine     History of nephrolithiasis US with no stones or hydronephrosis     Hyperlipidemia   - continue with Lipitor     History of stroke in  with residual right weakness     DVT prophylaxis: lovenox SQ      Stress ulcer prophylaxis: Not indicated     Code status: Full code      Next of Kin: sister and brother    Recommended Disposition: [facility]/Rehab     Subjective:     Chief Complaint / Reason for Physician Visit  \"I am ok\". Discussed with RN events overnight.      Review of Systems:  Symptom Y/N Comments  Symptom Y/N Comments   Fever/Chills n   Chest Pain n    Poor Appetite n   Edema     Cough n   Abdominal Pain     Sputum n   Joint Pain     SOB/FLORES n   Pruritis/Rash     Nausea/vomit n   Tolerating PT/OT     Diarrhea n   Tolerating Diet     Constipation n   Other       Could NOT obtain due to:      Objective:     VITALS: Last 24hrs VS reviewed since prior progress note. Most recent are:  Patient Vitals for the past 24 hrs:   Temp Pulse Resp BP SpO2   03/02/17 1546 98 °F (36.7 °C) 90 18 136/64 98 %   03/02/17 1126 98.1 °F (36.7 °C) 92 16 116/62 100 %   03/02/17 0828 98.1 °F (36.7 °C) 100 16 132/66 98 %   03/02/17 0230 97.9 °F (36.6 °C) 85 18 129/54 95 %   03/01/17 2257 97.8 °F (36.6 °C) 86 20 128/62 98 %   03/01/17 2120 97.8 °F (36.6 °C) 82 20 128/59 100 %   03/01/17 2030 - 81 21 149/77 -   03/01/17 2000 - 93 25 142/49 98 %   03/01/17 1951 - 86 18 - 100 %   03/1950 - 87 17 122/54 -   03/01/17 1839 - (!) 103 17 - 100 %   03/01/17 1833 - 96 16 116/55 -   03/01/17 1815 - 95 18 126/63 -   03/01/17 1800 - 83 21 105/52 -   03/01/17 1730 - 98 18 116/56 -   03/01/17 1715 - 100 14 108/71 99 %   03/01/17 1700 - 79 15 114/41 100 %   03/01/17 1645 - 81 18 136/58 99 %   03/01/17 1631 - 79 18 - 98 %   03/01/17 1630 - - - 124/61 -   03/01/17 1615 - - - 121/43 99 %       Intake/Output Summary (Last 24 hours) at 03/02/17 1607  Last data filed at 03/02/17 1502   Gross per 24 hour   Intake             1405 ml   Output             1110 ml   Net              295 ml        PHYSICAL EXAM:  General: WD, WN. Alert, cooperative, no acute distress    EENT:  EOMI. Anicteric sclerae. MMM  Resp:  CTA bilaterally, no wheezing or rales. No accessory muscle use  CV:  Regular  rhythm,  No edema  GI:  Soft, Non distended, Non tender.  +Bowel sounds  Neurologic:  Alert and oriented X 3 (but, it is unclear how much information he is able to process; unable to verbalize his understanding in detail, he responses as 'ok', normal speech,   Psych:   Good insight. Not anxious nor agitated, right side weakness  Skin:  No rashes.   No jaundice    Reviewed most current lab test results and cultures  YES  Reviewed most current radiology test results   YES  Review and summation of old records today    NO  Reviewed patient's current orders and MAR    YES  PMH/SH reviewed - no change compared to H&P  ________________________________________________________________________  Care Plan discussed with:    Comments   Patient y    Family      RN y    Care Manager y    Consultant                       y Multidiciplinary team rounds were held today with , nursing, pharmacist and clinical coordinator. Patient's plan of care was discussed; medications were reviewed and discharge planning was addressed. ________________________________________________________________________  Total NON critical care TIME:  30   Minutes    Total CRITICAL CARE TIME Spent:   Minutes non procedure based      Comments   >50% of visit spent in counseling and coordination of care     ________________________________________________________________________  Maggie Feuntes NP     Procedures: see electronic medical records for all procedures/Xrays and details which were not copied into this note but were reviewed prior to creation of Plan. LABS:  I reviewed today's most current labs and imaging studies.   Pertinent labs include:  Recent Labs      03/02/17 0237 03/01/17   1453   WBC  9.7  10.9   HGB  10.2*  11.6*   HCT  29.6*  34.0*   PLT  256  284     Recent Labs      03/02/17   0237  03/01/17   2307  03/01/17   1453   NA  136  134*  130*   K  4.3  4.9  6.1*   CL  104  103  100   CO2  22  20*  21   GLU  119*  125*  166*   BUN  73*  74*  78*   CREA  2.46*  2.63*  3.16*   CA  9.1  9.6  9.6   MG  2.0   --    --    PHOS  4.0   --    --    ALB  3.3*   --   4.0   TBILI  0.5   --   0.8   SGOT  14*   --   18   ALT  21   --   26       Signed: )Héctor Louis, NP

## 2017-03-02 NOTE — PROGRESS NOTES
Initial Nutrition Assessment:    INTERVENTIONS/RECOMMENDATIONS:   · Continue current diet  · Encourage PO intake  · Consider lifting K+ restriction when medically feasible     ASSESSMENT:   Mr. Basia Licona, 80 yo male medically noted for ARF, DM, HTN, HLD and PMH shown below. Pt report of varied appetite and normally eats 2 meals per day (L&D). He notes of a ~20 lbs (8%) weight loss in the past 3 months but was a little unsure of the time frame. He just started to drink ensure BID at home. Will hold on ONS due to K+ restriction on diet order and monitor PO intake. Ate 100% of breakfast this morning. 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 Pertinent Medications: [x]Reviewed []Other (NS, welchol, insulin)  Pertinent Labs: [x]Reviewed []Other (HA1C: 6.5)  Food Allergies: [x]None []Other   Last BM: 3/1   [x]Active     []Hyperactive  []Hypoactive       [] Absent BS  Skin:    [x] Intact   [] Incision  [] Breakdown  [] Other:    Anthropometrics:   Height: 5' 10\" (177.8 cm) Weight: 99.8 kg (220 lb)   IBW (%IBW):   ( ) UBW (%UBW):   (  %)   Last Weight Metrics:  Weight Loss Metrics 3/1/2017 11/7/2016 1/7/2016 8/17/2015 11/19/2014 1/15/2014 10/29/2013   Today's Wt 220 lb 225 lb 246 lb 3.2 oz 247 lb 231 lb 235 lb 249 lb   BMI 31.57 kg/m2 33.23 kg/m2 36.34 kg/m2 36.46 kg/m2 34.1 kg/m2 34.69 kg/m2 37.31 kg/m2       BMI: Body mass index is 31.57 kg/(m^2).     This BMI is indicative of:   []Underweight    []Normal    []Overweight    [x] Obesity   [] Extreme Obesity (BMI>40)     Estimated Nutrition Needs (Based on):   2125 Kcals/day (MSJ: 1775 x 1.2) , 80 g (0.8 g/kg) Protein  Carbohydrate: At Least 130 g/day  Fluids: 2125 mL/day (1ml/kcal)    NUTRITION DIAGNOSES:   Problem:  Inadequate oral food/beverage intake      Etiology: related to varied appetite     Signs/Symptoms: as evidenced by pt report of 8% wt loss in 3 months      NUTRITION INTERVENTIONS:  Meals/Snacks: General/healthful diet                  GOAL:   consume >50% of meals in 3-5 days    LEARNING NEEDS (Diet, Food/Nutrient-Drug Interaction):    [x] None Identified   [] Identified and Education Provided/Documented   [] Identified and Pt declined/was not appropriate     Cultureal, Orthodox, OR Ethnic Dietary Needs:    [x] None Identified   [] Identified and Addressed     [x] Interdisciplinary Care Plan Reviewed/Documented    [x] Discharge Planning:   Consistent carb diet    MONITORING /EVALUATION:      Food/Nutrient Intake Outcomes:  Total energy intake  Physical Signs/Symptoms Outcomes: Weight/weight change, Electrolyte and renal profile, Glucose profile, GI    NUTRITION RISK:    [] High              [x] Moderate      to     [x]  Low  []  Minimal/Uncompromised    PT SEEN FOR:    []  MD Consult: []Calorie Count      []Diabetic Diet Education        []Diet Education     []Electrolyte Management     []General Nutrition Management and Supplements     []Management of Tube Feeding     []TPN Recommendations    [x]  RN Referral:  [x]MST score >=2     []Enteral/Parenteral Nutrition PTA     []Pregnant: Gestational DM or Multigestation     []Pressure Ulcer/Wound Care needs        []  Low BMI  []  DTR Referral       Yudy Castro RDN  Pager [phone]  Weekend Pager [phone]

## 2017-03-02 NOTE — PROGRESS NOTES
Problem: Acute Renal Failure: Day 1  Goal: Respiratory  Outcome: Resolved/Met Date Met:  03/01/17  Lungs clear to auscultation bilaterally.

## 2017-03-02 NOTE — PROGRESS NOTES
1100:   Cardiopulmonary Care Interdisciplinary rounds were held today to discuss patient plan of care and outcomes. The following members were present: PT, NP/Physician, Pharmacy, Nursing, Nutritionist and Case Management. Plan of Care: Continue current treatment plan. Pt. Is on IV abx's for a UTI, PT/OT is recommending home health.

## 2017-03-02 NOTE — PROGRESS NOTES
Pt was admitted from home through the ED. Pt is alert and oriented, he lives alone in his one story home. He uses a cane and has a tub transfer bench. He has had a stroke and has right side deficits. He states he is independent with most ADLs and IADLs. His sister is very support and lives nearby. She assists him with his bathing, transportation to MD appointments, grocery shopping, etc.  She stated she is available to stay with him at discharge and she also has a bed and bathroom set up in her home where he can stay with her also. He prefers to be in his own home. Pt has been to [facility] in the past for inpt rehab and outpatient services with Dr. Celio Aguilera. Discussed d/c options. Pt is open to returning to [facility] at discharge or home with [facility] and sister's support, whichever is most appropriate to meet his needs. MD order obtained for [facility] consult and referral sent via [address]. CM will continue to follow for d/c needs. Care Management Interventions  PCP Verified by CM:  Yes  Transition of Care Consult (CM Consult): Discharge Planning  Discharge Durable Medical Equipment: No (uses a cane)  Physical Therapy Consult: Yes  Occupational Therapy Consult: Yes  Speech Therapy Consult: No  Current Support Network: Own Home, Lives Alone  Confirm Follow Up Transport: Family (sister)  Plan discussed with Pt/Family/Caregiver: Yes  Freedom of Choice Offered: Yes    Gabriela Rajput, [address] [place] [address] [phone]

## 2017-03-02 NOTE — PROGRESS NOTES
[address] SHIFT NURSING NOTE    Bedside shift change report given to Yvon Nieves RN (oncoming nurse) by Dolores Martins RN (offgoing nurse). Report included the following information SBAR, Kardex, Intake/Output, MAR, Recent Results, Med Rec Status and Cardiac Rhythm NSR.    SHIFT SUMMARY: Patient incontinent of large amount of urine this AM. Bladder scan post void noted 51 cc. Admission Date 3/1/2017   Admission Diagnosis Acute renal failure (ARF) ([address]   Consults IP CONSULT TO NEPHROLOGY        Consults   [x] PT   [x] OT   [] Speech   [] Palliative      [] Hospice    [] Case Management   [] None   Cardiac Monitoring   [x] Yes   [] No     Antibiotics   [x] Yes   [] No   GI Prophylaxis  (Ex: Protonix, Pepcid, etc,.)   [] Yes   [x] No          DVT Prophylaxis   SCDs:             Sammy stockings:         [x] Medication (Ex: Lovenox, Eliquis, Brilinta, Coumadin,  Heparin, etc..)   [] Contraindicated   [] No VTE needed       Urinary Catheter             LDAs               Peripheral IV 03/01/17 Right Wrist (Active)   Site Assessment Clean, dry, & intact 3/2/2017  2:57 AM   Phlebitis Assessment 0 3/2/2017  2:57 AM   Infiltration Assessment 0 3/2/2017  2:57 AM   Dressing Status Clean, dry, & intact 3/2/2017  2:57 AM   Dressing Type Tape;Transparent 3/2/2017  2:57 AM   Hub Color/Line Status Pink;Patent; Infusing 3/2/2017  2:57 AM                      I/Os   Intake/Output Summary (Last 24 hours) at 03/02/17 0526  Last data filed at 03/02/17 0525   Gross per 24 hour   Intake             1165 ml   Output              200 ml   Net              965 ml         Activity Level Activity Level: Up with Assistance     Activity Assistance: Partial (one person)   Diet Active Orders   Diet    DIET DIABETIC CONSISTENT CARB Regular; Low Potassium      Purposeful Rounding every 1-2 hour?    [x] Yes    Jasbir Score  Total Score: 4   Bed Alarm (If score 3 or >)   [x] Yes    [] Refused (See signed refusal form in chart)   Pako Score Pako Score: 18       Pako Score (if score 14 or less)   [] PMT consult   [] Nutrition consult   [] Wound Care consult      []  Specialty bed         Influenza Vaccine Received Flu Vaccine for Current Season (usually Sept-March): Yes (December 2016)               Needs prior to discharge:   Home O2 required:    [] Yes   [x] No     If yes, how much O2 required?     Other:    Last Bowel Movement Date: 03/01/17   Readmission Risk Assessment Tool Score Medium Risk            15       Total Score        3 Relationship with PCP    4 More than 1 Admission in calendar year    5 Patient Insurance is Medicare, Medicaid or Self Pay    3 Charlson Comorbidity Score        Criteria that do not apply:    Patient Living Status    Patient Length of Stay > 5       Expected Length of Stay - - -   Actual Length of Stay 1

## 2017-03-02 NOTE — PROGRESS NOTES
9:20 PM Patient arrived to room 2210 via stretcher. VS stable. Sister at bedside. Patient oriented to room and call bell system. Bed in lowest position. Call bell within reach. Will continue to monitor closely.     Primary Nurse Zach Avendano RN and Lan Longo, RN performed a dual skin assessment on this patient No impairment noted  Pako score is 18

## 2017-03-02 NOTE — ED NOTES
TRANSFER - OUT REPORT:    Verbal report given to Larry Dumont RN on [employer]  being transferred to [facility] for routine progression of care       Report consisted of patients Situation, Background, Assessment and   Recommendations(SBAR). Information from the following report(s) SBAR, Kardex, ED Summary, [facility] and Recent Results was reviewed with the receiving nurse. Lines:   Peripheral IV 03/01/17 Right Wrist (Active)   Site Assessment Clean, dry, & intact 3/1/2017  4:21 PM   Phlebitis Assessment 0 3/1/2017  4:21 PM   Infiltration Assessment 0 3/1/2017  4:21 PM   Dressing Status Clean, dry, & intact 3/1/2017  4:21 PM        Opportunity for questions and clarification was provided.

## 2017-03-02 NOTE — ED NOTES
Brace on right leg ambulates with cane, paralyzed on right leg, weakness in right arm, uses left. Intermittent confusion. , lives alone.

## 2017-03-02 NOTE — PROGRESS NOTES
Problem: Self Care Deficits Care Plan (Adult)  Goal: *Acute Goals and Plan of Care (Insert Text)  Occupational Therapy Goals  Initiated 3/2/2017  1. Patient will perform grooming while standing at the sink with modified independence within 7 day(s). 2. Patient will perform bathing with supervision/set-up within 7 day(s). 3. Patient will perform lower body dressing with modified independence within 7 day(s). 4. Patient will perform toilet transfers with modified independence within 7 day(s). 5. Patient will perform all aspects of toileting with modified independence within 7 day(s). OCCUPATIONAL THERAPY EVALUATION  Patient: Blake Nesbitt (11 y.o. male)  Date: 3/2/2017  Primary Diagnosis: Acute renal failure (ARF) ([facility])        Precautions:          ASSESSMENT :  Based on the objective data described below, the patient presents with deficits in self-care as compared to his functional baseline, primarily due to decreased dynamic balance, general weakness, mild decreased safety, and decreased activity tolerance. He had a CVA in 1989 and presents with residual R sided deficits. However, he has been living alone and independent with basic self-care. His sister is very supportive and assists with IADL as needed. Discussed the possibility of rehab with him, but he is indicating that he wants to go home. He says that his sister will be able to stay with him and assist him, if needed, but this will need to be confirmed, as he is currently needing 24 hour supervision/assistance. Patient will benefit from skilled OT treatment to maximize independence and safety in ADL. Patient will benefit from skilled intervention to address the above impairments.   Patients rehabilitation potential is considered to be Good  Factors which may influence rehabilitation potential include:   [X]             None noted  [ ]             Mental ability/status  [ ]             Medical condition  [ ]             Home/family situation and support systems  [ ]             Safety awareness  [ ]             Pain tolerance/management  [ ]             Other:        PLAN :  Recommendations and Planned Interventions:  [X]               Self Care Training                  [X]        Therapeutic Activities  [X]               Functional Mobility Training    [ ]        Cognitive Retraining  [X]               Therapeutic Exercises           [X]        Endurance Activities  [X]               Balance Training                   [ ]        Neuromuscular Re-Education  [ ]               Visual/Perceptual Training     [X]   Home Safety Training  [X]               Patient Education                 [X]        Family Training/Education  [ ]               Other (comment):     Frequency/Duration: Patient will be followed by occupational therapy 3 times a week to address goals. Discharge Recommendations: To Be Determined  (Patient really wants to go home. Unsure if family will be able to provide assistance/supervision)  Further Equipment Recommendations for Discharge: None anticipated       SUBJECTIVE:   Patient stated I've been to rehab more times than I can count. 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     small cecal polyp    DM (diabetes mellitus) ([facility] [address] 12/14/2009    Hypercholesterolemia      Hyperlipidemia 12/14/2009    Hypertension 12/14/2009    Nephrolithiasis 12/14/2009    Nephrolithiasis 12/14/2009    Recurrent UTI 12/14/2009    Stroke [facility])       Past Surgical History:   Procedure Laterality Date    COLORECTAL SCRN; HI RISK IND   1/15/2014          ENDOSCOPY, COLON, DIAGNOSTIC   9/2/2008     small cecal tubular adenoma removed    HX HEENT         tonsillectomy    HX UROLOGICAL         for kidney stones        Prior Level of Function/Home Situation: Lives alone and is independent with self-care. Sister assist with IADL and drives him to his appointments.   Expanded or extensive additional review of patient history:      Home Situation  Home Environment: Private residence  # Steps to Enter: 4  Rails to Enter: Yes  Hand Rails : Right  One/Two Story Residence: One story  Living Alone: Yes  Support Systems: Family member(s)  Patient Expects to be Discharged to[de-identified] Private residence  Current DME Used/Available at Home: Charley Grist, straight, Shower chair, Raised toilet seat, Grab bars  Tub or Shower Type: Tub/Shower combination  [ ]  Right hand dominant   [X]  Left hand dominant (since CVA in 1989)     EXAMINATION OF PERFORMANCE DEFICITS:  Cognitive/Behavioral Status:  Neurologic State: Alert  Orientation Level: Oriented X4  Cognition: Appropriate for age attention/concentration; Follows commands  Perception: Appears intact  Perseveration: No perseveration noted     Vision/Perceptual:                           Acuity: Within Defined Limits       Range of Motion:  AROM: Generally decreased, functional (decreased R shoulder and R LE from old CVA)                       Strength:  Strength: Generally decreased, functional              Coordination:     Fine Motor Skills-Upper: Right Impaired;Left Intact (mild deficits from old CVA)    Gross Motor Skills-Upper: Right Intact; Left Intact     Balance:  Sitting: Impaired  Sitting - Static: Good (unsupported)  Sitting - Dynamic: Fair (occasional)  Standing: Impaired  Standing - Static: Fair  Standing - Dynamic : Fair     Functional Mobility and Transfers for ADLs:  Bed Mobility:  Supine to Sit: Moderate assistance  Scooting: Moderate assistance     Transfers:  Sit to Stand: Minimum assistance  Toilet Transfer : Contact guard assistance     ADL Assessment:  Feeding: Setup     Oral Facial Hygiene/Grooming: Setup     Bathing: Minimum assistance     Upper Body Dressing: Supervision     Lower Body Dressing:  Moderate assistance     Toileting: Minimum assistance                 ADL Intervention and task modifications:  Discussed strategies to increase independence with donning socks and shoes/brace. He has been able to manage until onset. Discussed safety and fall prevention during ADL and functional transfers. Functional Measure:  Barthel Index:      Bathin  Bladder: 10  Bowels: 10  Groomin  Dressin  Feedin  Mobility: 0  Stairs: 0  Toilet Use: 5  Transfer (Bed to Chair and Back): 10  Total: 50         Barthel and G-code impairment scale:  Percentage of impairment CH  0% CI  1-19% CJ  20-39% CK  40-59% CL  60-79% CM  80-99% CN  100%   Barthel Score 0-100 100 99-80 79-60 59-40 20-39 1-19    0   Barthel Score 0-20 20 17-19 13-16 9-12 5-8 1-4 0      The Barthel ADL Index: Guidelines  1. The index should be used as a record of what a patient does, not as a record of what a patient could do. 2. The main aim is to establish degree of independence from any help, physical or verbal, however minor and for whatever reason. 3. The need for supervision renders the patient not independent. 4. A patient's performance should be established using the best available evidence. Asking the patient, friends/relatives and nurses are the usual sources, but direct observation and common sense are also important. However direct testing is not needed. 5. Usually the patient's performance over the preceding 24-48 hours is important, but occasionally longer periods will be relevant. 6. Middle categories imply that the patient supplies over 50 per cent of the effort. 7. Use of aids to be independent is allowed. Sakina Rubio., Barthel, D.W. (2357). Functional evaluation: the Barthel Index. [facility] (14)2. ARRON Mclaughlin, Fely Bell., Екатерина Cabrera., [facility], [address] [place] [address] (). Measuring the change indisability after inpatient rehabilitation; comparison of the responsiveness of the Barthel Index and Functional [place] Measure. Journal of Neurology, Neurosurgery, and Psychiatry, 66(4), 344-550.   CLARISA Lucas.AMY, Radha Marshall  WRachelleJ.ANKIT, & Karrie Faye M.A. (2004.) Assessment of post-stroke quality of life in cost-effectiveness studies: The usefulness of the Barthel Index and the EuroQoL-5D. Quality of Life Research, 13, 154-57         G codes: In compliance with CMSs Claims Based Outcome Reporting, the following G-code set was chosen for this patient based on their primary functional limitation being treated: The outcome measure chosen to determine the severity of the functional limitation was the Barthel Index with a score of 50/100 which was correlated with the impairment scale. · Self Care:               - CURRENT STATUS:    CK - 40%-59% impaired, limited or restricted               - GOAL STATUS:           CJ - 20%-39% impaired, limited or restricted               - D/C STATUS:                       ---------------To be determined---------------      Occupational Therapy Evaluation Charge Determination   History Examination Decision-Making   LOW Complexity : Brief history review  LOW Complexity : 1-3 performance deficits relating to physical, cognitive , or psychosocial skils that result in activity limitations and / or participation restrictions  MEDIUM Complexity : Patient may present with comorbidities that affect occupational performnce.  Miniml to moderate modification of tasks or assistance (eg, physical or verbal ) with assesment(s) is necessary to enable patient to complete evaluation       Based on the above components, the patient evaluation is determined to be of the following complexity level: LOW   Pain:  Pain Scale 1: Numeric (0 - 10)  Pain Intensity 1: 0              Activity Tolerance:   Good  After treatment:   [X] Patient left in no apparent distress sitting up in chair  [ ] Patient left in no apparent distress in bed  [X] Call bell left within reach  [X] Nursing notified  [ ] Caregiver present  [ ] Bed alarm activated      COMMUNICATION/EDUCATION:   The patients plan of care was discussed with: Physical Therapist, Registered Nurse and Care Manager.  [X] Home safety education was provided and the patient/caregiver indicated understanding. [ ] Patient/family have participated as able in goal setting and plan of care. [X] Patient/family agree to work toward stated goals and plan of care. [ ] Patient understands intent and goals of therapy, but is neutral about his/her participation. [ ] Patient is unable to participate in goal setting and plan of care. This patients plan of care is appropriate for delegation to [facility].      Thank you for this referral.  Danielle Cabral OTR/L  Time Calculation: 36 mins

## 2017-03-03 PROBLEM — Z86.73 HISTORY OF STROKE: Status: ACTIVE | Noted: 2017-01-01

## 2017-03-03 PROBLEM — N39.0 UTI (URINARY TRACT INFECTION): Status: ACTIVE | Noted: 2017-01-01

## 2017-03-03 NOTE — PROGRESS NOTES
Pt has been accepted at [facility] for inpatient rehab services. They will have a bed available when he is medically stable.     Ruthie Crowley, [address] [place] [address]

## 2017-03-03 NOTE — DISCHARGE INSTRUCTIONS
Patient Discharge Instructions     Pt Name  Delaney Jiang   Date of Birth 1950   Age  79 y.o. Medical Record Number  [id number]   PCP Eleazar Schaeffer MD    Admit date:  3/1/2017 @    [address] [place] [address]    Room Number  2210/01   Date of Discharge 3/3/2017     Admission Diagnoses:     Acute renal failure (ARF) (HCC)          Allergies   Allergen Reactions    Bactrim [Sulfamethoxazole-Trimethoprim] Nausea and Vomiting        You were admitted to [address] [place] [address] for  Acute renal failure (ARF) ([facility] [address]    YOUR OTHER MEDICAL DIAGNOSES INCLUDE (BUT NOT LIMITED TO ):  Present on Admission:   Acute renal failure (ARF) ([address]   Hypertension   DM (diabetes mellitus) ([facility] [address]   H/O   Recurrent UTI   Nephrolithiasis   Hyperkalemia   Hyperlipidemia   History of stroke   UTI (urinary tract infection)      DIET:  Diabetic Diet   Recommended activity: Activity as tolerated  Follow up : Follow-up Information     Follow up With Details Comments Contact Info    Karen Wheeler MD In 2 weeks  [facility]  [address] [facility]      Lonie Sandifer, MD In 3 weeks or earlier with problem [address] [facility]      Celestino Burdick MD  Follow up within 2-3 weeks [address] [place] [address]  [phone]          ** Please check BMP on Monday to check his renal function. ** The patient is no longer on Metformin. Please check his blood glucose qac and hs, follow your facility insulin sliding scale protocol. Skilled nursing facility MD responsible for above upon discharge. · It is important that you take the medication exactly as they are prescribed. · Keep your medication in the bottles provided by the pharmacist and keep a list of the medication names, dosages, and times to be taken in your wallet. · Do not take other medications without consulting your doctor. ADDITIONAL INFORMATION: If you experience any of the following symptoms or have any health problem not listed below, then please call your primary care physician or return to the emergency room if you cannot get hold of your doctor: Fever, chills, nausea, vomiting, diarrhea, change in mentation, falling, bleeding, shortness of breath. I understand that if any problems occur once I am discharged, I am supposed to call my Primary care physician for further care or seek help in the Emergency Department at the nearest Healthcare facility. I have had an opportunity to discuss my clinical issues with my doctor and nursing staff. I understand and acknowledge receipt of the above instructions.                                                                                                                                            Physician's or R.N.'s Signature                                                            Date/Time                                                                                                                                              Patient or Representative Signature                                                 Date/Time

## 2017-03-03 NOTE — ROUTINE PROCESS
Cardiopulmonary Care Interdisciplinary rounds were held today to discuss patient plan of care and outcomes. The following members were present: PT, NP/Physician, Pharmacy, Nursing, Nutritionist and Case Management. Plan of Care: Add nutritional supplements; [address] @ D/C?   Continue current treatment plan

## 2017-03-03 NOTE — DISCHARGE SUMMARY
Hospitalist Discharge Summary     Patient ID:  Laura Kapoor  [id number]  79 y.o.  1950    PCP on record: Conrad Cannon MD    Admit date: 3/1/2017  Discharge date and time: 3/3/2017      DISCHARGE DIAGNOSIS:  Acute renal failure (ARF) ([address] (3/1/2017) POA   Hyperkalemia POA   DM type 2 without complications POA  Essential HTN POA  History of nephrolithiasis   Hyperlipidemia   History of stroke   UTI    CONSULTATIONS:  IP CONSULT TO NEPHROLOGY    Excerpted HPI from H&P of Tushar Lyons MD:    79 y.o.  male:  Baseline right weakness due to old CVA in 18's  Sister noted 10 days ago that urine was cloudy and foul-smelling, episodes of gross hematuria  No fevers or weakness or N/V at time  Saw PCP, urine sample sent and PO bactrim started 1 week, has been taking regularly  Otherwise well until today, got up to go podiatry appointment, became progressively diffusely weak. Will trying to get into the car, felt to ground and could not get up. No LOC or head trauma  Sister tried to get up, but could not, EMS called and got up  Shortly thereafter he developed several episodes of N/V with blood or coffee grounds  With the vomiting, leaning to the right side and eyes seemed to be rolling back into head  He was brought by EMS to the ED  No HA, new acute focal motor or sensory changes, fevers/chills, no unusual back pain, no abd pain or diarrhea  No further N/V in the ED  No dysuria     ED HD stable, admit BUN/creat 78/3.16, prior baseline 2016 1.0 to 1.1  K+ 6.1 without EKG changes  IV Insulin and glucose, IV NaHCO3, Po kayexylate  ______________________________________________________________________  DISCHARGE SUMMARY/HOSPITAL COURSE:  for full details see H&P, daily progress notes, labs, consult notes. 79year old male admitted to the hospital with history of hematuria, acute renal failure, and hyperkalemia.     Hyperkaliemia was resolved after receiving IV insulin and PO kayexalate. We believe his ARF secondary to NSAID, diuretics, ACE, and AIN from bactrim. His renal function has improved; creat is 1.49 on discharge compare to 3.16 on admission. We discontinued his ACE, diuretics, and NSAID. We also discontinued his metformin. His Hgb A1C is 6.5, and his fasting blood glucose has has been 100's with strict diabetic diet. His UTI was initially treated with IV rocephin until his urine culture result revealed Candida Albicans. We recommend him to complete 7 day course of Diflucan 100mg tablets. The patient has history of stroke and has pronounced right side weakness. The patient ambulates with SPC and right AFO. The patient will be discharged to [facility] for further rehabilitation. Below are the detailed medical diagnosis that he was treated during this hospitalization;    Acute renal failure (ARF) ([address] (3/1/2017) POA   - trending down a little; admit BUN/Creat 46/1.49 (today: 73/2.46)  - pre-renal azotemia secondary to NSAID, diuretics, ACE, and AIN from Bactrim which seem resolving at present time. - Discontinue ACE, Diuretics, and NSAIDS. - Renal U/S: No hydronephrosis. Right nephrolithiasis. Nonspecific right renal mixed cystic and solid lesion. When patient's renal function returns to a GFR greater than 30, recommend dedicated renal CT or MRI; pt has been advised to follow up with  as outpatient (he was followed by Dr. Breezy Son as outpatient in the past)     Hyperkalemia POA K 6.1  - resolved; K 4.7 (was 6.1 on admission)  - Stop the HCTZ, lisinopril      DM type 2 without complications POA  - Stop metformin with the renal failure  - Hgb A1C 6.5   - blood glucose in 100's, check qac and hs blood glucose, SSI, diabetic diet      Essential HTN POA  - stable without medication.   Re-evaluate for antihypertensive if becomes issue.      History of nephrolithiasis US with no stones or hydronephrosis      Hyperlipidemia   - continue with Lipitor     History of stroke in 1980's with residual right weakness            _______________________________________________________________________  Patient seen and examined by me on discharge day. Pertinent Findings:  Gen:    Not in distress  Chest: Clear lungs  CVS:   Regular rhythm. No edema  Abd:  Soft, not distended, not tender  Neuro:  Alert, orient x 4  _______________________________________________________________________  DISCHARGE MEDICATIONS:   Current Discharge Medication List      START taking these medications    Details   fluconazole (DIFLUCAN) 100 mg tablet Take 1 Tab by mouth daily for 7 days. FDA advises cautious prescribing of oral fluconazole in pregnancy. Qty: 7 Tab, Refills: 0         CONTINUE these medications which have NOT CHANGED    Details   atorvastatin (LIPITOR) 40 mg tablet TAKE 1 TABLET BY MOUTH AT BEDTIME  Qty: 90 Tab, Refills: 1      colesevelam (WELCHOL) 625 mg tablet Take 2 Tabs by mouth two (2) times daily (with meals). Qty: 360 Tab, Refills: 1      aspirin delayed-release 81 mg tablet Take 81 mg by mouth daily. timolol (BETIMOL) 0.25 % ophthalmic solution Administer 1-2 Drops to both eyes two (2) times a day. use in affected eye(s)      ketoconazole (NIZORAL) 2 % topical cream Apply  to affected area daily. hydrocortisone (ALA-ONEIL) 1 % lotion Apply  to affected area two (2) times a day.  use thin layer          STOP taking these medications       lisinopril (PRINIVIL, ZESTRIL) 20 mg tablet Comments:   Reason for Stopping:         hydrochlorothiazide (HYDRODIURIL) 12.5 mg tablet Comments:   Reason for Stopping:         metFORMIN (GLUCOPHAGE) 1,000 mg tablet Comments:   Reason for Stopping:         RESTASIS 0.05 % ophthalmic emulsion Comments:   Reason for Stopping:         nitrofurantoin (MACROBID) 100 mg capsule Comments:   Reason for Stopping:               My Recommended Diet, Activity, Wound Care, and follow-up labs are listed in the patient's Discharge Insturctions which I have personally completed and reviewed.     _______________________________________________________________________  DISPOSITION:    Home with Family:    Home with HH/PT/OT/RN:    SNF/LTC: y   ROCÍO:    OTHER:        Condition at Discharge:  Stable  _______________________________________________________________________  Follow up with:   PCP : Shanta Lynne MD  Follow-up Information     Follow up With Details 16 Phu Hendrix MD In 2 weeks  [address]      Jeni Hurtado MD In 3 weeks or earlier with problem [facility]      Willie Olson MD  Follow up within 2-3 weeks [address] [phone]                Total time in minutes spent coordinating this discharge (includes going over instructions, follow-up, prescriptions, and preparing report for sign off to her PCP) :  35 minutes    Signed:  Héctor Vick NP

## 2017-03-03 NOTE — PROGRESS NOTES
[email or web address]                  Phone - [phone]   Renal Progress Note    Subjective:   Patient: Darren Bartholomew                    YOB: 1950               Date- 3/3/2017          Reason for visit: ARF and hyperkalemia    Admission Date: 3/1/2017         Acute Kidney Insufficiency secondary to Pre-renal Azotemia seems most likely. The patient has been taking a NSAID and has been on a diuretic and ACE-I. His po intake has been poor. Renal function improving with the bun/creat down to 46/1.5 on 3/3.     Hyperkalemia secondary to an ACE-I, NSAID, and renal failure. --resolved     Hyponatremia, resolved     Recent UTI.     Hypertension (2009)     DM (diabetes mellitus)      Recurrent UTI (2009)  Overview: Followed by Dr. Duncan      Nephrolithiasis (2009)  Overview: Followed by Dr. Thien Lui     H/o a CVA    Right renal calculus measures up to 2.6 cm. Mixed cystic and  solid right renal lesion is ill-defined but unchanged            Plan:        Continue to hold ACE-I, diuretics, and NSAIDS. Can d/c the IV fluids. Restart ACE-I when the renal function has completely resolved. Will lift dietary K restriction. Would recheck labs in a few days. F/U with urology after discharge regarding the lesion in the right kidney. D/w the patient and Dr. Tanesha Mckeon (however, the patient does not appear to understand most of what is going on). Complaint: none.  Patient feeling well    Review of Systems  Constitutional: negative  Ears, nose, mouth, throat, and face: negative  Respiratory: negative  Cardiovascular: negative  Gastrointestinal: negative  Musculoskeletal:negative  Neurological: negative  Skin: no rashes    Objective:     Visit Vitals    /68 (BP 1 Location: Right arm, BP Patient Position: At rest)    Pulse (!) 107    Temp 98.6 °F (37 °C)    Resp 20    Ht 5' 10\" (1.778 m)    Wt 99.8 kg (220 lb)    SpO2 99%    BMI 31.57 kg/m2     Temp (24hrs), Av.3 °F (36.8 °C), Min:98 °F (36.7 °C), Max:98.7 °F (37.1 °C)      03/03 0701 - 03/03 1900  In: 700 [P.O.:700]  Out: 800 [Urine:800]  03/01 1901 - 03/03 0700  In: 4188.3 [P.O.:720; I.V.:3468.3]  Out: 2070 [Urine:2070]    Physical Exam:  General:  alert, cooperative, no distress. Eye: anicteric sclera  Neck: supple; no JVD  Neuro: no acute changes. Confusion noted but per sister, this is his baseline. Lungs:  clear to auscultation bilaterally  Heart:  regular rate and rhythm; no gallop or rub  Abdomen:  soft, non-tender. . No masses,  no organomegaly  Skin:  No rashes noted  Psych: seems content  --normal affect and mood  Abdomen:  soft, non-tender. Extremities/Edema: none    Data Review:     Recent Labs      03/03/17   0114  03/02/17   0237  03/01/17   2307  03/01/17   1453   WBC  9.5  9.7   --   10.9   HGB  10.4*  10.2*   --   11.6*   NA  136  136  134*  130*   K  4.7  4.3  4.9  6.1*   CL  105  104  103  100   CO2  22  22  20*  21   BUN  46*  73*  74*  78*   CREA  1.49*  2.46*  2.63*  3.16*   CA  9.2  9.1  9.6  9.6   ALB  3.4*  3.3*   --   4.0   PHOS  2.8  4.0   --    --    MG   --   2.0   --    --        Assessment:     Active Problems:    Hypertension (12/14/2009)      DM (diabetes mellitus) ([facility] (12/14/2009)      H/O (12/14/2009)      Recurrent UTI (12/14/2009)      Overview: Followed by Rachelle Navarro            Nephrolithiasis (12/14/2009)      Overview: Followed by Dr. Bre Vazquez            Acute renal failure (ARF) ([facility] (3/1/2017)      Hyperkalemia (3/1/2017)      Chart reviewed. Pertinent Notes Reviewed. Medications list Personally Reviewed. Charlotte Burns, [address] [facility]  [facility] [address], [address]  [place], [address]  Phone - [phone]    Fax - [phone]  [email or web address]                                         [facility]   [address] [address]  Phone - [phone]  Fax - [phone] [email or web address]

## 2017-03-03 NOTE — PROGRESS NOTES
Problem: Mobility Impaired (Adult and Pediatric)  Goal: *Acute Goals and Plan of Care (Insert Text)  Physical Therapy Goals  Initiated 3/2/2017  1. Patient will move from supine to sit and sit to supine , scoot up and down and roll side to side in bed with modified independence within 7 day(s). 2. Patient will transfer from bed to chair and chair to bed with minimal assistance/contact guard assist using the least restrictive device within 7 day(s). 3. Patient will perform sit to stand with modified independence within 7 day(s). 4. Patient will ambulate with minimal assistance/contact guard assist for 50 feet with the least restrictive device within 7 day(s). 5. Patient will increase Tinetti score by 5 points to 8/28 in order to improve safety in standing within 7 days. PHYSICAL THERAPY TREATMENT  Patient: Rudi De Paz (89 y.o. male)  Date: 3/3/2017  Diagnosis: Acute renal failure (ARF) (HCC) Acute renal failure (ARF) (HCC)       Precautions: Fall, Bed Alarm      ASSESSMENT:  Patient received in chair with no family present today. He continues to present with a flat affect although very pleasant to work with and agreeable to all requests made. Focused on gait training today with specific attention paid to manual cues for terminal hip extension and (R) LE advancement with appropriate weight shifting. Performed 3 trials of 10 foot ambulation in room with [facility] and chair follow. Patient required modA from posterior position with (R) hand/trunk cuing glutes, WS, and forward progression. Improved step length noted today, although patient still presents with appearance of festinating, shuffled gait with significantly increased forward flexion. He also required increased assistance at times for sit to stand today (mod to maxA) depending on how well he positioned and maintained RLE underneath INNA during attempts.  Was able to side step well during transfer bed to chair and then demonstrated good eccentric trunk control moving sit to supine (CGA). Attempting bridging in bed to work on gluteal strength, however patient was unable to activate glutes and persistently compensated with (R) hip flexion in spite of manual/verbal cues. Patient has been accepted by [facility] and anticipate he will do very well there. Progression toward goals:  [ ]       Improving appropriately and progressing toward goals  [X]       Improving slowly and progressing toward goals  [ ]       Not making progress toward goals and plan of care will be adjusted       PLAN:  Patient continues to benefit from skilled intervention to address the above impairments. Continue treatment per established plan of care. Discharge Recommendations:  Inpatient Rehab  Further Equipment Recommendations for Discharge:  TBD by facility       SUBJECTIVE:   Patient stated [place] Agent. All right. Alright.       OBJECTIVE DATA SUMMARY:   Critical Behavior:  Neurologic State: Alert, Appropriate for age  Orientation Level: Oriented X4  Cognition: Follows commands, Appropriate for age attention/concentration, Appropriate decision making, Appropriate safety awareness     Functional Mobility Training:  Bed Mobility:    Sit to supine: CGA        Transfers:  Sit to Stand: Moderate assistance;Maximum assistance (inconsistent--at times needed maxA)  Stand to Sit: Moderate assistance           Balance:  Sitting: Intact  Standing: Impaired  Standing - Static: Fair  Standing - Dynamic : Poor  Ambulation/Gait Training:  Distance (ft): 30 Feet (ft) (3 x 10)  Assistive Device: Cane, straight;Gait belt  Ambulation - Level of Assistance: Moderate assistance        Gait Abnormalities: Hemiplegic;Decreased step clearance;Shuffling gait;Trunk sway increased        Base of Support: Center of gravity altered; Widened     Speed/Florina: Shuffled;Pace decreased (<100 feet/min)  Step Length: Left shortened;Right shortened  Swing Pattern: Right asymmetrical                       Neuro Re-Education:  Heavy manual and verbal cues during gait training to facilitate appropriate weightshifting and limb progression. Patient resistant to cues for terminal hip extension. Unable to activate glutes during supine bridging attempts. Pain:  Pain Scale 1: Numeric (0 - 10)  Pain Intensity 1: 0     Activity Tolerance:   Fair--VSS but patient fatigued , especially in (R) leg  Please refer to the flowsheet for vital signs taken during this treatment. After treatment:   [ ] Patient left in no apparent distress sitting up in chair  [X] Patient left in no apparent distress in bed  [X] Call bell left within reach  [X] Nursing notified  [ ] Caregiver present  [X] Bed alarm activated      COMMUNICATION/COLLABORATION:   Patient was educated regarding His deficit(s) of impaired gait and balance as this relates to His diagnosis of UTI and old CVA. He demonstrated Good understanding as evidenced by motivation to participate in therapy and pending transfer to [facility]. The patients plan of care was discussed with: Registered Nurse and    Regarding student involvement in patient care:  A student participated in this treatment session. Per CMS Medicare statements and APTA guidelines I certify that the following was true:  1. I was present and directly observed the entire session. 2. I made all skilled judgments and clinical decisions regarding care. 3. I am the practitioner responsible for assessment, treatment, and documentation.         QUINTIN Steele   Time Calculation: 28 mins

## 2017-03-03 NOTE — PROGRESS NOTES
[address] SHIFT NURSING NOTE    Bedside and Verbal shift change report given to  (oncoming nurse) by Bernardo Moran (offgoing nurse). Report included the following information SBAR, Procedure Summary, Intake/Output, MAR and Recent Results. SHIFT SUMMARY:         Admission Date 3/1/2017   Admission Diagnosis Acute renal failure (ARF) ([facility]   Consults IP CONSULT TO NEPHROLOGY        Consults   []PT   []OT   []Speech   []Case Management      [] Palliative       Cardiac Monitoring Order   []Yes   []No     GI Prophylaxis   []Yes   []No           DVT Prophylaxis   SCDs:             Sammy stockings:         [] Medication   []Contraindicated   []None        Activity Level Activity Level: Up with Assistance     Activity Assistance: Partial (two people)   Purposeful Rounding every 1-2 hour? []Yes    Jasbir Score  Total Score: 4   Bed Alarm (If score 3 or >)   []Yes   [] Refused (See signed refusal form in chart)   Pako Score  Pako Score: 18   Pako Score (if score 14 or less)   []PMT consult   []Wound Care consult      []Specialty bed   [] Nutrition consult          Needs prior to discharge:   Home O2 required:    []Yes   []No    If yes, how much O2 required?     Other:    Last Bowel Movement: Last Bowel Movement Date: 03/01/17        POST-OP SURGICAL VATS   []Yes   []N/A   Incentive Spirometer:   []Yes   []Refused   Coughing and deep breathing:     []Yes   []Refused   Oral care:     []Yes   []Refused   Understanding (patient education):     []Yes   []Refused   Getting out of bed Number times ambulated in hallway past shift:    Number of times OOB to chair past shift:     Head of bed elevation:     []Yes   []Refused      Influenza Vaccine Received Flu Vaccine for Current Season (usually Sept-March): Yes (December 2016)        Pneumonia Vaccine           Diet Active Orders   Diet    DIET DIABETIC CONSISTENT CARB Regular; Low Potassium      LDAs               Peripheral IV 03/01/17 Right Wrist (Active)   Site Assessment Clean, dry, & intact 3/3/2017  4:23 AM   Phlebitis Assessment 0 3/3/2017  4:23 AM   Infiltration Assessment 0 3/3/2017  4:23 AM   Dressing Status Clean, dry, & intact 3/3/2017  4:23 AM   Dressing Type Transparent;Tape 3/3/2017  4:23 AM   Hub Color/Line Status Pink; Infusing 3/3/2017  4:23 AM                      Urinary Catheter      Intake & Output   Date 03/02/17 0700 - 03/03/17 0659 03/03/17 0700 - 03/04/17 0659   Shift 5382-1710 7863-6382 24 Hour Total 0700-1859 1900-0659 24 Hour Total   I  N  T  A  K  E   P.O. 240 240 480         P. O. 240 240 480       I.V.  (mL/kg/hr)  2543. 3 2543. 3         Volume (0.9% sodium chloride infusion)  2543. 3 2543. 3       Shift Total  (mL/kg) 240  (2.4) 2783.3  (27.9) 3023.3  (30.3)      O  U  T  P  U  T   Urine  (mL/kg/hr) 930  (0.8) 860 1790         Urine Voided          Urine Occurrence(s) 1 x 2 x 3 x       Stool            Stool Occurrence(s) 2 x  2 x       Shift Total  (mL/kg) 930  (9.3) 860  (8.6) 1790  (17.9)      NET -690 1923.3 1233.3      Weight (kg) 99.8 99.8 99.8 99.8 99.8 99.8         Readmission Risk Assessment Tool Score Medium Risk            15       Total Score        3 Relationship with PCP    4 More than 1 Admission in calendar year    5 Patient Insurance is Medicare, Medicaid or Self Pay    3 Charlson Comorbidity Score        Criteria that do not apply:    Patient Living Status    Patient Length of Stay > 5       Expected Length of Stay 3d 9h   Actual Length of Stay 2

## 2017-03-03 NOTE — PROGRESS NOTES
Pt is being discharged to [address] [place] [address] for inpatient rehab services. Pt in agreement. Call placed at pt's request to inform his sister. No answer  HIPPA complaint VM left requesting a return call. Nursing aware and given the number to call report. Care Management Interventions  PCP Verified by CM: Yes  Mode of Transport at Discharge: Other (see comment)  Transition of Care Consult (CM Consult):  Other ([facility])  Discharge Durable Medical Equipment: No (uses a cane)  Physical Therapy Consult: Yes  Occupational Therapy Consult: Yes  Speech Therapy Consult: No  Current Support Network: Own Home, Lives Alone  Confirm Follow Up Transport: Family (sister)  Plan discussed with Pt/Family/Caregiver: Yes  Freedom of Choice Offered: Yes  Discharge Location  Discharge Placement: Rehab hospital/unit acute ([facility])      DONYA Eric   [phone]

## 2017-03-03 NOTE — PROGRESS NOTES
TRANSFER - OUT REPORT:    Verbal report given to Aiyana Muse RN (name) on [facility]  being transferred to [facility] (unit) for routine progression of care       Report consisted of patients Situation, Background, Assessment and   Recommendations(SBAR). Information from the following report(s) SBAR, Kardex, Procedure Summary and MAR was reviewed with the receiving nurse. Lines:   Peripheral IV 03/01/17 Right Wrist (Active)   Site Assessment Clean, dry, & intact 3/3/2017 11:45 AM   Phlebitis Assessment 0 3/3/2017 11:45 AM   Infiltration Assessment 0 3/3/2017 11:45 AM   Dressing Status Clean, dry, & intact 3/3/2017 11:45 AM   Dressing Type Tape;Transparent 3/3/2017 11:45 AM   Hub Color/Line Status Pink; Infusing;Flushed 3/3/2017 11:45 AM        Opportunity for questions and clarification was provided. Patient transported with:  Transport.

## 2017-04-17 PROBLEM — N13.4 HYDROURETER ON RIGHT: Status: ACTIVE | Noted: 2017-01-01

## 2017-04-17 PROBLEM — R19.8 PERFORATED VISCUS: Status: ACTIVE | Noted: 2017-01-01

## 2017-04-17 NOTE — IP AVS SNAPSHOT
[address] [facility] 
[phone] Patient: Emerson Mendiola MRN: [id number] QST:7[birth year]108 You are allergic to the following Allergen Reactions Bactrim (Sulfamethoxazole-Trimethoprim) Nausea and Vomiting Recent Documentation Height Weight BMI Smoking Status 1.753 m 97.6 kg 31.77 kg/m2 Former Smoker Emergency Contacts Name Discharge Info Relation Home Work Mobile Padmini Martinez  Other Relative [6] [phone] About your hospitalization You were admitted on:  April 18, 2017 You last received care in the:  [facility] 2 PROGRESSIVE CARE You were discharged on:  May 3, 2017 Why you were hospitalized Your primary diagnosis was:  Perforated Viscus Your diagnoses also included:  Acute Renal Failure (Arf) (Hcc), Hydroureter On Right, Perforated Duodenal Bulb Ulcer (Hcc), Hyponatremia, Svt (Supraventricular Tachycardia) (Hcc), Sirs (Systemic Inflammatory Response Syndrome) (Hcc) Providers Seen During Your Hospitalizations Provider Role Specialty Primary office phone Harry Pena MD Attending Provider Emergency Medicine [phone] Vinicio Tinajero MD Attending Provider General Surgery [phone] Your Primary Care Physician (PCP) Primary Care Physician Office Phone Office Fax [facility], [address] [place] [address] [phone] Follow-up Information Follow up With Details Comments Contact Info Stuart Butt MD   [address] 13 
[phone] Srinivasa Hood MD Schedule an appointment as soon as possible for a visit in 3 weeks  [address] [facility] 
[phone] [address] [place] [address], [place] [address]   [address] [id number] [phone]  Srinivasa Hood MD Schedule an appointment as soon as possible for a visit in 1 month  [address] [facility] 
[phone] Current Discharge Medication List  
  
START taking these medications Dose & Instructions Dispensing Information Comments Morning Noon Evening Bedtime  
 amoxicillin 500 mg Tab Your last dose was: Your next dose is:    
   
   
 Dose:  1000 mg Take 1,000 mg by mouth two (2) times a day for 10 days. Quantity:  40 Tab Refills:  0  
     
   
   
   
  
 clarithromycin 500 mg tablet Commonly known as:  Luis De Los Santosir Your last dose was: Your next dose is:    
   
   
 Dose:  500 mg Take 1 Tab by mouth two (2) times a day for 10 days. Quantity:  20 Tab Refills:  0  
     
   
   
   
  
 * dilTIAZem 30 mg tablet Commonly known as:  CARDIZEM Your last dose was: Your next dose is:    
   
   
 Dose:  30 mg Take 1 Tab by mouth once for 1 dose. At [facility]  Indications: Sustained sinus tach. Quantity:  1 Tab Refills:  0  
     
   
   
   
  
 * dilTIAZem  mg ER capsule Commonly known as:  CARDIZEM CD Start taking on:  5/4/2017 Your last dose was: Your next dose is:    
   
   
 Dose:  120 mg Take 1 Cap by mouth daily. Quantity:  30 Cap Refills:  4  
     
   
   
   
  
 metoprolol tartrate 50 mg tablet Commonly known as:  LOPRESSOR Your last dose was: Your next dose is:    
   
   
 Dose:  50 mg Take 1 Tab by mouth every twelve (12) hours. Quantity:  60 Tab Refills:  4  
     
   
   
   
  
 pantoprazole 40 mg tablet Commonly known as:  PROTONIX Your last dose was: Your next dose is:    
   
   
 Dose:  40 mg Take 1 Tab by mouth two (2) times a day for 60 days. Quantity:  60 Tab Refills:  1  
     
   
   
   
  
 * Notice: This list has 2 medication(s) that are the same as other medications prescribed for you.  Read the directions carefully, and ask your doctor or other care provider to review them with you. CONTINUE these medications which have NOT CHANGED Dose & Instructions Dispensing Information Comments Morning Noon Evening Bedtime  
 aspirin delayed-release 81 mg tablet Your last dose was: Your next dose is:    
   
   
 Dose:  81 mg Take 81 mg by mouth daily. Refills:  0  
     
   
   
   
  
 atorvastatin 40 mg tablet Commonly known as:  LIPITOR Your last dose was: Your next dose is: TAKE 1 TABLET BY MOUTH AT BEDTIME Quantity:  90 Tab Refills:  1  
     
   
   
   
  
 colesevelam 625 mg tablet Commonly known as:  [facility] Your last dose was: Your next dose is:    
   
   
 Dose:  1250 mg Take 2 Tabs by mouth two (2) times daily (with meals). Quantity:  360 Tab Refills:  1  
     
   
   
   
  
 hydrocortisone 1 % lotion Commonly known as:  ALA-ONEIL Your last dose was: Your next dose is:    
   
   
 Apply  to affected area two (2) times a day. use thin layer Refills:  0  
     
   
   
   
  
 ketoconazole 2 % topical cream  
Commonly known as:  NIZORAL Your last dose was: Your next dose is:    
   
   
 Apply  to affected area daily. Refills:  0  
     
   
   
   
  
 timolol 0.25 % ophthalmic solution Commonly known as:  Leward Sails Your last dose was: Your next dose is:    
   
   
 Dose:  1-2 Drop Administer 1-2 Drops to both eyes two (2) times a day. use in affected eye(s) Refills:  0 STOP taking these medications   
 lisinopril 10 mg tablet Commonly known as:  PRINIVIL ZESTRIL  
   
  
 OTHER Where to Get Your Medications These medications were sent to [address] [place] [address] 111, 919 [facility] [address] Phone:  [phone]  
  amoxicillin 500 mg Tab clarithromycin 500 mg tablet  
 dilTIAZem 30 mg tablet  
 dilTIAZem  mg ER capsule  
 metoprolol tartrate 50 mg tablet  
 pantoprazole 40 mg tablet Discharge Instructions MyChart Activation Thank you for requesting access to iPowerUp. Please follow the instructions below to securely access and download your online medical record. iPowerUp allows you to send messages to your doctor, view your test results, renew your prescriptions, schedule appointments, and more. How Do I Sign Up? 1. In your internet browser, go to [email or web address] 
2. Click on the First Time User? Click Here link in the Sign In box. You will be redirect to the New Member Sign Up page. 3. Enter your iPowerUp Access Code exactly as it appears below. You will not need to use this code after youve completed the sign-up process. If you do not sign up before the expiration date, you must request a new code. iPowerUp Access Code: [email or web address] Expires: 2017  4:02 PM (This is the date your iPowerUp access code will ) 4. Enter the last four digits of your Social Security Number (xxxx) and Date of Birth (mm/dd/yyyy) as indicated and click Submit. You will be taken to the next sign-up page. 5. Create a iPowerUp ID. This will be your iPowerUp login ID and cannot be changed, so think of one that is secure and easy to remember. 6. Create a iPowerUp password. You can change your password at any time. 7. Enter your Password Reset Question and Answer. This can be used at a later time if you forget your password. 8. Enter your e-mail address. You will receive e-mail notification when new information is available in [address] 9. Click Sign Up. You can now view and download portions of your medical record. 10. Click the Download Summary menu link to download a portable copy of your medical information. Additional Information If you have questions, please visit the Frequently Asked Questions section of the SocialShield website at [email or web address] Remember, SocialShield is NOT to be used for urgent needs. For medical emergencies, dial 911. Discharge Instructions Attachments/References PEPTIC ULCER DISEASE (ENGLISH) Discharge Orders None General Information Please provide this summary of care documentation to your next provider. Introducing [facility] [place] [facility]! [facility] introduces SocialShield patient portal. Now you can access parts of your medical record, email your doctor's office, and request medication refills online. 1. In your internet browser, go to [email or web address] 2. Click on the First Time User? Click Here link in the Sign In box. You will see the New Member Sign Up page. 3. Enter your SocialShield Access Code exactly as it appears below. You will not need to use this code after youve completed the sign-up process. If you do not sign up before the expiration date, you must request a new code. · SocialShield Access Code: [email or web address] Expires: 5/30/2017  4:02 PM 
 
4. Enter the last four digits of your Social Security Number (xxxx) and Date of Birth (mm/dd/yyyy) as indicated and click Submit. You will be taken to the next sign-up page. 5. Create a SocialShield ID. This will be your SocialShield login ID and cannot be changed, so think of one that is secure and easy to remember. 6. Create a SocialShield password. You can change your password at any time. 7. Enter your Password Reset Question and Answer. This can be used at a later time if you forget your password. 8. Enter your e-mail address. You will receive e-mail notification when new information is available in [address] 9. Click Sign Up. You can now view and download portions of your medical record. 10. Click the Download Summary menu link to download a portable copy of your medical information. If you have questions, please visit the Frequently Asked Questions section of the MyChart website. Remember, AudienceViewt is NOT to be used for urgent needs. For medical emergencies, dial 911. Now available from your iPhone and Android! Patient Signature:  ____________________________________________________________ Date:  ____________________________________________________________  
  
Lizette Wallis Provider Signature:  ____________________________________________________________ Date:  ____________________________________________________________ More Information Peptic Ulcer Disease: Care Instructions Your Care Instructions Peptic ulcers are sores on the inside of the stomach or the small intestine. They are usually caused by an infection with bacteria or from use of nonsteroidal anti-inflammatory drugs (NSAIDs). NSAIDs include aspirin, ibuprofen (Advil), and naproxen (Aleve). Your doctor may have prescribed medicine to reduce stomach acid. You also may need to take antibiotics if your peptic ulcers are caused by an infection. You can help your stomach heal and keep ulcers from coming back by making some changes in your lifestyle. Quit smoking, limit caffeine and alcohol, and reduce stress. Follow-up care is a key part of your treatment and safety. Be sure to make and go to all appointments, and call your doctor if you are having problems. Its also a good idea to know your test results and keep a list of the medicines you take. How can you care for yourself at home? · Take your medicines exactly as prescribed. Call your doctor if you think you are having a problem with your medicine. · Do not take aspirin or other NSAIDs such as ibuprofen (Advil or Motrin) or naproxen (Aleve). Ask your doctor what you can take for pain. · Do not smoke. Smoking can make ulcers worse.  If you need help quitting, talk to your doctor about stop-smoking programs and medicines. These can increase your chances of quitting for good. · Drink in moderation or avoid drinking alcohol. · Do not drink beverages that have caffeine if they bother your stomach. These include coffee, tea, and soda. · Eat a balanced diet of small, frequent meals. Make an appointment with a dietitian if you need help planning your meals. · Reduce stress. Avoid people and places that make you feel anxious, if you can. Learn ways to reduce stress, such as biofeedback, guided imagery, and meditation. When should you call for help? Call 911 anytime you think you may need emergency care. For example, call if: 
· You passed out (lost consciousness). · You vomit blood or what looks like coffee grounds. · You pass maroon or very bloody stools. Call your doctor now or seek immediate medical care if: 
· You have severe pain in your belly, back, or shoulders. · You have new or worsening belly pain. · You are dizzy or lightheaded, or you feel like you may faint. · Your stools are black and tarlike or have streaks of blood. Watch closely for changes in your health, and be sure to contact your doctor if: 
· You have new symptoms such as weight loss, nausea or vomiting. · You do not feel better as expected. Where can you learn more? Go to http://bill-maria elena.info/. Enter V615 in the search box to learn more about \"Peptic Ulcer Disease: Care Instructions. \" Current as of: August 9, 2016 Content Version: 11.2 © 5142-6662 [employer]. Care instructions adapted under license by [employer] (which disclaims liability or warranty for this information). If you have questions about a medical condition or this instruction, always ask your healthcare professional. [address] any warranty or liability for your use of this information.

## 2017-04-17 NOTE — IP AVS SNAPSHOT
Summary of Care Report The Summary of Care report has been created to help improve care coordination. Users with access to LetMeGo or [employer] [address] (Web-based application) may access additional patient information including the Discharge Summary. If you are not currently a [address] user and need more information, please call the number listed below in the [address] section and ask to be connected with Medical Records. Facility Information Name Address Phone [address] [address] [phone] Patient Information Patient Name Sex  Jenifer Brothers ([id number]) Male 1950 Discharge Information Admitting Provider Service Area Unit Karl Isabel MD / [phone] [address] [facility] 2 [facility] Care / [phone] Discharge Provider Discharge Date/Time Discharge Disposition Destination (none) 5/3/2017 [facility] (Pending) SNF (none) Patient Language Language ENGLISH [13] Hospital Problems as of 5/3/2017  Reviewed: 3/3/2017  3:04 PM by Noel Clements NP Class Noted - Resolved Last Modified POA Active Problems Acute renal failure (ARF) ([address]  3/1/2017 - Present 2017 by Karl Isabel MD Yes Entered by Ramón Rich MD  
  * (Principal)Perforated viscus  2017 - Present 2017 by Altjovanni Isabel, MD Yes Entered by Karl Isabel MD  
  Hydroureter on right  2017 - Present 2017 by Karl Isabel, MD Yes Entered by Karl Isabel MD  
  Perforated duodenal bulb ulcer ([address]  2017 - Present 2017 by Altjovanni Isabel MD Unknown Entered by Karl Isabel MD  
  Hyponatremia  2017 - Present 2017 by Karl Isabel MD Yes   Entered by Karl Isabel MD  
  SVT (supraventricular tachycardia) ([address]  2017 - Present 2017 by Kirsten Huang MD Unknown Entered by Kirsten Huang MD  
  Overview Signed 4/19/2017  7:55 PM by Kirsten Huang MD  
   Approximately 220 BPM on tele 4/19/17 Non-Hospital Problems as of 5/3/2017  Reviewed: 3/3/2017  3:04 PM by Franca Victoria NP Class Noted - Resolved Last Modified Active Problems Hypertension  12/14/2009 - Present 3/1/2017 by Toño Mccord MD  
  Entered by Michele Neff Hyperlipidemia  12/14/2009 - Present 3/3/2017 by Franca Victoria NP Entered by Michele Neff DM (diabetes mellitus) ([facility]  12/14/2009 - Present 3/1/2017 by Toño Mccord MD  
  Entered by Michele Neff H/O  12/14/2009 - Present 3/1/2017 by Toño Mccord MD  
  Entered by Michele Neff Recurrent UTI  12/14/2009 - Present 3/1/2017 by Toño Mccord MD  
  Entered by Michele Neff Overview Signed 12/14/2009 10:42 AM by Michele Neff Followed by Dr. Praveen Knott Nephrolithiasis  12/14/2009 - Present 3/1/2017 by Toño Mccord MD  
  Entered by Michele Melton Signed 12/14/2009 10:42 AM by Michele Neff Followed by Dr. Praveen Knott Hyperkalemia  3/1/2017 - Present 3/1/2017 by Toño Mccord MD  
  Entered by Toño Mccord MD  
  History of stroke  3/3/2017 - Present 3/3/2017 by Franca Victoria NP Entered by Franca Victoria NP  
  UTI (urinary tract infection)  3/3/2017 - Present 3/3/2017 by Franca Victoria NP Entered by Franca Victoria NP You are allergic to the following Allergen Reactions Bactrim (Sulfamethoxazole-Trimethoprim) Nausea and Vomiting Current Discharge Medication List  
  
START taking these medications Dose & Instructions Dispensing Information Comments  
 amoxicillin 500 mg Tab Dose:  1000 mg Take 1,000 mg by mouth two (2) times a day for 10 days. Quantity:  40 Tab Refills:  0  
   
 clarithromycin 500 mg tablet Commonly known as:  Barry Crenshaw  Dose:  500 mg  
 Take 1 Tab by mouth two (2) times a day for 10 days. Quantity:  20 Tab Refills:  0  
   
 * dilTIAZem 30 mg tablet Commonly known as:  CARDIZEM Dose:  30 mg Take 1 Tab by mouth once for 1 dose. At [facility]  Indications: Sustained sinus tach. Quantity:  1 Tab Refills:  0  
   
 * dilTIAZem  mg ER capsule Commonly known as:  CARDIZEM CD Start taking on:  5/4/2017 Dose:  120 mg Take 1 Cap by mouth daily. Quantity:  30 Cap Refills:  4  
   
 metoprolol tartrate 50 mg tablet Commonly known as:  LOPRESSOR Dose:  50 mg Take 1 Tab by mouth every twelve (12) hours. Quantity:  60 Tab Refills:  4  
   
 pantoprazole 40 mg tablet Commonly known as:  PROTONIX Dose:  40 mg Take 1 Tab by mouth two (2) times a day for 60 days. Quantity:  60 Tab Refills:  1  
   
 * Notice: This list has 2 medication(s) that are the same as other medications prescribed for you. Read the directions carefully, and ask your doctor or other care provider to review them with you. CONTINUE these medications which have NOT CHANGED Dose & Instructions Dispensing Information Comments  
 aspirin delayed-release 81 mg tablet Dose:  81 mg Take 81 mg by mouth daily. Refills:  0  
   
 atorvastatin 40 mg tablet Commonly known as:  LIPITOR  
 TAKE 1 TABLET BY MOUTH AT BEDTIME Quantity:  90 Tab Refills:  1  
   
 colesevelam 625 mg tablet Commonly known as:  [facility] Dose:  1250 mg Take 2 Tabs by mouth two (2) times daily (with meals). Quantity:  360 Tab Refills:  1  
   
 hydrocortisone 1 % lotion Commonly known as:  ALA-ONEIL Apply  to affected area two (2) times a day. use thin layer Refills:  0  
   
 ketoconazole 2 % topical cream  
Commonly known as:  NIZORAL Apply  to affected area daily. Refills:  0  
   
 timolol 0.25 % ophthalmic solution Commonly known as:  Artist  Dose:  1-2 Drop Administer 1-2 Drops to both eyes two (2) times a day. use in affected eye(s) Refills:  0 STOP taking these medications Comments  
 lisinopril 10 mg tablet Commonly known as:  PRINIVIL, ZESTRIL  
   
   
 OTHER Current Immunizations Name Date Influenza Vaccine 10/19/2014, 10/29/2013 Surgery Information ID Date/Time Status Primary Surgeon All Procedures Location [id number] 4/18/2017 2295 MARJAN Aguila MD LAPAROTOMY EXPLORATORY, REPAIR OF MASSIVE DUODENAL BULB ULCER WITH BIOPSY AND [facility] MAIN OR    
 [id number] 4/21/2017 [address] [facility] Ivett Key MD CYSTOSCOPY RIGHT PYELOGRAM INSERTION RIGHT URETERAL STENT [facility] MAIN OR Follow-up Information Follow up With Details Comments Contact Info Booker Adams MD   [address] [place] [address] 102 [address] [place] [address] 
[phone] Ervin Martinez MD Schedule an appointment as soon as possible for a visit in 3 weeks  [address] [facility] 
[phone] [address] [place] [address], [address]   [address] [id number] [phone] Ervin Martinez MD Schedule an appointment as soon as possible for a visit in 1 month  [address] [facility] 
[phone] Discharge Instructions Network Physics Activation Thank you for requesting access to Network Physics. Please follow the instructions below to securely access and download your online medical record. Network Physics allows you to send messages to your doctor, view your test results, renew your prescriptions, schedule appointments, and more. How Do I Sign Up? 1. In your internet browser, go to [email or web address] 
2. Click on the First Time User? Click Here link in the Sign In box. You will be redirect to the New Member Sign Up page. 3. Enter your Network Physics Access Code exactly as it appears below.  You will not need to use this code after youve completed the sign-up process. If you do not sign up before the expiration date, you must request a new code. Connect Access Code: [email or web address] Expires: 2017  4:02 PM (This is the date your Connect access code will ) 4. Enter the last four digits of your Social Security Number (xxxx) and Date of Birth (mm/dd/yyyy) as indicated and click Submit. You will be taken to the next sign-up page. 5. Create a ThinkHRt ID. This will be your Connect login ID and cannot be changed, so think of one that is secure and easy to remember. 6. Create a Connect password. You can change your password at any time. 7. Enter your Password Reset Question and Answer. This can be used at a later time if you forget your password. 8. Enter your e-mail address. You will receive e-mail notification when new information is available in [address] 9. Click Sign Up. You can now view and download portions of your medical record. 10. Click the Download Summary menu link to download a portable copy of your medical information. Additional Information If you have questions, please visit the Frequently Asked Questions section of the Connect website at [email or web address] Remember, Connect is NOT to be used for urgent needs. For medical emergencies, dial 911. Chart Review Routing History Recipient Method Report Sent By Melani Lopes MD  
Fax: [phone] Phone: [phone] Fax Provider Comm Report Bernardo White RN [[id number]] 2011  3:35 PM 2011 Provider Comm Report Bernardo White RN [[id number]] 2011  3:35 PM 2011 Provider Comm Report Bernardo White RN [[id number]] 2011  3:35 PM 2011 Provider Comm Report Bernardo White RN [[id number]] 2011  3:35 PM 2011 Brenda Valdez MD  
Fax: [phone] Phone: [phone] Fax [facility] IP MD NOTES AUTO ROUTING REPORT Payal Marroquin MD [[id number]] 3/1/2017  9:46 PM 03/01/2017 Monica Castañeda MD  
Fax: [phone] Phone: [phone] Fax [facility] IP MD NOTES AUTO ROUTING REPORT Payal Marroquin MD [[id number]] 3/3/2017  3:51 PM 03/03/2017 Monica Castañeda MD  
Fax: [phone] Phone: [phone] Fax IP Auto Routed Moe Urban MD [[id number]] 4/20/2017  2:42 PM 04/20/2017 Yann Finn MD  
Phone: [phone] In [employer] IP Auto Routed Moe Urban MD [[id number]] 4/20/2017  2:42 PM 04/20/2017 Roscoe Vaughan MD  
Phone: [phone] In Basket IP Auto Routed [address] [place] [address] Erick Harper MD [[id number]] 4/26/2017  5:38 PM 04/26/2017

## 2017-04-17 NOTE — IP AVS SNAPSHOT
[address] [facility] 
[phone] Patient: Megan Choudhury MRN: [id number] NCQ:8/26/3364 You are allergic to the following Allergen Reactions Bactrim (Sulfamethoxazole-Trimethoprim) Nausea and Vomiting Recent Documentation Height Weight BMI Smoking Status 1.753 m 97.6 kg 31.77 kg/m2 Former Smoker Emergency Contacts Name Discharge Info Relation Home Work Mobile Padmini Martinez  Other Relative [6] [phone] About your hospitalization You were admitted on:  April 18, 2017 You last received care in the:  [facility] 2 PROGRESSIVE CARE You were discharged on:  May 3, 2017 Unit phone number:  [phone] Why you were hospitalized Your primary diagnosis was:  Perforated Viscus Your diagnoses also included:  Acute Renal Failure (Arf) (Hcc), Hydroureter On Right, Perforated Duodenal Bulb Ulcer (Hcc), Hyponatremia, Svt (Supraventricular Tachycardia) (Hcc), Sirs (Systemic Inflammatory Response Syndrome) (Hcc) Providers Seen During Your Hospitalizations Provider Role Specialty Primary office phone Sharonda Ogden MD Attending Provider Emergency Medicine [phone] Dave Poon MD Attending Provider General Surgery [phone] Your Primary Care Physician (PCP) Primary Care Physician Office Phone Office Fax [facility] [address] [facility] [phone] Follow-up Information Follow up With Details Comments Contact Info Matias Gonzalez MD   [address] [place] [address] 
[phone] Bryant Mina MD Schedule an appointment as soon as possible for a visit in 3 weeks  [address] 
[phone] 4 [facility], [address]   [address] [facility] [address] 
[phone] Christina Mendenhall MD Schedule an appointment as soon as possible for a visit in 1 month  [address] [facility] 
[phone] Current Discharge Medication List  
  
START taking these medications Dose & Instructions Dispensing Information Comments Morning Noon Evening Bedtime  
 amoxicillin 500 mg Tab Your last dose was: Your next dose is:    
   
   
 Dose:  1000 mg Take 1,000 mg by mouth two (2) times a day for 10 days. Quantity:  40 Tab Refills:  0  
     
   
   
   
  
 clarithromycin 500 mg tablet Commonly known as:  Jodi Catena Your last dose was: Your next dose is:    
   
   
 Dose:  500 mg Take 1 Tab by mouth two (2) times a day for 10 days. Quantity:  20 Tab Refills:  0  
     
   
   
   
  
 * dilTIAZem 30 mg tablet Commonly known as:  CARDIZEM Your last dose was: Your next dose is:    
   
   
 Dose:  30 mg Take 1 Tab by mouth once for 1 dose. At [facility]  Indications: Sustained sinus tach. Quantity:  1 Tab Refills:  0  
     
   
   
   
  
 * dilTIAZem  mg ER capsule Commonly known as:  CARDIZEM CD Start taking on:  5/4/2017 Your last dose was: Your next dose is:    
   
   
 Dose:  120 mg Take 1 Cap by mouth daily. Quantity:  30 Cap Refills:  4  
     
   
   
   
  
 metoprolol tartrate 50 mg tablet Commonly known as:  LOPRESSOR Your last dose was: Your next dose is:    
   
   
 Dose:  50 mg Take 1 Tab by mouth every twelve (12) hours. Quantity:  60 Tab Refills:  4  
     
   
   
   
  
 pantoprazole 40 mg tablet Commonly known as:  PROTONIX Your last dose was: Your next dose is:    
   
   
 Dose:  40 mg Take 1 Tab by mouth two (2) times a day for 60 days. Quantity:  60 Tab Refills:  1  
     
   
   
   
  
 * Notice:   This list has 2 medication(s) that are the same as other medications prescribed for you. Read the directions carefully, and ask your doctor or other care provider to review them with you. CONTINUE these medications which have NOT CHANGED Dose & Instructions Dispensing Information Comments Morning Noon Evening Bedtime  
 aspirin delayed-release 81 mg tablet Your last dose was: Your next dose is:    
   
   
 Dose:  81 mg Take 81 mg by mouth daily. Refills:  0  
     
   
   
   
  
 atorvastatin 40 mg tablet Commonly known as:  LIPITOR Your last dose was: Your next dose is: TAKE 1 TABLET BY MOUTH AT BEDTIME Quantity:  90 Tab Refills:  1  
     
   
   
   
  
 colesevelam 625 mg tablet Commonly known as:  [facility] Your last dose was: Your next dose is:    
   
   
 Dose:  1250 mg Take 2 Tabs by mouth two (2) times daily (with meals). Quantity:  360 Tab Refills:  1  
     
   
   
   
  
 hydrocortisone 1 % lotion Commonly known as:  ALA-ONEIL Your last dose was: Your next dose is:    
   
   
 Apply  to affected area two (2) times a day. use thin layer Refills:  0  
     
   
   
   
  
 ketoconazole 2 % topical cream  
Commonly known as:  NIZORAL Your last dose was: Your next dose is:    
   
   
 Apply  to affected area daily. Refills:  0  
     
   
   
   
  
 timolol 0.25 % ophthalmic solution Commonly known as:  Rachelle Erica Your last dose was: Your next dose is:    
   
   
 Dose:  1-2 Drop Administer 1-2 Drops to both eyes two (2) times a day. use in affected eye(s) Refills:  0 STOP taking these medications   
 lisinopril 10 mg tablet Commonly known as:  PRINIVIL, ZESTRIL  
   
  
 OTHER Where to Get Your Medications These medications were sent to [address] [place] [address] 111, [address] [address] Phone:  [phone]  
  amoxicillin 500 mg Tab  
 clarithromycin 500 mg tablet  
 dilTIAZem 30 mg tablet  
 dilTIAZem  mg ER capsule  
 metoprolol tartrate 50 mg tablet  
 pantoprazole 40 mg tablet Discharge Instructions MyChart Activation Thank you for requesting access to [employer]. Please follow the instructions below to securely access and download your online medical record. [employer]ube allows you to send messages to your doctor, view your test results, renew your prescriptions, schedule appointments, and more. How Do I Sign Up? 1. In your internet browser, go to [email or web address] 
2. Click on the First Time User? Click Here link in the Sign In box. You will be redirect to the New Member Sign Up page. 3. Enter your Smart Cube Access Code exactly as it appears below. You will not need to use this code after youve completed the sign-up process. If you do not sign up before the expiration date, you must request a new code. [employer] Access Code: [email or web address] Expires: 2017  4:02 PM (This is the date your Smart Cube access code will ) 4. Enter the last four digits of your Social Security Number (xxxx) and Date of Birth (mm/dd/yyyy) as indicated and click Submit. You will be taken to the next sign-up page. 5. Create a Smart Cube ID. This will be your [employer] Cube login ID and cannot be changed, so think of one that is secure and easy to remember. 6. Create a [employer] Cube password. You can change your password at any time. 7. Enter your Password Reset Question and Answer. This can be used at a later time if you forget your password. 8. Enter your e-mail address. You will receive e-mail notification when new information is available in [address] 9. Click Sign Up. You can now view and download portions of your medical record. 10. Click the Download Summary menu link to download a portable copy of your medical information. Additional Information If you have questions, please visit the Frequently Asked Questions section of the [employer] website at [email or web address] Remember, [employer] is NOT to be used for urgent needs. For medical emergencies, dial 911. Discharge Instructions Attachments/References PEPTIC ULCER DISEASE (ENGLISH) Discharge Orders None ACO Transitions of Care Introducing [facility] [employer] [facility] offers a voluntary care coordination program to provide high quality service and care to [facility] fee-for-service beneficiaries. Merlinda Fells was designed to help you enhance your health and well-being through the following services: ? Transitions of Care  support for individuals who are transitioning from one care setting to another (example: Hospital to home). ? Chronic and Complex Care Coordination  support for individuals and caregivers of those with serious or chronic illnesses or with more than one chronic (ongoing) condition and those who take a number of different medications. If you meet specific medical criteria, a [facility] [address] may call you directly to coordinate your care with your primary care physician and your other care providers. For questions about the [facility] programs, please, contact your physicians office. For general questions or additional information about Accountable Care Organizations: 
Please visit www.medicare.gov/acos. html or call 1-800-MEDICARE (9-210.612.3462) TTY users should call 4-645.375.6258. [employer] Announcement We are excited to announce that we are making your provider's discharge notes available to you in [employer]. You will see these notes when they are completed and signed by the physician that discharged you from your recent hospital stay.   If you have any questions or concerns about any information you see in [employer], please call the Health Information Department where you were seen or reach out to your Primary Care Provider for more information about your plan of care. Introducing \[facility]\"" & HEALTH SERVICES! Kristofer Sewell introduces [employer] patient portal. Now you can access parts of your medical record, email your doctor's office, and request medication refills online. 1. In your internet browser, go to [email or web address] 2. Click on the First Time User? Click Here link in the Sign In box. You will see the New Member Sign Up page. 3. Enter your [employer] Access Code exactly as it appears below. You will not need to use this code after youve completed the sign-up process. If you do not sign up before the expiration date, you must request a new code. · [employer] Access Code: [email or web address] Expires: 5/30/2017  4:02 PM 
 
4. Enter the last four digits of your Social Security Number (xxxx) and Date of Birth (mm/dd/yyyy) as indicated and click Submit. You will be taken to the next sign-up page. 5. Create a [employer] ID. This will be your [employer] login ID and cannot be changed, so think of one that is secure and easy to remember. 6. Create a [employer] password. You can change your password at any time. 7. Enter your Password Reset Question and Answer. This can be used at a later time if you forget your password. 8. Enter your e-mail address. You will receive e-mail notification when new information is available in [address] 9. Click Sign Up. You can now view and download portions of your medical record. 10. Click the Download Summary menu link to download a portable copy of your medical information. If you have questions, please visit the Frequently Asked Questions section of the [employer] website. Remember, [employer] is NOT to be used for urgent needs. For medical emergencies, dial 911. Now available from your iPhone and Android! General Information Please provide this summary of care documentation to your next provider. Patient Signature:  ____________________________________________________________ Date:  ____________________________________________________________  
  
Aster Brought Provider Signature:  ____________________________________________________________ Date:  ____________________________________________________________ More Information Peptic Ulcer Disease: Care Instructions Your Care Instructions Peptic ulcers are sores on the inside of the stomach or the small intestine. They are usually caused by an infection with bacteria or from use of nonsteroidal anti-inflammatory drugs (NSAIDs). NSAIDs include aspirin, ibuprofen (Advil), and naproxen (Aleve). Your doctor may have prescribed medicine to reduce stomach acid. You also may need to take antibiotics if your peptic ulcers are caused by an infection. You can help your stomach heal and keep ulcers from coming back by making some changes in your lifestyle. Quit smoking, limit caffeine and alcohol, and reduce stress. Follow-up care is a key part of your treatment and safety. Be sure to make and go to all appointments, and call your doctor if you are having problems. Its also a good idea to know your test results and keep a list of the medicines you take. How can you care for yourself at home? · Take your medicines exactly as prescribed. Call your doctor if you think you are having a problem with your medicine. · Do not take aspirin or other NSAIDs such as ibuprofen (Advil or Motrin) or naproxen (Aleve). Ask your doctor what you can take for pain. · Do not smoke. Smoking can make ulcers worse. If you need help quitting, talk to your doctor about stop-smoking programs and medicines. These can increase your chances of quitting for good. · Drink in moderation or avoid drinking alcohol. · Do not drink beverages that have caffeine if they bother your stomach. These include coffee, tea, and soda. · Eat a balanced diet of small, frequent meals. Make an appointment with a dietitian if you need help planning your meals. · Reduce stress. Avoid people and places that make you feel anxious, if you can. Learn ways to reduce stress, such as biofeedback, guided imagery, and meditation. When should you call for help? Call 911 anytime you think you may need emergency care. For example, call if: 
· You passed out (lost consciousness). · You vomit blood or what looks like coffee grounds. · You pass maroon or very bloody stools. Call your doctor now or seek immediate medical care if: 
· You have severe pain in your belly, back, or shoulders. · You have new or worsening belly pain. · You are dizzy or lightheaded, or you feel like you may faint. · Your stools are black and tarlike or have streaks of blood. Watch closely for changes in your health, and be sure to contact your doctor if: 
· You have new symptoms such as weight loss, nausea or vomiting. · You do not feel better as expected. Where can you learn more? Go to http://bill-maria elena.info/. Enter S512 in the search box to learn more about \"Peptic Ulcer Disease: Care Instructions. \" Current as of: August 9, 2016 Content Version: 11.2 © 5576-7315 [employer]. Care instructions adapted under license by [employer] (which disclaims liability or warranty for this information). If you have questions about a medical condition or this instruction, always ask your healthcare professional. [address] any warranty or liability for your use of this information.

## 2017-04-18 PROBLEM — K26.5 PERFORATED DUODENAL BULB ULCER (HCC): Status: ACTIVE | Noted: 2017-01-01

## 2017-04-18 PROBLEM — E87.1 HYPONATREMIA: Status: ACTIVE | Noted: 2017-01-01

## 2017-04-18 NOTE — CONSULTS
Urology Consult    Subjective:     Date of Consultation:  April 18, 2017    Referring Physician: Ann Phan    Reason for Consultation:  Dominique Tamiko history, hydro    Patient Name: Lauren Bell  MRN: [id number]    History of Present Illness:   Patient is a 79 y.o.  male who is being seen for stone history, right hyro. He was admitted to the hospital for Perforated Viscous  Perforated duodenal bulb ulcer ([facility] [address] The patient underwent emergency ulcer surgery overnight. his past history is significant for a CVA suffered in his 35s. In 1990 he underwent bilateral percutaneous nephro lithotripsy and follow-up lithotripsies for very large bilateral staghorn stones. We have followed him in the office for a number of years with [facility] imaging. He has had stable right lower pole stones. He does have a prior history of recurrent urinary tract infections. At times has been on maintenance antibiotics. Most commonly Macrobid. With this admission he had a CT scan demonstrating right hydronephrosis and Hydro ureter to the level of the bladder. Question of bladder wall thickening. The CT scan mentions possible tiny distal right ureteral stones but I am not able to identify those on review of the images. No left Hydro. In review of the records prior ultrasound imaging indicates the possibility of a right renal lesion possibly cystic or solid. That is not identified or mentioned on this CT scan or a CT scan from November. He did have an event from a month or so ago with elevated creatinine. That was thought possibly related to dehydration and medications. A urine culture is pending. admission urinalysis had many white blood cells. He is afebrile. Dr. Ann Phan had discussed options overnight with Dr. Tio Brown. He is on Zosyn. Creatinine today is improved at 2.0.     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    small cecal polyp    DM (diabetes mellitus) ([facility] [address] 12/14/2009    Hypercholesterolemia     Hyperlipidemia 12/14/2009    Hypertension 12/14/2009    Nephrolithiasis 12/14/2009    Nephrolithiasis 12/14/2009    Perforated duodenal bulb ulcer ([address] 4/18/2017    Recurrent UTI 12/14/2009    Stroke [facility])       Past Surgical History:   Procedure Laterality Date    COLORECTAL SCRN; HI RISK IND  1/15/2014         ENDOSCOPY, COLON, DIAGNOSTIC  9/2/2008    small cecal tubular adenoma removed    HX HEENT      tonsillectomy    HX UROLOGICAL      for kidney stones      Family History   Problem Relation Age of Onset    Hypertension Mother     Elevated Lipids Mother     Heart Disease Mother     Diabetes Father     Hypertension Father    [facility] Elevated Lipids Sister     Hypertension Sister     Heart Disease Brother     Stroke Maternal Grandfather     Stroke Paternal Grandfather       Social History   Substance Use Topics    Smoking status: Former Smoker    Smokeless tobacco: Not on file    Alcohol use No     Allergies   Allergen Reactions    Bactrim [Sulfamethoxazole-Trimethoprim] Nausea and Vomiting      Prior to Admission medications    Medication Sig Start Date End Date Taking? Authorizing Provider   atorvastatin (LIPITOR) 40 mg tablet TAKE 1 TABLET BY MOUTH AT BEDTIME 7/11/16  Yes Prakash Chun MD   [facility]) 625 mg tablet Take 2 Tabs by mouth two (2) times daily (with meals). 6/13/16  Yes Lissa Leach MD   aspirin delayed-release 81 mg tablet Take 81 mg by mouth daily. Yes Historical Provider   timolol (BETIMOL) 0.25 % ophthalmic solution Administer 1-2 Drops to both eyes two (2) times a day. use in affected eye(s)   Yes Historical Provider   ketoconazole (NIZORAL) 2 % topical cream Apply  to affected area daily. Yes Historical Provider   hydrocortisone (ALA-ONEIL) 1 % lotion Apply  to affected area two (2) times a day.  use thin layer    Yes Historical Provider             Objective:     Data Review (Labs):    Recent Labs      04/18/17   0550  04/17/17   2143  04/17/17   2131   WBC  13.4*   -- 16.9*   HGB  10.7*   --   12.6   PLT  261   --   324   NA  131*   --   124*   K  4.5   --   5.4*   CREA  2.05*   --   3.05*   BUN  46*   --   56*   INR   --   1.2*   --        Patient Vitals for the past 8 hrs:   BP Temp Pulse Resp SpO2 Weight   17 0800 - - - - 92 % -   17 0722 123/55 97.2 °F (36.2 °C) 78 16 92 % -   17 0655 - - 76 - 91 % -   17 0600 - - 77 - 96 % -   17 0550 - - 75 - 94 % -   17 0533 - - - - - 107.9 kg (237 lb 12.8 oz)   17 0528 129/57 97.4 °F (36.3 °C) 73 16 96 % -   17 0412 139/70 97.2 °F (36.2 °C) 74 16 97 % -   17 0345 130/56 - 78 15 94 % -   17 0340 118/56 - 79 18 94 % -   17 0335 121/59 - 82 18 94 % -     Temp (24hrs), Av.3 °F (36.3 °C), Min:96.3 °F (35.7 °C), Max:98.4 °F (36.9 °C)      Intake and Output:    1901 -  0700  In: 2000 [I.V.:2000]  Out: 1315 [Urine:1225; Drains:60]    Physical Exam:            General:    alert, cooperative, no distress, appears stated age                     Skin:  Normal.    Ext HEENT unremark(NG Tube) Resp w/o diff, Neck supple             Abdomen[de-identified]  post op             Genitalia:  diallo with clear urine          Extremities:  partial hemiplegia long term       Assessment:     Principal Problem:    Perforated viscus (2017)    Active Problems:    Acute renal failure (ARF) ([address] (3/1/2017)      Hydroureter on right (2017)      Perforated duodenal bulb ulcer ([address] (2017)          Right renal stones  -lower pole. Right hydroureteronephrosis to bladder. Question of small stone in distal right ureter but not easily visualized on CT. Question of bladder wall thickening. CT images are similar to hospital CT from November. Possible UTI. Cultures pending. On antibiotics. Postop from procedure from overnight.   For questionable right renal lesion on ultrasound but not visualized on noncontrast CT, might need eventual dedicated CT without and with contrast once renal function optimized. Plan:     Recommend leave Amaya catheter for now. Await cultures. we will give him a couple of days to recover from his overnight anesthesia and procedure. We will see if we can hold a spot later this week for cystoscopy probable right retrograde, possible stent, possible bladder biopsy. I discussed this with him.      Signed By: Lei Lipscomb MD                         April 18, 2017

## 2017-04-18 NOTE — PROGRESS NOTES
TRANSFER - IN REPORT:    Verbal report received from Pedro Trujillo RN(name) on Rolf Clark  being received from PACU(unit) for routine post - op      Report consisted of patients Situation, Background, Assessment and   Recommendations(SBAR). Information from the following report(s) SBAR, Kardex, MAR, Recent Results and Cardiac Rhythm NSR was reviewed with the receiving nurse. Opportunity for questions and clarification was provided. Assessment completed upon patients arrival to unit and care assumed. 04:05  Primary Nurse Ladan Luther RN and Eliezer Amaya RN performed a dual skin assessment on this patient No impairment noted other than surgical wounds. Pako score is 19.    07:30  Bedside shift change report given to [address] (oncoming nurse) by Ladan Luther RN (offgoing nurse). Report included the following information SBAR, Kardex, MAR and Recent Results.

## 2017-04-18 NOTE — CDMP QUERY
Dr. Marcelle Leal, [place] D :  Please clarify if this patient is being treated/managed for:    =>Acute renal failure in the setting of bun/crea 56/3.05/21 req ivfs @ 125/hr, NS 1L, urol cx pending  =>Other Explanation of clinical findings  =>Unable to Determine (no explanation of clinical findings)    The medical record reflects the following clinical findings, treatment, and risk factors:    Risk Factors: 67m adm w/ perforated duodenal bulb ulcer  Clinical Indicators: bun/crea 56/3.05/21, 4/18 pn \"Cr down some with hydration. Right hydronephrosis appears related to distal ureteral stone,  Acute renal failure (ARF) ([facility]) (3/1/2017)\"  Treatment:  ivfs @ 125/hr, NS 1L, urol cx pending    Please clarify and document your clinical opinion in the progress notes and discharge summary including the definitive and/or presumptive diagnosis, (suspected or probable), related to the above clinical findings. Please include clinical findings supporting your diagnosis.     Thank Morelia Officer

## 2017-04-18 NOTE — CDMP QUERY
Dr. Leanna Laughlin, [address] D :  Please clarify if this patient is being treated/managed for:    =>Hyponatremia in the setting of --131 req ivfs @ 125/hr, NS 1L  =>Other Explanation of clinical findings  =>Unable to Determine (no explanation of clinical findings)    The medical record reflects the following clinical findings, treatment, and risk factors:    Risk Factors: 67m adm w/ perforated duodenal bulb ulcer  Clinical Indicators: --131, 4/18 pn \"Sodium improved on NS\"  Treatment:  ivfs @ 125/hr, NS 1L    Please clarify and document your clinical opinion in the progress notes and discharge summary including the definitive and/or presumptive diagnosis, (suspected or probable), related to the above clinical findings. Please include clinical findings supporting your diagnosis.     Thank Nadiya Or

## 2017-04-18 NOTE — ED NOTES
Assumed care of pt from EMS. Pt presents to ED with chief complaint of nausea/ vomiting, lower abdominal pain, blood in urine. Pt presents to ED with right sided decreased sensation and right leg weakness due to a hx of CVA. Pt is A&O x 4. Pt presents to ED with right sided metal leg brace. Pt denies any other symptoms at this time. Pt resting comfortably on the stretcher in a position of comfort. Pt in no acute distress at this time. Call bell within reach. Side rails x 2. Cardiac monitor x 2. Stretcher locked in the lowest position. Pt aware of plan to await for MD/PA-C/NP assessment, and pt/family verbalizes understanding. Will continue to monitor.

## 2017-04-18 NOTE — PERIOP NOTES
Handoff Report from Operating Room to PACU    Report received from [place]  and Virgen ROBERSON regarding Lea Marrerobetty. Surgeon(s):  Greg Matt MD  And Procedure(s) (LRB):  LAPAROTOMY EXPLORATORY, REPAIR OF MASSIVE DUODENAL BULB ULCER WITH BIOPSY AND KEVIN PATCH (Bilateral)  confirmed   with allergies, drains and dressings discussed. Anesthesia type, drugs, patient history, complications, estimated blood loss, vital signs, intake and output, and last pain medication and reversal medications were reviewed.

## 2017-04-18 NOTE — ED PROVIDER NOTES
HPI Comments: Lyla Hankins is a 79 y.o. male with h/o DM, HTN, kidney stones, kidney failure and CVA (causing R sided deficit) who presents via EMS to the ED from [address] [facility] [place] home c/o diffuse abd pain worse in the RLQ, increase fatigue and \"just not feeling well\" x 5 days. Additionally, the pt's wife reports the pt experiencing wheezing, hematuria, and subjective fever all occurring 3 days ago which resolved the same day. Pt takes Macrobid daily for UTI prevention but has since been off as he is in a rehab facility and was placed on Bactrim for 2-3 days and then subsequently taken off the Bactrim after no growth on the urine culture per wife, and states he was then placed on Levaquin which he is still taking. Pt last vomited 5 days ago and denies diarrhea, Last BM was 2 days ago. Last oral intake was sherbert a few hours PTA. Pt's wife states pt is at baseline mental status. Of note, pt saw his urologist on 3/21, received an US showing a stone in his R kidney (stable), and states passing a stone on the left. Pt specifically denies diarrhea, SOB, CP, LE swelling, nausea, vomiting, fever, chills, saddle numbness. PCP: Abundio Rinne, MD  Urologist: Dr. Yogi Helms Hx: -tobacco, -EtOH, -Illicit Drugs      There are no other complaints, changes or physical findings at this time. The history is provided by the patient and the spouse.         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    small cecal polyp    DM (diabetes mellitus) ([facility] [address] 12/14/2009    Hypercholesterolemia     Hyperlipidemia 12/14/2009    Hypertension 12/14/2009    Nephrolithiasis 12/14/2009    Nephrolithiasis 12/14/2009    Recurrent UTI 12/14/2009    Stroke [facility])        Past Surgical History:   Procedure Laterality Date    COLORECTAL SCRN; HI RISK IND  1/15/2014         ENDOSCOPY, COLON, DIAGNOSTIC  9/2/2008    small cecal tubular adenoma removed    HX HEENT      tonsillectomy    HX UROLOGICAL      for kidney stones         Family History:   Problem Relation Age of Onset    Hypertension Mother     Elevated Lipids Mother     Heart Disease Mother     Diabetes Father     Hypertension Father    Abdiaziz Ott Elevated Lipids Sister     Hypertension Sister     Heart Disease Brother     Stroke Maternal Grandfather     Stroke Paternal Grandfather        Social History     Social History    Marital status: SINGLE     Spouse name: N/A    Number of children: N/A    Years of education: N/A     Occupational History    Not on file. Social History Main Topics    Smoking status: Former Smoker    Smokeless tobacco: Not on file    Alcohol use No    Drug use: No    Sexual activity: Not Currently     Birth control/ protection: Abstinence     Other Topics Concern    Not on file     Social History Narrative         ALLERGIES: Bactrim [sulfamethoxazole-trimethoprim]    Review of Systems   Constitutional: Positive for fatigue. Negative for activity change, chills, fever and unexpected weight change. +\"just not feeling well\"   Respiratory: Negative for cough, chest tightness, shortness of breath and wheezing. Cardiovascular: Negative. Negative for chest pain and palpitations. Gastrointestinal: Positive for abdominal pain (diffuse, worse in RLQ). Negative for diarrhea, nausea and vomiting. Genitourinary: Negative. Negative for dysuria, flank pain, frequency and hematuria. Musculoskeletal: Negative. Negative for arthralgias, back pain, neck pain and neck stiffness. Skin: Negative. Negative for color change and rash. Neurological: Negative. Negative for dizziness, numbness and headaches. Denies saddle numbness   Psychiatric/Behavioral: Negative. Negative for confusion. All other systems reviewed and are negative.       Patient Vitals for the past 12 hrs:   Temp Pulse Resp BP SpO2   04/17/17 2038 98.4 °F (36.9 °C) 91 18 96/64 95 %     Physical Exam   Constitutional: He is oriented to person, place, and time. He appears well-developed and well-nourished. He is active. Non-toxic appearance. No distress. HENT:   Head: Normocephalic and atraumatic. Eyes: Conjunctivae are normal. Pupils are equal, round, and reactive to light. Right eye exhibits no discharge. Left eye exhibits no discharge. Neck: Normal range of motion and full passive range of motion without pain. Neck supple. No tracheal tenderness present. Cardiovascular: Normal rate, regular rhythm, normal heart sounds, intact distal pulses and normal pulses. Exam reveals no gallop and no friction rub. No murmur heard. Pulmonary/Chest: Effort normal. No respiratory distress. He has no wheezes. He has no rales. He exhibits no tenderness.   having occasional deep respirations but no tachypnea   Abdominal: Soft. Bowel sounds are normal. He exhibits no distension. There is tenderness. There is rebound. There is no guarding. abd markedly tender throughout but focused in RLQ, no CVA tenderness   Musculoskeletal: Normal range of motion. He exhibits no edema. Neurological: He is alert and oriented to person, place, and time. He has normal strength. No cranial nerve deficit or sensory deficit. Coordination normal.   weakness in R arm unchanged due to prior stroke   Skin: Skin is warm, dry and intact. No abrasion and no rash noted. He is not diaphoretic. No erythema. There is pallor. Psychiatric: He has a normal mood and affect. His speech is normal and behavior is normal. Cognition and memory are normal.   Nursing note and vitals reviewed. MDM  Number of Diagnoses or Management Options  Acute hyperkalemia:   Acute renal failure, unspecified acute renal failure type [facility]):    Hyponatremia:   Perforated viscus:   Diagnosis management comments:     DDx: ACS, perforated viscus, appendicitis, kidney stone       Amount and/or Complexity of Data Reviewed  Clinical lab tests: ordered and reviewed  Tests in the radiology section of CPT®: ordered and reviewed  Tests in the medicine section of CPT®: ordered and reviewed  Obtain history from someone other than the patient: yes (EMS  Wife)  Review and summarize past medical records: yes  Discuss the patient with other providers: yes (General Surgery)  Independent visualization of images, tracings, or specimens: yes      ED Course       Procedures    CONSULT NOTE:   9:38 PM  ARCELIA Han spoke with Dr. Connie Lopes,  Specialty: General Surgery  Discussed pt's hx, disposition, and available diagnostic and imaging results. Reviewed care plans. Consultant agrees with plans as outlined. He recommends to obtain a CT and he will come evaluate the PT in ED. Written by Adam Lim, ED Scribe, as dictated by Felix Moran. Progress Note:  9:45 PM  Pt re-evaluated. Updated pt and family on plan of care. They agree and have no further questions at this time. Written by Adam Lim, ED Scribe, as dictated by Felix Moran. CONSULT NOTE:   10:38 PM  ARCELIA Han spoke with Dr. Connie Lopes,  Specialty: General Specialist  Discussed pt's hx, disposition, and available diagnostic and imaging results. Reviewed care plans. Consultant agrees with plans as outlined. He agrees to admit pt under his services. Written by Adam Lim, ED Scribe, as dictated by Felix Moran    ED EKG interpretation: 21:20  Rhythm: normal sinus rhythm; and regular . Rate (approx.): 89; Axis: left axis deviation; MI Interval: normal; QRS interval: normal ; ST/T wave: non-specific changes; This EKG was interpreted by Yolanda Klein MD,ED Provider.       LABORATORY TESTS:  Recent Results (from the past 12 hour(s))   EKG, 12 LEAD, INITIAL    Collection Time: 04/17/17  9:14 PM   Result Value Ref Range    Ventricular Rate 92 BPM    Atrial Rate 92 BPM    P-R Interval 182 ms    QRS Duration 80 ms    Q-T Interval 336 ms    QTC Calculation (Bezet) 415 ms    Calculated P Axis 20 degrees Calculated R Axis -30 degrees    Calculated T Axis -9 degrees    Diagnosis       Normal sinus rhythm  Left axis deviation  Possible Inferior infarct (cited on or before 17-APR-2017)  Possible Anterolateral infarct (cited on or before 17-APR-2017)  Abnormal ECG  When compared with ECG of 01-MAR-2017 15:26,  T wave inversion now evident in Inferior leads     CBC WITH AUTOMATED DIFF    Collection Time: 04/17/17  9:31 PM   Result Value Ref Range    WBC 16.9 (H) 4.1 - 11.1 K/uL    RBC 4.35 4.10 - 5.70 M/uL    HGB 12.6 12.1 - 17.0 g/dL    HCT 36.1 (L) 36.6 - 50.3 %    MCV 83.0 80.0 - 99.0 FL    MCH 29.0 26.0 - 34.0 PG    MCHC 34.9 30.0 - 36.5 g/dL    RDW 13.9 11.5 - 14.5 %    PLATELET 012 849 - 536 K/uL    NEUTROPHILS 87 (H) 32 - 75 %    LYMPHOCYTES 5 (L) 12 - 49 %    MONOCYTES 8 5 - 13 %    EOSINOPHILS 0 0 - 7 %    BASOPHILS 0 0 - 1 %    ABS. NEUTROPHILS 14.7 (H) 1.8 - 8.0 K/UL    ABS. LYMPHOCYTES 0.8 0.8 - 3.5 K/UL    ABS. MONOCYTES 1.4 (H) 0.0 - 1.0 K/UL    ABS. EOSINOPHILS 0.0 0.0 - 0.4 K/UL    ABS. BASOPHILS 0.0 0.0 - 0.1 K/UL    DF SMEAR SCANNED      RBC COMMENTS NORMOCYTIC, NORMOCHROMIC      WBC COMMENTS LEFT SHIFT     METABOLIC PANEL, COMPREHENSIVE    Collection Time: 04/17/17  9:31 PM   Result Value Ref Range    Sodium 124 (L) 136 - 145 mmol/L    Potassium 5.4 (H) 3.5 - 5.1 mmol/L    Chloride 90 (L) 97 - 108 mmol/L    CO2 22 21 - 32 mmol/L    Anion gap 12 5 - 15 mmol/L    Glucose 146 (H) 65 - 100 mg/dL    BUN 56 (H) 6 - 20 MG/DL    Creatinine 3.05 (H) 0.70 - 1.30 MG/DL    BUN/Creatinine ratio 18 12 - 20      GFR est AA 25 (L) >60 ml/min/1.73m2    GFR est non-AA 21 (L) >60 ml/min/1.73m2    Calcium 9.2 8.5 - 10.1 MG/DL    Bilirubin, total 1.6 (H) 0.2 - 1.0 MG/DL    ALT (SGPT) 19 12 - 78 U/L    AST (SGOT) 19 15 - 37 U/L    Alk.  phosphatase 78 45 - 117 U/L    Protein, total 8.3 (H) 6.4 - 8.2 g/dL    Albumin 3.1 (L) 3.5 - 5.0 g/dL    Globulin 5.2 (H) 2.0 - 4.0 g/dL    A-G Ratio 0.6 (L) 1.1 - 2.2     LACTIC ACID, PLASMA    Collection Time: 04/17/17  9:31 PM   Result Value Ref Range    Lactic acid 1.4 0.4 - 2.0 MMOL/L   TROPONIN I    Collection Time: 04/17/17  9:31 PM   Result Value Ref Range    Troponin-I, Qt. <0.04 <0.05 ng/mL   LIPASE    Collection Time: 04/17/17  9:31 PM   Result Value Ref Range    Lipase 177 73 - 393 U/L   PROTHROMBIN TIME + INR    Collection Time: 04/17/17  9:43 PM   Result Value Ref Range    INR 1.2 (H) 0.9 - 1.1      Prothrombin time 12.1 (H) 9.0 - 11.1 sec       IMAGING RESULTS:    EXAM: XR CHEST PORT     INDICATION: shortness of breath     COMPARISON: None.     FINDINGS: A portable AP radiograph of the chest was obtained at 2057 hours. The  patient is on a cardiac monitor. The lungs are clear. The cardiac and  mediastinal contours and pulmonary vascularity are normal. The bones and soft  tissues are grossly within normal limits. There is free air beneath the right  hemidiaphragm.      IMPRESSION  IMPRESSION: Free intraperitoneal air.     The findings were called to Dr. Nolan Stagemarito on April 17, 2017 at 9:30 PM by myself. [id number]     INDICATION: R back, RLQ pain; hx of kidney stones; eval for stone, appendix     COMPARISON: November 7, 2016     TECHNIQUE:   Thin axial images were obtained through the abdomen and pelvis. Coronal and  sagittal reconstructions were generated. Oral contrast was not administered. CT  dose reduction was achieved through use of a standardized protocol tailored for  this examination and automatic exposure control for dose modulation.      The absence of intravenous contrast material reduces the sensitivity for  evaluation of the solid parenchymal organs of the abdomen.      FINDINGS:   There is free intraperitoneal air. Gallstones are seen in a nondistended  gallbladder. There is severe hydronephrosis and hydroureter on the right. Small  calculi seen within the distal ureter. Underlying pathology suggest stricture or  mass not excluded. There are bilateral intrarenal calculi.  There is no  obstruction on the left. Diffuse vascular calcifications. There is no bowel wall  thickening or obstruction. No free fluid. Bladder is midline but not distended.     IMPRESSION  impression: Free intraperitoneal air. Right-sided hydronephrosis and  hydroureters. Cholelithiasis. MEDICATIONS GIVEN:  Medications   0.9% sodium chloride infusion (not administered)   piperacillin-tazobactam (ZOSYN) 3.375 g in 0.9% sodium chloride (MBP/ADV) 100 mL (not administered)   piperacillin-tazobactam (ZOSYN) 3.375 g in 0.9% sodium chloride (MBP/ADV) 100 mL (not administered)   sodium chloride 0.9 % bolus infusion 1,000 mL (1,000 mL IntraVENous New Bag 4/17/17 2134)       IMPRESSION:  1. Perforated viscus    2. Acute renal failure, unspecified acute renal failure type ([address]    3. Acute hyperkalemia    4. Hyponatremia        PLAN:  1. To be admitted to general surgery    10:39 PM  Patient is being admitted to the hospital by Dr. Ann Escobar. The results of their tests and reasons for their admission have been discussed with them and/or available family. They convey agreement and understanding for the need to be admitted and for their admission diagnosis. Consultation has been made with the inpatient physician specialist for hospitalization. Written by ANMOL Brown, as dictated by [employer]    This note is prepared by Elizabeth Etienne, acting as Scribe for [employer]    ARCELIA Vee Else: The scribe's documentation has been prepared under my direction and personally reviewed by me in its entirety. I confirm that the note above accurately reflects all work, treatment, procedures, and medical decision making performed by me. This note will not be viewable in [address]

## 2017-04-18 NOTE — ED NOTES
TRANSFER - OUT REPORT:    Verbal report given to OR nurses on Rolf HEMANTH Clark  being transferred to [facility], [place], operated by [facility] surgery. Report consisted of patients Situation, Background, Assessment and   Recommendations(SBAR). Information from the following report(s) SBAR, ED Summary, [facility] and Recent Results was reviewed with the receiving nurse. Lines:   Peripheral IV 03/01/17 Right Wrist (Active)       Peripheral IV 04/17/17 Right Antecubital (Active)   Site Assessment Clean, dry, & intact 4/17/2017  9:34 PM   Phlebitis Assessment 0 4/17/2017  9:34 PM   Infiltration Assessment 0 4/17/2017  9:34 PM   Dressing Status Clean, dry, & intact 4/17/2017  9:34 PM   Dressing Type Transparent 4/17/2017  9:34 PM   Hub Color/Line Status Pink 4/17/2017  9:34 PM        Opportunity for questions and clarification was provided.       Patient transported with:   OR RN's

## 2017-04-18 NOTE — PERIOP NOTES
TRANSFER - OUT REPORT:    Verbal report given to [facility] on [employer]  being transferred to 2266(unit) for routine post - op       Report consisted of patients Situation, Background, Assessment and   Recommendations(SBAR). Information from the following report(s) SBAR, OR Summary, Procedure Summary, Intake/Output, MAR, Recent Results and Cardiac Rhythm NSR was reviewed with the receiving nurse. Opportunity for questions and clarification was provided.       Patient transported with:   SBAR, OR Summary, Procedure Summary, Intake/Output, MAR, Recent Results and Cardiac Rhythm NSR

## 2017-04-18 NOTE — H&P
Surgery History and Physcial    Subjective:      Yrn Liu is a 79 y.o. male who presents with 4 days history of severe abdominal pain. He is s/p CVA in 1989 with some weakness and expressive deficit. He states that he has not had a bowel movement in 1-2 days and has been having worsening right sided abdominal pain. He has had nausea but no emesis. He had a workup for acute renal insufficiency recently with a retroperitoneal ultrasound on 3/1/17 which showed a right renal mass without hydronephrosis but with distal ureteral calculi. He had a Cr of 3 at that time which improved to his baseline of 1 by mid March. Pt had gross hematuria 4 days ago which cleared. Today his Cr is again 3. CT today shows free intraabdominal air with marked right hydroureter with possible mass effect.     Patient Active Problem List    Diagnosis Date Noted    SIRS (systemic inflammatory response syndrome) ([address] 04/22/2017    SVT (supraventricular tachycardia) (HCC) 04/19/2017    Perforated duodenal bulb ulcer ([address] 04/18/2017    Hyponatremia 04/18/2017    Perforated viscus 04/17/2017    Hydroureter on right 04/17/2017    History of stroke 03/03/2017    UTI (urinary tract infection) 03/03/2017    Acute renal failure (ARF) ([address] 03/01/2017    Hyperkalemia 03/01/2017    Hypertension 12/14/2009    Hyperlipidemia 12/14/2009    DM (diabetes mellitus) ([address] 12/14/2009    H/O 12/14/2009    Recurrent UTI 12/14/2009    Nephrolithiasis 12/14/2009     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    small cecal polyp    DM (diabetes mellitus) ([address] 12/14/2009    Hypercholesterolemia     Hyperlipidemia 12/14/2009    Hypertension 12/14/2009    Nephrolithiasis 12/14/2009    Nephrolithiasis 12/14/2009    Perforated duodenal bulb ulcer ([address] 4/18/2017    Recurrent UTI 12/14/2009    Stroke ([address]     SVT (supraventricular tachycardia) ([address] 4/19/2017      Past Surgical History:   Procedure Laterality Date    COLORECTAL SCRN; HI RISK IND  1/15/2014         ENDOSCOPY, COLON, DIAGNOSTIC  9/2/2008    small cecal tubular adenoma removed    HX HEENT      tonsillectomy    HX UROLOGICAL      for kidney stones      Social History   Substance Use Topics    Smoking status: Former Smoker    Smokeless tobacco: Not on file    Alcohol use No      Family History   Problem Relation Age of Onset    Hypertension Mother     Elevated Lipids Mother     Heart Disease Mother     Diabetes Father     Hypertension Father     Elevated Lipids Sister     Hypertension Sister     Heart Disease Brother     Stroke Maternal Grandfather     Stroke Paternal Grandfather       Prior to Admission medications    Medication Sig Start Date End Date Taking? Authorizing Provider   lisinopril (PRINIVIL, ZESTRIL) 10 mg tablet Take 10 mg by mouth daily. Yes Historical Provider   OTHER    Yes Historical Provider   atorvastatin (LIPITOR) 40 mg tablet TAKE 1 TABLET BY MOUTH AT BEDTIME 7/11/16  Yes Yasmani Jaimes MD   [facility] [place]) 625 mg tablet Take 2 Tabs by mouth two (2) times daily (with meals). 6/13/16  Yes Landry Machuca MD   aspirin delayed-release 81 mg tablet Take 81 mg by mouth daily. Yes Historical Provider   timolol (BETIMOL) 0.25 % ophthalmic solution Administer 1-2 Drops to both eyes two (2) times a day. use in affected eye(s)   Yes Historical Provider   ketoconazole (NIZORAL) 2 % topical cream Apply  to affected area daily. Yes Historical Provider   hydrocortisone (ALA-ONEIL) 1 % lotion Apply  to affected area two (2) times a day. use thin layer    Yes Historical Provider     Allergies   Allergen Reactions    Bactrim [Sulfamethoxazole-Trimethoprim] Nausea and Vomiting         Review of Systems   Constitutional: Positive for appetite change. Negative for chills, diaphoresis and fever. Respiratory: Negative for shortness of breath and wheezing. Cardiovascular: Negative for chest pain and palpitations. Gastrointestinal: Positive for abdominal pain, constipation and nausea. Negative for diarrhea and vomiting. Musculoskeletal: Negative for myalgias. Hematological: Does not bruise/bleed easily. Objective:     Visit Vitals    /64    Pulse 70    Temp 98.1 °F (36.7 °C)    Resp 20    Ht 5' 9\" (1.753 m)    Wt 222 lb 7.1 oz (100.9 kg)    SpO2 96%    BMI 32.85 kg/m2       Physical Exam   Constitutional: He appears well-developed and well-nourished. No distress. HENT:   Head: Normocephalic and atraumatic. Cardiovascular: Normal rate, regular rhythm, normal heart sounds and intact distal pulses. Pulmonary/Chest: Breath sounds normal. He has no wheezes. He has no rales. Abdominal: Soft. Bowel sounds are normal. He exhibits no distension and no mass. There is no hepatosplenomegaly. There is tenderness in the right upper quadrant and right lower quadrant. There is rebound and guarding. There is no rigidity. No hernia. Musculoskeletal: Normal range of motion. Lymphadenopathy:     He has no cervical adenopathy.        Imaging:  images and reports reviewed    Lab Review:    Recent Results (from the past 24 hour(s))   GLUCOSE, POC    Collection Time: 04/21/17 11:43 PM   Result Value Ref Range    Glucose (POC) 184 (H) 65 - 100 mg/dL    Performed by Cici Castano, BASIC    Collection Time: 04/22/17  4:21 AM   Result Value Ref Range    Sodium 135 (L) 136 - 145 mmol/L    Potassium 4.3 3.5 - 5.1 mmol/L    Chloride 101 97 - 108 mmol/L    CO2 25 21 - 32 mmol/L    Anion gap 9 5 - 15 mmol/L    Glucose 188 (H) 65 - 100 mg/dL    BUN 19 6 - 20 MG/DL    Creatinine 1.03 0.70 - 1.30 MG/DL    BUN/Creatinine ratio 18 12 - 20      GFR est AA >60 >60 ml/min/1.73m2    GFR est non-AA >60 >60 ml/min/1.73m2    Calcium 8.8 8.5 - 10.1 MG/DL   MAGNESIUM    Collection Time: 04/22/17  4:21 AM   Result Value Ref Range    Magnesium 2.0 1.6 - 2.4 mg/dL   CBC W/O DIFF    Collection Time: 04/22/17  4:21 AM   Result Value Ref Range    WBC 14.5 (H) 4.1 - 11.1 K/uL    RBC 3.59 (L) 4.10 - 5.70 M/uL    HGB 10.1 (L) 12.1 - 17.0 g/dL    HCT 31.1 (L) 36.6 - 50.3 %    MCV 86.6 80.0 - 99.0 FL    MCH 28.1 26.0 - 34.0 PG    MCHC 32.5 30.0 - 36.5 g/dL    RDW 14.3 11.5 - 14.5 %    PLATELET 654 727 - 318 K/uL   GLUCOSE, POC    Collection Time: 04/22/17  6:24 AM   Result Value Ref Range    Glucose (POC) 216 (H) 65 - 100 mg/dL    Performed by Anthony Stone    GLUCOSE, POC    Collection Time: 04/22/17  5:37 PM   Result Value Ref Range    Glucose (POC) 206 (H) 65 - 100 mg/dL    Performed by Radha Bailon      CT:  FINDINGS:   There is free intraperitoneal air. Gallstones are seen in a nondistended  gallbladder. There is severe hydronephrosis and hydroureter on the right. Small  calculi seen within the distal ureter. Underlying pathology suggest stricture or  mass not excluded. There are bilateral intrarenal calculi. There is no  obstruction on the left. Diffuse vascular calcifications. There is no bowel wall  thickening or obstruction. No free fluid. Bladder is midline but not distended. Assessment:     Perforated viscus, right hydroureter which is new, with recurrent acute renal insufficiency which may be secondary to ureterolithiasis. Hyponatremia secondary to acute on chronic illness    Plan:     1. I recommend proceeding with exploratory laparotomy. Cysto with stent placement prior with Dr. Kenzie Baez, discussed in detail with him. 2. Discussed aspects of surgical intervention, methods, risks including by not limited to infection, bleeding, hematoma, and perforation of the intestines or solid organs, possible need for bowel resection and/or ostomy, and the risks of general anesthetic. The patient understands the risks; any and all questions were answered to the patient's satisfaction.     Signed By: Margarette Cavazos MD, [facility]    April 22, 2017

## 2017-04-18 NOTE — CDMP QUERY
Dr. Marcelle Leal, [place] D :  Please clarify if this patient is being treated/managed for:    =>clinical significance of ua: 1 bact, few epith, large le, wbc >100, +yeast, cx pending  =>Other Explanation of clinical findings  =>Unable to Determine (no explanation of clinical findings)    The medical record reflects the following clinical findings, treatment, and risk factors:    Risk Factors: 67F adm w/ perforated duodenal bulb ulcer, been on Lvq for UTI prevention, 3/21 urol apptl: stone in his R kidney (stable), and states passing a stone on the left  Clinical Indicators: ua: 1 bact, few epith, large le, wbc >100, +yeast, cx pending  Treatment: urol cx pending, ns 1L    Please clarify and document your clinical opinion in the progress notes and discharge summary including the definitive and/or presumptive diagnosis, (suspected or probable), related to the above clinical findings. Please include clinical findings supporting your diagnosis.     Thank [facility] ER Officer

## 2017-04-18 NOTE — ANESTHESIA PREPROCEDURE EVALUATION
Anesthetic History   No history of anesthetic complications            Review of Systems / Medical History  Patient summary reviewed, nursing notes reviewed and pertinent labs reviewed    Pulmonary  Within defined limits                 Neuro/Psych       CVA (+R sided weakness)       Cardiovascular    Hypertension          Hyperlipidemia    Exercise tolerance: <4 METS     GI/Hepatic/Renal         Renal disease: ARF and stones       Endo/Other    Diabetes: using insulin    Obesity     Other Findings              Physical Exam    Airway  Mallampati: IV  TM Distance: 4 - 6 cm  Neck ROM: normal range of motion   Mouth opening: Normal     Cardiovascular  Regular rate and rhythm,  S1 and S2 normal,  no murmur, click, rub, or gallop             Dental  No notable dental hx       Pulmonary  Breath sounds clear to auscultation               Abdominal  GI exam deferred       Other Findings            Anesthetic Plan    ASA: 3, emergent  Anesthesia type: general    Monitoring Plan: BIS      Induction: Intravenous  Anesthetic plan and risks discussed with: Patient      Gabriela

## 2017-04-18 NOTE — PROGRESS NOTES
Surgery      Spoke again with Dr. Lauryn Gillespie, who has now had a chance to review the CT images. He feels the patient had a similar appearance a year ago, and has a fairly non-functional right kidney at this point. He feels it best to hold off on any stent placement at this point and proceed directly with exploratory laparotomy. He will pass pt information on to Dr. Praveen Knott, to see pt in am. Saintclair Kief.  Ariella Garcia MD, [facility]

## 2017-04-18 NOTE — PROGRESS NOTES
Admit Date: 2017    POD Day of Surgery    Procedure:  Procedure(s):  CYSTOSCOPY RIGHT PYELOGRAM INSERTION RIGHT URETERAL STENT    Subjective:     Patient has no new complaints. Objective:     Blood pressure 153/64, pulse 70, temperature 98.1 °F (36.7 °C), resp. rate 20, height 5' 9\" (1.753 m), weight 222 lb 7.1 oz (100.9 kg), SpO2 96 %. Temp (24hrs), Av.2 °F (36.8 °C), Min:97.7 °F (36.5 °C), Max:98.8 °F (37.1 °C)      Physical Exam:  GENERAL: no distress, appears stated age, sleepy, LUNG: clear to auscultation bilaterally, HEART: regular rate and rhythm, S1, S2 normal, no murmur, click, rub or gallop, ABDOMEN: soft, non-tender.  Wounds c/d/i, YARON o/p serosanguinous x 2, EXTREMITIES:  extremities normal, atraumatic, no cyanosis or edema    Labs:   Recent Results (from the past 24 hour(s))   GLUCOSE, POC    Collection Time: 17 11:43 PM   Result Value Ref Range    Glucose (POC) 184 (H) 65 - 100 mg/dL    Performed by Cici Castano, BASIC    Collection Time: 17  4:21 AM   Result Value Ref Range    Sodium 135 (L) 136 - 145 mmol/L    Potassium 4.3 3.5 - 5.1 mmol/L    Chloride 101 97 - 108 mmol/L    CO2 25 21 - 32 mmol/L    Anion gap 9 5 - 15 mmol/L    Glucose 188 (H) 65 - 100 mg/dL    BUN 19 6 - 20 MG/DL    Creatinine 1.03 0.70 - 1.30 MG/DL    BUN/Creatinine ratio 18 12 - 20      GFR est AA >60 >60 ml/min/1.73m2    GFR est non-AA >60 >60 ml/min/1.73m2    Calcium 8.8 8.5 - 10.1 MG/DL   MAGNESIUM    Collection Time: 17  4:21 AM   Result Value Ref Range    Magnesium 2.0 1.6 - 2.4 mg/dL   CBC W/O DIFF    Collection Time: 17  4:21 AM   Result Value Ref Range    WBC 14.5 (H) 4.1 - 11.1 K/uL    RBC 3.59 (L) 4.10 - 5.70 M/uL    HGB 10.1 (L) 12.1 - 17.0 g/dL    HCT 31.1 (L) 36.6 - 50.3 %    MCV 86.6 80.0 - 99.0 FL    MCH 28.1 26.0 - 34.0 PG    MCHC 32.5 30.0 - 36.5 g/dL    RDW 14.3 11.5 - 14.5 %    PLATELET 759 465 - 988 K/uL   GLUCOSE, POC    Collection Time: 17  6:24 AM   Result Value Ref Range    Glucose (POC) 216 (H) 65 - 100 mg/dL    Performed by Marsha Olmos    GLUCOSE, POC    Collection Time: 04/22/17  5:37 PM   Result Value Ref Range    Glucose (POC) 206 (H) 65 - 100 mg/dL    Performed by Denis Linares        Data Review images and reports reviewed    Assessment:     Principal Problem:    Perforated viscus (4/17/2017)    Active Problems:    Acute renal failure (ARF) ([address] (3/1/2017)      Hydroureter on right (4/17/2017)      Perforated duodenal bulb ulcer ([address] (4/18/2017)      Hyponatremia (4/18/2017)      SVT (supraventricular tachycardia) ([address] (4/19/2017)      Overview: Approximately 220 BPM on tele 4/19/17      SIRS (systemic inflammatory response syndrome) ([address] (4/22/2017)        Plan/Recommendations/Medical Decision Making:     Continue present treatment   S/p repair massive perforated duodenal bulb ulcer  Right hydronephrosis appears related to distal ureteral stone - Pt of Dr. Tish Tracy, consult pending  Cr down some with hydration. Urine clear this am.  Sodium improved on NS  Bid protonix  H pylori pending  No more aspirin  PICC line today or tomorrow in anticipation of TPN starting tomorrow  NGT will need to stay 7 days due to severity of duodenal perforation      Roberth Morris MD, [facility]

## 2017-04-18 NOTE — BRIEF OP NOTE
BRIEF OPERATIVE NOTE    Date of Procedure: 4/18/2017   Preoperative Diagnosis: Perforated Viscous  Postoperative Diagnosis: perforated massive duodenal  bulb ulcer    Procedure(s):  LAPAROTOMY EXPLORATORY, REPAIR OF MASSIVE DUODENAL BULB ULCER WITH BIOPSY AND KEVIN PATCH  Surgeon(s) and Role:     * Aurelia Rodriges MD - Primary            Surgical Staff:  Circ-1: Linda Griffin RN  Scrub Tech-1: Virgen Stoll  Surg Asst-1: [facility]  Event Time In   Incision Start 0051   Incision Close 0234     Anesthesia: General   Estimated Blood Loss: 25 cc  Specimens:   ID Type Source Tests Collected by Time Destination   1 : duodenal ulcer margin Preservative Abdomen  Aurelia Rodriges MD 4/18/2017 0141 Pathology      Findings: massive superior duodenal bulb perforation with fish mouth appearance, contained abscess   Complications: none  Implants: * No implants in log *

## 2017-04-18 NOTE — ANESTHESIA POSTPROCEDURE EVALUATION
Post-Anesthesia Evaluation and Assessment    Patient: Mehreen Lundberg MRN: [id number]  SSN: [id number]    YOB: 1950  Age: 79 y.o. Sex: male       Cardiovascular Function/Vital Signs  Visit Vitals    /59    Pulse 85    Temp 35.7 °C (96.3 °F)    Resp 18    Ht 5' 9\" (1.753 m)    Wt 97.5 kg (215 lb)    SpO2 96%    BMI 31.75 kg/m2       Patient is status post general anesthesia for Procedure(s):  LAPAROTOMY EXPLORATORY, REPAIR OF MASSIVE DUODENAL BULB ULCER WITH BIOPSY AND KEVIN PATCH. Nausea/Vomiting: None    Postoperative hydration reviewed and adequate. Pain:  Pain Scale 1: Numeric (0 - 10) (04/18/17 0323)  Pain Intensity 1: 0 (04/18/17 0323)   Managed    Neurological Status:   Neuro  Neurologic State: Drowsy; Eyes open spontaneously; Eyes open to voice (04/18/17 0323)  Orientation Level: Oriented to person;Oriented to place;Oriented to situation (04/18/17 0323)  Cognition: Follows commands (04/18/17 0323)  Speech: Clear (04/18/17 0323)  Assessment L Pupil: Brisk (04/17/17 2037)  Assessment R Pupil: Brisk (04/17/17 2037)  LUE Motor Response: Purposeful (04/18/17 0323)  LLE Motor Response: Purposeful (04/18/17 0323)  RUE Motor Response: Purposeful (04/18/17 0323)  RLE Motor Response: Purposeful (04/18/17 0323)   At baseline    Mental Status and Level of Consciousness: Arousable    Pulmonary Status:   O2 Device: Nasal cannula (04/18/17 0300)   Adequate oxygenation and airway patent    Complications related to anesthesia: None    Post-anesthesia assessment completed.  No concerns    Signed By: Annie Hatch MD     April 18, 2017

## 2017-04-19 PROBLEM — I47.1 SVT (SUPRAVENTRICULAR TACHYCARDIA) (HCC): Status: ACTIVE | Noted: 2017-01-01

## 2017-04-19 NOTE — PROGRESS NOTES
Patient recently returned to floor from surgery. Patient is still groggy. Nurse at bedside. CM deferred assessment with patient. Called Emergency Contact (sister) Gómez Mojica. Updated contact info [phone]. Pt name and  confirmed as pt identifiers. Demographics confirmed. Prior admit 3/1/2017. Patient was discharged to [facility]. Patient presented to ED on 2017 for perforated duodenal bulb ulcer, kidney stones, hydronephrosis. Patient was at [facility] [address] [place] [address] for 4 weeks prior to admission. Patient lives alone in a single story home. DME - cane, tub transfer bench, grab bars and wheelchair is available if needed. ADL's/IADL's - patient has right sided deficit form previous stroke. Sister assists with bathing, transportation to/from appointments and shopping. Otherwise patient is somewhat independent at home. No previous [facility] experience. LUCINDA and Mel for rehab. Sister, Gómez Mojica has requested referral to [facility] at time of discharge. Care Management Interventions  PCP Verified by CM:  Yes  Transition of Care Consult (CM Consult): Discharge Planning  Current Support Network: [address] [place] [address]  Discharge Location  Discharge Placement:  (tbd)    Renae Theodore  Ext [phone]

## 2017-04-19 NOTE — PROGRESS NOTES
UROLOGY    Afeb  Cr  1.2       Post Op large duodenal ulcer repair. Per notes will NG for a while    Diallo was clear yesterday---some sediment today  Urine culture  40,000 candida    I have reviewed CT again---possible 1mm or less faint stone in distal right ureter ~ 3cm above UVJ---doubt this is the hydro source    He is scheduled for Friday morning for cysto, Right retrograde, possible stent, possible bladder biopsy by Dr Jenny Mattson. Discussed with patient and family. Remains NPO with NGT. Plan fluconazole for urine. On Zosyn. Irrigate diallo prn.     Assess:  Chronic right lower pole stones, right hydro + urine culture

## 2017-04-19 NOTE — PROGRESS NOTES
PICC (Peripherally Inserted Central Catheter) line insertion  procedure note :     Procedure explained to patient along with risks and benefits  and patient agreed to proceed. Informed consent obtained from  Patient  Darryl Stubbs. Patient teaching completed. Timeout completed. Pre-procedure assessment done. Maximum sterile barrier precautions observed throughout procedure. Lidocaine 1%  3    ml sq given prior to cannulation. Cannulated basilic  vein using ultrasound guidance and modified seldinger technique. Inserted 5  [place] double lumen  lumen PICC to Right arm using [employer] Tip Location System and  [address]. Pt has    sinus   rhythm. PICC tip location was confirmed by 3 CG tip positioning system, indicating tall P wave and no negative deflection before P wave which would indicate that the PICC tip is properly placed in the distal SVC or at the [place]. PICC tip location was  confirmed by 2 PICC nurses and 3CG printout placed on patient's chart. Blood return verified and flushed with 20 ml normal saline in each port. Sterile dressing applied with biopatch, statLock and occlusive dressing as per protocol. Curos caps applied to each port. Patient tolerated procedure well with minimal blood loss ( less than 5 ml.)   Patient education material provided. PICC procedure performed by  :  Kendall Summers RN. PICC nurse  Assisted by :   Tayler Patel RN  PICC nurse  Reason for access : reliable access / MD order /  TPN infusion /   Complications related to insertion  : none  X-Ray : not applicable  Notified primary nurse  Jarad Rubio RN  that  PICC line can be used.    Total Trimmed Length :  46   cm   External Length : at 0  cm   PICC line site arm circumference:  33    cm   PICC catheter occupies  9   % of vein  Type of PICC: Bard Solo Power PICC   Ref # :     [id number]   /   Lot # :  [id number]   Expiration Date :    2018-03-31     Marlee Pizarro RN PICC Nurse, Vascular Access Team.

## 2017-04-19 NOTE — CONSULTS
CARDIOLOGY CONSULT       Date of  Admission: 4/17/2017  8:31 PM     Admission type:Emergency    Consult for: Supraventricular tachycardia  Consulted by: Dr. Sandra Randle     Subjective:     Belem Jc is a 79 y.o. male admitted for Perforated Viscous; Perforated duodenal bulb ulcer (HCC);KID*. We are consulted after he was noted to go into rapid supraventricular tachycardia at approximately 220 bpm.  He noted his heart beating fast but denied any chest pain or shortness of breath. It resolved spontaneously. The patient denies chest pain/ shortness of breath, orthopnea, PND, LE edema, palpitations, syncope, or presyncope.       Patient Active Problem List    Diagnosis Date Noted    SVT (supraventricular tachycardia) ([address] 04/19/2017    Perforated duodenal bulb ulcer ([address] 04/18/2017    Hyponatremia 04/18/2017    Perforated viscus 04/17/2017    Hydroureter on right 04/17/2017    History of stroke 03/03/2017    UTI (urinary tract infection) 03/03/2017    Acute renal failure (ARF) ([address] 03/01/2017    Hyperkalemia 03/01/2017    Hypertension 12/14/2009    Hyperlipidemia 12/14/2009    DM (diabetes mellitus) ([address] 12/14/2009    H/O 12/14/2009    Recurrent UTI 12/14/2009    Nephrolithiasis 12/14/2009      Alexsandra Wahl MD  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    small cecal polyp    DM (diabetes mellitus) ([address] 12/14/2009    Hypercholesterolemia     Hyperlipidemia 12/14/2009    Hypertension 12/14/2009    Nephrolithiasis 12/14/2009    Nephrolithiasis 12/14/2009    Perforated duodenal bulb ulcer ([address] 4/18/2017    Recurrent UTI 12/14/2009    Stroke ([address]     SVT (supraventricular tachycardia) ([address] 4/19/2017      Social History     Social History    Marital status: SINGLE     Spouse name: N/A    Number of children: N/A    Years of education: N/A     Social History Main Topics    Smoking status: Former Smoker    Smokeless tobacco: None    Alcohol use No    Drug use: No    Sexual activity: Not Currently     Birth control/ protection: Abstinence     Other Topics Concern    None     Social History Narrative     Allergies   Allergen Reactions    Bactrim [Sulfamethoxazole-Trimethoprim] Nausea and Vomiting      Family History   Problem Relation Age of Onset    Hypertension Mother    [facility] Elevated Lipids Mother     Heart Disease Mother     Diabetes Father     Hypertension Father    [facility] Elevated Lipids Sister     Hypertension Sister     Heart Disease Brother     Stroke Maternal Grandfather     Stroke Paternal Grandfather       Current Facility-Administered Medications   Medication Dose Route Frequency    fat emulsion 20% (LIPOSYN, INTRALIPID) infusion  21 mL/hr IntraVENous 3 times weekly    TPN ADULT - CENTRAL AA 5% D20% W/ CA + ELECTROLYTES   IntraVENous CONTINUOUS    dextrose 5% - 0.45% NaCl with KCl 20 mEq/L infusion  75 mL/hr IntraVENous CONTINUOUS    [START ON 4/20/2017] enoxaparin (LOVENOX) injection 40 mg  40 mg SubCUTAneous Q24H    sodium chloride (NS) flush 10-30 mL  10-30 mL InterCATHeter PRN    sodium chloride (NS) flush 10 mL  10 mL InterCATHeter Q24H    sodium chloride (NS) flush 10 mL  10 mL InterCATHeter PRN    sodium chloride (NS) flush 10-40 mL  10-40 mL InterCATHeter Q8H    sodium chloride (NS) flush 20 mL  20 mL InterCATHeter Q24H    heparin (porcine) pf 300 Units  300 Units InterCATHeter PRN    fluconazole (DIFLUCAN) 200mg/100 mL IVPB (premix)  200 mg IntraVENous Q24H    metoprolol (LOPRESSOR) injection 2.5 mg  2.5 mg IntraVENous Q6H    piperacillin-tazobactam (ZOSYN) 3.375 g in 0.9% sodium chloride (MBP/ADV) 100 mL  3.375 g IntraVENous Q8H    timolol (TIMOPTIC) 0.25% ophthalmic solution  1-2 Drop Both Eyes BID    sodium chloride (NS) flush 5-10 mL  5-10 mL IntraVENous Q8H    sodium chloride (NS) flush 5-10 mL  5-10 mL IntraVENous PRN    HYDROmorphone (PF) (DILAUDID) injection 0.5 mg  0.5 mg IntraVENous Q2H PRN    ondansetron [facility]) injection 4 mg  4 mg IntraVENous Q4H PRN    pantoprazole (PROTONIX) 40 mg in sodium chloride 0.9 % 10 mL injection  40 mg IntraVENous Q12H         Review of Symptoms:  11 systems reviewed, negative other than as stated in HPI     Subjective:      Visit Vitals    /69    Pulse 99    Temp 99 °F (37.2 °C)    Resp 20    Ht 5' 9\" (1.753 m)    Wt 228 lb 14.4 oz (103.8 kg)    SpO2 92%    BMI 33.8 kg/m2       Physical:  Gen:  NAD  Heart: regular rhythm, no murmur, gallop or rub  Lungs: clear to auscultation bilaterally  Neck: supple, symmetrical, trachea midline, no adenopathy, thyroid: not enlarged, symmetric, no tenderness/mass/nodules, no carotid bruit and no JVD  Abdomen: soft, non-tender.  Bowel sounds normal. No masses,  no organomegaly  Extremities: extremities normal, atraumatic, no cyanosis or edema, positive femorals without bruits, normal dp pulses  Neurologic: CN, motor and speech grossly normal    Data Review:   Recent Labs      04/18/17   0550  04/17/17 2131   WBC  13.4*  16.9*   HGB  10.7*  12.6   HCT  31.4*  36.1*   PLT  261  324     Recent Labs      04/19/17   0538  04/18/17   0550  04/17/17   2143  04/17/17 2131   NA  139  131*   --   124*   K  4.5  4.5   --   5.4*   CL  108  103   --   90*   CO2  18*  19*   --   22   GLU  89  130*   --   146*   BUN  29*  46*   --   56*   CREA  1.27  2.05*   --   3.05*   CA  9.1  8.5   --   9.2   ALB   --   2.7*   --   3.1*   TBILI   --   1.5*   --   1.6*   SGOT   --   23   --   19   ALT   --   19   --   19   INR   --    --   1.2*   --        Recent Labs      04/17/17 2131   Jas Camilo  <0.04       @BRIEFLAB(CHOL,CHOLPOCT,[id number],CHOLX,CHOLP,CHLST,CHOLV,[id number],HDL,HDLPOCT,HDLPOC,[id number],NHDLCT,[id number],HDLC,HDLP,LDL,LDLPOCT,LDLCPOC,[id number],NLDLCT,DLDL,LDLC,DLDLP,[id number],VLDLC,VLDL,TGL,TGLX,TRIGL,[id number],TRIGP,TGLPOCT,[id number],CHHD,CHHDX)@      Intake/Output Summary (Last 24 hours) at 04/19/17 1957  Last data filed at 04/19/17 1846   Gross per 24 hour   Intake           2477.5 ml   Output             2935 ml   Net           -457.5 ml        Cardiographics    Telemetry: normal sinus rhythm with a couple runs of supraventricular tachycardia at 220 bpm today. ECG: normal sinus rhythm, cannot rule out inferior infarct, poor R-wave progression, cannot rule out anterior infarct    Echocardiogram: Not done     Assessment:       Principal Problem:    Perforated viscus (4/17/2017)    Active Problems:    Acute renal failure (ARF) ([address] (3/1/2017)      Hydroureter on right (4/17/2017)      Perforated duodenal bulb ulcer ([address] (4/18/2017)      Hyponatremia (4/18/2017)      SVT (supraventricular tachycardia) ([address] (4/19/2017)      Overview: Approximately 220 BPM on tele 4/19/17         Plan:     SVT:  Likely contribution from stress of illness/ surgery  Echo to evaluate for structural heart disease  Troponin negative on admission, no symptoms or signs of ischemia  IV BB-titrate as needed  Check Mag and TSH in AM    Thanks for the consult. I appreciate the opportunity to participate in this patient's care and will follow.

## 2017-04-19 NOTE — PROGRESS NOTES
Admit Date: 2017    POD #2    Procedure:  Procedure(s):  LAPAROTOMY EXPLORATORY, REPAIR OF MASSIVE DUODENAL BULB ULCER WITH BIOPSY AND KEVIN PATCH    Subjective:     Patient has no new complaints. Objective:     Blood pressure 136/55, pulse 88, temperature 98.6 °F (37 °C), resp. rate 20, height 5' 9\" (1.753 m), weight 237 lb 12.8 oz (107.9 kg), SpO2 93 %. Temp (24hrs), Av.4 °F (36.9 °C), Min:97.4 °F (36.3 °C), Max:99.4 °F (37.4 °C)      Physical Exam:  GENERAL: no distress, appears stated age, sleepy, LUNG: clear to auscultation bilaterally, HEART: regular rate and rhythm, S1, S2 normal, no murmur, click, rub or gallop, ABDOMEN: soft, non-tender.  Wounds c/d/i, YARON o/p serosanguinous anterior drain, bilious posterior drain, EXTREMITIES:  extremities normal, atraumatic, no cyanosis or edema    Labs:   Recent Results (from the past 24 hour(s))   GLUCOSE, POC    Collection Time: 17 11:20 AM   Result Value Ref Range    Glucose (POC) 151 (H) 65 - 100 mg/dL    Performed by [employer]    GLUCOSE, POC    Collection Time: 17  4:30 PM   Result Value Ref Range    Glucose (POC) 131 (H) 65 - 100 mg/dL    Performed by [employer]    METABOLIC PANEL, BASIC    Collection Time: 17  5:38 AM   Result Value Ref Range    Sodium 139 136 - 145 mmol/L    Potassium 4.5 3.5 - 5.1 mmol/L    Chloride 108 97 - 108 mmol/L    CO2 18 (L) 21 - 32 mmol/L    Anion gap 13 5 - 15 mmol/L    Glucose 89 65 - 100 mg/dL    BUN 29 (H) 6 - 20 MG/DL    Creatinine 1.27 0.70 - 1.30 MG/DL    BUN/Creatinine ratio 23 (H) 12 - 20      GFR est AA >60 >60 ml/min/1.73m2    GFR est non-AA 57 (L) >60 ml/min/1.73m2    Calcium 9.1 8.5 - 10.1 MG/DL   GLUCOSE, POC    Collection Time: 17  7:55 AM   Result Value Ref Range    Glucose (POC) 119 (H) 65 - 100 mg/dL    Performed by Reji Babb        Data Review images and reports reviewed    Assessment:     Principal Problem:    Perforated viscus (2017)    Active Problems:    Acute renal failure (ARF) [address] (3/1/2017)      Hydroureter on right (4/17/2017)      Perforated duodenal bulb ulcer ([facility] (4/18/2017)      Hyponatremia (4/18/2017)        Plan/Recommendations/Medical Decision Making:     Continue present treatment   S/p repair massive perforated duodenal bulb ulcer  Right hydronephrosis appears related to distal ureteral stone - appreciate Dr. Maribeth Mead. Cr down some with hydration. Urine clear this am.  Hyponatremia resolved, will change fluids. Bid protonix  H pylori pending  No more aspirin  PICC line today, TPN starting today  NGT will need to stay 7 days due to severity of duodenal perforation      Juan C Barfield MD, [facility]

## 2017-04-19 NOTE — PROGRESS NOTES
1900 Bedside verbal report received from [facility], [address] [place] [address] Assessment complete. Patient is alert and oriented, intermittently confused. Lung sounds are diminished. Pulses palpable. NG tube to low suction. Midline abdominal incision dressing dry and intact. Bilateral YARON drains charged. Amaya draining yellow cloudy urine. Low grade temp of 99.2 present. 2335 Notified by monitor tech that patient heart rate was sustaining 170-180's. Nurse entered the room patient was sleeping. No c/o pain. /63. Asked patient to cough to try to break his high heart rate. Patient refused. Several minutes later Heart rate is 102. Will continue to monitor. 0000 Assessment complete. No changes from previous. 0150 Notified by monitor tech that patient HR sustained in the 180's. Patient is asleep in bed. Patient spontaneously broke HR returned to 110. Will continue to monitor. 0400 Assessment complete. Patient is now febrile @ 100.2 orally. Patient refuses to turn cough and deep breathe or work on incentive spirometer. Ami Spoke with Dr. Jaren Melo updated on patient condition. Received orders for Tylenol suppository and 1 pair of blood cultures to be drawn.     0700 bedside verbal report given to María Elena Scott

## 2017-04-19 NOTE — PROGRESS NOTES
Bedside and Verbal shift change report given to Nanci Moreno RN (oncoming nurse) by Nancy Crenshaw RN (offgoing nurse). Report included the following information SBAR, Kardex, OR Summary, Intake/Output, MAR, Recent Results, Med Rec Status and Cardiac Rhythm NSR.

## 2017-04-19 NOTE — PROGRESS NOTES
Pharmacy Automatic Renal Dosing Protocol - Antimicrobials    Indication for Antimicrobials: Intraabdominal Infection  Current Regimen of Each Antimicrobial (Start Day & Day of Therapy):  Zosyn 3.375 gram iv q8h - start  - day #2  Fluconazole 50 mg IV q24h - start  - day #1    Significant Cultures:   Urine culture 17 - 40,000 c.albicans  CAPD, Hemodialysis or Renal Replacement Therapy: no  Paralysis, amputations, malnutrition: no  Recent Labs      17   0538  17   0550  17   2131   CREA  1.27  2.05*  3.05*   BUN  29*  46*  56*   WBC   --   13.4*  16.9*     Temp (24hrs), Av.7 °F (37.1 °C), Min:98.1 °F (36.7 °C), Max:99.4 °F (37.4 °C)    Creatinine Clearance (Creatinine Clearance (ml/min)): >50 ml/min    Impression/Plan: continue same zosyn dose. Will increase fluconazole dose to 200 mg IV q24h for indication and renal function. Pharmacy will follow daily and adjust medications as appropriate for renal function and/or serum levels.     Thank you,  Ayah Begum PHARMD     Renal Dosing Tables on [employer]

## 2017-04-19 NOTE — PROGRESS NOTES
Initial Nutrition Assessment:    INTERVENTIONS/RECOMMENDATIONS:   · Enteral/Parenteral Nutrition: Initiate parenteral nutrition     Day 1: Initiate AA5% D20% @ 42 mL/hr    Day 2: Advance to AA5% D20% @ 63 mL/hr    Day 3: If tolerating and electrolytes WNL, advance to goal rate of AA5% D20% @ 83 mL/hr + 20% lipids 3x/week (provides 1967 kcals, and 99.6g protein)    ASSESSMENT:   Chart review; patient medically noted for repair of perforated duodenal bulb ulcer and right hydronephrosis. PMH for DM, stroke, and HTN. NPO with NGT to suction. TPN to start this afternoon. Recommendations provided above. Goal rate will meet approximately 91.5% of estimated kcal needs and >100% of estimated protein needs. NGT to remain for 7 days per surgeon. Diet advancement as medically appropriate. Diet Order: NPO (TPN AA5% D20% @ 42 mL/hr + 20% lipids 3x/week; provides 1101 kcal, 50.4g protein)  % Eaten:  Patient Vitals for the past 72 hrs:   % Diet Eaten   04/19/17 1129 0 %     Pertinent Medications: [x]Reviewed []Other: protonix   Pertinent Labs: [x]Reviewed []Other:  400-700--136  Food Allergies: [x]None []Other   Last BM:    []Active     []Hyperactive  [x]Hypoactive       [] Absent BS  Skin:    [] Intact   [x] Incision  [] Breakdown  [] Other:    Anthropometrics:   Height: 5' 9\" (175.3 cm) Weight: 107.9 kg (237 lb 12.8 oz)   IBW (%IBW):   ( ) UBW (%UBW):   (  %)   Last Weight Metrics:  Weight Loss Metrics 4/18/2017 3/3/2017 3/1/2017 11/7/2016 1/7/2016 8/17/2015 11/19/2014   Today's Wt 237 lb 12.8 oz 210 lb 220 lb 225 lb 246 lb 3.2 oz 247 lb 231 lb   BMI 35.12 kg/m2 31.01 kg/m2 31.57 kg/m2 33.23 kg/m2 36.34 kg/m2 36.46 kg/m2 34.1 kg/m2       BMI: Body mass index is 35.12 kg/(m^2). This BMI is indicative of:   []Underweight    []Normal    []Overweight    [x] Obesity   [] Extreme Obesity (BMI>40)     Estimated Nutrition Needs (Based on):   2149 Kcals/day (BMR (1845) x 1. 3AF -250kcal) , 86 g (0.8 g/kg bw) Protein  Carbohydrate: At Least 130 g/day  Fluids: 2150 mL/day (1ml/kcal)    Pt expected to meet estimated nutrient needs: [x]Yes []No    NUTRITION DIAGNOSES:   Problem:  Altered GI function      Etiology: related to perforared duodenal bulb ulcer, s/p ex lap     Signs/Symptoms: as evidenced by NPO, NGT to suction, TPN starting      NUTRITION INTERVENTIONS:    Enteral/Parenteral Nutrition: Initiate parenteral nutrition                GOAL:   TPN Advanced to goal with electrolytes WNL next 2-4 days    LEARNING NEEDS (Diet, Food/Nutrient-Drug Interaction):    [x] None Identified   [] Identified and Education Provided/Documented   [] Identified and Pt declined/was not appropriate     Cultureal, Advent, OR Ethnic Dietary Needs:    [x] None Identified   [] Identified and Addressed     [x] Interdisciplinary Care Plan Reviewed/Documented    [x] Discharge Planning:   Too early to determine nutrition discharge plan     MONITORING /EVALUATION:   Food/Nutrient Intake Outcomes: Enteral/parenteral nutrition intake  Physical Signs/Symptoms Outcomes: Weight/weight change, GI profile, Electrolyte and renal profile, Glucose profile    NUTRITION RISK:    [x] High              [] Moderate           []  Low  []  Minimal/Uncompromised    PT SEEN FOR:    []  MD Consult: []Calorie Count      []Diabetic Diet Education        []Diet Education     []Electrolyte Management     []General Nutrition Management and Supplements     []Management of Tube Feeding     []TPN Recommendations    []  RN Referral:  []MST score >=2     []Enteral/Parenteral Nutrition PTA     []Pregnant: Gestational DM or Multigestation     []Pressure Ulcer/Wound Care needs        []  Low BMI  []  DTR Referral   New TPN    [facility]  Pager [phone]                 Weekend Pager [phone]

## 2017-04-19 NOTE — PROGRESS NOTES
At 1700 while getting pt back to bed with [facility] lift the heart rate went to 230 briefly and quickly came down, pt asymptomatic and B/P stable.

## 2017-04-19 NOTE — PROGRESS NOTES
Call placed to Dr Bhupinder Hauser re some SVT that occurred while getting pt to the chair. Pt does not move well and would appreciate PT orders, Had been running low grade temps  and urine now milky at times since IVF cut back. Also wondering about SSI once TPN starts.

## 2017-04-20 NOTE — PROGRESS NOTES
Admit Date: 2017    POD #3    Procedure:  Procedure(s):  LAPAROTOMY EXPLORATORY, REPAIR OF MASSIVE DUODENAL BULB ULCER WITH BIOPSY AND KEVIN PATCH    Subjective:     Patient has no new complaints. Low grade temp this am.  No further episodes of rapid SVT. Objective:     Blood pressure 153/63, pulse (!) 113, temperature 98.2 °F (36.8 °C), resp. rate 20, height 5' 9\" (1.753 m), weight 228 lb 14.4 oz (103.8 kg), SpO2 91 %. Temp (24hrs), Av.1 °F (37.3 °C), Min:98.2 °F (36.8 °C), Max:100.2 °F (37.9 °C)      Physical Exam:  GENERAL: no distress, appears stated age, sleepy, LUNG: clear to auscultation bilaterally, HEART: regular rate and rhythm, S1, S2 normal, no murmur, click, rub or gallop, ABDOMEN: soft, non-tender.  Wounds c/d/i, YARON o/p serous anterior drain, bilious posterior drain although becoming more serous appearing, EXTREMITIES:  extremities normal, atraumatic, no cyanosis or edema    Labs:   Recent Results (from the past 24 hour(s))   GLUCOSE, POC    Collection Time: 17 11:24 AM   Result Value Ref Range    Glucose (POC) 101 (H) 65 - 100 mg/dL    Performed by Pita Chung    GLUCOSE, POC    Collection Time: 17  7:43 PM   Result Value Ref Range    Glucose (POC) 131 (H) 65 - 100 mg/dL    Performed by Ronna Zarate    GLUCOSE, POC    Collection Time: 17 12:14 AM   Result Value Ref Range    Glucose (POC) 154 (H) 65 - 100 mg/dL    Performed by Florida Haider    METABOLIC PANEL, BASIC    Collection Time: 17  5:23 AM   Result Value Ref Range    Sodium 137 136 - 145 mmol/L    Potassium 4.3 3.5 - 5.1 mmol/L    Chloride 105 97 - 108 mmol/L    CO2 24 21 - 32 mmol/L    Anion gap 8 5 - 15 mmol/L    Glucose 140 (H) 65 - 100 mg/dL    BUN 19 6 - 20 MG/DL    Creatinine 1.17 0.70 - 1.30 MG/DL    BUN/Creatinine ratio 16 12 - 20      GFR est AA >60 >60 ml/min/1.73m2    GFR est non-AA >60 >60 ml/min/1.73m2    Calcium 9.1 8.5 - 10.1 MG/DL   MAGNESIUM    Collection Time: 17  5:23 AM Result Value Ref Range    Magnesium 1.3 (L) 1.6 - 2.4 mg/dL   TSH 3RD GENERATION    Collection Time: 04/20/17  5:23 AM   Result Value Ref Range    TSH 0.46 0.36 - 3.74 uIU/mL   CULTURE, BLOOD, PERIPHERAL    Collection Time: 04/20/17  6:37 AM   Result Value Ref Range    Special Requests: NO SPECIAL REQUESTS      Culture result: NO GROWTH AFTER 1 HOUR     GLUCOSE, POC    Collection Time: 04/20/17  7:40 AM   Result Value Ref Range    Glucose (POC) 179 (H) 65 - 100 mg/dL    Performed by Salty Ponce        Data Review images and reports reviewed    Assessment:     Principal Problem:    Perforated viscus (4/17/2017)    Active Problems:    Acute renal failure (ARF) ([address] (3/1/2017)      Hydroureter on right (4/17/2017)      Perforated duodenal bulb ulcer ([address] (4/18/2017)      Hyponatremia (4/18/2017)      SVT (supraventricular tachycardia) ([address] (4/19/2017)      Overview: Approximately 220 BPM on tele 4/19/17        Plan/Recommendations/Medical Decision Making:     Continue present treatment   S/p repair massive perforated duodenal bulb ulcer  Right hydronephrosis appears related to distal ureteral stone - appreciate Dr. Faina Medina assistance. Amaya to remain per his recommendation until he states otherwise. Cr normalizing. Hyponatremia resolved  SVT, appreciate Dr. Zoe Runner assistance. Mg low, adding to TPN today. Bid protonix  H pylori pending  No more aspirin  TPN advanced to goal today  NGT will need to stay 7 days due to severity of duodenal perforation  Blood cultures ordered for temp, tylenol suppository      Rao Fontaine MD, [facility]

## 2017-04-20 NOTE — OP NOTES
[address], [facility]   OP NOTE       Name:  Ave Cogan   MR#:  [id number]   :  1950   Account #:  [de-identified]    Surgery Date:  2017   Date of Adm:  2017       PREOPERATIVE DIAGNOSIS: Intra-abdominal perforated viscus. POSTOPERATIVE DIAGNOSIS: Perforated massive duodenal bulb   ulcer. PROCEDURES PERFORMED: Exploratory laparotomy, repair of   massive duodenal bulb ulcer, with biopsy, and Yakov patch. SURGEON: Richie Sampson. Felix Toro MD    ANESTHESIA: General endotracheal anesthesia. ANESTHESIOLOGIST: Graciela Rico MD    ESTIMATED BLOOD LOSS: 25 mL. SPECIMENS REMOVED: Duodenal bulb ulcer margin sent for tissue   exam.    FINDINGS: A massive superior duodenal bulb perforation with a fish-  mouth appearance, with a contained abscess in the right upper   quadrant. COMPLICATIONS: There were no complications. DESCRIPTION OF OPERATION IN DETAIL: After appropriate   consent was obtained, the patient was brought to the operating room,   made comfortable in the supine position, and administered general   endotracheal anesthesia. A nasogastric tube was placed as well as a   Amaya catheter, and the patient was prepped and draped in the   standard fashion. A midline incision was made above the umbilicus and this was   extended down through the midline fascia. The abdominal cavity was   entered. The abdomen was explored through this small incision and it   was found that the inflammatory process was in the upper abdomen. The incision was extended superiorly. The patient had some fibrinous   exudate in the right upper quadrant just inferior to the liver, and there   was some omentum which was adherent in this area. This was taken   down and the stomach was inspected. The stomach was markedly   distended, despite the NG tube, and this was repositioned to   decompress the stomach.  The duodenum was followed and ultimately   an abscess cavity was entered, and there was evidence of a large   superior duodenal bulb perforated ulcer into this contained abscess   cavity. The duodenal bulb and sweep were mobilized and the ulcer   perforation was more clearly delineated. The ulcer was approximately   3 cm in diameter, being just a massive perforation. There was minimal   surrounding inflammation. This appeared to be well contained and   somewhat walled off. It appears to have likely been a somewhat   subacute process. At this time, it was felt that the ulcer could be closed   primarily without significant narrowing of the gastric outlet. The ulcer   was a few centimeters distal to the pylorus, and a finger passed   through the ulcer was easily able to be advanced through the pylorus   into the stomach. The nasogastric tube was brought out through the pylorus and   advanced beyond the ulcer into the duodenal sweep. With this in   place, the duodenal ulcer was repaired with a primary suture repair. First 3-0 silk stay sutures were placed superiorly and inferiorly on the   anterior surface of the duodenum. A 4-0 Monocryl suture was used to   perform a running closure. When this was complete, interrupted 3-0   silk sutures were used in a Lembert fashion over top to reinforce the   repair. This was done a second time to further reinforce the closure;   and, when this was complete, this was felt to be a good watertight   closure. The greater omentum was tacked down with additional   interrupted 3-0 silk sutures over the repair site, and 15-Japanese drains   were placed, one in Morison pouch and one inferior to the repair over   the omentum. The drains were brought out through separate stab   wound incisions in the anterior abdominal wall and sewn in place   with 2-0 nylon skin sutures. The Bookwalter retractor was then   removed from the field. Sponge, sharp, and instrument counts were   verified correct.  The abdomen was further explored. No other pathologic   findings were noted. Midline fascia was closed with #1 PDS running   suture x2. Skin incision was closed with skin staples. The wound was   cleaned and dried and dressed with sterile dressing. The patient was awakened, extubated, and transferred to the recovery   room in stable condition.         Von Edgar MD      MW / CO   D:  04/20/2017   09:36   T:  04/20/2017   10:23   Job #:  [id number]

## 2017-04-20 NOTE — CARDIO/PULMONARY
C/P rehab note- chart reviewed for in basket referral. S/P LAPAROTOMY EXPLORATORY, REPAIR OF MASSIVE DUODENAL BULB ULCER WITH BIOPSY AND KEVIN PATCH  On 4/18/2017. HX includes DM,HTN,Stroke,Hypercholesterolemia. Cardiology consulted for SVT. Echo pending to evaluate for structural heart disease. Former smoker. Will follow for appropriate teaching if indicated after ECHO resulted.

## 2017-04-20 NOTE — PROGRESS NOTES
PICC site and dressing check 24 hours post insertion :     PICC line site and dressing clean, dry and intact. No complications noted.     Mannie Gonsalves RN  Vascular Access Team

## 2017-04-20 NOTE — PROGRESS NOTES
PCU SHIFT NURSING NOTE      0700: Bedside and Verbal shift change report given to Landon Poon RN and Jt Lovelace RN (oncoming nurse) by Michael Rodriguez RN (offgoing nurse). Report included the following information SBAR, Kardex, ED Summary, OR Summary, Intake/Output, MAR, Accordion, Recent Results and Cardiac Rhythm NSR. Shift Summary:   1100: Echo technician arrived for pts echo  1200: Echo technician left floor. Went to check on pt and the NG tube was dislodged about 10 cms. Reinserted the NG to correct line. Noted NG tube draining. 1430: Dr. Deidra Nuno made aware of NG dislodgement. KUB ordered per Dr. Deidra Nuno to verify placement of NG.  1530: KUB performed. 1615: Cardio paged to verify if pt could get up to chair today. 1630: Kim Melo NP verified it was okay to get pt up to chair, and that we would just see how he does. 1721: pt up in chair via [place]. 1850: pt experienced 1-2 minutes of SVT with HR in the 180s. Pt otherwise asymptomatic. Spontaneously converted back to NSR.  1930: Bedside and Verbal shift change report given to Fei Riley (oncoming nurse) by Landon Poon RN and Jt Lovelace RN (offgoing nurse). Report included the following information SBAR, Kardex, Intake/Output, MAR, Accordion, Recent Results and Cardiac Rhythm NSR.        Admission Date 4/17/2017   Admission Diagnosis Perforated Viscous  Perforated duodenal bulb ulcer ([facility] [address]  KIDNEY STONE, HYDRONEPHROSIS   Consults IP CONSULT TO UROLOGY  IP CONSULT TO CARDIOLOGY        Consults   [x]PT   [x]OT   []Speech   []Case Management      [] Palliative      Cardiac Monitoring Order   [x]Yes   []No     IV drips   []Yes    Drip:                            Dose:  Drip:                            Dose:  Drip:                            Dose:   [x]No     GI Prophylaxis   []Yes   []No         DVT Prophylaxis   SCDs:  Sequential Compression Device: Bilateral          Sammy stockings:         [x] Medication   []Contraindicated   []None      Activity Level Activity Level: Up with Assistance     Activity Assistance: Complete care   Purposeful Rounding every 1-2 hour? [x]Yes   Dexter Score  Total Score: 3   Bed Alarm (If score 3 or >)   [x]Yes   [] Refused (See signed refusal form in chart)   Pako Score  Pako Score: 15   Pako Score (if score 14 or less)   []PMT consult   []Wound Care consult      [x]Specialty bed   [] Nutrition consult          Needs prior to discharge:   Home O2 required:    []Yes   [x]No    If yes, how much O2 required? Other:    Last Bowel Movement: Last Bowel Movement Date:  (pt. cant remember)      Influenza Vaccine          Pneumonia Vaccine           Diet Active Orders   Diet    DIET NPO      LDAs         PICC Double Lumen 05/41/91 Right;Basilic (Active)   Central Line Being Utilized Yes 4/20/2017  3:34 PM   Criteria for Appropriate Use Total parenteral nutrition 4/20/2017  3:34 PM   Site Assessment Clean, dry, & intact 4/20/2017  3:34 PM   Phlebitis Assessment 0 4/20/2017  3:34 PM   Infiltration Assessment 0 4/20/2017  3:34 PM   Arm Circumference (cm) 33 cm 4/19/2017 12:18 PM   Date of Last Dressing Change 04/19/17 4/20/2017 11:19 AM   Dressing Status Clean, dry, & intact 4/20/2017  3:34 PM   Action Taken Other (comment) 4/20/2017 11:19 AM   External Catheter Length (cm) 0 centimeters 4/19/2017 12:18 PM   Dressing Type Disk with Chlorhexadine gluconate (CHG); Transparent 4/20/2017  3:34 PM   Hub Color/Line Status Purple; Infusing 4/20/2017  3:34 PM   Positive Blood Return (Site #1) Yes 4/20/2017  3:34 PM   Hub Color/Line Status Red; Infusing 4/20/2017  3:34 PM   Positive Blood Return (Site #2) Yes 4/20/2017  3:34 PM   Alcohol Cap Used Yes 4/19/2017  8:00 PM          Peripheral IV 04/17/17 Right Antecubital (Active)   Site Assessment Clean, dry, & intact 4/20/2017  3:34 PM   Phlebitis Assessment 0 4/20/2017  3:34 PM   Infiltration Assessment 0 4/20/2017  3:34 PM   Dressing Status Clean, dry, & intact 4/20/2017  3:34 PM   Dressing Type Transparent;Tape 4/20/2017  3:34 PM   Hub Color/Line Status Infusing;Pink 4/20/2017  3:34 PM   Action Taken Open ports on tubing capped 4/20/2017  7:30 AM   Alcohol Cap Used Yes 4/20/2017  7:30 AM       Peripheral IV 04/18/17 Left Hand (Active)   Site Assessment Clean, dry, & intact 4/20/2017  3:34 PM   Phlebitis Assessment 0 4/20/2017  3:34 PM   Infiltration Assessment 0 4/20/2017  3:34 PM   Dressing Status Clean, dry, & intact 4/20/2017  3:34 PM   Dressing Type Transparent;Tape 4/20/2017  3:34 PM   Hub Color/Line Status Capped;Flushed 4/20/2017  3:34 PM   Action Taken Open ports on tubing capped 4/19/2017  8:00 PM   Alcohol Cap Used Yes 4/19/2017  8:00 PM          Nasogastric Tube 04/18/17 (Active)   Site Assessment Clean, dry, & intact 4/20/2017  3:34 PM   Dressing Status Clean, dry, & intact 4/20/2017  3:34 PM   G Port Status Continuous Suction 4/20/2017  3:34 PM   External Insertion Rao (cms) 86 cms 4/20/2017 12:05 PM   Action Taken Retaped 4/20/2017  4:45 PM   Drainage Description Sheryl Licea 4/20/2017  3:34 PM   Gastric Residual (mL) 0 ml 4/19/2017  8:00 PM   Drainage Chamber Level (ml) 400 ml 4/20/2017  6:56 PM   Output (ml) 0 ml 4/20/2017  6:56 PM       Rojas Drain #1 04/18/17 Left Abdomen (Active)   Site Assessment Clean, dry, & intact 4/20/2017  3:34 PM   Dressing Status Clean, dry, & intact 4/20/2017  3:34 PM   Drainage Description Sheryl Licea 4/20/2017  6:56 PM   Rojas Drain Airleak No 4/20/2017  3:34 PM   Status Patent; Charged 4/20/2017  3:34 PM   Y Connector Used No 4/20/2017  3:34 PM   Output (ml) 40 ml 4/20/2017  6:56 PM       Elnita Bergman #1 04/18/17 Right Abdomen (Active)   Site Assessment Clean, dry, & intact 4/20/2017  3:34 PM   Dressing Status Clean, dry, & intact 4/20/2017  3:34 PM   Drainage Description Serosanguinous 4/20/2017  3:34 PM   Rojas Drain Airleak No 4/20/2017  3:34 PM   Status Patent; Charged 4/20/2017  3:34 PM   Y Connector Used No 4/20/2017  3:34 PM   Drainage Chamber Level (ml) 10 ml 4/18/2017  3:58 PM   Output (ml) 0 ml 4/20/2017  3:34 PM                Urinary Catheter Urinary Catheter 04/18/17 2- way; Amaya-Criteria for Appropriate Use: Obstruction/retention    Intake & Output   Date 04/19/17 1900 - 04/20/17 0659 04/20/17 0700 - 04/21/17 0659   Shift 9505-5625 24 Hour Total 3756-9302 6429-0943 24 Hour Total   I  N  T  A  K  E   P. O.  0         P. O.  0       I.V.  (mL/kg/hr) 2046. 7 2646.7         I. V.  500         Volume (dextrose 5% - 0.45% NaCl with KCl 20 mEq/L infusion) 1392.5 1392.5         Volume (piperacillin-tazobactam (ZOSYN) 3.375 g in 0.9% sodium chloride (MBP/ADV) 100 mL)  100         Volume (fat emulsion 20% (LIPOSYN, INTRALIPID) infusion) 218.1 218.1         Volume (TPN ADULT - CENTRAL AA 5% D20% W/ CA + ELECTROLYTES) 436.1 436.1       Shift Total  (mL/kg) 2046.7  (19.7) 2646.7  (25.5)      O  U  T  P  U  T   Urine  (mL/kg/hr) 1650 3150 1420  (1.1)  1420      Urine Output (mL) (Urinary Catheter 04/18/17 2- way; Amaya) 1650 3150 1420  1420    Emesis/NG output 0 0 280  280      Output (ml) (Nasogastric Tube 04/18/17) 0 0 280  280      Output (ml) ([REMOVED] Nasogastric Tube 04/18/17) 0 0       Drains 110 350 95  95      Output (ml) (Rojas Drain #1 04/18/17 Left Abdomen) 100 130 85  85      Output (ml) (Rojas Drain #1 04/18/17 Right Abdomen) 10 220 10  10    Shift Total  (mL/kg) 1760  (17) 3500  (33.7) 1795  (17.3)  1795  (17.3)   .7 -853.4 -6433  -1795   Weight (kg) 103.8 103.8 103.8 103.8 103.8         Readmission Risk Assessment Tool Score Medium Risk            15       Total Score        3 Relationship with PCP    4 More than 1 Admission in calendar year    5 Patient Insurance is Medicare, Medicaid or Self Pay    3 Charlson Comorbidity Score        Criteria that do not apply:    Patient Living Status    Patient Length of Stay > 5       Expected Length of Stay 5d 12h   Actual Length of Stay 2

## 2017-04-20 NOTE — PROGRESS NOTES
[address] [place] [address]  [phone]      Cardiology Progress Note      4/20/2017 9AM    Admit Date: 4/17/2017    Admit Diagnosis:   Perforated Viscous  Perforated duodenal bulb ulcer ([address]  KIDNEY STONE, HYDRONEPHROSIS    Subjective:     Lauren Bell is a 79 y.o. male with PMH DM, CVA, HLD, HTN, colon CA who was admitted for a perforated viscous/duodenal bulb ulcer. Overnight events:  -some continued tachycardia overnight, and low pulse ox sats  -Mag 1.3 this morning  -Mr. Clark is drowsy, but denies current cardiac complaints. No chest pain, SOB, palpitations.       Visit Vitals    /71    Pulse 94    Temp 98.2 °F (36.8 °C)    Resp 18    Ht 5' 9\" (1.753 m)    Wt 103.8 kg (228 lb 14.4 oz)    SpO2 91%    BMI 33.8 kg/m2       Current Facility-Administered Medications   Medication Dose Route Frequency    insulin lispro (HUMALOG) injection   SubCUTAneous Q6H    glucose chewable tablet 16 g  4 Tab Oral PRN    dextrose (D50W) injection syrg 12.5-25 g  12.5-25 g IntraVENous PRN    glucagon (GLUCAGEN) injection 1 mg  1 mg IntraMUSCular PRN    acetaminophen (TYLENOL) suppository 650 mg  650 mg Rectal Q4H PRN    TPN ADULT - CENTRAL AA 5% D20% W/ CA + ELECTROLYTES   IntraVENous CONTINUOUS    metoprolol (LOPRESSOR) injection 5 mg  5 mg IntraVENous Q6H    magnesium sulfate 1 g/100 ml IVPB (premix or compounded)  1 g IntraVENous ONCE    sodium chloride (NS) 0.9 % flush        perflutren lipid microspheres (DEFINITY) 1.1 mg/mL contrast injection        fat emulsion 20% (LIPOSYN, INTRALIPID) infusion  21 mL/hr IntraVENous 3 times weekly    TPN ADULT - CENTRAL AA 5% D20% W/ CA + ELECTROLYTES   IntraVENous CONTINUOUS    dextrose 5% - 0.45% NaCl with KCl 20 mEq/L infusion  50 mL/hr IntraVENous CONTINUOUS    enoxaparin (LOVENOX) injection 40 mg  40 mg SubCUTAneous Q24H    sodium chloride (NS) flush 10-30 mL  10-30 mL InterCATHeter PRN    sodium chloride (NS) flush 10 mL 10 mL InterCATHeter Q24H    sodium chloride (NS) flush 10 mL  10 mL InterCATHeter PRN    sodium chloride (NS) flush 10-40 mL  10-40 mL InterCATHeter Q8H    sodium chloride (NS) flush 20 mL  20 mL InterCATHeter Q24H    heparin (porcine) pf 300 Units  300 Units InterCATHeter PRN    fluconazole (DIFLUCAN) 200mg/100 mL IVPB (premix)  200 mg IntraVENous Q24H    piperacillin-tazobactam (ZOSYN) 3.375 g in 0.9% sodium chloride (MBP/ADV) 100 mL  3.375 g IntraVENous Q8H    timolol (TIMOPTIC) 0.25% ophthalmic solution  1-2 Drop Both Eyes BID    sodium chloride (NS) flush 5-10 mL  5-10 mL IntraVENous Q8H    sodium chloride (NS) flush 5-10 mL  5-10 mL IntraVENous PRN    HYDROmorphone (PF) (DILAUDID) injection 0.5 mg  0.5 mg IntraVENous Q2H PRN    ondansetron (ZOFRAN) injection 4 mg  4 mg IntraVENous Q4H PRN    pantoprazole (PROTONIX) 40 mg in sodium chloride 0.9 % 10 mL injection  40 mg IntraVENous Q12H       Objective:      Physical Exam:  General Appearance:  ill-appearing  male, appears older than stated age   Chest:   Clear  Cardiovascular:  Regular rate and rhythm, no murmur.   Abdomen:   obese, distended; NG tube in nare  Extremities: palpable distal pulses.   LUE with large amounts of swelling, especially distal to PIV  Skin:  Warm and dry. pale    Data Review:   Recent Labs      04/18/17   0550  04/17/17   2131   WBC  13.4*  16.9*   HGB  10.7*  12.6   HCT  31.4*  36.1*   PLT  261  324     Recent Labs      04/20/17   1021  04/20/17   0523  04/19/17   0538  04/18/17   0550  04/17/17   2143  04/17/17   2131   NA   --   137  139  131*   --   124*   K   --   4.3  4.5  4.5   --   5.4*   CL   --   105  108  103   --   90*   CO2   --   24  18*  19*   --   22   GLU   --   140*  89  130*   --   146*   BUN   --   19  29*  46*   --   56*   CREA   --   1.17  1.27  2.05*   --   3.05*   CA   --   9.1  9.1  8.5   --   9.2   MG  1.1*  1.3*   --    --    --    --    ALB   --    --    --   2.7*   --   3.1*   TBILI   -- --    --   1.5*   --   1.6*   SGOT   --    --    --   23 --   19   ALT   --    --    --   19 --   19   INR   --    --    --    --   1.2*   --        Recent Labs      04/17/17   2131   Reina Bell  <0.04         Intake/Output Summary (Last 24 hours) at 04/20/17 1250  Last data filed at 04/20/17 1205   Gross per 24 hour   Intake          2146.65 ml   Output             4270 ml   Net         -2123.35 ml        Telemetry: SR; 2 bursts of SVT overnight to 200bpm  EKG: NSR  Cxray: free intraperitoneal air  ECHO: ordered    Assessment:     Principal Problem:    Perforated viscus (4/17/2017)    Active Problems:    Acute renal failure (ARF) ([address] (3/1/2017)      Hydroureter on right (4/17/2017)      Perforated duodenal bulb ulcer ([address] (4/18/2017)      Hyponatremia (4/18/2017)      SVT (supraventricular tachycardia) ([address] (4/19/2017)      Overview: Approximately 220 BPM on tele 4/19/17        Plan:     SVT: in setting of illness/surgery. 2 bursts overnight.   Mag 1.3   · ECHO results pending  · Increase BB  · Replete Mag- with TPN initiation, will likely only need repletion today, but will check again in AM      Also spoke with RN about LUE swelling and suspected IV infiltration        Vel Bolanos, ANAYELI  DNP, RN, AGACNP-BC

## 2017-04-20 NOTE — CDMP QUERY
Dr. Dotson Anchors,    Please clarify if this patient is being treated/managed for:    =>Sirs d/t perforated ulcer & contained abscess w/ acute organ dysfunction in the setting of ARF,  wbc 16.9---13.4, hr 120-145, temp 100.2, rr 26 req  s/p Lap exploratory, repair ulcer w/ patch, abx, ivfs, cx pending, npo, tpn  =>Other Explanation of clinical findings  =>Unable to Determine (no explanation of clinical findings)    The medical record reflects the following clinical findings, treatment, and risk factors:    Risk Factors: 67M adm w/ perforated ulcer & abscess   Clinical Indicators: bun/crea 56/3.05/21,  wbc 16.9---13.4, hr 120-145, temp 100.2, rr 26, OPN \"massive superior duodenal bulb perforation with a fish-mouth appearance, with a contained abscess in the right upper quadrant\"  Treatment: s/p Lap exploratory, repair ulcer w/ patch, abx, ivfs, cx pending, npo, tpn    Please clarify and document your clinical opinion in the progress notes and discharge summary including the definitive and/or presumptive diagnosis, (suspected or probable), related to the above clinical findings. Please include clinical findings supporting your diagnosis.     Thank Stefanie Casillas

## 2017-04-20 NOTE — PROGRESS NOTES
Cardiology input reviewed sp SVT. Echo results pending at this time. OK to proceed with scheduled surgical procedure tomorrow, assuming no acute changes in clinical status or lab derangements overnight and if Cardiology is in agreement.

## 2017-04-21 NOTE — PERIOP NOTES
Spoke with dr Sharmila Cunningham and dr Liu Simmons (anesthesia), patient to return to previously assigned room 2266.

## 2017-04-21 NOTE — BRIEF OP NOTE
BRIEF OPERATIVE NOTE    Date of Procedure: 4/21/2017   Preoperative Diagnosis: KIDNEY STONE, HYDRONEPHROSIS  Postoperative Diagnosis: KIDNEY STONE, HYDRONEPHROSIS    Procedure(s):  CYSTOSCOPY WITH RIGHT RETROGRADES, Right J-J stent placement  Surgeon(s) and Role:     * Olayinka Galeano MD - Primary         Assistant Staff:       Surgical Staff:  Circ-1: Sonja Brown, RN  Radiology Technician: RT Hina  Scrub Tech-1: Pierre Alcocer  Event Time In   Incision Start 1136   Incision Close      Anesthesia: General   Estimated Blood Loss: none  Specimens:   ID Type Source Tests Collected by Time Destination   1 : RIGHT KIDNEY URINE Urine Bladder CULTURE, URINE Olayinka Galeano MD 4/21/2017 8201 Microbiology      Findings: see note   Complications: none  Implants:   Implant Name Type Inv.  Item Serial No.  Lot No. LRB No. Used Action   STENT URET FLX SFT [id number] -- PERCUFLEX - [id number]   STENT URET FLX SFT [id number] -- PERCUFLEX   Deep Glint Formerly [facility] UROLOGY-WOMENS [facility]   Right 1 Implanted

## 2017-04-21 NOTE — PERIOP NOTES
Handoff Report from Operating Room to PACU    Report received from MARJAN Mi RN and Vineet Rodriguez CRNA regarding Osie Marker. Surgeon(s):  Juvenal Akhtar MD  And Procedure(s) (LRB):  CYSTOSCOPY WITH RIGHT RETROGRADES, POSSIBLE STENT PLACEMENT, POSSIBLE BLADDER BIOPSY (Right)  confirmed   with allergies and drains discussed. Anesthesia type, drugs, patient history, complications, estimated blood loss, vital signs, intake and output, and last pain medication, lines and temperature were reviewed.

## 2017-04-21 NOTE — OP NOTES
[address], [facility]   OP NOTE       Name:  Tika Clarke   MR#:  [id number]   :  1950   Account #:  [de-identified]    Surgery Date:  2017   Date of Adm:  2017       PREOPERATIVE DIAGNOSIS: Right hydroureteronephrosis. POSTOPERATIVE DIAGNOSIS: Right hydroureteronephrosis. PROCEDURES PERFORMED:    1. Cystoscopy. 2. Right retrograde pyelogram and insertion of right double-J stent. ANESTHESIA:  General.     SURGEON: Halima Larsen MD.      DESCRIPTION OF PROCEDURE: The patient placed in the dorsal   lithotomy position and genitalia prepped and draped in the standard   fashion for this procedure after general anesthesia had been achieved. Time out taken. Fluoroscopy positioned. A 21 scope passed easily. Bladder, urethra   and trigone were directly visualized and found to be normal except for   debris in the bladder and catheter cystitis. The bladder was thickened. Prostate was mildly visually obstructing from BPH. The bladder was   easily friable. No obvious mucosal lesions suggestive of carcinoma   noted. A retrograde pyelogram was performed on the right side   demonstrating a calcific density near the right ureter, but outside the   ureter in the pelvis. The distal ureter almost to the level of the vessels   was concentrically narrowed. Above this, there was   hydroureteronephrosis. No obvious filling defects seen. There was a   fusiform ending of this hydronephrosis. We made the decision to place   a stent by passing a Glidewire and over this a 6 x 26 double-J stent   without a suture tag. The drainage from the ureter was grossly purulent   and the urine from the stent was collected and sent for culture labeled   right ureteral urine. The bladder was left partially full, the cystoscope   removed. A 16-Faroese Amaya catheter placed and left indwelling.  The   patient was taken out of lithotomy, awakened and transferred to   recovery area in satisfactory condition. There were no complications. ESTIMATED BLOOD LOSS: 1 mL. SPECIMENS REMOVED: None. IMPRESSION: Hydroureteronephrosis to the distal ureter without an   obvious cause of obstruction, but a thin, narrow distal right ureter noted   on retrograde pyelography with marked purulent debris upon drainage. PLAN: We will leave stent indwelling for a number of weeks. Amaya can   come out when ambulatory and medically not necessary. Will need a   secondary procedure with ureteroscopy at some point.         Matt Gould.MD Radha / Curt Jones   D:  04/21/2017   13:17   T:  04/21/2017   14:45   Job #:  [id number]

## 2017-04-21 NOTE — ANESTHESIA POSTPROCEDURE EVALUATION
Post-Anesthesia Evaluation and Assessment    Patient: Chaka Mendiola MRN: [id number]  SSN: [id number]    YOB: 1950  Age: 79 y.o. Sex: male       Cardiovascular Function/Vital Signs  Visit Vitals    /73    Pulse 86    Temp 37.3 °C (99.1 °F)    Resp 18    Ht 5' 9\" (1.753 m)    Wt 100 kg (220 lb 6.4 oz)    SpO2 100%    BMI 32.55 kg/m2       Patient is status post general anesthesia for Procedure(s):  CYSTOSCOPY WITH RIGHT RETROGRADES, POSSIBLE STENT PLACEMENT, . Nausea/Vomiting: None    Postoperative hydration reviewed and adequate. Pain:  Pain Scale 1: Numeric (0 - 10) (04/21/17 1245)  Pain Intensity 1: 0 (04/21/17 1245)   Managed    Neurological Status:   Neuro (WDL): Exceptions to WDL (04/21/17 1220)  Neuro  Neurologic State: Drowsy; Eyes open to voice; Eyes open spontaneously (04/21/17 1220)  Orientation Level: Oriented to person;Oriented to situation (04/21/17 1220)  Cognition: Follows commands (04/21/17 1220)  Speech: Clear (04/21/17 1220)  Assessment L Pupil: Brisk;Round (04/19/17 2000)  Assessment R Pupil: Brisk;Round (04/19/17 2000)  LUE Motor Response: Purposeful;Weak (04/21/17 1220)  LLE Motor Response: Purposeful;Weak (04/21/17 1220)  RUE Motor Response: Purposeful;Weak (04/21/17 1220)  RLE Motor Response: Purposeful;Weak (04/21/17 1220)   At baseline    Mental Status and Level of Consciousness: Alert and oriented     Pulmonary Status:   O2 Device: Nasal cannula (04/21/17 1251)   Adequate oxygenation and airway patent    Complications related to anesthesia: Postop SVT resolved with carotid massage and valsalva. BB given. Pt stable- going to tele floor. Post-anesthesia assessment completed.      Signed By: Eber Rangel MD     April 21, 2017

## 2017-04-21 NOTE — PROGRESS NOTES
PCU SHIFT NURSING NOTE      Bedside shift change report given to Fei Riley (oncoming nurse) by Abner Maxwell and Sridevi CROCKER (offgoing nurse). Report included the following information SBAR, Kardex, ED Summary, Procedure Summary, Intake/Output, MAR and Recent Results. Shift Summary:   1946: VSS and no complaints of pain at this time. NG tube at 77 cms. YARON drains in place on left and right sides. Honeycomb dressing on mid abdomen DI with small amount of old drainage. Amaya draining and emptied. TPN infusing into right upper PICC. 2000: Patient assisted back to bed via [address], patient went into SVT with a rate in the 190's during transfer, MD's aware of this happening during transfer. Once back in bed and settled, patient converted back to NSR with a rate in the 90's.     2353: Patient medicated with Dilaudid for abdominal pain. 0423: PRN tylenol given for temp of 99.5. All drains in place. NG tube to suction. Patient given chlorhexidine bath, gown and linens changed. 0630: PRN Dilaudid given for pain. Temp is now 97.7.    0700: Bedside shift change report given to Abner Maxwell (oncoming nurse) by [place] (offgoing nurse). Report included the following information SBAR, Kardex, ED Summary, Procedure Summary, Intake/Output, MAR and Recent Results.            Admission Date 4/17/2017   Admission Diagnosis Perforated Viscous  Perforated duodenal bulb ulcer ([facility] [address]  KIDNEY STONE, HYDRONEPHROSIS   Consults IP CONSULT TO UROLOGY  IP CONSULT TO CARDIOLOGY        Consults   []PT   []OT   []Speech   []Case Management      [] Palliative      Cardiac Monitoring Order   []Yes   []No     IV drips   []Yes    Drip:                            Dose:  Drip:                            Dose:  Drip:                            Dose:   []No     GI Prophylaxis   []Yes   []No         DVT Prophylaxis   SCDs:  Sequential Compression Device: Bilateral          Sammy stockings:         [] Medication   []Contraindicated   []None Activity Level Activity Level: Up with Assistance     Activity Assistance: Complete care   Purposeful Rounding every 1-2 hour? []Yes   Dexter Score  Total Score: 3   Bed Alarm (If score 3 or >)   []Yes   [] Refused (See signed refusal form in chart)   Pako Score  Pako Score: 15   Pako Score (if score 14 or less)   []PMT consult   []Wound Care consult      []Specialty bed   [] Nutrition consult          Needs prior to discharge:   Home O2 required:    []Yes   []No    If yes, how much O2 required? Other:    Last Bowel Movement: Last Bowel Movement Date:  (pt. cant remember)      Influenza Vaccine          Pneumonia Vaccine           Diet Active Orders   Diet    DIET NPO      LDAs         PICC Double Lumen 62/48/41 Right;Basilic (Active)   Central Line Being Utilized Yes 4/20/2017  3:34 PM   Criteria for Appropriate Use Total parenteral nutrition 4/20/2017  3:34 PM   Site Assessment Clean, dry, & intact 4/20/2017  3:34 PM   Phlebitis Assessment 0 4/20/2017  3:34 PM   Infiltration Assessment 0 4/20/2017  3:34 PM   Arm Circumference (cm) 33 cm 4/19/2017 12:18 PM   Date of Last Dressing Change 04/19/17 4/20/2017 11:19 AM   Dressing Status Clean, dry, & intact 4/20/2017  3:34 PM   Action Taken Other (comment) 4/20/2017 11:19 AM   External Catheter Length (cm) 0 centimeters 4/19/2017 12:18 PM   Dressing Type Disk with Chlorhexadine gluconate (CHG); Transparent 4/20/2017  3:34 PM   Hub Color/Line Status Purple; Infusing 4/20/2017  3:34 PM   Positive Blood Return (Site #1) Yes 4/20/2017  3:34 PM   Hub Color/Line Status Red; Infusing 4/20/2017  3:34 PM   Positive Blood Return (Site #2) Yes 4/20/2017  3:34 PM   Alcohol Cap Used Yes 4/19/2017  8:00 PM          Peripheral IV 04/17/17 Right Antecubital (Active)   Site Assessment Clean, dry, & intact 4/20/2017  3:34 PM   Phlebitis Assessment 0 4/20/2017  3:34 PM   Infiltration Assessment 0 4/20/2017  3:34 PM   Dressing Status Clean, dry, & intact 4/20/2017  3:34 PM Dressing Type Transparent;Tape 4/20/2017  3:34 PM   Hub Color/Line Status Infusing;Pink 4/20/2017  3:34 PM   Action Taken Open ports on tubing capped 4/20/2017  7:30 AM   Alcohol Cap Used Yes 4/20/2017  7:30 AM       Peripheral IV 04/18/17 Left Hand (Active)   Site Assessment Clean, dry, & intact 4/20/2017  3:34 PM   Phlebitis Assessment 0 4/20/2017  3:34 PM   Infiltration Assessment 0 4/20/2017  3:34 PM   Dressing Status Clean, dry, & intact 4/20/2017  3:34 PM   Dressing Type Transparent;Tape 4/20/2017  3:34 PM   Hub Color/Line Status Capped;Flushed 4/20/2017  3:34 PM   Action Taken Open ports on tubing capped 4/19/2017  8:00 PM   Alcohol Cap Used Yes 4/19/2017  8:00 PM          Nasogastric Tube 04/18/17 (Active)   Site Assessment Clean, dry, & intact 4/20/2017  3:34 PM   Dressing Status Clean, dry, & intact 4/20/2017  3:34 PM   G Port Status Continuous Suction 4/20/2017  3:34 PM   External Insertion Rao (cms) 86 cms 4/20/2017 12:05 PM   Action Taken Retaped 4/20/2017  4:45 PM   Drainage Description Brenda Burgess 4/20/2017  3:34 PM   Gastric Residual (mL) 0 ml 4/19/2017  8:00 PM   Drainage Chamber Level (ml) 400 ml 4/20/2017  8:27 PM   Output (ml) 0 ml 4/20/2017  8:27 PM       De Patrick #1 04/18/17 Left Abdomen (Active)   Site Assessment Clean, dry, & intact 4/20/2017  3:34 PM   Dressing Status Clean, dry, & intact 4/20/2017  3:34 PM   Drainage Description Brenda Burgess 4/20/2017  6:56 PM   Rojas Drain Airleak No 4/20/2017  3:34 PM   Status Patent; Charged 4/20/2017  3:34 PM   Y Connector Used No 4/20/2017  3:34 PM   Output (ml) 10 ml 4/20/2017  8:27 PM       De Patrick #1 04/18/17 Right Abdomen (Active)   Site Assessment Clean, dry, & intact 4/20/2017  3:34 PM   Dressing Status Clean, dry, & intact 4/20/2017  3:34 PM   Drainage Description Serosanguinous 4/20/2017  3:34 PM   Rojas Drain Airleak No 4/20/2017  3:34 PM   Status Patent; Charged 4/20/2017  3:34 PM   Y Connector Used No 4/20/2017  3:34 PM   Drainage Chamber Level (ml) 10 ml 4/18/2017  3:58 PM   Output (ml) 0 ml 4/20/2017  8:27 PM                Urinary Catheter Urinary Catheter 04/18/17 2- way; Amaya-Criteria for Appropriate Use: Obstruction/retention    Intake & Output   Date 04/19/17 1900 - 04/20/17 0659 04/20/17 0700 - 04/21/17 0659   Shift 3781-5378 24 Hour Total 8074-4697 7090-0491 24 Hour Total   I  N  T  A  K  E   P. O.  0         P. O.  0       I.V.  (mL/kg/hr) 2046. 7 2646. 7 1802.5  (1.4)  1802.5      I.V.  500         Volume (dextrose 5% - 0.45% NaCl with KCl 20 mEq/L infusion) 1392.5 1392.5 765.8  765.8      Volume (piperacillin-tazobactam (ZOSYN) 3.375 g in 0.9% sodium chloride (MBP/ADV) 100 mL)  100 100  100      Volume (fluconazole (DIFLUCAN) 200mg/100 mL IVPB (premix))   100  100      Volume (fat emulsion 20% (LIPOSYN, INTRALIPID) infusion) 218.1 218.1 272.7  272.7      Volume (TPN ADULT - CENTRAL AA 5% D20% W/ CA + ELECTROLYTES) 436.1 436.1 545.3  545. 3      Volume (TPN ADULT - CENTRAL AA 5% D20% W/ CA + ELECTROLYTES)   18.8  18.8    Shift Total  (mL/kg) 2046.7  (19.7) 2646.7  (25.5) 1802.5  (17.4)  1802.5  (17.4)   O  U  T  P  U  T   Urine  (mL/kg/hr) 1650 3150 1420  (1.1) 150 1570      Urine Output (mL) (Urinary Catheter 04/18/17 2- way; Amaya) 1650 3150 2995 186 3095    Emesis/NG output 0 0 280 0 280      Output (ml) (Nasogastric Tube 04/18/17) 0 0 280 0 280      Output (ml) ([REMOVED] Nasogastric Tube 04/18/17) 0 0       Drains 110 350 95 10 105      Output (ml) (Rojas Drain #1 04/18/17 Left Abdomen) 100 130 85 10 95      Output (ml) (Rojas Drain #1 04/18/17 Right Abdomen) 10 220 10 0 10    Shift Total  (mL/kg) 1760  (17) 3500  (33.7) 1795  (17.3) 160  (1.5) 1955  (18.8)   .7 -853.4 7.5 -160 -152.5   Weight (kg) 103.8 103.8 103.8 103.8 103.8         Readmission Risk Assessment Tool Score Medium Risk            15       Total Score        3 Relationship with PCP    4 More than 1 Admission in calendar year    5 Patient Insurance is Medicare, Medicaid or Self Pay    3 Charlson Comorbidity Score        Criteria that do not apply:    Patient Living Status    Patient Length of Stay > 5       Expected Length of Stay 5d 12h   Actual Length of Stay 2

## 2017-04-21 NOTE — WOUND CARE
Pressure Ulcer Prevention In basket Alert Received for Pako < 14 (moderate risk).      Suggested Care Plan/Interventions for Nursing  1. Complete Pako Pressure Ulcer Risk Scale and use sub scores to identify appropriate interventions. 2. Perform Assessment: skin, changes in LOC, visual cues for pain, monitor skin under medical devices  3. Respond to Reduced Sensory Perception: changes in LOC, check visual cues for pain, float heels, suspension boots, pressure redistribution bed/mattress/chair cushion, turning and reposition approximately every 2 hours (pillows & wedges), pad between skin to skin, turn & reposition  4. Manage Moisture: absorbent under pads, internal / external urinary device, internal /  external fecal device, minimize layers, contain wound drainage, access need for specialty bed, limit adult briefs, maintain skin hydration (lotion/cream), moisture barrier, offer toileting every hour  5. Promote Activity: increase time out of bed, chair cushion, PT/OT evaluation, trapeze to reposition, pressure redistribution bed/mattress/chair  6. Address Reduced Mobility: float heels / suspension boot, HOB 30 degrees or less, pressure redistribution bed/mattress/cushion, PT / OT evaluation, turn and reposition approximately every 2 hours (pillows & wedges)  7. Promote Nutrition: document food / fluid / supplement intake, encourage/assist with meals as needed  8. Reduce Friction and Shear: transferring/repositioning devices (lift/draw sheet), lift team/ patient mobility team, feet elevated on foot rest, minimize layers, foam dressing / transparent film / skin sealants, protective barrier creams and emollients, transfer aides (board, Yahaira lift, ceiling lift, stand assist), HOB 30 degrees or less, trapeze to reposition.   Wound Care Team

## 2017-04-21 NOTE — PERIOP NOTES
Patient with sustained run of SVT rate up to 200's, Dr. Santana West notified and arrived at bedside, patient placed on code monitor with pads in place, patient performed vagal maneuvers, and heart rate began to decrease, received order for 5 milligrams IV metoprolol, patient reports \"feeling better\" after administration of metoprolol, heart rate down to 83 with /59    1245  Spoke with Dr. Jenny Mattson and informed him of patient status, okay to keep patient on current level of care and not send to general surgery floor    1350  TRANSFER - OUT REPORT:    Verbal report given to Nuvia Bethea RN on Grzegorz Clark  being transferred to PCU, 2266 for routine post - op       Report consisted of patients Situation, Background, Assessment and   Recommendations(SBAR). Information from the following report(s) SBAR, Kardex, OR Summary, Intake/Output, MAR and Cardiac Rhythm NSR was reviewed with the receiving nurse. Lines:   PICC Double Lumen 59/89/81 Right;Basilic (Active)   Central Line Being Utilized Yes 4/21/2017 12:20 PM   Criteria for Appropriate Use Total parenteral nutrition 4/21/2017 12:20 PM   Site Assessment Clean, dry, & intact 4/21/2017 12:20 PM   Phlebitis Assessment 0 4/21/2017 12:20 PM   Infiltration Assessment 0 4/21/2017 12:20 PM   Arm Circumference (cm) 33 cm 4/19/2017 12:18 PM   Date of Last Dressing Change 04/19/17 4/21/2017 12:20 PM   Dressing Status Clean, dry, & intact 4/21/2017 12:20 PM   Action Taken Open ports on tubing capped 4/21/2017  7:20 AM   External Catheter Length (cm) 0 centimeters 4/19/2017 12:18 PM   Dressing Type Disk with Chlorhexadine gluconate (CHG) 4/21/2017  9:38 AM   Hub Color/Line Status Purple; Infusing;Flushed;Patent 4/21/2017  9:38 AM   Positive Blood Return (Site #1) Yes 4/21/2017  9:38 AM   Hub Color/Line Status Red; Infusing 4/21/2017  9:38 AM   Positive Blood Return (Site #2) Yes 4/21/2017  9:38 AM   Alcohol Cap Used Yes 4/21/2017  9:38 AM       Peripheral IV 04/17/17 Right Antecubital (Active)   Site Assessment Clean, dry, & intact 4/21/2017 12:20 PM   Phlebitis Assessment 0 4/21/2017 12:20 PM   Infiltration Assessment 0 4/21/2017 12:20 PM   Dressing Status Clean, dry, & intact 4/21/2017 12:20 PM   Dressing Type Tape;Transparent 4/21/2017 12:20 PM   Hub Color/Line Status Pink;Capped 4/21/2017 12:20 PM   Action Taken Open ports on tubing capped 4/21/2017  3:59 AM   Alcohol Cap Used Yes 4/20/2017  7:30 AM       Peripheral IV 04/18/17 Left Hand (Active)   Site Assessment Clean, dry, & intact 4/21/2017 12:20 PM   Phlebitis Assessment 0 4/21/2017 12:20 PM   Infiltration Assessment 0 4/21/2017 12:20 PM   Dressing Status Clean, dry, & intact 4/21/2017 12:20 PM   Dressing Type Tape;Transparent 4/21/2017 12:20 PM   Hub Color/Line Status Pink;Capped 4/21/2017 12:20 PM   Action Taken Open ports on tubing capped 4/19/2017  8:00 PM   Alcohol Cap Used Yes 4/19/2017  8:00 PM        Opportunity for questions and clarification was provided.       Patient transported with:   Monitor  O2 @ 2 liters  Registered Nurse

## 2017-04-21 NOTE — PROGRESS NOTES
[address] [address]  [phone]      Cardiology Progress Note      4/21/2017 9AM    Admit Date: 4/17/2017    Admit Diagnosis:   Perforated Viscous  Perforated duodenal bulb ulcer ([address]  KIDNEY STONE, HYDRONEPHROSIS    Subjective:     Alex Christopher is a 79 y.o. male with PMH DM, CVA, HLD, HTN, colon CA who was admitted for a perforated viscous/duodenal bulb ulcer. Overnight events:  -continued intermittent SVT  -off the floor for cystoscopy most of the day   -Mag 1.9 today   -Mr. Clark is just recently returned from PACU post-procedure and states he is \"feeling better\". He denies current pain, SOB, palpitations although he went into a burst of SVT 170s during assessment.         Visit Vitals    /67 (BP 1 Location: Left arm, BP Patient Position: At rest)    Pulse 84    Temp 98 °F (36.7 °C)    Resp 18    Ht 5' 9\" (1.753 m)    Wt 100 kg (220 lb 6.4 oz)    SpO2 97%    BMI 32.55 kg/m2       Current Facility-Administered Medications   Medication Dose Route Frequency    TPN ADULT - CENTRAL AA 5% D20% W/ CA + ELECTROLYTES   IntraVENous CONTINUOUS    insulin lispro (HUMALOG) injection   SubCUTAneous Q6H    glucose chewable tablet 16 g  4 Tab Oral PRN    dextrose (D50W) injection syrg 12.5-25 g  12.5-25 g IntraVENous PRN    glucagon (GLUCAGEN) injection 1 mg  1 mg IntraMUSCular PRN    acetaminophen (TYLENOL) suppository 650 mg  650 mg Rectal Q4H PRN    TPN ADULT - CENTRAL AA 5% D20% W/ CA + ELECTROLYTES   IntraVENous CONTINUOUS    metoprolol (LOPRESSOR) injection 5 mg  5 mg IntraVENous Q6H    sodium chloride (NS) 0.9 % flush        perflutren lipid microspheres (DEFINITY) 1.1 mg/mL contrast injection        fat emulsion 20% (LIPOSYN, INTRALIPID) infusion  21 mL/hr IntraVENous 3 times weekly    dextrose 5% - 0.45% NaCl with KCl 20 mEq/L infusion  50 mL/hr IntraVENous CONTINUOUS    enoxaparin (LOVENOX) injection 40 mg  40 mg SubCUTAneous Q24H    sodium chloride (NS) flush 10-30 mL  10-30 mL InterCATHeter PRN    sodium chloride (NS) flush 10 mL  10 mL InterCATHeter Q24H    sodium chloride (NS) flush 10 mL  10 mL InterCATHeter PRN    sodium chloride (NS) flush 10-40 mL  10-40 mL InterCATHeter Q8H    sodium chloride (NS) flush 20 mL  20 mL InterCATHeter Q24H    heparin (porcine) pf 300 Units  300 Units InterCATHeter PRN    fluconazole (DIFLUCAN) 200mg/100 mL IVPB (premix)  200 mg IntraVENous Q24H    piperacillin-tazobactam (ZOSYN) 3.375 g in 0.9% sodium chloride (MBP/ADV) 100 mL  3.375 g IntraVENous Q8H    timolol (TIMOPTIC) 0.25% ophthalmic solution  1-2 Drop Both Eyes BID    sodium chloride (NS) flush 5-10 mL  5-10 mL IntraVENous Q8H    sodium chloride (NS) flush 5-10 mL  5-10 mL IntraVENous PRN    HYDROmorphone (PF) (DILAUDID) injection 0.5 mg  0.5 mg IntraVENous Q2H PRN    ondansetron (ZOFRAN) injection 4 mg  4 mg IntraVENous Q4H PRN    pantoprazole (PROTONIX) 40 mg in sodium chloride 0.9 % 10 mL injection  40 mg IntraVENous Q12H       Objective:      Physical Exam:  General Appearance:  ill-appearing  male, appears older than stated age in NAD  Chest:   Clear  Cardiovascular:  Regular tachycardic rate and rhythm, no murmur.   Abdomen:   obese, distended; NG tube in nare  Extremities: palpable distal pulses. LUE with improvement in swelling, but still 1+ size  Skin:  Warm and dry. pale    Data Review:   Recent Labs      04/21/17 0413   WBC  13.3*   HGB  10.5*   HCT  32.5*   PLT  281     Recent Labs      04/21/17   0413  04/20/17   1021  04/20/17   0523  04/19/17   0538   NA  135*   --   137  139   K  4.0   --   4.3  4.5   CL  101   --   105  108   CO2  25   --   24  18*   GLU  138*   --   140*  89   BUN  19   --   19  29*   CREA  1.03   --   1.17  1.27   CA  9.1   --   9.1  9.1   MG  1.9  1.1*  1.3*   --    PHOS  3.2   --    --    --        No results for input(s): TROIQ, CPK, CKMB in the last 72 hours.       Intake/Output Summary (Last 24 hours) at 04/21/17 1454  Last data filed at 04/21/17 1350   Gross per 24 hour   Intake           4122.9 ml   Output             3160 ml   Net            962.9 ml        Telemetry: SR; 2 bursts of SVT overnight. 1 burst during/immediatedly after procedure, and 1 burst after return to floor. EKG: NSR  Cxray: free intraperitoneal air  ECHO: EF 55-60%; No wall motion abnormalities;  Mild concentric hypertrophy     Assessment:     Principal Problem:    Perforated viscus (4/17/2017)    Active Problems:    Acute renal failure (ARF) ([facility] [address] (3/1/2017)      Hydroureter on right (4/17/2017)      Perforated duodenal bulb ulcer ([facility] [address] (4/18/2017)      Hyponatremia (4/18/2017)      SVT (supraventricular tachycardia) ([facility] [address] (4/19/2017)      Overview: Approximately 220 BPM on tele 4/19/17        Plan:     SVT: in setting of illness/surgery. 2 bursts overnight and 2 more bursts after cystoscopy procedure today. Electrolytes stable. Non-significant ECHO  · There was improvement with the increased BB until stimulation from the procedure today. Did receive an extra dose moise-procedure. · Continue to monitor electrolytes and treat underlying condition. Also spoke with RN about Mr. Quintin Arana SVT episodes and that they should call cardiology if Mr. Ambrose Richmond sustains SVT greater than 5 minutes.         Sy Sanches NP  DNP, RN, AGACNP-BC

## 2017-04-21 NOTE — PROGRESS NOTES
PCU SHIFT NURSING NOTE      Bedside shift change report given to Fei Riley (oncoming nurse) by Kenzie Baez and Sridevi RN (offgoing nurse). Report included the following information SBAR, Kardex, ED Summary, Procedure Summary, Intake/Output, MAR and Recent Results. Shift Summary:   1927: VSS and no complaints of pain at this time. 2L NC, diallo, right arm PICC, left and right YARON drains, and NG tube all in place. Abdominal incision dressing DI with dry old drainage. 0700: Bedside shift change report given to [address] [place] [id number] (oncoming nurse) by [place] (offgoing nurse). Report included the following information SBAR, Kardex, ED Summary, Procedure Summary, Intake/Output, MAR and Recent Results. Admission Date 4/17/2017   Admission Diagnosis Perforated Viscous  Perforated duodenal bulb ulcer ([facility] [address]  KIDNEY STONE, HYDRONEPHROSIS   Consults IP CONSULT TO UROLOGY  IP CONSULT TO CARDIOLOGY        Consults   []PT   []OT   []Speech   []Case Management      [] Palliative      Cardiac Monitoring Order   []Yes   []No     IV drips   []Yes    Drip:                            Dose:  Drip:                            Dose:  Drip:                            Dose:   []No     GI Prophylaxis   []Yes   []No         DVT Prophylaxis   SCDs:  Sequential Compression Device: Bilateral          Sammy stockings:         [] Medication   []Contraindicated   []None      Activity Level Activity Level: Head of bed elevated (degrees), Up with Assistance     Activity Assistance: Complete care   Purposeful Rounding every 1-2 hour? []Yes   Dexter Score  Total Score: 3   Bed Alarm (If score 3 or >)   []Yes   [] Refused (See signed refusal form in chart)   Pako Score  Pako Score: 14   Pako Score (if score 14 or less)   []PMT consult   []Wound Care consult      []Specialty bed   [] Nutrition consult          Needs prior to discharge:   Home O2 required:    []Yes   []No    If yes, how much O2 required?     Other:    Last Bowel Movement: Last Bowel Movement Date: 04/18/17      Influenza Vaccine Received Flu Vaccine for Current Season (usually Sept-March): Not Flu Season        Pneumonia Vaccine           Diet Active Orders   Diet    DIET NPO      LDAs         PICC Double Lumen 82/99/14 Right;Basilic (Active)   Central Line Being Utilized Yes 4/21/2017  2:00 PM   Criteria for Appropriate Use Total parenteral nutrition 4/21/2017  2:00 PM   Site Assessment Clean, dry, & intact 4/21/2017  2:00 PM   Phlebitis Assessment 0 4/21/2017  2:00 PM   Infiltration Assessment 0 4/21/2017  2:00 PM   Arm Circumference (cm) 33 cm 4/19/2017 12:18 PM   Date of Last Dressing Change 04/19/17 4/21/2017  1:50 PM   Dressing Status Clean, dry, & intact 4/21/2017  2:00 PM   Action Taken Open ports on tubing capped 4/21/2017  6:45 PM   External Catheter Length (cm) 0 centimeters 4/19/2017 12:18 PM   Dressing Type Disk with Chlorhexadine gluconate (CHG); Tape 4/21/2017  2:00 PM   Hub Color/Line Status Purple; Infusing;Flushed 4/21/2017  6:45 PM   Positive Blood Return (Site #1) Yes 4/21/2017  6:45 PM   Hub Color/Line Status Red; Infusing;Flushed 4/21/2017  6:45 PM   Positive Blood Return (Site #2) Yes 4/21/2017  6:45 PM   Alcohol Cap Used Yes 4/21/2017  6:45 PM          Peripheral IV 04/17/17 Right Antecubital (Active)   Site Assessment Clean, dry, & intact 4/21/2017  2:00 PM   Phlebitis Assessment 0 4/21/2017  2:00 PM   Infiltration Assessment 0 4/21/2017  2:00 PM   Dressing Status Clean, dry, & intact 4/21/2017  2:00 PM   Dressing Type Tape;Transparent 4/21/2017  2:00 PM   Hub Color/Line Status Pink;Flushed;Capped 4/21/2017  6:45 PM   Action Taken Open ports on tubing capped 4/21/2017  2:00 PM   Alcohol Cap Used Yes 4/21/2017  6:45 PM       Peripheral IV 04/18/17 Left Hand (Active)   Site Assessment Clean, dry, & intact 4/21/2017  2:00 PM   Phlebitis Assessment 0 4/21/2017  2:00 PM   Infiltration Assessment 0 4/21/2017  2:00 PM   Dressing Status Clean, dry, & intact 4/21/2017  2:00 PM   Dressing Type Tape;Transparent 4/21/2017  2:00 PM   Hub Color/Line Status Pink;Capped;Flushed 4/21/2017  2:00 PM   Action Taken Open ports on tubing capped 4/21/2017  2:00 PM   Alcohol Cap Used Yes 4/21/2017  2:00 PM          Nasogastric Tube 04/18/17 (Active)   Site Assessment Clean, dry, & intact 4/21/2017  2:00 PM   Dressing Status Clean, dry, & intact 4/21/2017  2:00 PM   G Port Status Continuous Suction 4/21/2017  2:00 PM   External Insertion Rao (cms) 76 cms 4/21/2017  6:45 PM   Action Taken Retaped 4/21/2017  7:20 AM   Drainage Description Haydee Johnston 4/21/2017  6:45 PM   Gastric Residual (mL) 0 ml 4/19/2017  8:00 PM   Drainage Chamber Level (ml) 0 ml 4/21/2017  6:45 PM   Output (ml) 0 ml 4/21/2017  6:45 PM       Rojas Drain #1 04/18/17 Left Abdomen (Active)   Site Assessment Clean, dry, & intact 4/21/2017  2:00 PM   Dressing Status Clean, dry, & intact 4/21/2017  2:00 PM   Drainage Description Yellow 4/21/2017  2:00 PM   Rojas Drain Airleak No 4/21/2017  2:00 PM   Status Patent; Charged 4/21/2017  2:00 PM   Y Connector Used No 4/21/2017  2:00 PM   Output (ml) 10 ml 4/21/2017  6:45 PM       Bhavana Gist #1 04/18/17 Right Abdomen (Active)   Site Assessment Clean, dry, & intact 4/21/2017  2:00 PM   Dressing Status Clean, dry, & intact 4/21/2017  2:00 PM   Drainage Description Yellow 4/21/2017  2:00 PM   Rojas Drain Airleak No 4/21/2017  2:00 PM   Status Patent; Charged 4/21/2017  2:00 PM   Y Connector Used No 4/21/2017  2:00 PM   Drainage Chamber Level (ml) 10 ml 4/18/2017  3:58 PM   Output (ml) 5 ml 4/21/2017  8:55 AM                Urinary Catheter [REMOVED] Urinary Catheter 04/18/17 2- way; Amaya-Criteria for Appropriate Use: Obstruction/retention  Urinary Catheter 04/21/17 2- way; Amaya-Criteria for Appropriate Use: Obstruction/retention, Surgical procedure    Intake & Output   Date 04/20/17 1900 - 04/21/17 0659 04/21/17 0700 - 04/22/17 0659   Shift 0509-1746 24 Hour Total 0537-0639 6779-7399 24 Hour Total I  N  T  A  K  E   I.V.  (mL/kg/hr) 1920.4 3722.9 2109.2  (1.8)  2109.2      Volume (dextrose 5% - 0.45% NaCl with KCl 20 mEq/L infusion) 589.2 1355 599.2  599.2      Volume (lactated ringers infusion)   400  400      Volume (piperacillin-tazobactam (ZOSYN) 3.375 g in 0.9% sodium chloride (MBP/ADV) 100 mL) 200 300 100  100      Volume (fluconazole (DIFLUCAN) 200mg/100 mL IVPB (premix)) 0 100 100  100      Volume (TPN ADULT - CENTRAL AA 5% D20% W/ CA + ELECTROLYTES)   11.3  11.3      Volume (fat emulsion 20% (LIPOSYN, INTRALIPID) infusion) 247.5 520.1         Volume (TPN ADULT - CENTRAL AA 5% D20% W/ CA + ELECTROLYTES)  545. 3         Volume (TPN ADULT - CENTRAL AA 5% D20% W/ CA + ELECTROLYTES) 883.8 902.5 898.8  898.8    Shift Total  (mL/kg) 1920.4  (19.2) 3722.9  (37.2) 2109.2  (21.1)  2109.2  (21.1)   O  U  T  P  U  T   Urine  (mL/kg/hr) 1000 2420 1375  (1.1)  1375      Urine Output (mL) ([REMOVED] Urinary Catheter 04/18/17 2- way; Amaya) 1000 2420 450  450      Urine Output (mL) (Urinary Catheter 04/21/17 2- way; Amaya)   925  925    Emesis/NG output 150 430 200  200      Output (ml) (Nasogastric Tube 04/18/17) 150 430 200  200    Drains 110 205 25  25      Output (ml) (Rojas Drain #1 04/18/17 Left Abdomen) 100 185 20  20      Output (ml) (Rojas Drain #1 04/18/17 Right Abdomen) 10 20 5  5    Shift Total  (mL/kg) 1260  (12.6) 3055  (30.6) 1600  (16)  1600  (16)   .4 667.9 509.2  509.2   Weight (kg) 100 100 100 100 100         Readmission Risk Assessment Tool Score Medium Risk            17       Total Score        3 Relationship with PCP    2 Patient Living Status    4 More than 1 Admission in calendar year    5 Patient Insurance is Medicare, Medicaid or Self Pay    3 Charlson Comorbidity Score        Criteria that do not apply:    Patient Length of Stay > 5       Expected Length of Stay 5d 12h   Actual Length of Stay 3

## 2017-04-21 NOTE — ANESTHESIA PREPROCEDURE EVALUATION
Anesthetic History   No history of anesthetic complications            Review of Systems / Medical History  Patient summary reviewed, nursing notes reviewed and pertinent labs reviewed    Pulmonary          Shortness of breath         Neuro/Psych       CVA (+R sided weakness)       Cardiovascular    Hypertension          Hyperlipidemia    Exercise tolerance: <4 METS     GI/Hepatic/Renal         Renal disease (Recent ARF now recovering): ARF and stones  PUD (S/p alonzo patch repair 4/18/2017)     Endo/Other    Diabetes: using insulin    Obesity     Other Findings              Physical Exam    Airway  Mallampati: IV  TM Distance: 4 - 6 cm  Neck ROM: normal range of motion   Mouth opening: Normal     Cardiovascular  Regular rate and rhythm,  S1 and S2 normal,  no murmur, click, rub, or gallop             Dental  No notable dental hx       Pulmonary    Rhonchi:bilateral             Abdominal  GI exam deferred       Other Findings            Anesthetic Plan    ASA: 3  Anesthesia type: general          Induction: Intravenous  Anesthetic plan and risks discussed with: Patient      Gabriela

## 2017-04-21 NOTE — PROGRESS NOTES
PCU SHIFT NURSING NOTE      0720: Bedside and Verbal shift change report given to Shubham Manuel RN and Denis Price RN (oncoming nurse) by Diamond Knapp RN (offgoing nurse). Report included the following information SBAR, Kardex, Intake/Output, MAR, Accordion, Recent Results and Cardiac Rhythm NSR. Shift Summary:   0825: Brief overview report given to OR nurse Juan Manuel Campos RN. Pt scheduled to leave for cystoscopy at 0915. Reported NG tube at 77cm draining brown at 650mL, YARON drains color and amt of drainage, diallo intact and draining, midline incision with honeycomb dressing intact. Per OR nurse hold lovenox due to bleeding risk, but administer AM metoprolol. 12Texie Teofilo with OR nurse Juan Manuel Campos RN regarding continuous TPN and fluids. Per Juan Manuel Campos RN, stop fluids, but keep TPN running. 8779: All drains emptied prior to surgery. 0930: Transported to OR with [facility] and Shubham Manuel RN. NG tube clamped, at 77cms at nares. All lines capped; TPN running to one lumen of PICC at 75mL/h.  1350: TRANSFER - IN REPORT:    Verbal report received from [employer] RN(name) on Rolf SAXENA Dukes  being received from PACU(unit) for routine post - op      Report consisted of patients Situation, Background, Assessment and   Recommendations(SBAR). Information from the following report(s) OR Summary, Procedure Summary, Intake/Output, MAR, Accordion and Cardiac Rhythm Episode of SVT with return to NSR was reviewed with the receiving nurse. Opportunity for questions and clarification was provided. Assessment completed upon patients arrival to unit and care assumed. 1400: Pt back to room. NGT at 76 cms at nares on low continuous suction and draining, diallo draining. 1500: Vladimir Cortez NP rounded on pt. While in room, pt had another episode of SVT with HR in 180s. Pt converted back to NSR after about a minute. Shaq Etienne NP stated to call over the weekend/night time if pt sustained SVT longer than 5 minutes.  Shaq Etienne NP made aware of pt receiving 5mg dose metoprolol in OR during episode of SVT at 1240. She stated to go ahead and administer 1500 scheduled metoprolol. 1920: Bedside and Verbal shift change report given to Ervin Brewer RN (oncoming nurse) by Stepan Duarte RN and Delores Begum RN (offgoing nurse). Report included the following information SBAR, Kardex, OR Summary, Procedure Summary, Intake/Output, MAR, Accordion and Cardiac Rhythm NSR. Admission Date 4/17/2017   Admission Diagnosis Perforated Viscous  Perforated duodenal bulb ulcer ([address]  KIDNEY STONE, HYDRONEPHROSIS   Consults IP CONSULT TO UROLOGY  IP CONSULT TO CARDIOLOGY        Consults   [x]PT   [x]OT   []Speech   []Case Management      [] Palliative      Cardiac Monitoring Order   [x]Yes   []No     IV drips   []Yes    Drip:                            Dose:  Drip:                            Dose:  Drip:                            Dose:   [x]No     GI Prophylaxis   [x]Yes   []No         DVT Prophylaxis   SCDs:  Sequential Compression Device: Bilateral          Sammy stockings:         [x] Medication   []Contraindicated   []None      Activity Level Activity Level: Head of bed elevated (degrees), Up with Assistance     Activity Assistance: Complete care   Purposeful Rounding every 1-2 hour? [x]Yes   Dexter Score  Total Score: 3   Bed Alarm (If score 3 or >)   []Yes   [] Refused (See signed refusal form in chart)   Pako Score  Pako Score: 16   Pako Score (if score 14 or less)   []PMT consult   []Wound Care consult      [x]Specialty bed   [] Nutrition consult          Needs prior to discharge:   Home O2 required:    []Yes   [x]No    If yes, how much O2 required?     Other:    Last Bowel Movement: Last Bowel Movement Date:  (pt cant remember last BM)      Influenza Vaccine          Pneumonia Vaccine           Diet Active Orders   Diet    DIET NPO      LDAs         PICC Double Lumen 32/53/81 Right;Basilic (Active)   Central Line Being Utilized Yes 4/21/2017  3:59 AM Criteria for Appropriate Use Total parenteral nutrition 4/21/2017  3:59 AM   Site Assessment Clean, dry, & intact 4/21/2017  3:59 AM   Phlebitis Assessment 0 4/21/2017  3:59 AM   Infiltration Assessment 0 4/21/2017  3:59 AM   Arm Circumference (cm) 33 cm 4/19/2017 12:18 PM   Date of Last Dressing Change 04/19/17 4/21/2017  3:59 AM   Dressing Status Clean, dry, & intact 4/21/2017  3:59 AM   Action Taken Blood drawn 4/21/2017  3:59 AM   External Catheter Length (cm) 0 centimeters 4/19/2017 12:18 PM   Dressing Type Disk with Chlorhexadine gluconate (CHG); Transparent 4/21/2017  3:59 AM   Hub Color/Line Status Purple; Infusing 4/21/2017  3:59 AM   Positive Blood Return (Site #1) Yes 4/21/2017  3:59 AM   Hub Color/Line Status Red; Infusing 4/21/2017  3:59 AM   Positive Blood Return (Site #2) Yes 4/21/2017  3:59 AM   Alcohol Cap Used Yes 4/21/2017  3:59 AM          Peripheral IV 04/17/17 Right Antecubital (Active)   Site Assessment Clean, dry, & intact 4/21/2017  3:59 AM   Phlebitis Assessment 0 4/21/2017  3:59 AM   Infiltration Assessment 0 4/21/2017  3:59 AM   Dressing Status Clean, dry, & intact 4/21/2017  3:59 AM   Dressing Type Transparent;Tape 4/21/2017  3:59 AM   Hub Color/Line Status Pink; Infusing 4/21/2017  3:59 AM   Action Taken Open ports on tubing capped 4/21/2017  3:59 AM   Alcohol Cap Used Yes 4/20/2017  7:30 AM       Peripheral IV 04/18/17 Left Hand (Active)   Site Assessment Clean, dry, & intact 4/21/2017  3:59 AM   Phlebitis Assessment 0 4/21/2017  3:59 AM   Infiltration Assessment 0 4/21/2017  3:59 AM   Dressing Status Clean, dry, & intact 4/21/2017  3:59 AM   Dressing Type Transparent;Tape 4/21/2017  3:59 AM   Hub Color/Line Status Pink;Flushed 4/21/2017  3:59 AM   Action Taken Open ports on tubing capped 4/19/2017  8:00 PM   Alcohol Cap Used Yes 4/19/2017  8:00 PM          Nasogastric Tube 04/18/17 (Active)   Site Assessment Clean, dry, & intact 4/21/2017  3:59 AM   Dressing Status Clean, dry, & intact 4/21/2017  3:59 AM   G Port Status Continuous Suction 4/21/2017  3:59 AM   External Insertion Rao (cms) 87 cms 4/21/2017  3:59 AM   Action Taken Retaped 4/20/2017  4:45 PM   Drainage Description Cassidy Lorenzo 4/21/2017  3:59 AM   Gastric Residual (mL) 0 ml 4/19/2017  8:00 PM   Drainage Chamber Level (ml) 550 ml 4/21/2017  4:03 AM   Output (ml) 125 ml 4/21/2017  4:03 AM       Rojas Drain #1 04/18/17 Left Abdomen (Active)   Site Assessment Clean, dry, & intact 4/21/2017  3:59 AM   Dressing Status Clean, dry, & intact 4/21/2017  3:59 AM   Drainage Description Cassidy Lorenzo 4/21/2017  3:59 AM   Rojas Drain Airleak No 4/21/2017  3:59 AM   Status Patent; Charged 4/21/2017  3:59 AM   Y Connector Used No 4/21/2017  3:59 AM   Output (ml) 50 ml 4/21/2017  4:03 AM       Rojas Drain #1 04/18/17 Right Abdomen (Active)   Site Assessment Clean, dry, & intact 4/21/2017  3:59 AM   Dressing Status Clean, dry, & intact 4/21/2017  3:59 AM   Drainage Description Serosanguinous 4/21/2017  3:59 AM   Rojas Drain Airleak No 4/21/2017  3:59 AM   Status Patent; Charged 4/21/2017  3:59 AM   Y Connector Used No 4/21/2017  3:59 AM   Drainage Chamber Level (ml) 10 ml 4/18/2017  3:58 PM   Output (ml) 10 ml 4/21/2017  4:03 AM                Urinary Catheter Urinary Catheter 04/18/17 2- way; Amaya-Criteria for Appropriate Use: Obstruction/retention    Intake & Output   Date 04/20/17 0700 - 04/21/17 0659 04/21/17 0700 - 04/22/17 0659   Shift 7769-4055 1031-2401 24 Hour Total 8972-0761 7504-2524 24 Hour Total   I  N  T  A  K  E   I.V.  (mL/kg/hr) 1802.5  (1.4) 1920.4  (1.6) 3722.9  (1.6)         Volume (dextrose 5% - 0.45% NaCl with KCl 20 mEq/L infusion) 765.8 589.2 1355         Volume (piperacillin-tazobactam (ZOSYN) 3.375 g in 0.9% sodium chloride (MBP/ADV) 100 mL) 100 200 300         Volume (fluconazole (DIFLUCAN) 200mg/100 mL IVPB (premix)) 100 0 100         Volume (fat emulsion 20% (LIPOSYN, INTRALIPID) infusion) 272.7 247.5 520.1         Volume (TPN ADULT - CENTRAL AA 5% D20% W/ CA + ELECTROLYTES) 545.3  545. 3         Volume (TPN ADULT - CENTRAL AA 5% D20% W/ CA + ELECTROLYTES) 18.8 883.8 902.5       Shift Total  (mL/kg) 1802.5  (17.4) 1920.4  (19.2) 3722.9  (37.2)      O  U  T  P  U  T   Urine  (mL/kg/hr) 1420  (1.1) 1000  (0.8) 2420  (1)         Urine Output (mL) (Urinary Catheter 04/18/17 2- way; Amaya) 1420 1000 2420       Emesis/NG output 280 150 430         Output (ml) (Nasogastric Tube 04/18/17) 280 150 430       Drains 95 110 205         Output (ml) (Rojas Drain #1 04/18/17 Left Abdomen) 85 100 185         Output (ml) (Rojas Drain #1 04/18/17 Right Abdomen) 10 10 20       Shift Total  (mL/kg) 1795  (17.3) 1260  (12.6) 3055  (30.6)      NET 7.5 660.4 667.9      Weight (kg) 103.8 100 100 100 100 100         Readmission Risk Assessment Tool Score Medium Risk            15       Total Score        3 Relationship with PCP    4 More than 1 Admission in calendar year    5 Patient Insurance is Medicare, Medicaid or Self Pay    3 Charlson Comorbidity Score        Criteria that do not apply:    Patient Living Status    Patient Length of Stay > 5       Expected Length of Stay 5d 12h   Actual Length of Stay 3

## 2017-04-21 NOTE — PROGRESS NOTES
Admit Date: 2017    POD #4    Procedure:  Procedure(s):  CYSTOSCOPY WITH RIGHT RETROGRADES, POSSIBLE STENT PLACEMENT,     Subjective:     Patient has no new complaints. Now s/p cysto with stent placement    Objective:     Blood pressure 126/57, pulse 86, temperature 99.1 °F (37.3 °C), resp. rate 18, height 5' 9\" (1.753 m), weight 220 lb 6.4 oz (100 kg), SpO2 94 %. Temp (24hrs), Av.5 °F (36.9 °C), Min:97.6 °F (36.4 °C), Max:99.5 °F (37.5 °C)      Physical Exam:  GENERAL: no distress, appears stated age, sleepy, LUNG: clear to auscultation bilaterally, HEART: regular rate and rhythm, S1, S2 normal, no murmur, click, rub or gallop, ABDOMEN: soft, non-tender.  Wounds c/d/i, YARON o/p serous anterior drain, bilious posterior drain although becoming more serous appearing, EXTREMITIES:  extremities normal, atraumatic, no cyanosis or edema    Labs:   Recent Results (from the past 24 hour(s))   GLUCOSE, POC    Collection Time: 17  6:01 PM   Result Value Ref Range    Glucose (POC) 165 (H) 65 - 100 mg/dL    Performed by Brittany Grace, POC    Collection Time: 17 11:44 PM   Result Value Ref Range    Glucose (POC) 165 (H) 65 - 100 mg/dL    Performed by Cici Castano, BASIC    Collection Time: 17  4:13 AM   Result Value Ref Range    Sodium 135 (L) 136 - 145 mmol/L    Potassium 4.0 3.5 - 5.1 mmol/L    Chloride 101 97 - 108 mmol/L    CO2 25 21 - 32 mmol/L    Anion gap 9 5 - 15 mmol/L    Glucose 138 (H) 65 - 100 mg/dL    BUN 19 6 - 20 MG/DL    Creatinine 1.03 0.70 - 1.30 MG/DL    BUN/Creatinine ratio 18 12 - 20      GFR est AA >60 >60 ml/min/1.73m2    GFR est non-AA >60 >60 ml/min/1.73m2    Calcium 9.1 8.5 - 10.1 MG/DL   MAGNESIUM    Collection Time: 17  4:13 AM   Result Value Ref Range    Magnesium 1.9 1.6 - 2.4 mg/dL   PHOSPHORUS    Collection Time: 17  4:13 AM   Result Value Ref Range    Phosphorus 3.2 2.6 - 4.7 MG/DL   CBC WITH AUTOMATED DIFF    Collection Time: 04/21/17  4:13 AM   Result Value Ref Range    WBC 13.3 (H) 4.1 - 11.1 K/uL    RBC 3.77 (L) 4.10 - 5.70 M/uL    HGB 10.5 (L) 12.1 - 17.0 g/dL    HCT 32.5 (L) 36.6 - 50.3 %    MCV 86.2 80.0 - 99.0 FL    MCH 27.9 26.0 - 34.0 PG    MCHC 32.3 30.0 - 36.5 g/dL    RDW 14.4 11.5 - 14.5 %    PLATELET 176 493 - 597 K/uL    NEUTROPHILS 75 32 - 75 %    LYMPHOCYTES 10 (L) 12 - 49 %    MONOCYTES 14 (H) 5 - 13 %    EOSINOPHILS 1 0 - 7 %    BASOPHILS 0 0 - 1 %    ABS. NEUTROPHILS 9.9 (H) 1.8 - 8.0 K/UL    ABS. LYMPHOCYTES 1.3 0.8 - 3.5 K/UL    ABS. MONOCYTES 1.8 (H) 0.0 - 1.0 K/UL    ABS. EOSINOPHILS 0.2 0.0 - 0.4 K/UL    ABS. BASOPHILS 0.0 0.0 - 0.1 K/UL   GLUCOSE, POC    Collection Time: 04/21/17  6:18 AM   Result Value Ref Range    Glucose (POC) 196 (H) 65 - 100 mg/dL    Performed by Lisa Mulligan, POC    Collection Time: 04/21/17 12:16 PM   Result Value Ref Range    Glucose (POC) 167 (H) 65 - 100 mg/dL    Performed by Esequiel Grade        Data Review images and reports reviewed    Assessment:     Principal Problem:    Perforated viscus (4/17/2017)    Active Problems:    Acute renal failure (ARF) ([address] (3/1/2017)      Hydroureter on right (4/17/2017)      Perforated duodenal bulb ulcer ([address] (4/18/2017)      Hyponatremia (4/18/2017)      SVT (supraventricular tachycardia) ([address] (4/19/2017)      Overview: Approximately 220 BPM on tele 4/19/17        Plan/Recommendations/Medical Decision Making:     Continue present treatment   S/p repair massive perforated duodenal bulb ulcer  Right hydronephrosis secondary to distal ureteral compression. Now stented. Cr normalizing. Hyponatremia resolved  SVT, appreciate Dr. Robert Sarabia assistance. Bid protonix  H pylori pending  No more aspirin  TPN advanced to goal  NGT will need to stay 7 days due to severity of duodenal perforation        [facility] GUERLINE Sosa MD, [facility]

## 2017-04-21 NOTE — PERIOP NOTES
TRANSFER - IN REPORT:    Verbal report received from Trudy Colby RN(name) on Rolf Clark  being received from [facility]266(unit) for ordered procedure      Report consisted of patients Situation, Background, Assessment and   Recommendations(SBAR). Information from the following report(s) SBAR, Intake/Output, MAR and Recent Results was reviewed with the receiving nurse. Opportunity for questions and clarification was provided. Assessment completed upon patients arrival to unit and care assumed.

## 2017-04-21 NOTE — PROGRESS NOTES
Nutrition Assessment:    INTERVENTIONS/RECOMMENDATIONS:   Enteral/Parenteral Nutrition: Modify rate, concentration, composition, and schedule: Consider increasing TPN to AA5% D20% @ 83 mL/hr + 20% lipids 3x/week to provide 1967 kcal and 99.6g protein and meet approximately 96% of estimated kcal needs and 100% of protein needs     ASSESSMENT:   Chart reviewed; Patient medically noted for repair of perforated duodenal bulb ulcer. For Cystoscopy and right stent today. NGT remains in place for suctioning. TPN advanced; currently meeting 88% of estimated kcal needs/100% of protein needs. Remains NPO at this time. BS hypoactive. K, Phos, and Mg all WNL today. Unsure when diet will be able to be advanced. Will continue to monitor and provide further recommendations as needed. Diet Order: NPO (TPN AA5% D20% + 20% lipids 3x/week; provides 1798 kcal, 90g protein)  % Eaten:  Patient Vitals for the past 72 hrs:   % Diet Eaten   04/19/17 1129 0 %     Pertinent Medications: [x] Reviewed []Other: humalog, lopressor, Protonix, D5 IVF + KCl  Pertinent Labs: [x]Reviewed  []Other: Na 135, [phone]  Food Allergies: [x]None []Other:     Last BM: 4/18   []Active     []Hyperactive  [x]Hypoactive       [] Absent  BS  Skin:    [] Intact   [x] Incision  [] Breakdown   []Edema   []Other:    Anthropometrics: Height: 5' 9\" (175.3 cm) Weight: 100 kg (220 lb 6.4 oz)    IBW (%IBW):   ( ) UBW (%UBW):   (  %)    BMI: Body mass index is 32.55 kg/(m^2). This BMI is indicative of:  []Underweight   []Normal   []Overweight   [x] Obesity   [] Extreme Obesity (BMI>40)  Last Weight Metrics:  Weight Loss Metrics 4/21/2017 3/3/2017 3/1/2017 11/7/2016 1/7/2016 8/17/2015 11/19/2014   Today's Wt 220 lb 6.4 oz 210 lb 220 lb 225 lb 246 lb 3.2 oz 247 lb 231 lb   BMI 32.55 kg/m2 31.01 kg/m2 31.57 kg/m2 33.23 kg/m2 36.34 kg/m2 36.46 kg/m2 34.1 kg/m2       Estimated Nutrition Needs (Based on): 2045 Kcals/day (BMR (1765) x 1. 3AF -250kcal) , 80 g (0.8 g/kg bw) Protein  Carbohydrate: At Least 130 g/day  Fluids: 2050 mL/day     Pt expected to meet estimated nutrient needs: [x]Yes []No    NUTRITION DIAGNOSES:   Problem:  Altered GI function      Etiology: related to perforared duodenal bulb ulcer, s/p ex lap     Signs/Symptoms: as evidenced by NPO, NGT to suction, TPN starting      NUTRITION INTERVENTIONS:    Enteral/Parenteral Nutrition: Modify rate, concentration, composition, and schedule                GOAL:   Patient will tolerate TPN with electrolytes WNL and BG <200 next 2-4 days    NUTRITION MONITORING AND EVALUATION   Food/Nutrient Intake Outcomes: Enteral/parenteral nutrition intake  Physical Signs/Symptoms Outcomes: Weight/weight change, Electrolyte and renal profile, GI profile, Glucose profile    Previous Goal Met:   [] Met              [x] Progressing Towards Goal              [] Not Progressing Towards Goal   Previous Recommendations:   [x] Implemented          [] Not Implemented          [] Not Applicable    LEARNING NEEDS (Diet, Food/Nutrient-Drug Interaction):    [x] None Identified   [] Identified and Education Provided/Documented   [] Identified and Pt declined/was not appropriate     Cultural, Hindu, OR Ethnic Dietary Needs:    [x] None Identified   [] Identified and Addressed     [x] Interdisciplinary Care Plan Reviewed/Documented    [x] Discharge Planning:  Too early to determine nutrition discharge plan   [] Participated in Interdisciplinary Rounds    NUTRITION RISK:    [x] High              [] Moderate           []  Low  []  Minimal/Uncompromised      Chance Longest  Pager [phone]              Weekend Pager [phone]

## 2017-04-22 PROBLEM — R65.10 SIRS (SYSTEMIC INFLAMMATORY RESPONSE SYNDROME) (HCC): Status: ACTIVE | Noted: 2017-01-01

## 2017-04-22 PROBLEM — R65.10 SIRS (SYSTEMIC INFLAMMATORY RESPONSE SYNDROME) (HCC): Status: RESOLVED | Noted: 2017-01-01 | Resolved: 2017-01-01

## 2017-04-22 NOTE — PROGRESS NOTES
Urology Progress Note    Subjective:     Daily Progress Note: 2017 10:29 AM    Monisha Clark is doing well. Reports his pain is improved after stent placement by Dr. Bryan Loera yesterday. Amaya is draining clear urine.  Has NG tube    Objective:     Visit Vitals    /51 (BP 1 Location: Left arm, BP Patient Position: At rest)    Pulse 79    Temp 97.7 °F (36.5 °C)    Resp 20    Ht 5' 9\" (1.753 m)    Wt 100.9 kg (222 lb 7.1 oz)    SpO2 94%    BMI 32.85 kg/m2        Temp (24hrs), Av.3 °F (36.8 °C), Min:97.7 °F (36.5 °C), Max:99.1 °F (37.3 °C)      Intake and Output:   1901 -  0700  In: 6267.3 [I.V.:6267.3]  Out: 3770 [Urine:3225; Drains:145]   0701 -  1900  In: 294.4 [I.V.:294.4]  Out: 415 [Urine:375; Drains:15]    Physical Exam:   General appearance: alert, cooperative, no distress  Abdomen: normal findings: soft, non-tender      Data Review:    Lab Results   Component Value Date/Time    WBC 14.5 2017 04:21 AM    HCT 31.1 2017 04:21 AM    PLATELET 375  04:21 AM    Sodium 135 2017 04:21 AM    Potassium 4.3 2017 04:21 AM    Chloride 101 2017 04:21 AM    CO2 25 2017 04:21 AM    BUN 19 2017 04:21 AM    Creatinine 1.03 2017 04:21 AM    Glucose 188 2017 04:21 AM    Calcium 8.8 2017 04:21 AM    Magnesium 2.0 2017 04:21 AM    INR 1.2 2017 09:43 PM       Lab Review:     Recent Labs      17   0421  17   0413   WBC  14.5*  13.3*   HGB  10.1*  10.5*   HCT  31.1*  32.5*   PLT  247  281     Recent Labs      17   0421  17   0413  17   1021  17   0523   NA  135*  135*   --   137   K  4.3  4.0   --   4.3   CL  101  101   --   105   CO2  25  25   --   24   GLU  188*  138*   --   140*   BUN  19  19   --   19   CREA  1.03  1.03   --   1.17   CA  8.8  9.1   --   9.1   MG  2.0  1.9  1.1*  1.3*   PHOS   --   3.2   --    --      Lab Results   Component Value Date/Time    Glucose (POC) 216 04/22/2017 06:24 AM    Glucose (POC) 184 04/21/2017 11:43 PM    Glucose (POC) 191 04/21/2017 05:50 PM    Glucose (POC) 174 04/21/2017 02:28 PM    Glucose (POC) 167 04/21/2017 12:16 PM         Assessment/Plan:     Principal Problem:    Perforated viscus (4/17/2017)    Active Problems:    Acute renal failure (ARF) ([address] (3/1/2017)      Hydroureter on right (4/17/2017)      Perforated duodenal bulb ulcer ([address] (4/18/2017)      Hyponatremia (4/18/2017)      SVT (supraventricular tachycardia) ([address] (4/19/2017)      Overview: Approximately 220 BPM on tele 4/19/17        Plan:  Doing well and continue with treatment   - On Diflucan for yeast in urine.       Signed By: Savannah Cordoba MD                         April 22, 2017

## 2017-04-22 NOTE — PROGRESS NOTES
4/22/2017 5:08 PM    Admit Date: 4/17/2017    Admit Diagnosis:   Perforated Viscous; Perforated duodenal bulb ulcer (HCC);KID*    Subjective:     Rolf Clark denies chest pain or shortness of breath.     Current Facility-Administered Medications   Medication Dose Route Frequency    TPN ADULT - CENTRAL AA 5% D20% W/ CA + ELECTROLYTES   IntraVENous CONTINUOUS    metoprolol (LOPRESSOR) injection 7.5 mg  7.5 mg IntraVENous Q6H    TPN ADULT - CENTRAL AA 5% D20% W/ CA + ELECTROLYTES   IntraVENous CONTINUOUS    insulin lispro (HUMALOG) injection   SubCUTAneous Q6H    glucose chewable tablet 16 g  4 Tab Oral PRN    dextrose (D50W) injection syrg 12.5-25 g  12.5-25 g IntraVENous PRN    glucagon (GLUCAGEN) injection 1 mg  1 mg IntraMUSCular PRN    acetaminophen (TYLENOL) suppository 650 mg  650 mg Rectal Q4H PRN    sodium chloride (NS) 0.9 % flush        perflutren lipid microspheres (DEFINITY) 1.1 mg/mL contrast injection        fat emulsion 20% (LIPOSYN, INTRALIPID) infusion  21 mL/hr IntraVENous 3 times weekly    dextrose 5% - 0.45% NaCl with KCl 20 mEq/L infusion  50 mL/hr IntraVENous CONTINUOUS    enoxaparin (LOVENOX) injection 40 mg  40 mg SubCUTAneous Q24H    sodium chloride (NS) flush 10-30 mL  10-30 mL InterCATHeter PRN    sodium chloride (NS) flush 10 mL  10 mL InterCATHeter Q24H    sodium chloride (NS) flush 10 mL  10 mL InterCATHeter PRN    sodium chloride (NS) flush 10-40 mL  10-40 mL InterCATHeter Q8H    sodium chloride (NS) flush 20 mL  20 mL InterCATHeter Q24H    heparin (porcine) pf 300 Units  300 Units InterCATHeter PRN    fluconazole (DIFLUCAN) 200mg/100 mL IVPB (premix)  200 mg IntraVENous Q24H    piperacillin-tazobactam (ZOSYN) 3.375 g in 0.9% sodium chloride (MBP/ADV) 100 mL  3.375 g IntraVENous Q8H    timolol (TIMOPTIC) 0.25% ophthalmic solution  1-2 Drop Both Eyes BID    sodium chloride (NS) flush 5-10 mL  5-10 mL IntraVENous Q8H    sodium chloride (NS) flush 5-10 mL  5-10 mL IntraVENous PRN    HYDROmorphone (PF) (DILAUDID) injection 0.5 mg  0.5 mg IntraVENous Q2H PRN    ondansetron (ZOFRAN) injection 4 mg  4 mg IntraVENous Q4H PRN    pantoprazole (PROTONIX) 40 mg in sodium chloride 0.9 % 10 mL injection  40 mg IntraVENous Q12H         Objective:      Physical Exam:    Visit Vitals    /52 (BP 1 Location: Left arm, BP Patient Position: At rest)    Pulse 79    Temp 98 °F (36.7 °C)    Resp 20    Ht 5' 9\" (1.753 m)    Wt 222 lb 7.1 oz (100.9 kg)    SpO2 96%    BMI 32.85 kg/m2     Gen:  NAD  Mental Status - Alert. General Appearance - Not in acute distress. Chest and Lung Exam   Inspection: Accessory muscles - No use of accessory muscles in breathing. Auscultation:   Breath sounds: - Normal.   Cardiovascular   Inspection: Jugular vein - Bilateral - Inspection Normal.   Palpation/Percussion:   Apical Impulse: - Normal.   Auscultation: Rhythm - Regular. Heart Sounds - S1 WNL and S2 WNL. No S3 or S4. Murmurs & Other Heart Sounds: Auscultation of the heart reveals - No Murmurs. Peripheral Vascular   Upper Extremity: Inspection - Bilateral - No Cyanotic nailbeds or Digital clubbing. Lower Extremity:   Palpation: Edema - Bilateral - No edema. Abdomen:   Soft, non-tender, bowel sounds are active. Neuro: A&O times 3, CN and motor grossly WNL    Data Review:   Recent Labs      04/22/17   0421  04/21/17   0413   WBC  14.5*  13.3*   HGB  10.1*  10.5*   HCT  31.1*  32.5*   PLT  247  281     Recent Labs      04/22/17   0421  04/21/17   0413  04/20/17   1021  04/20/17   0523   NA  135*  135*   --   137   K  4.3  4.0   --   4.3   CL  101  101   --   105   CO2  25  25   --   24   GLU  188*  138*   --   140*   BUN  19  19   --   19   CREA  1.03  1.03   --   1.17   CA  8.8  9.1   --   9.1   MG  2.0  1.9  1.1*  1.3*   PHOS   --   3.2   --    --        No results for input(s): TROIQ, CPK, CKMB in the last 72 hours.       Intake/Output Summary (Last 24 hours) at 04/22/17 8023  Last data filed at 04/22/17 1439   Gross per 24 hour   Intake          5166.23 ml   Output             1785 ml   Net          3381.23 ml        Telemetry: normal sinus rhythm, With occasional bursts of SVT      Assessment:     Principal Problem:    Perforated viscus (4/17/2017)    Active Problems:    Acute renal failure (ARF) ([facility] (3/1/2017)      Hydroureter on right (4/17/2017)      Perforated duodenal bulb ulcer ([facility] (4/18/2017)      Hyponatremia (4/18/2017)      SVT (supraventricular tachycardia) ([facility] (4/19/2017)      Overview: Approximately 220 BPM on tele 4/19/17        Plan:     SVT: in setting of illness/surgery. Electrolytes stable. Non-significant ECHO. Still with some bursts of SVT overnight. · Increase IV metoprolol to 7.5 mg every 6 hours today  · Continue to monitor electrolytes and treat underlying condition. · May eventually benefit from ablation if persistent SVT after he recovers from his illness more fully        Also spoke with RN about Mr. Drew Can SVT episodes and that they should call cardiology if Mr. Leanna Urrutia sustains SVT greater than 5 minutes.

## 2017-04-22 NOTE — PROGRESS NOTES
PCU SHIFT NURSING NOTE      Bedside and Verbal shift change report given to Catracho Viramontes RN (oncoming nurse) by Jennifer Funk RN (offgoing nurse). Report included the following information SBAR, Kardex, ED Summary, OR Summary, Procedure Summary, Intake/Output, MAR, Recent Results, Med Rec Status, Cardiac Rhythm NSR and Alarm Parameters . Shift Summary:         Admission Date 4/17/2017   Admission Diagnosis Perforated Viscous  Perforated duodenal bulb ulcer ([address]  KIDNEY STONE, HYDRONEPHROSIS   Consults IP CONSULT TO UROLOGY  IP CONSULT TO CARDIOLOGY        Consults   [x]PT   [x]OT   [x]Speech   [x]Case Management      [] Palliative      Cardiac Monitoring Order   [x]Yes   []No     IV drips   []Yes    Drip:                            Dose:  Drip:                            Dose:  Drip:                            Dose:   [x]No     GI Prophylaxis   [x]Yes   []No         DVT Prophylaxis   SCDs:  Sequential Compression Device: Bilateral          Sammy stockings:         [x] Medication   []Contraindicated   []None      Activity Level Activity Level: Bed Rest, Head of bed elevated (degrees)     Activity Assistance: Complete care   Purposeful Rounding every 1-2 hour? [x]Yes   Dexter Score  Total Score: 3   Bed Alarm (If score 3 or >)   [x]Yes   [] Refused (See signed refusal form in chart)   Pako Score  Pako Score: 15   Pako Score (if score 14 or less)   [x]PMT consult   [x]Wound Care consult      [x]Specialty bed   [x] Nutrition consult          Needs prior to discharge:   Home O2 required:    []Yes   [x]No    If yes, how much O2 required?     Other:    Last Bowel Movement: Last Bowel Movement Date: 04/18/17      Influenza Vaccine Received Flu Vaccine for Current Season (usually Sept-March): Not Flu Season        Pneumonia Vaccine           Diet Active Orders   Diet    DIET NPO      LDAs         PICC Double Lumen 67/32/30 Right;Basilic (Active)   Central Line Being Utilized Yes 4/22/2017  9:00 AM   Criteria for Appropriate Use Total parenteral nutrition 4/22/2017  9:00 AM   Site Assessment Clean, dry, & intact 4/22/2017  9:00 AM   Phlebitis Assessment 0 4/22/2017  9:00 AM   Infiltration Assessment 0 4/22/2017  9:00 AM   Arm Circumference (cm) 33 cm 4/19/2017 12:18 PM   Date of Last Dressing Change 04/19/17 4/22/2017  9:00 AM   Dressing Status Clean, dry, & intact 4/22/2017  9:00 AM   Action Taken Open ports on tubing capped 4/22/2017  9:00 AM   External Catheter Length (cm) 0 centimeters 4/19/2017 12:18 PM   Dressing Type Disk with Chlorhexadine gluconate (CHG); Transparent 4/22/2017  9:00 AM   Hub Color/Line Status Purple; Infusing;Flushed 4/22/2017  9:00 AM   Positive Blood Return (Site #1) Yes 4/22/2017  9:00 AM   Hub Color/Line Status Red; Infusing;Flushed 4/22/2017  9:00 AM   Positive Blood Return (Site #2) Yes 4/22/2017  9:00 AM   Alcohol Cap Used Yes 4/22/2017  9:00 AM          Peripheral IV 04/17/17 Right Antecubital (Active)   Site Assessment Clean, dry, & intact 4/22/2017  9:00 AM   Phlebitis Assessment 0 4/22/2017  9:00 AM   Infiltration Assessment 0 4/22/2017  9:00 AM   Dressing Status Clean, dry, & intact 4/22/2017  9:00 AM   Dressing Type Tape;Transparent 4/22/2017  9:00 AM   Hub Color/Line Status Pink;Capped;Flushed 4/22/2017  9:00 AM   Action Taken Open ports on tubing capped 4/21/2017  7:27 PM   Alcohol Cap Used Yes 4/21/2017  6:45 PM       Peripheral IV 04/18/17 Left Hand (Active)   Site Assessment Clean, dry, & intact 4/22/2017  9:00 AM   Phlebitis Assessment 0 4/22/2017  9:00 AM   Infiltration Assessment 0 4/22/2017  9:00 AM   Dressing Status Clean, dry, & intact 4/22/2017  9:00 AM   Dressing Type Tape;Transparent 4/22/2017  9:00 AM   Hub Color/Line Status Pink;Capped;Flushed 4/22/2017  9:00 AM   Action Taken Open ports on tubing capped 4/21/2017  2:00 PM   Alcohol Cap Used Yes 4/21/2017  7:27 PM          Nasogastric Tube 04/18/17 (Active)   Site Assessment Clean, dry, & intact 4/22/2017  9:00 AM Dressing Status Clean, dry, & intact 4/22/2017  9:00 AM   G Port Status Continuous Suction 4/22/2017  9:00 AM   External Insertion Rao (cms) 76 cms 4/22/2017  4:00 AM   Action Taken Retaped 4/22/2017  4:00 AM   Drainage Description Brown 4/22/2017  9:00 AM   Gastric Residual (mL) 0 ml 4/19/2017  8:00 PM   Drainage Chamber Level (ml) 75 ml 4/22/2017  9:00 AM   Output (ml) 0 ml 4/22/2017  9:00 AM       Rojas Drain #1 04/18/17 Left Abdomen (Active)   Site Assessment Clean, dry, & intact 4/22/2017  9:00 AM   Dressing Status Clean, dry, & intact 4/22/2017  9:00 AM   Drainage Description Serous 4/22/2017  9:00 AM   Rojas Drain Airleak No 4/22/2017  9:00 AM   Status Charged;Draining 4/22/2017  9:00 AM   Y Connector Used No 4/22/2017  9:00 AM   Drainage Chamber Level (ml) 0 ml 4/22/2017  9:00 AM   Output (ml) 0 ml 4/22/2017  9:00 AM       Rojas Drain #1 04/18/17 Right Abdomen (Active)   Site Assessment Clean, dry, & intact 4/22/2017  9:00 AM   Dressing Status Clean, dry, & intact 4/22/2017  9:00 AM   Drainage Description Serosanguinous 4/22/2017  9:00 AM   Rojas Drain Airleak No 4/22/2017  9:00 AM   Status Charged;Draining 4/22/2017  9:00 AM   Y Connector Used No 4/22/2017  9:00 AM   Drainage Chamber Level (ml) 0 ml 4/22/2017  9:00 AM   Output (ml) 0 ml 4/22/2017  9:00 AM                Urinary Catheter [REMOVED] Urinary Catheter 04/18/17 2- way; Amaya-Criteria for Appropriate Use: Obstruction/retention  Urinary Catheter 04/21/17 2- way; Amaya-Criteria for Appropriate Use: Obstruction/retention, Strict I/Os    Intake & Output   Date 04/21/17 0700 - 04/22/17 0659 04/22/17 0700 - 04/23/17 0659   Shift [id number] 1900-0659 24 Hour Total 0700-1859 1900-0659 24 Hour Total   I  N  T  A  K  E   I.V.  (mL/kg/hr) 2109.2  (1.8) 2237.7  (1.8) 4346.9  (1.8) 294.4  294.4      Volume (dextrose 5% - 0.45% NaCl with KCl 20 mEq/L infusion) 599.2 611.7 1210.8 100.8  100.8      Volume (lactated ringers infusion) 400  400         Volume (piperacillin-tazobactam (ZOSYN) 3.375 g in 0.9% sodium chloride (MBP/ADV) 100 mL) 100 200 300 0  0      Volume (fluconazole (DIFLUCAN) 200mg/100 mL IVPB (premix)) 100 0 100 0  0      Volume (TPN ADULT - CENTRAL AA 5% D20% W/ CA + ELECTROLYTES) 11.3 917.5 928.8 151.3  151.3      Volume (fat emulsion 20% (LIPOSYN, INTRALIPID) infusion)  508.6 508.6 42.4  42.4      Volume (TPN ADULT - CENTRAL AA 5% D20% W/ CA + ELECTROLYTES) 898.8  898.8       Shift Total  (mL/kg) 2109.2  (21.1) 2237.7  (22.2) 4346.9  (43.1) 294.4  (2.9)  294.4  (2.9)   O  U  T  P  U  T   Urine  (mL/kg/hr) 1375  (1.1) 850  (0.7) 2225  (0.9) 375  375      Urine Output (mL) ([REMOVED] Urinary Catheter 04/18/17 2- way; Amaya) 450  450         Urine Output (mL) (Urinary Catheter 04/21/17 2- way; Amaya)  375  375    Emesis/NG output 200 50 250 25  25      Output (ml) (Nasogastric Tube 04/18/17) 200 50 250 25  25    Drains 25 10 35 15  15      Output (ml) (Rojas Drain #1 04/18/17 Left Abdomen) 20  20 5  5      Output (ml) (Rojas Drain #1 04/18/17 Right Abdomen) 5 10 15 10  10    Shift Total  (mL/kg) 1600  (16) 910  (9) 2510  (24.9) 415  (4.1)  415  (4.1)   .2 1327.7 1836.9 -120.6  -120.6   Weight (kg) 100 100.9 100.9 100.9 100.9 100.9         Readmission Risk Assessment Tool Score Medium Risk            17       Total Score        3 Relationship with PCP    2 Patient Living Status    4 More than 1 Admission in calendar year    5 Patient Insurance is Medicare, Medicaid or Self Pay    3 Charlson Comorbidity Score        Criteria that do not apply:    Patient Length of Stay > 5       Expected Length of Stay 5d 12h   Actual Length of Stay 4

## 2017-04-22 NOTE — PROGRESS NOTES
Admit Date: 2017    POD #5    Procedure:  Procedure(s):  CYSTOSCOPY RIGHT PYELOGRAM INSERTION RIGHT URETERAL STENT    Subjective:     Patient has no new complaints. Now s/p cysto with stent placement, draining clear urine    Objective:     Blood pressure 128/52, pulse 77, temperature 97.9 °F (36.6 °C), resp. rate 20, height 5' 9\" (1.753 m), weight 222 lb 7.1 oz (100.9 kg), SpO2 97 %. Temp (24hrs), Av.2 °F (36.8 °C), Min:97.7 °F (36.5 °C), Max:98.8 °F (37.1 °C)      Physical Exam:  GENERAL: no distress, appears stated age, sleepy, LUNG: clear to auscultation bilaterally, HEART: regular rate and rhythm, S1, S2 normal, no murmur, click, rub or gallop, ABDOMEN: soft, non-tender.  Wounds c/d/i, YARON o/p serous anterior drain, bilious posterior drain although becoming more serous appearing, EXTREMITIES:  extremities normal, atraumatic, no cyanosis or edema    Labs:   Recent Results (from the past 24 hour(s))   GLUCOSE, POC    Collection Time: 17  2:28 PM   Result Value Ref Range    Glucose (POC) 174 (H) 65 - 100 mg/dL    Performed by [address] [place] [address], POC    Collection Time: 17  5:50 PM   Result Value Ref Range    Glucose (POC) 191 (H) 65 - 100 mg/dL    Performed by [facility]    GLUCOSE, POC    Collection Time: 17 11:43 PM   Result Value Ref Range    Glucose (POC) 184 (H) 65 - 100 mg/dL    Performed by [place] [id number], BASIC    Collection Time: 17  4:21 AM   Result Value Ref Range    Sodium 135 (L) 136 - 145 mmol/L    Potassium 4.3 3.5 - 5.1 mmol/L    Chloride 101 97 - 108 mmol/L    CO2 25 21 - 32 mmol/L    Anion gap 9 5 - 15 mmol/L    Glucose 188 (H) 65 - 100 mg/dL    BUN 19 6 - 20 MG/DL    Creatinine 1.03 0.70 - 1.30 MG/DL    BUN/Creatinine ratio 18 12 - 20      GFR est AA >60 >60 ml/min/1.73m2    GFR est non-AA >60 >60 ml/min/1.73m2    Calcium 8.8 8.5 - 10.1 MG/DL   MAGNESIUM    Collection Time: 17  4:21 AM   Result Value Ref Range    Magnesium 2.0 1.6 - 2.4 mg/dL   CBC W/O DIFF    Collection Time: 04/22/17  4:21 AM   Result Value Ref Range    WBC 14.5 (H) 4.1 - 11.1 K/uL    RBC 3.59 (L) 4.10 - 5.70 M/uL    HGB 10.1 (L) 12.1 - 17.0 g/dL    HCT 31.1 (L) 36.6 - 50.3 %    MCV 86.6 80.0 - 99.0 FL    MCH 28.1 26.0 - 34.0 PG    MCHC 32.5 30.0 - 36.5 g/dL    RDW 14.3 11.5 - 14.5 %    PLATELET 023 766 - 482 K/uL   GLUCOSE, POC    Collection Time: 04/22/17  6:24 AM   Result Value Ref Range    Glucose (POC) 216 (H) 65 - 100 mg/dL    Performed by Curtice Garre        Data Review images and reports reviewed    Assessment:     Principal Problem:    Perforated viscus (4/17/2017)    Active Problems:    Acute renal failure (ARF) ([address] (3/1/2017)      Hydroureter on right (4/17/2017)      Perforated duodenal bulb ulcer ([address] (4/18/2017)      Hyponatremia (4/18/2017)      SVT (supraventricular tachycardia) ([address] (4/19/2017)      Overview: Approximately 220 BPM on tele 4/19/17        Plan/Recommendations/Medical Decision Making:     Continue present treatment   S/p repair massive perforated duodenal bulb ulcer  Right hydronephrosis secondary to distal ureteral compression. Now stented. Cr normalizing. Hyponatremia resolved  SVT, appreciate Dr. Sierra Orozco assistance. Bid protonix  Scant YARON o/p past 24 hours  No more aspirin  TPN advanced to goal  NGT will need to stay 7 days due to severity of duodenal perforation - plan d/c NGT on Tuesday. H pylori positive. Will need treated once taking po. Gianna Clark.  Jaren Melo MD, [facility]

## 2017-04-22 NOTE — PROGRESS NOTES
PCU SHIFT NURSING NOTE      Bedside and Verbal shift change report given to Tita France RN (oncoming nurse) by Lilo Cheney RN (offgoing nurse). Report included the following information SBAR, Kardex, Intake/Output, MAR, Recent Results and Cardiac Rhythm NSR. Shift Summary:   2000 - Pt very drowsy on assessment; pt states pain controlled at this time. Will continue to monitor. Admission Date 4/17/2017   Admission Diagnosis Perforated Viscous  Perforated duodenal bulb ulcer ([address]  KIDNEY STONE, HYDRONEPHROSIS   Consults IP CONSULT TO UROLOGY  IP CONSULT TO CARDIOLOGY        Consults   [x]PT   [x]OT   []Speech   []Case Management      [] Palliative      Cardiac Monitoring Order   [x]Yes   []No     IV drips   []Yes    Drip:                          Dose:  Drip:                            Dose:  Drip:                            Dose:   [x]No     GI Prophylaxis   []Yes   []No         DVT Prophylaxis   SCDs:  Sequential Compression Device: Bilateral          Sammy stockings:         [] Medication   []Contraindicated   []None      Activity Level Activity Level: Bed Rest, Head of bed elevated (degrees)     Activity Assistance: Complete care   Purposeful Rounding every 1-2 hour? [x]Yes   Dexter Score  Total Score: 3   Bed Alarm (If score 3 or >)   [x]Yes   [] Refused (See signed refusal form in chart)   Pako Score  Pako Score: 15   Pako Score (if score 14 or less)   [x]PMT consult   [x]Wound Care consult      []Specialty bed   [] Nutrition consult          Needs prior to discharge:   Home O2 required:    []Yes   [x]No    If yes, how much O2 required?     Other:    Last Bowel Movement: Last Bowel Movement Date: 04/18/17      Influenza Vaccine Received Flu Vaccine for Current Season (usually Sept-March): Not Flu Season        Pneumonia Vaccine           Diet Active Orders   Diet    DIET NPO      LDAs         PICC Double Lumen 82/15/02 Right;Basilic (Active)   Central Line Being Utilized Yes 4/22/2017  5:30 PM   Criteria for Appropriate Use Total parenteral nutrition 4/22/2017  5:30 PM   Site Assessment Clean, dry, & intact 4/22/2017  5:30 PM   Phlebitis Assessment 0 4/22/2017  5:30 PM   Infiltration Assessment 0 4/22/2017  5:30 PM   Arm Circumference (cm) 33 cm 4/19/2017 12:18 PM   Date of Last Dressing Change 04/19/17 4/22/2017  5:30 PM   Dressing Status Clean, dry, & intact 4/22/2017  5:30 PM   Action Taken Open ports on tubing capped 4/22/2017  2:39 PM   External Catheter Length (cm) 0 centimeters 4/19/2017 12:18 PM   Dressing Type Disk with Chlorhexadine gluconate (CHG); Transparent 4/22/2017  5:30 PM   Hub Color/Line Status Purple; Infusing;Flushed 4/22/2017  5:30 PM   Positive Blood Return (Site #1) Yes 4/22/2017  5:30 PM   Hub Color/Line Status Red; Infusing;Flushed 4/22/2017  5:30 PM   Positive Blood Return (Site #2) Yes 4/22/2017  5:30 PM   Alcohol Cap Used Yes 4/22/2017  5:30 PM          Peripheral IV 04/17/17 Right Antecubital (Active)   Site Assessment Clean, dry, & intact 4/22/2017  5:30 PM   Phlebitis Assessment 0 4/22/2017  5:30 PM   Infiltration Assessment 0 4/22/2017  5:30 PM   Dressing Status Clean, dry, & intact 4/22/2017  5:30 PM   Dressing Type Tape;Transparent 4/22/2017  5:30 PM   Hub Color/Line Status Pink;Capped;Flushed 4/22/2017  5:30 PM   Action Taken Open ports on tubing capped 4/21/2017  7:27 PM   Alcohol Cap Used Yes 4/21/2017  6:45 PM       Peripheral IV 04/18/17 Left Hand (Active)   Site Assessment Clean, dry, & intact 4/22/2017  5:30 PM   Phlebitis Assessment 0 4/22/2017  5:30 PM   Infiltration Assessment 0 4/22/2017  5:30 PM   Dressing Status Clean, dry, & intact 4/22/2017  5:30 PM   Dressing Type Tape;Transparent 4/22/2017  5:30 PM   Hub Color/Line Status Pink;Capped;Flushed 4/22/2017  5:30 PM   Action Taken Open ports on tubing capped 4/21/2017  2:00 PM   Alcohol Cap Used Yes 4/21/2017  7:27 PM          Nasogastric Tube 04/18/17 (Active)   Site Assessment Clean, dry, & intact 4/22/2017  5:00 PM Dressing Status Clean, dry, & intact 4/22/2017  5:00 PM   G Port Status Continuous Suction 4/22/2017  5:00 PM   External Insertion Rao (cms) 76 cms 4/22/2017  4:00 AM   Action Taken Retaped 4/22/2017  4:00 AM   Drainage Description Brown 4/22/2017  5:00 PM   Gastric Residual (mL) 0 ml 4/19/2017  8:00 PM   Drainage Chamber Level (ml) 200 ml 4/22/2017  5:00 PM   Output (ml) 50 ml 4/22/2017  5:00 PM       Rojas Drain #1 04/18/17 Left Abdomen (Active)   Site Assessment Clean, dry, & intact 4/22/2017  5:00 PM   Dressing Status Clean, dry, & intact 4/22/2017  5:00 PM   Drainage Description Serous 4/22/2017  5:00 PM   Rojas Drain Airleak No 4/22/2017  5:00 PM   Status Charged;Draining 4/22/2017  5:00 PM   Y Connector Used No 4/22/2017  5:00 PM   Drainage Chamber Level (ml) 5 ml 4/22/2017  5:00 PM   Output (ml) 5 ml 4/22/2017  5:00 PM       Jonathan Garbe #1 04/18/17 Right Abdomen (Active)   Site Assessment Clean, dry, & intact 4/22/2017  5:00 PM   Dressing Status Clean, dry, & intact 4/22/2017  5:00 PM   Drainage Description Serosanguinous 4/22/2017  5:00 PM   Rojas Drain Airleak No 4/22/2017  5:00 PM   Status Charged;Draining 4/22/2017  5:00 PM   Y Connector Used No 4/22/2017  5:00 PM   Drainage Chamber Level (ml) 5 ml 4/22/2017  5:00 PM   Output (ml) 5 ml 4/22/2017  5:00 PM                Urinary Catheter [REMOVED] Urinary Catheter 04/18/17 2- way; Amaya-Criteria for Appropriate Use: Obstruction/retention  Urinary Catheter 04/21/17 2- way; Amaya-Criteria for Appropriate Use: Obstruction/retention, Strict I/Os    Intake & Output   Date 04/21/17 1900 - 04/22/17 0659 04/22/17 0700 - 04/23/17 0659   Shift 1285-7509 24 Hour Total [id number] 2825-1664 24 Hour Total   I  N  T  A  K  E   I.V.  (mL/kg/hr) 2237.7 4346. 9 1945.4  (1.6)  1945. 4      Volume (dextrose 5% - 0.45% NaCl with KCl 20 mEq/L infusion) 611.7 1210.8 573.3  573.3      Volume (lactated ringers infusion)  400         Volume (piperacillin-tazobactam (ZOSYN) 3.375 g in 0.9% sodium chloride (MBP/ADV) 100 mL) 200 300 100  100      Volume (fluconazole (DIFLUCAN) 200mg/100 mL IVPB (premix)) 0 100 100  100      Volume (TPN ADULT - CENTRAL AA 5% D20% W/ CA + ELECTROLYTES) 917.5 928.8 860  860      Volume (TPN ADULT - CENTRAL AA 5% D20% W/ CA + ELECTROLYTES)   71.3  71.3      Volume (fat emulsion 20% (LIPOSYN, INTRALIPID) infusion) 508.6 508.6 240.8  240.8      Volume (TPN ADULT - CENTRAL AA 5% D20% W/ CA + ELECTROLYTES)  898.8       Shift Total  (mL/kg) 2237.7  (22.2) 4346.9  (43.1) 1945.4  (19.3)  1945.4  (19.3)   O  U  T  P  U  T   Urine  (mL/kg/hr) 850 2225 1675  (1.4)  1675      Urine Output (mL) ([REMOVED] Urinary Catheter 04/18/17 2- way; Amaya)  450         Urine Output (mL) (Urinary Catheter 04/21/17 2- way; Amaya) 850 1775 1675  1675    Emesis/NG output 50 250 150  150      Output (ml) (Nasogastric Tube 04/18/17) 50 250 150  150    Drains 10 35 45  45      Output (ml) (Rojas Drain #1 04/18/17 Left Abdomen)  20 20  20      Output (ml) (Rojas Drain #1 04/18/17 Right Abdomen) 10 15 25  25    Shift Total  (mL/kg) 910  (9) 2510  (24.9) 1870  (18.5)  1870  (18.5)   NET 1327.7 1836.9 75.4  75.4   Weight (kg) 100.9 100.9 100.9 100.9 100.9         Readmission Risk Assessment Tool Score Medium Risk            17       Total Score        3 Relationship with PCP    2 Patient Living Status    4 More than 1 Admission in calendar year    5 Patient Insurance is Medicare, Medicaid or Self Pay    3 Charlson Comorbidity Score        Criteria that do not apply:    Patient Length of Stay > 5       Expected Length of Stay 5d 12h   Actual Length of Stay 4

## 2017-04-23 NOTE — PROGRESS NOTES
4/23/2017 10:34 AM    Admit Date: 4/17/2017    Admit Diagnosis:   Perforated Viscous; Perforated duodenal bulb ulcer (HCC);KID*    Subjective:     Rolf Clark denies chest pain or shortness of breath. Feels tachy but no CP with bursts of SVT.     Current Facility-Administered Medications   Medication Dose Route Frequency    TPN ADULT - CENTRAL AA 5% D20% W/ CA + ELECTROLYTES   IntraVENous CONTINUOUS    TPN ADULT - CENTRAL AA 5% D20% W/ CA + ELECTROLYTES   IntraVENous CONTINUOUS    metoprolol (LOPRESSOR) injection 7.5 mg  7.5 mg IntraVENous Q6H    insulin lispro (HUMALOG) injection   SubCUTAneous Q6H    glucose chewable tablet 16 g  4 Tab Oral PRN    dextrose (D50W) injection syrg 12.5-25 g  12.5-25 g IntraVENous PRN    glucagon (GLUCAGEN) injection 1 mg  1 mg IntraMUSCular PRN    acetaminophen (TYLENOL) suppository 650 mg  650 mg Rectal Q4H PRN    sodium chloride (NS) 0.9 % flush        perflutren lipid microspheres (DEFINITY) 1.1 mg/mL contrast injection        fat emulsion 20% (LIPOSYN, INTRALIPID) infusion  21 mL/hr IntraVENous 3 times weekly    dextrose 5% - 0.45% NaCl with KCl 20 mEq/L infusion  50 mL/hr IntraVENous CONTINUOUS    enoxaparin (LOVENOX) injection 40 mg  40 mg SubCUTAneous Q24H    sodium chloride (NS) flush 10-30 mL  10-30 mL InterCATHeter PRN    sodium chloride (NS) flush 10 mL  10 mL InterCATHeter Q24H    sodium chloride (NS) flush 10 mL  10 mL InterCATHeter PRN    sodium chloride (NS) flush 10-40 mL  10-40 mL InterCATHeter Q8H    sodium chloride (NS) flush 20 mL  20 mL InterCATHeter Q24H    heparin (porcine) pf 300 Units  300 Units InterCATHeter PRN    fluconazole (DIFLUCAN) 200mg/100 mL IVPB (premix)  200 mg IntraVENous Q24H    piperacillin-tazobactam (ZOSYN) 3.375 g in 0.9% sodium chloride (MBP/ADV) 100 mL  3.375 g IntraVENous Q8H    timolol (TIMOPTIC) 0.25% ophthalmic solution  1-2 Drop Both Eyes BID    sodium chloride (NS) flush 5-10 mL  5-10 mL IntraVENous Q8H  sodium chloride (NS) flush 5-10 mL  5-10 mL IntraVENous PRN    HYDROmorphone (PF) (DILAUDID) injection 0.5 mg  0.5 mg IntraVENous Q2H PRN    ondansetron (ZOFRAN) injection 4 mg  4 mg IntraVENous Q4H PRN    pantoprazole (PROTONIX) 40 mg in sodium chloride 0.9 % 10 mL injection  40 mg IntraVENous Q12H         Objective:      Physical Exam:    Visit Vitals    /60 (BP 1 Location: Left arm, BP Patient Position: At rest)    Pulse 95    Temp 99.7 °F (37.6 °C)    Resp 18    Ht 5' 9\" (1.753 m)    Wt 225 lb 9.6 oz (102.3 kg)    SpO2 96%    BMI 33.32 kg/m2     Gen:  NAD  Mental Status - Alert. General Appearance - Not in acute distress. Chest and Lung Exam   Inspection: Accessory muscles - No use of accessory muscles in breathing. Auscultation:   Breath sounds: - Normal.   Cardiovascular   Inspection: Jugular vein - Bilateral - Inspection Normal.   Palpation/Percussion:   Apical Impulse: - Normal.   Auscultation: Rhythm - Regular. Heart Sounds - S1 WNL and S2 WNL. No S3 or S4. Murmurs & Other Heart Sounds: Auscultation of the heart reveals - No Murmurs. Peripheral Vascular   Upper Extremity: Inspection - Bilateral - No Cyanotic nailbeds or Digital clubbing. Lower Extremity:   Palpation: Edema - Bilateral - No edema. Abdomen:   Soft, non-tender, bowel sounds are active. Neuro: A&O times 3, CN and motor grossly WNL    Data Review:   Recent Labs      04/22/17   0421  04/21/17 0413   WBC  14.5*  13.3*   HGB  10.1*  10.5*   HCT  31.1*  32.5*   PLT  247  281     Recent Labs      04/22/17   0421  04/21/17   0413   NA  135*  135*   K  4.3  4.0   CL  101  101   CO2  25  25   GLU  188*  138*   BUN  19  19   CREA  1.03  1.03   CA  8.8  9.1   MG  2.0  1.9   PHOS   --   3.2       No results for input(s): TROIQ, CPK, CKMB in the last 72 hours.       Intake/Output Summary (Last 24 hours) at 04/23/17 1034  Last data filed at 04/23/17 0534   Gross per 24 hour   Intake          2835.08 ml   Output 3165 ml   Net          -329.92 ml        Telemetry: normal sinus rhythm, with occasional bursts of SVT    Assessment:     Principal Problem:    Perforated viscus (4/17/2017)    Active Problems:    Acute renal failure (ARF) ([facility] [address] (3/1/2017)      Hydroureter on right (4/17/2017)      Perforated duodenal bulb ulcer ([facility] [address] (4/18/2017)      Hyponatremia (4/18/2017)      SVT (supraventricular tachycardia) ([facility] [address] (4/19/2017)      Overview: Approximately 220 BPM on tele 4/19/17        Plan:     SVT: in setting of illness/surgery. Electrolytes stable. Non-significant ECHO. Still with some bursts of SVT overnight. · Increased IV metoprolol to 7.5 mg every 6 hours  · Continue to monitor electrolytes and treat underlying condition. · May eventually benefit from ablation if persistent SVT after he recovers from his illness more fully  Flavio Parikh  Also spoke with RN about Mr. Pamela Fox SVT episodes and that they should call cardiology if Mr. Steven Alexandra sustains SVT greater than 5 minutes.

## 2017-04-23 NOTE — PROGRESS NOTES
PCU SHIFT NURSING NOTE      Bedside and Verbal shift change report given to Carri Baptiste RN (oncoming nurse) by Lossie Leyden, RN (offgoing nurse). Report included the following information SBAR, Kardex, Intake/Output, MAR, Recent Results and Cardiac Rhythm NSR. Shift Summary:     2005 - HR 180s-200s and sustains for about 30secs then returns to 100s; then about 1 min later increases up to 200s and sustains for about 1min. Administered scheduled IV Metoprolol and paged oncall cardiology. 2010 - Notified Dr. Keven Lipscomb of the above; MD to place order for prn diltiazem & if HR sustains or continues to fluctuate despite medication, notify MD.  0400 - Provided AM bath; pt follows commands in assisting this nurse with turning self. Pt denies any pain this AM & states, \"I feel fine. \"     Admission Date 4/17/2017   Admission Diagnosis Perforated Viscous  Perforated duodenal bulb ulcer ([facility] [address]  KIDNEY STONE, HYDRONEPHROSIS   Consults IP CONSULT TO UROLOGY  IP CONSULT TO CARDIOLOGY        Consults   [x]PT   [x]OT   []Speech   []Case Management      [] Palliative      Cardiac Monitoring Order   [x]Yes   []No     IV drips   []Yes    Drip:                            Dose:  Drip:                            Dose:  Drip:                            Dose:   [x]No     GI Prophylaxis   []Yes   []No         DVT Prophylaxis   SCDs:  Sequential Compression Device: Bilateral          Sammy stockings:         [] Medication   []Contraindicated   []None      Activity Level Activity Level: Chair     Activity Assistance: Partial (two people)   Purposeful Rounding every 1-2 hour?    [x]Yes   Dexter Score  Total Score: 3   Bed Alarm (If score 3 or >)   [x]Yes   [] Refused (See signed refusal form in chart)   Pako Score  Pako Score: 15   Pako Score (if score 14 or less)   [x]PMT consult   []Wound Care consult      []Specialty bed   [] Nutrition consult          Needs prior to discharge:   Home O2 required:    []Yes   [x]No    If yes, how much O2 required? Other:    Last Bowel Movement: Last Bowel Movement Date: 04/18/17      Influenza Vaccine Received Flu Vaccine for Current Season (usually Sept-March): Not Flu Season        Pneumonia Vaccine           Diet Active Orders   Diet    DIET NPO      LDAs         PICC Double Lumen 62/31/37 Right;Basilic (Active)   Central Line Being Utilized Yes 4/23/2017  4:02 PM   Criteria for Appropriate Use Total parenteral nutrition 4/23/2017  4:02 PM   Site Assessment Clean, dry, & intact 4/23/2017  4:02 PM   Phlebitis Assessment 0 4/23/2017  4:02 PM   Infiltration Assessment 0 4/23/2017  4:02 PM   Arm Circumference (cm) 33 cm 4/19/2017 12:18 PM   Date of Last Dressing Change 04/19/17 4/23/2017  4:02 PM   Dressing Status Clean, dry, & intact 4/23/2017  4:02 PM   Action Taken Open ports on tubing capped 4/22/2017  2:39 PM   External Catheter Length (cm) 0 centimeters 4/19/2017 12:18 PM   Dressing Type Disk with Chlorhexadine gluconate (CHG); Transparent 4/23/2017  4:02 PM   Hub Color/Line Status Purple; Infusing;Flushed 4/23/2017  4:02 PM   Positive Blood Return (Site #1) Yes 4/23/2017  4:02 PM   Hub Color/Line Status Red; Infusing;Flushed 4/23/2017  4:02 PM   Positive Blood Return (Site #2) Yes 4/23/2017  4:02 PM   Alcohol Cap Used Yes 4/23/2017  4:02 PM          Peripheral IV 04/17/17 Right Antecubital (Active)   Site Assessment Clean, dry, & intact 4/23/2017  4:02 PM   Phlebitis Assessment 0 4/23/2017  4:02 PM   Infiltration Assessment 0 4/23/2017  4:02 PM   Dressing Status Clean, dry, & intact 4/23/2017  4:02 PM   Dressing Type Tape;Transparent 4/23/2017  4:02 PM   Hub Color/Line Status Pink;Capped;Flushed 4/23/2017  4:02 PM   Action Taken Open ports on tubing capped 4/21/2017  7:27 PM   Alcohol Cap Used Yes 4/23/2017 11:59 AM       Peripheral IV 04/18/17 Left Hand (Active)   Site Assessment Clean, dry, & intact 4/23/2017  4:02 PM   Phlebitis Assessment 0 4/23/2017  4:02 PM   Infiltration Assessment 0 4/23/2017  4:02 PM Dressing Status Clean, dry, & intact 4/23/2017  4:02 PM   Dressing Type Tape;Transparent 4/23/2017  4:02 PM   Hub Color/Line Status Pink;Capped;Flushed 4/23/2017  4:02 PM   Action Taken Open ports on tubing capped 4/21/2017  2:00 PM   Alcohol Cap Used Yes 4/23/2017 11:59 AM          Johnathan Mena #1 04/18/17 Left Abdomen (Active)   Site Assessment Clean, dry, & intact 4/23/2017  4:02 PM   Dressing Status Clean, dry, & intact 4/23/2017  4:02 PM   Drainage Description Serous 4/23/2017  4:02 PM   Rojas Drain Airleak No 4/23/2017  4:02 PM   Status Charged;Draining 4/23/2017  4:02 PM   Y Connector Used No 4/23/2017  4:02 PM   Drainage Chamber Level (ml) 0 ml 4/23/2017  4:02 PM   Output (ml) 0 ml 4/23/2017  4:02 PM       Rojas Drain #1 04/18/17 Right Abdomen (Active)   Site Assessment Clean, dry, & intact 4/23/2017  4:02 PM   Dressing Status Clean, dry, & intact 4/23/2017  4:02 PM   Drainage Description Serous 4/23/2017  4:02 PM   Rojas Drain Airleak No 4/23/2017  4:02 PM   Status Charged;Draining 4/23/2017  4:02 PM   Y Connector Used No 4/23/2017  4:02 PM   Drainage Chamber Level (ml) 0 ml 4/23/2017  4:02 PM   Output (ml) 0 ml 4/23/2017  4:02 PM                Urinary Catheter [REMOVED] Urinary Catheter 04/18/17 2- way; Amaya-Criteria for Appropriate Use: Obstruction/retention  Urinary Catheter 04/21/17 2- way; Amaya-Criteria for Appropriate Use: Obstruction/retention, Strict I/Os    Intake & Output   Date 04/22/17 1900 - 04/23/17 0659 04/23/17 0700 - 04/24/17 0659   Shift 2600-4352 24 Hour Total 6589-5913 0982-3996 24 Hour Total   I  N  T  A  K  E   I.V.  (mL/kg/hr) 1952 3975.3 1616.3  (1.3)  1616.3      Volume (dextrose 5% - 0.45% NaCl with KCl 20 mEq/L infusion) 600 1200 452.5  452.5      Volume (piperacillin-tazobactam (ZOSYN) 3.375 g in 0.9% sodium chloride (MBP/ADV) 100 mL) 200 300 100  100      Volume (fluconazole (DIFLUCAN) 200mg/100 mL IVPB (premix)) 0 100 100  100      Volume (TPN ADULT - CENTRAL AA 5% D20% W/ CA + ELECTROLYTES)  860         Volume (TPN ADULT - CENTRAL AA 5% D20% W/ CA + ELECTROLYTES) 900 1011.3 845  845      Volume (TPN ADULT - CENTRAL AA 5% D20% W/ CA + ELECTROLYTES) 0 0 13.8  13.8      Volume (fat emulsion 20% (LIPOSYN, INTRALIPID) infusion) 252 504 105  105    Shift Total  (mL/kg) 1952  (19.1) 3975.3  (38.8) 1616.3  (15.8)  1616.3  (15.8)   O  U  T  P  U  T   Urine  (mL/kg/hr) 1550 3225 1100  (0.9)  1100      Urine Output (mL) (Urinary Catheter 04/21/17 2- way; Amaya) 1550 3225 1100  1100    Emesis/NG output 350 500         Output (ml) ([REMOVED] Nasogastric Tube 04/18/17) 350 500       Drains 10 55 10  10      Output (ml) (Rojas Drain #1 04/18/17 Left Abdomen) 5 25 5  5      Output (ml) (Rojas Drain #1 04/18/17 Right Abdomen) 5 30 5  5    Shift Total  (mL/kg) 1910  (18.7) 3780  (36.9) 1110  (10.8)  1110  (10.8)   NET 42 195.3 506.3  506.3   Weight (kg) 102.3 102.3 102.3 102.3 102.3         Readmission Risk Assessment Tool Score Medium Risk            20       Total Score        3 Relationship with PCP    2 Patient Living Status    3 Patient Length of Stay > 5    4 More than 1 Admission in calendar year    5 Patient Insurance is Medicare, Medicaid or Self Pay    3 Charlson Comorbidity Score       Expected Length of Stay 5d 12h   Actual Length of Stay 5

## 2017-04-23 NOTE — PROGRESS NOTES
Admit Date: 2017    POD #6    Procedure:  Procedure(s):  CYSTOSCOPY RIGHT PYELOGRAM INSERTION RIGHT URETERAL STENT    Subjective:     Patient has no new complaints. Now s/p cysto with stent placement, draining clear urine. Pt pulled NGT out overnight. Objective:     Blood pressure 154/60, pulse 95, temperature 99.7 °F (37.6 °C), resp. rate 18, height 5' 9\" (1.753 m), weight 225 lb 9.6 oz (102.3 kg), SpO2 96 %. Temp (24hrs), Av.3 °F (36.8 °C), Min:97.9 °F (36.6 °C), Max:99.7 °F (37.6 °C)      Physical Exam:  GENERAL: no distress, appears stated age, sleepy, LUNG: clear to auscultation bilaterally, HEART: regular rate and rhythm, S1, S2 normal, no murmur, click, rub or gallop, ABDOMEN: soft, non-tender.  Wounds c/d/i, YARON o/p serous anterior drain, bilious posterior drain although becoming more serous appearing, EXTREMITIES:  extremities normal, atraumatic, no cyanosis or edema    Labs:   Recent Results (from the past 24 hour(s))   GLUCOSE, POC    Collection Time: 17  5:37 PM   Result Value Ref Range    Glucose (POC) 206 (H) 65 - 100 mg/dL    Performed by [address], POC    Collection Time: 17 11:54 PM   Result Value Ref Range    Glucose (POC) 218 (H) 65 - 100 mg/dL    Performed by Lennox Nieves    GLUCOSE, POC    Collection Time: 17  5:15 AM   Result Value Ref Range    Glucose (POC) 240 (H) 65 - 100 mg/dL    Performed by Lennox Jacqueline        Data Review images and reports reviewed    Assessment:     Principal Problem:    Perforated viscus (2017)    Active Problems:    Acute renal failure (ARF) ([address] (3/1/2017)      Hydroureter on right (2017)      Perforated duodenal bulb ulcer ([address] (2017)      Hyponatremia (2017)      SVT (supraventricular tachycardia) ([address] (2017)      Overview: Approximately 220 BPM on tele 17        Plan/Recommendations/Medical Decision Making:     Continue present treatment   S/p repair massive perforated duodenal bulb ulcer  Right hydronephrosis secondary to distal ureteral compression. Now stented. Cr normalizing. Hyponatremia resolved  SVT, appreciate Dr. [facility] assistance. Bid protonix  Scant YARON o/p past 48 hours  No more aspirin  TPN advanced to goal  Replace NGT. Will need to stay 7 days due to severity of duodenal perforation - plan d/c NGT on Tuesday. H pylori positive. Will need treated once taking po. Calista Listen.  Jakub Chavis MD, [facility]

## 2017-04-23 NOTE — PROGRESS NOTES
PCU SHIFT NURSING NOTE      Bedside and Verbal shift change report given to Ruddy Dimas RN (oncoming nurse) by Leighton Cheney RN (offgoing nurse). Report included the following information SBAR, Kardex, ED Summary, OR Summary, Procedure Summary, Intake/Output, MAR, Recent Results, Med Rec Status, Cardiac Rhythm NSR and Alarm Parameters . Shift Summary:     0905--Page Dr. Fredis White to give assessment data on patients NG tube. Have tried twice to reinsert NG tube with no success. Patient pulled NG tube at shift change and doctor is aware. Will wait on return call. 0930--Dr. Fredis White called back and was given information on the two attempts to insert NG tube. Dr. Zuleyma Morris wants patient to be NPO and to leave the tube out. Dr. Zuleyma Morris will not attempt to put in at this time. Admission Date 4/17/2017   Admission Diagnosis Perforated Viscous  Perforated duodenal bulb ulcer ([facility] [address]  KIDNEY STONE, HYDRONEPHROSIS   Consults IP CONSULT TO UROLOGY  IP CONSULT TO CARDIOLOGY        Consults   [x]PT   [x]OT   []Speech   [x]Case Management      [] Palliative      Cardiac Monitoring Order   [x]Yes   []No     IV drips   []Yes    Drip:                            Dose:  Drip:                            Dose:  Drip:                            Dose:   [x]No     GI Prophylaxis   [x]Yes   []No         DVT Prophylaxis   SCDs:  Sequential Compression Device: Bilateral          Sammy stockings:         [x] Medication   []Contraindicated   []None      Activity Level Activity Level: Bed Rest, Head of bed elevated (degrees)     Activity Assistance: Complete care   Purposeful Rounding every 1-2 hour?    [x]Yes   Dexter Score  Total Score: 3   Bed Alarm (If score 3 or >)   [x]Yes   [] Refused (See signed refusal form in chart)   Pako Score  Pako Score: 15   Pako Score (if score 14 or less)   [x]PMT consult   [x]Wound Care consult      []Specialty bed   [] Nutrition consult          Needs prior to discharge:   Home O2 required: []Yes   [x]No    If yes, how much O2 required? Other:    Last Bowel Movement: Last Bowel Movement Date: 04/18/17      Influenza Vaccine Received Flu Vaccine for Current Season (usually Sept-March): Not Flu Season        Pneumonia Vaccine           Diet Active Orders   Diet    DIET NPO      LDAs         PICC Double Lumen 33/35/24 Right;Basilic (Active)   Central Line Being Utilized Yes 4/23/2017  3:02 AM   Criteria for Appropriate Use Total parenteral nutrition 4/23/2017  3:02 AM   Site Assessment Clean, dry, & intact 4/23/2017  3:02 AM   Phlebitis Assessment 0 4/22/2017  7:35 PM   Infiltration Assessment 0 4/22/2017  7:35 PM   Arm Circumference (cm) 33 cm 4/19/2017 12:18 PM   Date of Last Dressing Change 04/19/17 4/22/2017  5:30 PM   Dressing Status Clean, dry, & intact 4/23/2017  3:02 AM   Action Taken Open ports on tubing capped 4/22/2017  2:39 PM   External Catheter Length (cm) 0 centimeters 4/19/2017 12:18 PM   Dressing Type Disk with Chlorhexadine gluconate (CHG); Transparent 4/23/2017  3:02 AM   Hub Color/Line Status Purple; Infusing;Flushed 4/23/2017  3:02 AM   Positive Blood Return (Site #1) Yes 4/23/2017  3:02 AM   Hub Color/Line Status Red; Infusing;Flushed 4/23/2017  3:02 AM   Positive Blood Return (Site #2) Yes 4/23/2017  3:02 AM   Alcohol Cap Used Yes 4/23/2017  3:02 AM          Peripheral IV 04/17/17 Right Antecubital (Active)   Site Assessment Clean, dry, & intact 4/23/2017  3:02 AM   Phlebitis Assessment 0 4/23/2017  3:02 AM   Infiltration Assessment 0 4/23/2017  3:02 AM   Dressing Status Clean, dry, & intact 4/23/2017  3:02 AM   Dressing Type Tape;Transparent 4/23/2017  3:02 AM   Hub Color/Line Status Pink;Capped 4/23/2017  3:02 AM   Action Taken Open ports on tubing capped 4/21/2017  7:27 PM   Alcohol Cap Used Yes 4/21/2017  6:45 PM       Peripheral IV 04/18/17 Left Hand (Active)   Site Assessment Clean, dry, & intact 4/23/2017  3:02 AM   Phlebitis Assessment 0 4/23/2017  3:02 AM   Infiltration Assessment 0 4/23/2017  3:02 AM   Dressing Status Clean, dry, & intact 4/23/2017  3:02 AM   Dressing Type Tape;Transparent 4/23/2017  3:02 AM   Hub Color/Line Status Pink;Capped 4/23/2017  3:02 AM   Action Taken Open ports on tubing capped 4/21/2017  2:00 PM   Alcohol Cap Used Yes 4/21/2017  7:27 PM          Nasogastric Tube 04/18/17 (Active)   Site Assessment Clean, dry, & intact 4/23/2017  3:02 AM   Dressing Status Clean, dry, & intact 4/23/2017  3:02 AM   G Port Status Continuous Suction 4/23/2017  3:02 AM   External Insertion Rao (cms) 76 cms 4/22/2017  4:00 AM   Action Taken Retaped 4/22/2017  4:00 AM   Drainage Description Brown 4/23/2017  3:02 AM   Gastric Residual (mL) 0 ml 4/19/2017  8:00 PM   Drainage Chamber Level (ml) 550 ml 4/23/2017  3:02 AM   Output (ml) 250 ml 4/23/2017  3:02 AM       Rojas Drain #1 04/18/17 Left Abdomen (Active)   Site Assessment Clean, dry, & intact 4/23/2017  5:34 AM   Dressing Status Clean, dry, & intact 4/23/2017  5:34 AM   Drainage Description Serous 4/23/2017  5:34 AM   Rojas Drain Airleak No 4/23/2017  5:34 AM   Status Charged;Draining 4/23/2017  5:34 AM   Y Connector Used No 4/23/2017  5:34 AM   Drainage Chamber Level (ml) 0 ml 4/23/2017  5:34 AM   Output (ml) 0 ml 4/23/2017  5:34 AM       Rojas Drain #1 04/18/17 Right Abdomen (Active)   Site Assessment Clean, dry, & intact 4/23/2017  5:34 AM   Dressing Status Clean, dry, & intact 4/23/2017  5:34 AM   Drainage Description Serous 4/23/2017  5:34 AM   Rojas Drain Airleak No 4/23/2017  5:34 AM   Status Charged;Draining 4/23/2017  5:34 AM   Y Connector Used No 4/23/2017  5:34 AM   Drainage Chamber Level (ml) 0 ml 4/23/2017  5:34 AM   Output (ml) 0 ml 4/23/2017  5:34 AM                Urinary Catheter [REMOVED] Urinary Catheter 04/18/17 2- way; Amaya-Criteria for Appropriate Use: Obstruction/retention  Urinary Catheter 04/21/17 2- way; Amaya-Criteria for Appropriate Use: Obstruction/retention    Intake & Output   Date 04/22/17 0700 - 04/23/17 0659 04/23/17 0700 - 04/24/17 0659   Shift 8234-8386 4501-7222 24 Hour Total 6854-8622 0468-8848 24 Hour Total   I  N  T  A  K  E   I.V.  (mL/kg/hr) 2023.3  (1.7) 1106.3  (0.9) 3129.5  (1.3)         Volume (dextrose 5% - 0.45% NaCl with KCl 20 mEq/L infusion) 600 402.5 1002.5         Volume (piperacillin-tazobactam (ZOSYN) 3.375 g in 0.9% sodium chloride (MBP/ADV) 100 mL) 100 100 200         Volume (fluconazole (DIFLUCAN) 200mg/100 mL IVPB (premix)) 100  100         Volume (TPN ADULT - CENTRAL AA 5% D20% W/ CA + ELECTROLYTES) 860  860         Volume (TPN ADULT - CENTRAL AA 5% D20% W/ CA + ELECTROLYTES) 111.3 603.8 715         Volume (fat emulsion 20% (LIPOSYN, INTRALIPID) infusion) 252  252       Shift Total  (mL/kg) 2023.3  (20.1) 1106.3  (10.8) 3129.5  (30.6)      O  U  T  P  U  T   Urine  (mL/kg/hr) 1675  (1.4) 1550  (1.3) 3225  (1.3)         Urine Output (mL) (Urinary Catheter 04/21/17 2- way; Amaya) 1675 1550 3225       Emesis/NG output 150 350 500         Output (ml) (Nasogastric Tube 04/18/17) 150 350 500       Drains 45 10 55         Output (ml) (Rojas Drain #1 04/18/17 Left Abdomen) 20 5 25         Output (ml) (Rojas Drain #1 04/18/17 Right Abdomen) 25 5 30       Shift Total  (mL/kg) 1870  (18.5) 1910  (18.7) 3780  (36.9)      .3 -803.8 -650.5      Weight (kg) 100.9 102.3 102.3 102.3 102.3 102.3         Readmission Risk Assessment Tool Score Medium Risk            20       Total Score        3 Relationship with PCP    2 Patient Living Status    3 Patient Length of Stay > 5    4 More than 1 Admission in calendar year    5 Patient Insurance is Medicare, Medicaid or Self Pay    3 Charlson Comorbidity Score       Expected Length of Stay 5d 12h   Actual Length of Stay 5

## 2017-04-24 NOTE — PROGRESS NOTES
Admit Date: 2017    POD 3 Days Post-Op    Procedure:  Procedure(s):  CYSTOSCOPY RIGHT PYELOGRAM INSERTION RIGHT URETERAL STENT    Subjective:     Patient has no new complaints. Sl better    Objective:     Blood pressure 131/52, pulse 77, temperature 98.5 °F (36.9 °C), resp. rate 16, height 5' 9\" (1.753 m), weight 223 lb 8.7 oz (101.4 kg), SpO2 96 %.     Temp (24hrs), Av.1 °F (37.3 °C), Min:98.1 °F (36.7 °C), Max:101.7 °F (38.7 °C)      Physical Exam:  GENERAL: alert, cooperative, no distress, appears stated age, LUNG: nl effort, HEART: regular rate and rhythm, ABDOMEN: natasha serous, EXTREMITIES:  extremities normal, atraumatic, no cyanosis or edema    Labs:   Recent Results (from the past 24 hour(s))   GLUCOSE, POC    Collection Time: 17  5:49 PM   Result Value Ref Range    Glucose (POC) 253 (H) 65 - 100 mg/dL    Performed by [address], POC    Collection Time: 17 11:52 PM   Result Value Ref Range    Glucose (POC) 222 (H) 65 - 100 mg/dL    Performed by Anastacio Dodd    METABOLIC PANEL, BASIC    Collection Time: 17  3:52 AM   Result Value Ref Range    Sodium 131 (L) 136 - 145 mmol/L    Potassium 4.5 3.5 - 5.1 mmol/L    Chloride 97 97 - 108 mmol/L    CO2 27 21 - 32 mmol/L    Anion gap 7 5 - 15 mmol/L    Glucose 221 (H) 65 - 100 mg/dL    BUN 26 (H) 6 - 20 MG/DL    Creatinine 1.05 0.70 - 1.30 MG/DL    BUN/Creatinine ratio 25 (H) 12 - 20      GFR est AA >60 >60 ml/min/1.73m2    GFR est non-AA >60 >60 ml/min/1.73m2    Calcium 8.7 8.5 - 10.1 MG/DL   MAGNESIUM    Collection Time: 17  3:52 AM   Result Value Ref Range    Magnesium 2.3 1.6 - 2.4 mg/dL   PHOSPHORUS    Collection Time: 17  3:52 AM   Result Value Ref Range    Phosphorus 2.7 2.6 - 4.7 MG/DL   GLUCOSE, POC    Collection Time: 17  6:16 AM   Result Value Ref Range    Glucose (POC) 264 (H) 65 - 100 mg/dL    Performed by Inocencio Han, POC    Collection Time: 17 11:26 AM   Result Value Ref Range Glucose (POC) 282 (H) 65 - 100 mg/dL    Performed by Shayne Renteria (PCT)        Data Review reviewed  I & O and lab    Assessment:     Principal Problem:    Perforated viscus (4/17/2017)    Active Problems:    Acute renal failure (ARF) ([address] (3/1/2017)      Hydroureter on right (4/17/2017)      Perforated duodenal bulb ulcer ([address] (4/18/2017)      Hyponatremia (4/18/2017)      SVT (supraventricular tachycardia) ([address] (4/19/2017)      Overview: Approximately 220 BPM on tele 4/19/17        Plan/Recommendations/Medical Decision Making:     Continue present treatment  cont npo    Chet Beard.  Wilder Gerard MD, [facility]

## 2017-04-24 NOTE — PROGRESS NOTES
[address] [place] [address]  [phone]      Cardiology Progress Note      4/24/2017 11AM    Admit Date: 4/17/2017    Admit Diagnosis:   Perforated Viscous  Perforated duodenal bulb ulcer ([address]  KIDNEY STONE, HYDRONEPHROSIS    Subjective:     Alex Christopher is a 79 y.o. male with PMH DM, CVA, HLD, HTN, colon CA who was admitted for a perforated viscous/duodenal bulb ulcer. Overnight events:  -continued intermittent SVT  -fever overnight  -Na slightly low  -Mr. Clark is feeling well today. He is more interactive and denies complaints of pain, SOB, palpitations. He does have a wet cough.       Visit Vitals    /52 (BP 1 Location: Right arm, BP Patient Position: At rest)    Pulse 77    Temp 98.5 °F (36.9 °C)    Resp 16    Ht 5' 9\" (1.753 m)    Wt 101.4 kg (223 lb 8.7 oz)    SpO2 96%    BMI 33.01 kg/m2       Current Facility-Administered Medications   Medication Dose Route Frequency    TPN ADULT - CENTRAL AA 5% D20% W/ CA + ELECTROLYTES   IntraVENous CONTINUOUS    TPN ADULT - CENTRAL AA 5% D20% W/ CA + ELECTROLYTES   IntraVENous CONTINUOUS    dilTIAZem (CARDIZEM) injection 10 mg  10 mg IntraVENous Q6H PRN    metoprolol (LOPRESSOR) injection 7.5 mg  7.5 mg IntraVENous Q6H    insulin lispro (HUMALOG) injection   SubCUTAneous Q6H    glucose chewable tablet 16 g  4 Tab Oral PRN    dextrose (D50W) injection syrg 12.5-25 g  12.5-25 g IntraVENous PRN    glucagon (GLUCAGEN) injection 1 mg  1 mg IntraMUSCular PRN    acetaminophen (TYLENOL) suppository 650 mg  650 mg Rectal Q4H PRN    sodium chloride (NS) 0.9 % flush        perflutren lipid microspheres (DEFINITY) 1.1 mg/mL contrast injection        fat emulsion 20% (LIPOSYN, INTRALIPID) infusion  21 mL/hr IntraVENous 3 times weekly    dextrose 5% - 0.45% NaCl with KCl 20 mEq/L infusion  25 mL/hr IntraVENous CONTINUOUS    enoxaparin (LOVENOX) injection 40 mg  40 mg SubCUTAneous Q24H    sodium chloride (NS) flush 10-30 mL 10-30 mL InterCATHeter PRN    sodium chloride (NS) flush 10 mL  10 mL InterCATHeter Q24H    sodium chloride (NS) flush 10 mL  10 mL InterCATHeter PRN    sodium chloride (NS) flush 10-40 mL  10-40 mL InterCATHeter Q8H    sodium chloride (NS) flush 20 mL  20 mL InterCATHeter Q24H    heparin (porcine) pf 300 Units  300 Units InterCATHeter PRN    fluconazole (DIFLUCAN) 200mg/100 mL IVPB (premix)  200 mg IntraVENous Q24H    piperacillin-tazobactam (ZOSYN) 3.375 g in 0.9% sodium chloride (MBP/ADV) 100 mL  3.375 g IntraVENous Q8H    timolol (TIMOPTIC) 0.25% ophthalmic solution  1-2 Drop Both Eyes BID    sodium chloride (NS) flush 5-10 mL  5-10 mL IntraVENous Q8H    sodium chloride (NS) flush 5-10 mL  5-10 mL IntraVENous PRN    HYDROmorphone (PF) (DILAUDID) injection 0.5 mg  0.5 mg IntraVENous Q2H PRN    ondansetron (ZOFRAN) injection 4 mg  4 mg IntraVENous Q4H PRN    pantoprazole (PROTONIX) 40 mg in sodium chloride 0.9 % 10 mL injection  40 mg IntraVENous Q12H       Objective:      Physical Exam:  General Appearance:  ill-appearing  male, appears older than stated age in NAD  Chest:   coarse. Wet cough  Cardiovascular:  Regular rate and rhythm, no murmur.   Abdomen:   obese, distended; NG tube in nare  Extremities: palpable distal pulses. No edema  Skin:  Warm and dry. pale    Data Review:   Recent Labs      04/22/17   0421   WBC  14.5*   HGB  10.1*   HCT  31.1*   PLT  247     Recent Labs      04/24/17   0352  04/22/17   0421   NA  131*  135*   K  4.5  4.3   CL  97  101   CO2  27  25   GLU  221*  188*   BUN  26*  19   CREA  1.05  1.03   CA  8.7  8.8   MG  2.3  2.0   PHOS  2.7   --        No results for input(s): TROIQ, CPK, CKMB in the last 72 hours.       Intake/Output Summary (Last 24 hours) at 04/24/17 1225  Last data filed at 04/24/17 1121   Gross per 24 hour   Intake          3772.93 ml   Output             2385 ml   Net          1387.93 ml        Telemetry: SR; 2-3 bursts of SVT up to 190s overnight. EKG: NSR  Cxray: free intraperitoneal air  ECHO: EF 55-60%; No wall motion abnormalities;  Mild concentric hypertrophy     Assessment:     Principal Problem:    Perforated viscus (4/17/2017)    Active Problems:    Acute renal failure (ARF) ([address] (3/1/2017)      Hydroureter on right (4/17/2017)      Perforated duodenal bulb ulcer ([address] (4/18/2017)      Hyponatremia (4/18/2017)      SVT (supraventricular tachycardia) ([address] (4/19/2017)      Overview: Approximately 220 BPM on tele 4/19/17        Plan:     SVT: in setting of illness/surgery. 2 bursts overnight and 2 more bursts after cystoscopy procedure today. Electrolytes stable. Non-significant ECHO  · Continue with IV metop;  cardizem if needed  · Parameters for when nursing to alert cardiology of sustained SVT  · Continue to monitor electrolytes and treat underlying condition. Hyponatremia: on TPN and IVF. Small decrease today. · With wet cough today and positive I/O, patient may be slightly fluid overloaded. Will give 1 time dose of IV lasix.         Josephine Hwang NP  DNP, RN, AGACNP-BC

## 2017-04-24 NOTE — PROGRESS NOTES
UROLOGY    Post op ulcer repair  And cysto and right stent for hydro, ?  Narrowed ureter    Tm   101.7 He feels better  NG out   Still NPO  Amaya is clear  Will D/C fluconazole(6 days)  Amaya can come out when OK from I/O standpoint  We will arrange office f/u post D/C  Please call if new issues

## 2017-04-24 NOTE — PROGRESS NOTES
CHART REVIEW. No new CM needs at this time. CM will continue to monitor discharge plan.      Raúl, [address] [place] [address] Se

## 2017-04-24 NOTE — PROGRESS NOTES
PCU SHIFT NURSING NOTE      Bedside and Verbal shift change report given to Brooklynn Hernandez RN (oncoming nurse) by Thee Godwin RN (offgoing nurse). Report included the following information SBAR, Kardex, Intake/Output, MAR, Recent Results and Cardiac Rhythm NSR. Shift Summary:   7863 - Pt refusing AM bath; states \"I want to do it later this afternoon. \"    Admission Date 4/17/2017   Admission Diagnosis Perforated Viscous  Perforated duodenal bulb ulcer ([address]  KIDNEY STONE, HYDRONEPHROSIS   Consults IP CONSULT TO UROLOGY  IP CONSULT TO CARDIOLOGY        Consults   [x]PT   [x]OT   []Speech   []Case Management      [] Palliative      Cardiac Monitoring Order   [x]Yes   []No     IV drips   []Yes    Drip:                            Dose:  Drip:                            Dose:  Drip:                            Dose:   [x]No     GI Prophylaxis   []Yes   []No         DVT Prophylaxis   SCDs:  Sequential Compression Device: Bilateral          Sammy stockings:         [] Medication   []Contraindicated   []None      Activity Level Activity Level: Bed Rest, Head of bed flat      Activity Assistance: Partial (two people)   Purposeful Rounding every 1-2 hour? [x]Yes   Dexter Score  Total Score: 3   Bed Alarm (If score 3 or >)   [x]Yes   [] Refused (See signed refusal form in chart)   Pako Score  Pako Score: 14   Pako Score (if score 14 or less)   [x]PMT consult   []Wound Care consult      []Specialty bed   [] Nutrition consult          Needs prior to discharge:   Home O2 required:    []Yes   [x]No    If yes, how much O2 required?     Other:    Last Bowel Movement: Last Bowel Movement Date: 04/18/17      Influenza Vaccine Received Flu Vaccine for Current Season (usually Sept-March): Not Flu Season        Pneumonia Vaccine           Diet Active Orders   Diet    DIET NPO      LDAs         PICC Double Lumen 75/48/99 Right;Basilic (Active)   Central Line Being Utilized Yes 4/24/2017  6:00 PM   Criteria for Appropriate Use Total parenteral nutrition 4/24/2017  6:00 PM   Site Assessment Clean, dry, & intact 4/24/2017  6:00 PM   Phlebitis Assessment 0 4/24/2017  6:00 PM   Infiltration Assessment 0 4/24/2017  6:00 PM   Arm Circumference (cm) 33 cm 4/19/2017 12:18 PM   Date of Last Dressing Change 04/19/17 4/24/2017  6:00 PM   Dressing Status Clean, dry, & intact 4/24/2017  6:00 PM   Action Taken Open ports on tubing capped 4/22/2017  2:39 PM   External Catheter Length (cm) 0 centimeters 4/19/2017 12:18 PM   Dressing Type Disk with Chlorhexadine gluconate (CHG); Transparent 4/24/2017  6:00 PM   Hub Color/Line Status Purple; Infusing;Flushed 4/24/2017  6:00 PM   Positive Blood Return (Site #1) Yes 4/24/2017  6:00 PM   Hub Color/Line Status Red; Infusing;Flushed 4/24/2017  6:00 PM   Positive Blood Return (Site #2) Yes 4/24/2017  6:00 PM   Alcohol Cap Used Yes 4/24/2017  6:00 PM          Peripheral IV 04/17/17 Right Antecubital (Active)   Site Assessment Clean, dry, & intact 4/24/2017  6:00 PM   Phlebitis Assessment 0 4/24/2017  6:00 PM   Infiltration Assessment 0 4/24/2017  6:00 PM   Dressing Status Clean, dry, & intact 4/24/2017  6:00 PM   Dressing Type Tape;Transparent 4/24/2017  6:00 PM   Hub Color/Line Status Pink;Capped;Flushed 4/24/2017  6:00 PM   Action Taken Open ports on tubing capped 4/21/2017  7:27 PM   Alcohol Cap Used Yes 4/23/2017 11:59 AM       Peripheral IV 04/18/17 Left Hand (Active)   Site Assessment Clean, dry, & intact 4/24/2017  6:00 PM   Phlebitis Assessment 0 4/24/2017  6:00 PM   Infiltration Assessment 0 4/24/2017  6:00 PM   Dressing Status Clean, dry, & intact 4/24/2017  6:00 PM   Dressing Type Tape;Transparent 4/24/2017  6:00 PM   Hub Color/Line Status Pink;Capped;Flushed 4/24/2017  6:00 PM   Action Taken Open ports on tubing capped 4/21/2017  2:00 PM   Alcohol Cap Used Yes 4/24/2017  9:50 AM          Tonny Dolan Drain #1 04/18/17 Left Abdomen (Active)   Site Assessment Clean, dry, & intact 4/24/2017  6:00 PM   Dressing Status Clean, dry, & intact 4/24/2017  6:00 PM   Drainage Description Serous 4/24/2017  6:00 PM   Rojas Drain Airleak No 4/24/2017  6:00 PM   Status Charged;Draining 4/24/2017  6:00 PM   Y Connector Used No 4/24/2017  6:00 PM   Drainage Chamber Level (ml) 0 ml 4/24/2017  6:00 PM   Output (ml) 0 ml 4/24/2017  6:00 PM       Rojas Drain #1 04/18/17 Right Abdomen (Active)   Site Assessment Clean, dry, & intact 4/24/2017  6:00 PM   Dressing Status Clean, dry, & intact 4/24/2017  6:00 PM   Drainage Description Serous 4/24/2017  6:00 PM   Rojas Drain Airleak No 4/24/2017  6:00 PM   Status Charged;Draining 4/24/2017  6:00 PM   Y Connector Used No 4/24/2017  6:00 PM   Drainage Chamber Level (ml) 0 ml 4/24/2017  6:00 PM   Output (ml) 0 ml 4/24/2017  6:00 PM                Urinary Catheter [REMOVED] Urinary Catheter 04/18/17 2- way; Amaya-Criteria for Appropriate Use: Obstruction/retention  Urinary Catheter 04/21/17 2- way; Amaya-Criteria for Appropriate Use: Obstruction/retention, Strict I/Os    Intake & Output   Date 04/23/17 1900 - 04/24/17 0659 04/24/17 0700 - 04/25/17 0659   Shift 0874-0808 24 Hour Total 4242-0178 4400-7213 24 Hour Total   I  N  T  A  K  E   I.V.  (mL/kg/hr) 1700 3517.5 1843.1  (1.5)  1843.1      Cardizem Volume 0 0 0  0      Volume (0.9% sodium chloride with KCl 20 mEq/L infusion)   117.5  117.5      Volume (dextrose 5% - 0.45% NaCl with KCl 20 mEq/L infusion) 600 1200 300.8  300.8      Volume (piperacillin-tazobactam (ZOSYN) 3.375 g in 0.9% sodium chloride (MBP/ADV) 100 mL) 200 300 100  100      Volume (fluconazole (DIFLUCAN) 200mg/100 mL IVPB (premix)) 0 100 100  100      Volume (TPN ADULT - CENTRAL AA 5% D20% W/ CA + ELECTROLYTES)  845         Volume (TPN ADULT - CENTRAL AA 5% D20% W/ CA + ELECTROLYTES) 900 967.5 682.5  682.5      Volume (TPN ADULT - CENTRAL AA 5% D20% W/ CA + ELECTROLYTES)   47  47      Volume (fat emulsion 20% (LIPOSYN, INTRALIPID) infusion)  105 495.3  495.3    Shift Total  (mL/kg) 1700  (16.8) 3517.5  (34.7) 1843.1  (18.2)  1843.1  (18.2)   O  U  T  P  U  T   Urine  (mL/kg/hr) 1400 2500 1875  (1.5)  1875      Urine Output (mL) (Urinary Catheter 04/21/17 2- way; Amaya) 1400 2500 1875  1875    Drains 10 20 10  10      Output (ml) (Rojas Drain #1 04/18/17 Left Abdomen) 5 10 5  5      Output (ml) (Rojas Drain #1 04/18/17 Right Abdomen) 5 10 5  5    Shift Total  (mL/kg) 1410  (13.9) 2520  (24.9) 1885  (18.6)  1885  (18.6)    997.5 -41.9  -41.9   Weight (kg) 101.4 101.4 101.4 101.4 101.4         Readmission Risk Assessment Tool Score Medium Risk            20       Total Score        3 Relationship with PCP    2 Patient Living Status    3 Patient Length of Stay > 5    4 More than 1 Admission in calendar year    5 Patient Insurance is Medicare, Medicaid or Self Pay    3 Charlson Comorbidity Score       Expected Length of Stay 5d 12h   Actual Length of Stay 6

## 2017-04-24 NOTE — DIABETES MGMT
DTC Progress Note    Recommendations/ Comments: Please consider removing dextrose from IVF. Pt is receiving dextrose in the TPN. Na 131. If pt's BG remains elevated despite discontinuing dextrose IVF then would consider adding regular insulin 10units/L to TPN. Chart reviewed on Rolf Clark. Patient is a 79 y.o. male with known Type 2 Diabetes on no DM medications at home noted. A1c:   Lab Results   Component Value Date/Time    Hemoglobin A1c 6.6 03/04/2017 04:58 AM    Hemoglobin A1c 6.5 03/02/2017 02:37 AM    Hemoglobin A1c, External 6.5 12/02/2015       Recent Glucose Results:   Lab Results   Component Value Date/Time     (H) 04/24/2017 03:52 AM    GLUCPOC 282 (H) 04/24/2017 11:26 AM    GLUCPOC 264 (H) 04/24/2017 06:16 AM    GLUCPOC 222 (H) 04/23/2017 11:52 PM        Lab Results   Component Value Date/Time    Creatinine 1.05 04/24/2017 03:52 AM       Active Orders   Diet    DIET NPO        PO intake: No data found. Current hospital DM medication: humalog correction    Will continue to follow as needed.     Thank you  PATTI JavierN, RN, [facility]

## 2017-04-25 NOTE — PROGRESS NOTES
[address]  [phone]      Cardiology Progress Note      4/25/2017 11AM    Admit Date: 4/17/2017    Admit Diagnosis:   Perforated Viscous  Perforated duodenal bulb ulcer ([address]  KIDNEY STONE, HYDRONEPHROSIS    Subjective:     Belem Jc is a 79 y.o. male with PMH DM, CVA, HLD, HTN, colon CA who was admitted for a perforated viscous/duodenal bulb ulcer. Overnight events:  -continued intermittent SVT 3x overnight   -no further fevers  -Na slightly low- stable; creatinine slight increase  -weights stable; I/O still positive despite diuresis yesterday   -Mr. Clark is feeling well today. He is more interactive and denies complaints of pain, SOB, palpitations.       Visit Vitals    /52 (BP 1 Location: Left arm, BP Patient Position: At rest)    Pulse 81    Temp 98.7 °F (37.1 °C)    Resp 18    Ht 5' 9\" (1.753 m)    Wt 102.2 kg (225 lb 3.2 oz)    SpO2 93%    BMI 33.26 kg/m2       Current Facility-Administered Medications   Medication Dose Route Frequency    TPN ADULT - CENTRAL AA 5% D20% W/ CA + ELECTROLYTES   IntraVENous CONTINUOUS    0.9% sodium chloride with KCl 20 mEq/L infusion   IntraVENous CONTINUOUS    dilTIAZem (CARDIZEM) injection 10 mg  10 mg IntraVENous Q6H PRN    metoprolol (LOPRESSOR) injection 7.5 mg  7.5 mg IntraVENous Q6H    insulin lispro (HUMALOG) injection   SubCUTAneous Q6H    glucose chewable tablet 16 g  4 Tab Oral PRN    dextrose (D50W) injection syrg 12.5-25 g  12.5-25 g IntraVENous PRN    glucagon (GLUCAGEN) injection 1 mg  1 mg IntraMUSCular PRN    acetaminophen (TYLENOL) suppository 650 mg  650 mg Rectal Q4H PRN    sodium chloride (NS) 0.9 % flush        perflutren lipid microspheres (DEFINITY) 1.1 mg/mL contrast injection        fat emulsion 20% (LIPOSYN, INTRALIPID) infusion  21 mL/hr IntraVENous 3 times weekly    enoxaparin (LOVENOX) injection 40 mg  40 mg SubCUTAneous Q24H    sodium chloride (NS) flush 10-30 mL 10-30 mL InterCATHeter PRN    sodium chloride (NS) flush 10 mL  10 mL InterCATHeter Q24H    sodium chloride (NS) flush 10 mL  10 mL InterCATHeter PRN    sodium chloride (NS) flush 10-40 mL  10-40 mL InterCATHeter Q8H    sodium chloride (NS) flush 20 mL  20 mL InterCATHeter Q24H    heparin (porcine) pf 300 Units  300 Units InterCATHeter PRN    piperacillin-tazobactam (ZOSYN) 3.375 g in 0.9% sodium chloride (MBP/ADV) 100 mL  3.375 g IntraVENous Q8H    timolol (TIMOPTIC) 0.25% ophthalmic solution  1-2 Drop Both Eyes BID    sodium chloride (NS) flush 5-10 mL  5-10 mL IntraVENous Q8H    sodium chloride (NS) flush 5-10 mL  5-10 mL IntraVENous PRN    HYDROmorphone (PF) (DILAUDID) injection 0.5 mg  0.5 mg IntraVENous Q2H PRN    ondansetron (ZOFRAN) injection 4 mg  4 mg IntraVENous Q4H PRN    pantoprazole (PROTONIX) 40 mg in sodium chloride 0.9 % 10 mL injection  40 mg IntraVENous Q12H       Objective:      Physical Exam:  General Appearance:  ill-appearing  male, appears older than stated age in NAD  Chest:   clear  Cardiovascular:  Regular rate and rhythm, no murmur.   Abdomen:   obese, distended; NG tube in nare  Extremities: palpable distal pulses. No edema  Skin:  Warm and dry. pale    Data Review:   Recent Labs      04/25/17   0254   WBC  16.2*   HGB  10.0*   HCT  30.5*   PLT  304     Recent Labs      04/25/17   0254  04/24/17   0352   NA  131*  131*   K  4.3  4.5   CL  95*  97   CO2  26  27   GLU  269*  221*   BUN  27*  26*   CREA  1.13  1.05   CA  8.9  8.7   MG  2.2  2.3   PHOS  2.6  2.7   ALB  2.0*   --    TBILI  0.9   --    SGOT  26   --    ALT  41   --        No results for input(s): TROIQ, CPK, CKMB in the last 72 hours. Intake/Output Summary (Last 24 hours) at 04/25/17 1147  Last data filed at 04/25/17 1000   Gross per 24 hour   Intake          2993.83 ml   Output             2310 ml   Net           683.83 ml        Telemetry: SR; 3 bursts of SVT overnight.     EKG: NSR  Cxray: free intraperitoneal air  ECHO: EF 55-60%; No wall motion abnormalities;  Mild concentric hypertrophy     Assessment:     Principal Problem:    Perforated viscus (4/17/2017)    Active Problems:    Acute renal failure (ARF) ([address] (3/1/2017)      Hydroureter on right (4/17/2017)      Perforated duodenal bulb ulcer ([address] (4/18/2017)      Hyponatremia (4/18/2017)      SVT (supraventricular tachycardia) ([address] (4/19/2017)      Overview: Approximately 220 BPM on tele 4/19/17        Plan:     SVT: in setting of illness/surgery. 3 bursts overnight. Electrolytes stable. Non-significant ECHO  · Since BP stable, will slightly increase metop today;  cardizem if needed  · Parameters for when nursing to alert cardiology of sustained SVT  · Continue to monitor electrolytes and treat underlying condition. Hyponatremia: on TPN and IVF. Steady at 131. · Wet cough resolved after lasix yesterday. Creatinine slight increase. Urine appears concentrated today - possibly third-spacing with low albumin levels. · Continue to monitor.         Rohith Wynn, ANAYELI  DNP, RN, AGACNP-BC

## 2017-04-25 NOTE — PROGRESS NOTES
Spiritual Care Assessment/Progress Notes    Tess Thorpe [id number]  [id number]    1950  79 y.o.  male    Patient Telephone Number: [phone] (home)   Sikh Affiliation: No Restorationism   Language: English   Extended Emergency Contact Information  Primary Emergency Contact: Anuel Phone: [phone]  Mobile Phone: [phone]  Relation: Other Relative   Patient Active Problem List    Diagnosis Date Noted    [facility] 04/19/2017    Perforated duodenal bulb ulcer ([facility] 04/18/2017    Hyponatremia 04/18/2017    Perforated viscus 04/17/2017    Hydroureter on right 04/17/2017    History of stroke 03/03/2017    UTI (urinary tract infection) 03/03/2017    Acute renal failure (ARF) ([facility] 03/01/2017    Hyperkalemia 03/01/2017    Hypertension 12/14/2009    Hyperlipidemia 12/14/2009    DM (diabetes mellitus) ([facility] 12/14/2009    H/O 12/14/2009    Recurrent UTI 12/14/2009    Nephrolithiasis 12/14/2009        Date: 4/25/2017       Level of Sikh/Spiritual Activity:  []         Involved in marielle tradition/spiritual practice    []         Not involved in marielle tradition/spiritual practice  []         Spiritually oriented    []         Claims no spiritual orientation    []         seeking spiritual identity  []         Feels alienated from Hoahaoism practice/tradition  []         Feels angry about Hoahaoism practice/tradition  []         Spirituality/Hoahaoism tradition is a resource for coping at this time.   [x]         Not able to assess due to medical condition    Services Provided Today:  []         crisis intervention    []         reading Scriptures  [x]         spiritual assessment    []         prayer  []         empathic listening/emotional support  []         rites and rituals (cite in comments)  []         life review     []         Hoahaoism support  []         theological development   []         advocacy  [] ethical dialog     []         blessing  []         bereavement support    []         support to family  []         anticipatory grief support   []         help with AMD  []         spiritual guidance    []         meditation      Spiritual Care Needs  []         Emotional Support  []         Spiritual/Yazidism Care  []         Loss/Adjustment  []         Advocacy/Referral                /Ethics  [x]         No needs expressed at               this time  []         Other: (note in               comments)  [address]  []         Follow up visits with               pt/family  []         Provide materials  []         Schedule sacraments  []         Contact Community               Clergy  [x]         Follow up as needed  []         Other: (note in               comments)     Comments:  initiated visit due to pt's length of stay. Pt seemed like he was having a hard time speaking. Pt politely dismissed  sharing that his medication was starting to kick in. Pt laid his head down and closed his eyes.  acknowledged pt's desire to get some rest. Advised of availability and assured him of continued support as needed/ desired. Sharifa Berger will return at a more suitable time as able. Libby Reyes M.S.   Spiritual Care Department  If need arise please call MELITA ([phone])

## 2017-04-25 NOTE — DIABETES MGMT
DTC Progress Note    Recommendations/ Comments: Please consider increasing insulin in TPN to 15units/L. Noted 4units/L added to the TPN to begin this evening. Chart reviewed on Rolf Clark. Patient is a 79 y.o. male with known Type 2 Diabetes on no DM medications at home noted. A1c:   Lab Results   Component Value Date/Time    Hemoglobin A1c 6.6 03/04/2017 04:58 AM    Hemoglobin A1c 6.5 03/02/2017 02:37 AM    Hemoglobin A1c, External 6.5 12/02/2015       Recent Glucose Results:   Lab Results   Component Value Date/Time     (H) 04/25/2017 02:54 AM    GLUCPOC 270 (H) 04/25/2017 11:48 AM    GLUCPOC 300 (H) 04/25/2017 05:50 AM    GLUCPOC 275 (H) 04/24/2017 11:28 PM        Lab Results   Component Value Date/Time    Creatinine 1.13 04/25/2017 02:54 AM       Active Orders   Diet    DIET NPO        PO intake: No data found. Current hospital DM medication: humalog correction    Will continue to follow as needed.     Thank you  BOB Mancilla, RN, [address]

## 2017-04-25 NOTE — PROGRESS NOTES
PCU SHIFT NURSING NOTE      Bedside and Verbal shift change report given to Deandra Kitchen RN (oncoming nurse) by Osbaldo Martinez RN (offgoing nurse). Report included the following information SBAR, Kardex, ED Summary, OR Summary, Procedure Summary, Intake/Output, MAR, Recent Results, Med Rec Status, Cardiac Rhythm NSR and Alarm Parameters . Shift Summary:         Admission Date 4/17/2017   Admission Diagnosis Perforated Viscous  Perforated duodenal bulb ulcer ([address]  KIDNEY STONE, HYDRONEPHROSIS   Consults IP CONSULT TO UROLOGY  IP CONSULT TO CARDIOLOGY        Consults   [x]PT   [x]OT   [x]Speech   [x]Case Management      [] Palliative      Cardiac Monitoring Order   [x]Yes   []No     IV drips   []Yes    Drip:                            Dose:  Drip:                            Dose:  Drip:                            Dose:   [x]No     GI Prophylaxis   [x]Yes   []No         DVT Prophylaxis   SCDs:  Sequential Compression Device: Bilateral          Sammy stockings:         [x] Medication   []Contraindicated   []None      Activity Level Activity Level: Chair     Activity Assistance: Partial (two people)   Purposeful Rounding every 1-2 hour? [x]Yes   Dexter Score  Total Score: 3   Bed Alarm (If score 3 or >)   [x]Yes   [] Refused (See signed refusal form in chart)   Pako Score  Pako Score: 14   Pako Score (if score 14 or less)   [x]PMT consult   [x]Wound Care consult      []Specialty bed   [x] Nutrition consult          Needs prior to discharge:   Home O2 required:    []Yes   [x]No    If yes, how much O2 required?     Other:    Last Bowel Movement: Last Bowel Movement Date: 04/18/17      Influenza Vaccine Received Flu Vaccine for Current Season (usually Sept-March): Not Flu Season        Pneumonia Vaccine           Diet Active Orders   Diet    DIET NPO      LDAs         PICC Double Lumen 81/71/47 Right;Basilic (Active)   Central Line Being Utilized Yes 4/25/2017  3:00 AM   Criteria for Appropriate Use Total parenteral nutrition 4/25/2017  3:00 AM   Site Assessment Clean, dry, & intact 4/25/2017  3:00 AM   Phlebitis Assessment 0 4/25/2017  3:00 AM   Infiltration Assessment 0 4/25/2017  3:00 AM   Arm Circumference (cm) 33 cm 4/19/2017 12:18 PM   Date of Last Dressing Change 04/19/17 4/24/2017  6:00 PM   Dressing Status Clean, dry, & intact 4/25/2017  3:00 AM   Action Taken Open ports on tubing capped 4/22/2017  2:39 PM   External Catheter Length (cm) 0 centimeters 4/19/2017 12:18 PM   Dressing Type Disk with Chlorhexadine gluconate (CHG); Transparent 4/25/2017  3:00 AM   Hub Color/Line Status Purple; Infusing;Flushed 4/25/2017  3:00 AM   Positive Blood Return (Site #1) Yes 4/25/2017  3:00 AM   Hub Color/Line Status Red; Infusing;Flushed 4/25/2017  3:00 AM   Positive Blood Return (Site #2) Yes 4/25/2017  3:00 AM   Alcohol Cap Used Yes 4/25/2017  3:00 AM          Peripheral IV 04/17/17 Right Antecubital (Active)   Site Assessment Clean, dry, & intact 4/25/2017  3:00 AM   Phlebitis Assessment 0 4/25/2017  3:00 AM   Infiltration Assessment 0 4/25/2017  3:00 AM   Dressing Status Clean, dry, & intact 4/25/2017  3:00 AM   Dressing Type Tape;Transparent 4/25/2017  3:00 AM   Hub Color/Line Status Pink;Flushed;Capped 4/25/2017  3:00 AM   Action Taken Open ports on tubing capped 4/21/2017  7:27 PM   Alcohol Cap Used Yes 4/23/2017 11:59 AM          Yamilet Magdy #1 04/18/17 Left Abdomen (Active)   Site Assessment Clean, dry, & intact 4/25/2017  2:58 AM   Dressing Status Clean, dry, & intact 4/25/2017  2:58 AM   Drainage Description Serous 4/25/2017  2:58 AM   Rojas Drain Airleak No 4/25/2017  2:58 AM   Status Charged 4/25/2017  2:58 AM   Y Connector Used No 4/25/2017  2:58 AM   Drainage Chamber Level (ml) 0 ml 4/25/2017  2:58 AM   Output (ml) 0 ml 4/25/2017  2:58 AM       Rojas Drain #1 04/18/17 Right Abdomen (Active)   Site Assessment Clean, dry, & intact 4/25/2017  2:58 AM   Dressing Status Clean, dry, & intact 4/25/2017  2:58 AM Drainage Description Serous 4/25/2017  2:58 AM   Rojas Drain Airleak No 4/25/2017  2:58 AM   Status Charged 4/25/2017  2:58 AM   Y Connector Used No 4/25/2017  2:58 AM   Drainage Chamber Level (ml) 0 ml 4/25/2017  2:58 AM   Output (ml) 0 ml 4/25/2017  2:58 AM                Urinary Catheter [REMOVED] Urinary Catheter 04/18/17 2- way; Amaya-Criteria for Appropriate Use: Obstruction/retention  Urinary Catheter 04/21/17 2- way; Amaya-Criteria for Appropriate Use: Obstruction/retention    Intake & Output   Date 04/24/17 0700 - 04/25/17 0659 04/25/17 0700 - 04/26/17 0659   Shift 9624-6058 3763-9739 24 Hour Total [id number] 1900-0659 24 Hour Total   I  N  T  A  K  E   I.V.  (mL/kg/hr) 1905.5  (1.6) 1616.9  (1.3) 3522.3  (1.4)         Cardizem Volume 0  0         Volume (0.9% sodium chloride with KCl 20 mEq/L infusion) 129.6 274.6 404.2         Volume (dextrose 5% - 0.45% NaCl with KCl 20 mEq/L infusion) 300.8  300.8         Volume (piperacillin-tazobactam (ZOSYN) 3.375 g in 0.9% sodium chloride (MBP/ADV) 100 mL) 100 200 300         Volume (fluconazole (DIFLUCAN) 200mg/100 mL IVPB (premix)) 100  100         Volume (TPN ADULT - CENTRAL AA 5% D20% W/ CA + ELECTROLYTES) 682.5  682.5         Volume (TPN ADULT - CENTRAL AA 5% D20% W/ CA + ELECTROLYTES) 87.2 911.6 998.8         Volume (fat emulsion 20% (LIPOSYN, INTRALIPID) infusion) 505.4 230.7 736.1       Shift Total  (mL/kg) 1905.5  (18.8) 1616.9  (15.8) 3522.3  (34.5)      O  U  T  P  U  T   Urine  (mL/kg/hr) 1875  (1.5) 850  (0.7) 2725  (1.1)         Urine Output (mL) (Urinary Catheter 04/21/17 2- way; Amaya) 1449 929 1302       Drains 10 0 10         Output (ml) (Rojas Drain #1 04/18/17 Left Abdomen) 5 0 5         Output (ml) (Rojas Drain #1 04/18/17 Right Abdomen) 5 0 5       Shift Total  (mL/kg) 1885  (18.6) 850  (8.3) 2735  (26.8)      NET 20.5 766.9 787.3      Weight (kg) 101.4 102.1 102.1 102.1 102.1 102.1         Readmission Risk Assessment Tool Score Medium Risk 20       Total Score        3 Relationship with PCP    2 Patient Living Status    3 Patient Length of Stay > 5    4 More than 1 Admission in calendar year    5 Patient Insurance is Medicare, Medicaid or Self Pay    3 Charlson Comorbidity Score       Expected Length of Stay 5d 12h   Actual Length of Stay 7

## 2017-04-25 NOTE — PROGRESS NOTES
Nutrition Assessment:    INTERVENTIONS/RECOMMENDATIONS:   Enteral/Parenteral Nutrition:  (Continue TPN as ordered)    ASSESSMENT:   Chart reviewed; patient continues on TPN; meeting 95% of estimated kcal needs and 100% of protein needs. BG elevated; DTC following and making recommendations. K+, Phos, Mg are WNL. Remains NPO. NGT is out. Will continue to monitor for diet advancement and tolerance. Diet Order: NPO (TPN AA5% D20% @ 83 mL/hr + 20% lipids 3x/week; provides 1967kcal, 99.6g protein)  % Eaten:  No data found. Pertinent Medications: [x] Reviewed []Other: Humalog, Lopressor, Protonix   Pertinent Labs: [x]Reviewed  []Other: -[phone]  Food Allergies: [x]None []Other:     Last BM: 4/18   []Active     []Hyperactive  [x]Hypoactive       [] Absent  BS  Skin:    [] Intact   [x] Incision  [] Breakdown   []Edema   []Other:    Anthropometrics: Height: 5' 9\" (175.3 cm) Weight: 102.2 kg (225 lb 3.2 oz)    IBW (%IBW):   ( ) UBW (%UBW):   (  %)    BMI: Body mass index is 33.26 kg/(m^2). This BMI is indicative of:  []Underweight   []Normal   []Overweight   [x] Obesity   [] Extreme Obesity (BMI>40)  Last Weight Metrics:  Weight Loss Metrics 4/25/2017 3/3/2017 3/1/2017 11/7/2016 1/7/2016 8/17/2015 11/19/2014   Today's Wt 225 lb 3.2 oz 210 lb 220 lb 225 lb 246 lb 3.2 oz 247 lb 231 lb   BMI 33.26 kg/m2 31.01 kg/m2 31.57 kg/m2 33.23 kg/m2 36.34 kg/m2 36.46 kg/m2 34.1 kg/m2       Estimated Nutrition Needs (Based on): 2071 Kcals/day (BMR (4690) x 1. 3AF -250kcal) , 82 g (-102g (0.8-1.0 g/kg bw)) Protein  Carbohydrate:  At Least 130 g/day  Fluids: 2050 mL/day     Pt expected to meet estimated nutrient needs: [x]Yes []No    NUTRITION DIAGNOSES:   Problem:  Altered GI function      Etiology: related to perforared duodenal bulb ulcer, s/p ex lap     Signs/Symptoms: as evidenced by NPO status; TPN infusing      NUTRITION INTERVENTIONS:    Enteral/Parenteral Nutrition:  (Continue TPN as ordered) GOAL:   Continue TPN at current rate with electrolytes WNL and BG trend <200 next 2-4 days    NUTRITION MONITORING AND EVALUATION   Food/Nutrient Intake Outcomes: Enteral/parenteral nutrition intake  Physical Signs/Symptoms Outcomes: Weight/weight change, Electrolyte and renal profile, GI profile, Glucose profile    Previous Goal Met:   [] Met              [x] Progressing Towards Goal              [] Not Progressing Towards Goal   Previous Recommendations:   [x] Implemented          [] Not Implemented          [] Not Applicable    LEARNING NEEDS (Diet, Food/Nutrient-Drug Interaction):    [x] None Identified   [] Identified and Education Provided/Documented   [] Identified and Pt declined/was not appropriate     Cultural, Restorationism, OR Ethnic Dietary Needs:    [x] None Identified   [] Identified and Addressed     [x] Interdisciplinary Care Plan Reviewed/Documented    [x] Discharge Planning:  Will continue to monitor diet advancement and tolerance to determine nutrition discharge plan    [] Participated in Interdisciplinary Rounds    NUTRITION RISK:    [x] High              [] Moderate           []  Low  []  Minimal/Uncompromised      Kasi Waldens  Pager [phone]              Weekend Pager [phone]

## 2017-04-26 NOTE — PROGRESS NOTES
PCU SHIFT NURSING NOTE      Bedside shift change report given to LIZZETTE Mauro (oncoming nurse) by Antonio Morales RN (offgoing nurse). Report included the following information SBAR. Shift Summary:     0025: Entered room to patient attempting to pull PICC out. Dressing partially off and half of stat lock off as well. Cleaned, new stat lock placed, and redressed. Explained to patient not to pull on lines. Patient verbalized understanding. Tele sitter not available at this time but telemetry will call as soon as one becomes available. Will continue to monitor and call for a sitter order if needed. Admission Date 4/17/2017   Admission Diagnosis Perforated Viscous  Perforated duodenal bulb ulcer ([facility] [address]  KIDNEY STONE, HYDRONEPHROSIS   Consults IP CONSULT TO UROLOGY  IP CONSULT TO CARDIOLOGY        Consults   []PT   []OT   []Speech   []Case Management      [] Palliative      Cardiac Monitoring Order   []Yes   []No     IV drips   []Yes    Drip:                            Dose:  Drip:                            Dose:  Drip:                            Dose:   []No     GI Prophylaxis   []Yes   []No         DVT Prophylaxis   SCDs:  Sequential Compression Device: Bilateral          Sammy stockings:         [] Medication   []Contraindicated   []None      Activity Level Activity Level: Up with Assistance     Activity Assistance: Partial (two people)   Purposeful Rounding every 1-2 hour? []Yes   Dexter Score  Total Score: 3   Bed Alarm (If score 3 or >)   []Yes   [] Refused (See signed refusal form in chart)   Pako Score  Pako Score: 14   Pako Score (if score 14 or less)   []PMT consult   []Wound Care consult      []Specialty bed   [] Nutrition consult          Needs prior to discharge:   Home O2 required:    []Yes   []No    If yes, how much O2 required?     Other:    Last Bowel Movement: Last Bowel Movement Date: 04/18/17      Influenza Vaccine Received Flu Vaccine for Current Season (usually Sept-March): Not Flu Season Pneumonia Vaccine           Diet Active Orders   Diet    DIET NPO      LDAs         PICC Double Lumen 23/37/64 Right;Basilic (Active)   Central Line Being Utilized Yes 4/25/2017  7:52 PM   Criteria for Appropriate Use Total parenteral nutrition 4/25/2017  7:52 PM   Site Assessment Clean, dry, & intact 4/25/2017  7:52 PM   Phlebitis Assessment 0 4/25/2017  7:52 PM   Infiltration Assessment 0 4/25/2017  7:52 PM   Arm Circumference (cm) 33 cm 4/19/2017 12:18 PM   Date of Last Dressing Change 04/19/17 4/25/2017  7:52 PM   Dressing Status Clean, dry, & intact 4/25/2017  7:52 PM   Action Taken Open ports on tubing capped 4/25/2017  6:24 PM   External Catheter Length (cm) 0 centimeters 4/19/2017 12:18 PM   Dressing Type Disk with Chlorhexadine gluconate (CHG); Transparent 4/25/2017  7:52 PM   Hub Color/Line Status Purple; Infusing;Flushed 4/25/2017  7:52 PM   Positive Blood Return (Site #1) Yes 4/25/2017  7:52 PM   Hub Color/Line Status Red; Infusing;Flushed 4/25/2017  7:52 PM   Positive Blood Return (Site #2) Yes 4/25/2017  7:52 PM   Alcohol Cap Used Yes 4/25/2017  6:24 PM          Peripheral IV 04/17/17 Right Antecubital (Active)   Site Assessment Clean, dry, & intact 4/25/2017  7:52 PM   Phlebitis Assessment 0 4/25/2017  7:52 PM   Infiltration Assessment 0 4/25/2017  7:52 PM   Dressing Status Clean, dry, & intact 4/25/2017  7:52 PM   Dressing Type Tape;Transparent 4/25/2017  7:52 PM   Hub Color/Line Status Pink;Capped 4/25/2017  7:52 PM   Action Taken Open ports on tubing capped 4/25/2017  4:00 PM   Alcohol Cap Used Yes 4/25/2017  4:00 PM          Rojas Drain #1 04/18/17 Left Abdomen (Active)   Site Assessment Clean, dry, & intact 4/25/2017  7:52 PM   Dressing Status Clean, dry, & intact 4/25/2017  7:52 PM   Drainage Description Serous 4/25/2017  7:52 PM   Rojas Drain Airleak No 4/25/2017  7:52 PM   Status Patent; Charged;Draining 4/25/2017  7:52 PM   Y Connector Used No 4/25/2017  4:00 PM   Drainage Chamber Level (ml) 0 ml 4/25/2017  4:00 PM   Output (ml) 0 ml 4/25/2017  4:00 PM       Rojas Drain #1 04/18/17 Right Abdomen (Active)   Site Assessment Clean, dry, & intact 4/25/2017  7:52 PM   Dressing Status Clean, dry, & intact 4/25/2017  7:52 PM   Drainage Description Serous 4/25/2017  7:52 PM   Rojas Drain Airleak No 4/25/2017  7:52 PM   Status Patent; Charged;Draining 4/25/2017  7:52 PM   Y Connector Used No 4/25/2017  4:00 PM   Drainage Chamber Level (ml) 0 ml 4/25/2017  4:00 PM   Output (ml) 0 ml 4/25/2017  4:00 PM                Urinary Catheter [REMOVED] Urinary Catheter 04/18/17 2- way; Amaya-Criteria for Appropriate Use: Obstruction/retention  Urinary Catheter 04/21/17 2- way; Amaya-Criteria for Appropriate Use: Obstruction/retention, Surgical procedure, Strict I/Os    Intake & Output   Date 04/25/17 0700 - 04/26/17 0659 04/26/17 0700 - 04/27/17 0659   Shift 3591-2765 3765-3745 24 Hour Total 5066-8821 4862-6575 24 Hour Total   I  N  T  A  K  E   I.V.  (mL/kg/hr) 1385.2  (1.1) 195.4 1580.6         Volume (0.9% sodium chloride with KCl 20 mEq/L infusion) 300 22.1 322.1         Volume (piperacillin-tazobactam (ZOSYN) 3.375 g in 0.9% sodium chloride (MBP/ADV) 100 mL) 100 100 200         Volume (TPN ADULT - CENTRAL AA 5% D20% W/ CA + ELECTROLYTES) 831.4  831.4         Volume (TPN ADULT - CENTRAL AA 5% D20% W/ CA + ELECTROLYTES) 48.4 73.3 121.7         Volume (fat emulsion 20% (LIPOSYN, INTRALIPID) infusion) 105.4  105.4       Shift Total  (mL/kg) 1385.2  (13.6) 195.4  (1.9) 1580.6  (15.5)      O  U  T  P  U  T   Urine  (mL/kg/hr) 975  (0.8)  975         Urine Output (mL) (Urinary Catheter 04/21/17 2- way; Amaya) 975  975       Drains 0  0         Output (ml) (Rojas Drain #1 04/18/17 Left Abdomen) 0  0         Output (ml) Cally Lassiter Drain #1 04/18/17 Right Abdomen) 0  0       Stool            Stool Occurrence(s)  1 x 1 x       Shift Total  (mL/kg) 975  (9.5)  975  (9.5)      .2 195.4 605.6      Weight (kg) 102.1 102.1 102.1 102.1 102.1 102.1         Readmission Risk Assessment Tool Score Medium Risk            20       Total Score        3 Relationship with PCP    2 Patient Living Status    3 Patient Length of Stay > 5    4 More than 1 Admission in calendar year    5 Patient Insurance is Medicare, Medicaid or Self Pay    3 Charlson Comorbidity Score       Expected Length of Stay 5d 12h   Actual Length of Stay 8

## 2017-04-26 NOTE — PROGRESS NOTES
Admit Date: 2017    POD 5 Days Post-Op    Procedure:  Procedure(s):  CYSTOSCOPY RIGHT PYELOGRAM INSERTION RIGHT URETERAL STENT    Subjective:     Patient has no new complaints. Reports about the same    Objective:     Blood pressure 138/62, pulse 95, temperature 98.7 °F (37.1 °C), resp. rate 18, height 5' 9\" (1.753 m), weight 224 lb 4.8 oz (101.7 kg), SpO2 95 %. Temp (24hrs), Av.4 °F (36.9 °C), Min:97.8 °F (36.6 °C), Max:98.7 °F (37.1 °C)      Physical Exam:  GENERAL: alert, cooperative, no distress, appears stated age, LUNG: nl effort, HEART: regular rate and rhythm, ABDOMEN: soft, non-tender.  Bowel sounds normal. No masses,  no organomegaly, natasha min serous output EXTREMITIES:  extremities normal, atraumatic, no cyanosis or edema    Labs:   Recent Results (from the past 24 hour(s))   GLUCOSE, POC    Collection Time: 17 11:48 AM   Result Value Ref Range    Glucose (POC) 270 (H) 65 - 100 mg/dL    Performed by Alysha Peoples    GLUCOSE, POC    Collection Time: 17  6:03 PM   Result Value Ref Range    Glucose (POC) 242 (H) 65 - 100 mg/dL    Performed by [address] [place] [address], BASIC    Collection Time: 17  4:30 AM   Result Value Ref Range    Sodium 132 (L) 136 - 145 mmol/L    Potassium 4.3 3.5 - 5.1 mmol/L    Chloride 97 97 - 108 mmol/L    CO2 28 21 - 32 mmol/L    Anion gap 7 5 - 15 mmol/L    Glucose 243 (H) 65 - 100 mg/dL    BUN 30 (H) 6 - 20 MG/DL    Creatinine 1.11 0.70 - 1.30 MG/DL    BUN/Creatinine ratio 27 (H) 12 - 20      GFR est AA >60 >60 ml/min/1.73m2    GFR est non-AA >60 >60 ml/min/1.73m2    Calcium 8.9 8.5 - 10.1 MG/DL   GLUCOSE, POC    Collection Time: 17  5:57 AM   Result Value Ref Range    Glucose (POC) 279 (H) 65 - 100 mg/dL    Performed by Octavio Mauro        Data Review reviewed  I & O and lab    Assessment:     Principal Problem:    Perforated viscus (2017)    Active Problems:    Acute renal failure (ARF) ([address] (3/1/2017)      Hydroureter on right (4/17/2017)      Perforated duodenal bulb ulcer ([facility] [address] (4/18/2017)      Hyponatremia (4/18/2017)      SVT (supraventricular tachycardia) ([facility] [address] (4/19/2017)      Overview: Approximately 220 BPM on tele 4/19/17        Plan/Recommendations/Medical Decision Making:     Continue present treatment  Diet  clear  increase activity    Stacey Nageotte.  Karen Cox MD, [facility]

## 2017-04-26 NOTE — PROGRESS NOTES
Problem: Mobility Impaired (Adult and Pediatric)  Goal: *Acute Goals and Plan of Care (Insert Text)  Physical Therapy Goals  Initiated 4/26/2017  1. Patient will move from supine to sit and sit to supine in bed with supervision/set-up within 7 day(s). 2. Patient will transfer from bed to chair and chair to bed with minimal assistance/contact guard assist using the least restrictive device within 7 day(s). 3. Patient will perform sit to stand with minimal assistance/contact guard assist within 7 day(s). 4. Patient will ambulate with moderate assistance for 20 feet with the least restrictive device within 7 day(s). PHYSICAL THERAPY EVALUATION  Patient: Lea Woodruff (89 y.o. male)  Date: 4/26/2017  Primary Diagnosis: Perforated Viscous  Perforated duodenal bulb ulcer ([facility] [address]  KIDNEY STONE, HYDRONEPHROSIS  Procedure(s) (LRB):  CYSTOSCOPY RIGHT PYELOGRAM INSERTION RIGHT URETERAL STENT (Right) 5 Days Post-Op   Precautions:   Fall      ASSESSMENT :  Based on the objective data described below, the patient presents with hospital admission for hydronephrosis and now with diminished strength, ROM, and balance. He has a hx of CVA with residual right sided weakness utilizing a metal AFO and cane to ambulate prior to recent illnesses. He was admitted from a SNF after transitioning there after a recent IP rehab stay. Mobility during eval was most limited by balance and motor apraxia causing difficulty with planning and sequencing mobility. Nsg asked to watch for SVT during activity. HR between 86 BPM resting and 114 BPM during activity. Gait and transfer to a bedside chair required assist x2 compared to his baseline modified independence (though uncertain of new baseline in rehab). Recommend discharge to SNF to finish his therapy when medically stable. Patient will benefit from skilled intervention to address the above impairments.   Patients rehabilitation potential is considered to be Good  Factors which may influence rehabilitation potential include:   [ ]         None noted  [ ]         Mental ability/status  [X]         Medical condition  [ ]         Home/family situation and support systems  [ ]         Safety awareness  [ ]         Pain tolerance/management  [ ]         Other:        PLAN :  Recommendations and Planned Interventions:  [X]           Bed Mobility Training             [X]    Neuromuscular Re-Education  [X]           Transfer Training                   [ ]    Orthotic/Prosthetic Training  [X]           Gait Training                         [ ]    Modalities  [X]           Therapeutic Exercises           [ ]    Edema Management/Control  [X]           Therapeutic Activities            [ ]    Patient and Family Training/Education  [ ]           Other (comment):     Frequency/Duration: Patient will be followed by physical therapy  5 times a week to address goals. Discharge Recommendations: Skilled Nursing Facility  Further Equipment Recommendations for Discharge: to be determined          SUBJECTIVE:   Patient stated I can get up.       OBJECTIVE DATA SUMMARY:   HISTORY:    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     small cecal polyp    DM (diabetes mellitus) ([facility] [address] 12/14/2009    Hypercholesterolemia      Hyperlipidemia 12/14/2009    Hypertension 12/14/2009    Nephrolithiasis 12/14/2009    Nephrolithiasis 12/14/2009    Perforated duodenal bulb ulcer ([address] 4/18/2017    Recurrent UTI 12/14/2009    Stroke ([facility] [address]      SVT (supraventricular tachycardia) ([facility] [address] 4/19/2017     Past Surgical History:   Procedure Laterality Date    COLORECTAL SCRN; HI RISK IND   1/15/2014          ENDOSCOPY, COLON, DIAGNOSTIC   9/2/2008     small cecal tubular adenoma removed    HX HEENT         tonsillectomy    HX UROLOGICAL         for kidney stones     Prior Level of Function/Home Situation: see above  Personal factors and/or comorbidities impacting plan of care:      [address] [place] [address] Environment: Private residence  # Steps to Enter: 4  One/Two Story Residence: One story  Living Alone: Yes  Support Systems: Skilled nursing facility  Patient Expects to be Discharged to[de-identified] Skilled nursing facility  Current DME Used/Available at Home: Brace/Splint, Cane, straight, Raised toilet seat, Grab bars, Tub transfer bench, Wheelchair     EXAMINATION/PRESENTATION/DECISION MAKING:   Critical Behavior:  Neurologic State: Alert  Orientation Level: Disoriented to time  Cognition: Decreased attention/concentration, Follows commands     Hearing: Auditory  Auditory Impairment: None  Skin:  Abdominal drains intact x2 and midline incision clean, dry, intact  Edema:   Range Of Motion:  AROM: Generally decreased, functional                       Strength:    Strength: Generally decreased, functional                    Tone & Sensation:   Tone: Abnormal                              Coordination:  Coordination: Grossly decreased, non-functional (right sided apraxia post old CVA)  Vision:      Functional Mobility:  Bed Mobility:  Rolling: Minimum assistance  Supine to Sit: Minimum assistance     Scooting: Moderate assistance  Transfers:  Sit to Stand: Minimum assistance;Assist x2  Stand to Sit: Moderate assistance;Assist x2                       Balance:   Sitting: Intact  Standing: Impaired  Standing - Static: Fair  Standing - Dynamic : Poor  Ambulation/Gait Training:  Distance (ft): 4 Feet (ft)  Assistive Device: Gait belt;Brace/Splint; Walker, rolling  Ambulation - Level of Assistance: Moderate assistance;Assist x2     Gait Description (WDL): Exceptions to WDL  Gait Abnormalities: Decreased step clearance;Trunk sway increased; Path deviations; Hemiplegic           Stance: Left increased  Speed/Florina: Shuffled  Step Length: Right shortened              Strong left weight shift and difficulty advancing R.               Cues and time required to release the RW with his right hand after sitting              Functional Measure:  Tinetti test:      Sitting Balance: 1  Arises: 0  Attempts to Rise: 0  Immediate Standing Balance: 0  Standing Balance: 1  Nudged: 0  Eyes Closed: 0  Turn 360 Degrees - Continuous/Discontinuous: 0  Turn 360 Degrees - Steady/Unsteady: 0  Sitting Down: 0  Balance Score: 2  Indication of Gait: 0  R Step Length/Height: 0  L Step Length/Height: 1  R Foot Clearance: 1  L Foot Clearance: 0  Step Symmetry: 0  Step Continuity: 0  Path: 0  Trunk: 0  Walking Time: 0  Gait Score: 2  Total Score: 4         Tinetti Test and G-code impairment scale:  Percentage of Impairment CH     0%    CI     1-19% CJ     20-39% CK     40-59% CL     60-79% CM     80-99% CN      100%   Tinetti  Score 0-28 28 23-27 17-22 12-16 6-11 1-5 0          Tinetti Tool Score Risk of Falls  <19 = High Fall Risk  19-24 = Moderate Fall Risk  25-28 = Low Fall Risk  Tinetti ME. Performance-Oriented Assessment of Mobility Problems in Elderly Patients. [address]; [id number]. (Scoring Description: PT Bulletin Feb. 10, 1993)     Older adults: Skye Joyce et al, 2009; n = 1000 [facility] elderly evaluated with ABC, QUITA, ADL, and IADL)  · Mean QUITA score for males aged 69-68 years = 26.21(3.40)  · Mean QUITA score for females age 69-68 years = 25.16(4.30)  · Mean QUITA score for males over 80 years = 23.29(6.02)  · Mean QUITA score for females over 80 years = 17.20(8.32)                    G codes: In compliance with CMSs Claims Based Outcome Reporting, the following G-code set was chosen for this patient based on their primary functional limitation being treated: The outcome measure chosen to determine the severity of the functional limitation was the Tinetti with a score of 4/28 which was correlated with the impairment scale.       · Mobility - Walking and Moving Around:               - CURRENT STATUS:    CM - 80%-99% impaired, limited or restricted               - GOAL STATUS:           CL - 60%-79% impaired, limited or restricted               - D/C STATUS:                       ---------------To be determined---------------      Physical Therapy Evaluation Charge Determination   History Examination Presentation Decision-Making   MEDIUM  Complexity : 1-2 comorbidities / personal factors will impact the outcome/ POC  MEDIUM Complexity : 3 Standardized tests and measures addressing body structure, function, activity limitation and / or participation in recreation  MEDIUM Complexity : Evolving with changing characteristics  MEDIUM Complexity : FOTO score of 26-74      Based on the above components, the patient evaluation is determined to be of the following complexity level: MEDIUM     Pain:  Pain Scale 1: Numeric (0 - 10)  Pain Intensity 1: 0     After treatment:   [X]         Patient left in no apparent distress sitting up in chair  [ ]         Patient left in no apparent distress in bed  [X]         Call bell left within reach  [X]         Nursing notified  [ ]         Caregiver present  [ ]         Bed alarm activated      COMMUNICATION/EDUCATION:   The patients plan of care was discussed with: Registered Nurse.  [X]         Fall prevention education was provided and the patient/caregiver indicated understanding. [X]         Patient/family have participated as able in goal setting and plan of care. [ ]         Patient/family agree to work toward stated goals and plan of care. [ ]         Patient understands intent and goals of therapy, but is neutral about his/her participation. [ ]         Patient is unable to participate in goal setting and plan of care.      Thank you for this referral.  Karuna Hernandez, PT, DPT   Time Calculation: 24 mins

## 2017-04-26 NOTE — PROGRESS NOTES
PCU SHIFT NURSING NOTE      0715: Bedside and Verbal shift change report given to Broderick Draper and Romero Miller RN (oncoming nurse) by Yfn Watson (offgoing nurse). Report included the following information SBAR, Kardex, ED Summary, Procedure Summary, Intake/Output, MAR, Accordion, Recent Results and Cardiac Rhythm NSR/ occasional SVT. Shift Summary:  8985: Pt resting quietly in bed. VSS. Call light within reach, will continue to monitor. 1000: Bety Munoz NP rounding with pt. States okay with cardiology to remove diallo if attending and urology agrees. 1030: Dr. Engle Speaker rounding with pt. New orders for clear liquid diet, d/c fluids, and PT/OT. New verbal order to d/c diallo. 1157: Diallo removed. Will monitor for voiding. 1430: Pt incontinence of urine. 1900: Bedside and Verbal shift change report given to Zunilda CROCKER (oncoming nurse) by Nabil Salgado RN (offgoing nurse). Report included the following information SBAR, Kardex, Intake/Output, MAR, Accordion, Recent Results and Cardiac Rhythm NSR. Admission Date 4/17/2017   Admission Diagnosis Perforated Viscous  Perforated duodenal bulb ulcer ([facility] [address]  KIDNEY STONE, HYDRONEPHROSIS   Consults IP CONSULT TO UROLOGY  IP CONSULT TO CARDIOLOGY        Consults   [x]PT   [x]OT   []Speech   []Case Management      [] Palliative      Cardiac Monitoring Order   [x]Yes   []No     IV drips   []Yes    Drip:                            Dose:  Drip:                            Dose:  Drip:                            Dose:   [x]No     GI Prophylaxis   [x]Yes   []No         DVT Prophylaxis   SCDs:  Sequential Compression Device: Bilateral          Sammy stockings:         [x] Medication   []Contraindicated   []None      Activity Level Activity Level: Up with Assistance     Activity Assistance: Partial (two people)   Purposeful Rounding every 1-2 hour?    [x]Yes   Dexter Score  Total Score: 3   Bed Alarm (If score 3 or >)   [x]Yes   [] Refused (See signed refusal form in chart) Pako Score  Pako Score: 14   Pako Score (if score 14 or less)   []PMT consult   []Wound Care consult      [x]Specialty bed   [] Nutrition consult          Needs prior to discharge:   Home O2 required:    []Yes   [x]No    If yes, how much O2 required? Other:    Last Bowel Movement: Last Bowel Movement Date: 04/26/17      Influenza Vaccine Received Flu Vaccine for Current Season (usually Sept-March): Not Flu Season        Pneumonia Vaccine           Diet Active Orders   Diet    DIET NPO      LDAs         PICC Double Lumen 82/72/53 Right;Basilic (Active)   Central Line Being Utilized Yes 4/26/2017  7:31 AM   Criteria for Appropriate Use Total parenteral nutrition 4/26/2017  7:31 AM   Site Assessment Clean, dry, & intact 4/26/2017  7:31 AM   Phlebitis Assessment 0 4/26/2017  7:31 AM   Infiltration Assessment 0 4/26/2017  7:31 AM   Arm Circumference (cm) 33 cm 4/19/2017 12:18 PM   Date of Last Dressing Change 04/25/17 4/26/2017  7:31 AM   Dressing Status Clean, dry, & intact 4/26/2017  7:31 AM   Action Taken Open ports on tubing capped 4/26/2017  7:31 AM   External Catheter Length (cm) 0 centimeters 4/19/2017 12:18 PM   Dressing Type Disk with Chlorhexadine gluconate (CHG); Transparent 4/26/2017  7:31 AM   Hub Color/Line Status Purple; Infusing 4/26/2017  7:31 AM   Positive Blood Return (Site #1) Yes 4/26/2017  7:31 AM   Hub Color/Line Status Red; Infusing 4/26/2017  7:31 AM   Positive Blood Return (Site #2) Yes 4/26/2017  7:31 AM   Alcohol Cap Used Yes 4/26/2017  7:31 AM          Peripheral IV 04/17/17 Right Antecubital (Active)   Site Assessment Clean, dry, & intact 4/26/2017  7:31 AM   Phlebitis Assessment 0 4/26/2017  7:31 AM   Infiltration Assessment 0 4/26/2017  7:31 AM   Dressing Status Clean, dry, & intact 4/26/2017  7:31 AM   Dressing Type Tape;Transparent 4/26/2017  7:31 AM   Hub Color/Line Status Pink;Capped 4/26/2017  7:31 AM   Action Taken Open ports on tubing capped 4/26/2017  7:31 AM   Alcohol Cap Used Yes 4/26/2017  7:31 AM          Wilhemenia Boros #1 04/18/17 Left Abdomen (Active)   Site Assessment Clean, dry, & intact 4/26/2017  7:31 AM   Dressing Status Clean, dry, & intact 4/26/2017  7:31 AM   Drainage Description Serous 4/26/2017  7:31 AM   Rojas Drain Airleak No 4/26/2017  7:31 AM   Status Patent; Charged 4/26/2017  7:31 AM   Y Connector Used No 4/26/2017  7:31 AM   Drainage Chamber Level (ml) 0 ml 4/26/2017  7:31 AM   Output (ml) 0 ml 4/25/2017  4:00 PM       Rojas Drain #1 04/18/17 Right Abdomen (Active)   Site Assessment Clean, dry, & intact 4/26/2017  7:31 AM   Dressing Status Clean, dry, & intact 4/26/2017  7:31 AM   Drainage Description Serous 4/26/2017  7:31 AM   Rojas Drain Airleak No 4/26/2017  7:31 AM   Status Patent; Charged 4/26/2017  7:31 AM   Y Connector Used No 4/26/2017  7:31 AM   Drainage Chamber Level (ml) 0 ml 4/26/2017  7:31 AM   Output (ml) 0 ml 4/25/2017  4:00 PM                Urinary Catheter [REMOVED] Urinary Catheter 04/18/17 2- way; Amaya-Criteria for Appropriate Use: Obstruction/retention  Urinary Catheter 04/21/17 2- way; Amaya-Criteria for Appropriate Use: Surgical procedure    Intake & Output   Date 04/25/17 0700 - 04/26/17 0659 04/26/17 0700 - 04/27/17 0659   Shift 2965-4465 2813-1608 24 Hour Total 0700-1859 1900-0659 24 Hour Total   I  N  T  A  K  E   I.V.  (mL/kg/hr) 1385.2  (1.1) 1120.6  (0.9) 2505.8  (1)         Volume (0.9% sodium chloride with KCl 20 mEq/L infusion) 300 236.3 536.3         Volume (piperacillin-tazobactam (ZOSYN) 3.375 g in 0.9% sodium chloride (MBP/ADV) 100 mL) 100 100 200         Volume (TPN ADULT - CENTRAL AA 5% D20% W/ CA + ELECTROLYTES) 831.4  831.4         Volume (TPN ADULT - CENTRAL AA 5% D20% W/ CA + ELECTROLYTES) 48.4 784.4 832.8         Volume (fat emulsion 20% (LIPOSYN, INTRALIPID) infusion) 105.4  105.4       Shift Total  (mL/kg) 1385.2  (13.6) 1120.6  (11) 2505.8  (24.6)      O  U  T  P  U  T   Urine  (mL/kg/hr) 975  (0.8) 950  (0.8) 1925  (0.8) 850  850      Urine Output (mL) (Urinary Catheter 04/21/17 2- way; Amaya)  850  850    Drains 0  0         Output (ml) (Rojas Drain #1 04/18/17 Left Abdomen) 0  0         Output (ml) (Rojas Drain #1 04/18/17 Right Abdomen) 0  0       Stool            Stool Occurrence(s)  2 x 2 x       Shift Total  (mL/kg) 975  (9.5) 950  (9.3) 1925  (18.9) 850  (8.4)  850  (8.4)   .2 170.6 580.8 -850  -850   Weight (kg) 102.1 101.7 101.7 101.7 101.7 101.7         Readmission Risk Assessment Tool Score Medium Risk            20       Total Score        3 Relationship with PCP    2 Patient Living Status    3 Patient Length of Stay > 5    4 More than 1 Admission in calendar year    5 Patient Insurance is Medicare, Medicaid or Self Pay    3 Charlson Comorbidity Score       Expected Length of Stay 5d 12h   Actual Length of Stay 8

## 2017-04-26 NOTE — PROGRESS NOTES
[address] [place] [address]  [phone]      Cardiology Progress Note      4/26/2017 11AM    Admit Date: 4/17/2017    Admit Diagnosis:   Perforated Viscous  Perforated duodenal bulb ulcer ([address]  KIDNEY STONE, HYDRONEPHROSIS    Subjective:     Megan Choudhury is a 79 y.o. male with PMH DM, CVA, HLD, HTN, colon CA who was admitted for a perforated viscous/duodenal bulb ulcer. Overnight events:  -continued intermittent SVT 3-4x overnight   -other VSS  -labs steady  -weights stable; I/O largely positive   -Mr. Clark is feeling frustrated today. He denies complaints of pain, SOB, palpitations.       Visit Vitals    /75    Pulse 94    Temp 99.4 °F (37.4 °C)    Resp 18    Ht 5' 9\" (1.753 m)    Wt 101.7 kg (224 lb 4.8 oz)    SpO2 96%    BMI 33.12 kg/m2       Current Facility-Administered Medications   Medication Dose Route Frequency    TPN ADULT - CENTRAL AA 5% D20% W/ CA + ELECTROLYTES   IntraVENous CONTINUOUS    metoprolol (LOPRESSOR) injection 10 mg  10 mg IntraVENous Q6H    TPN ADULT - CENTRAL AA 5% D20% W/ CA + ELECTROLYTES   IntraVENous CONTINUOUS    dilTIAZem (CARDIZEM) injection 10 mg  10 mg IntraVENous Q6H PRN    insulin lispro (HUMALOG) injection   SubCUTAneous Q6H    glucose chewable tablet 16 g  4 Tab Oral PRN    dextrose (D50W) injection syrg 12.5-25 g  12.5-25 g IntraVENous PRN    glucagon (GLUCAGEN) injection 1 mg  1 mg IntraMUSCular PRN    acetaminophen (TYLENOL) suppository 650 mg  650 mg Rectal Q4H PRN    sodium chloride (NS) 0.9 % flush        perflutren lipid microspheres (DEFINITY) 1.1 mg/mL contrast injection        fat emulsion 20% (LIPOSYN, INTRALIPID) infusion  21 mL/hr IntraVENous 3 times weekly    enoxaparin (LOVENOX) injection 40 mg  40 mg SubCUTAneous Q24H    sodium chloride (NS) flush 10-30 mL  10-30 mL InterCATHeter PRN    sodium chloride (NS) flush 10 mL  10 mL InterCATHeter Q24H    sodium chloride (NS) flush 10 mL  10 mL InterCATHeter PRN    sodium chloride (NS) flush 10-40 mL  10-40 mL InterCATHeter Q8H    sodium chloride (NS) flush 20 mL  20 mL InterCATHeter Q24H    heparin (porcine) pf 300 Units  300 Units InterCATHeter PRN    piperacillin-tazobactam (ZOSYN) 3.375 g in 0.9% sodium chloride (MBP/ADV) 100 mL  3.375 g IntraVENous Q8H    timolol (TIMOPTIC) 0.25% ophthalmic solution  1-2 Drop Both Eyes BID    sodium chloride (NS) flush 5-10 mL  5-10 mL IntraVENous Q8H    sodium chloride (NS) flush 5-10 mL  5-10 mL IntraVENous PRN    HYDROmorphone (PF) (DILAUDID) injection 0.5 mg  0.5 mg IntraVENous Q2H PRN    ondansetron (ZOFRAN) injection 4 mg  4 mg IntraVENous Q4H PRN    pantoprazole (PROTONIX) 40 mg in sodium chloride 0.9 % 10 mL injection  40 mg IntraVENous Q12H       Objective:      Physical Exam:  General Appearance:  ill-appearing  male, appears older than stated age in NAD  Chest:   clear; wet sounding upper airways   Cardiovascular:  Regular rate and rhythm, no murmur.   Abdomen:   obese, distended; NG tube in nare  Extremities: palpable distal pulses. No edema  Skin:  Warm and dry. pale    Data Review:   Recent Labs      04/25/17   0254   WBC  16.2*   HGB  10.0*   HCT  30.5*   PLT  304     Recent Labs      04/26/17   0430  04/25/17   0254  04/24/17   0352   NA  132*  131*  131*   K  4.3  4.3  4.5   CL  97  95*  97   CO2  28  26  27   GLU  243*  269*  221*   BUN  30*  27*  26*   CREA  1.11  1.13  1.05   CA  8.9  8.9  8.7   MG   --   2.2  2.3   PHOS   --   2.6  2.7   ALB   --   2.0*   --    TBILI   --   0.9   --    SGOT   --   26   --    ALT   --   41   --        No results for input(s): TROIQ, CPK, CKMB in the last 72 hours. Intake/Output Summary (Last 24 hours) at 04/26/17 1344  Last data filed at 04/26/17 1157   Gross per 24 hour   Intake           1858.6 ml   Output             2500 ml   Net           -641.4 ml        Telemetry: SR; 3-4 bursts of SVT overnight.   1 short episode of NSVT  EKG: NSR  Cxray: free intraperitoneal air  ECHO: EF 55-60%; No wall motion abnormalities;  Mild concentric hypertrophy     Assessment:     Principal Problem:    Perforated viscus (4/17/2017)    Active Problems:    Acute renal failure (ARF) ([facility] [address] (3/1/2017)      Hydroureter on right (4/17/2017)      Perforated duodenal bulb ulcer ([facility] [address] (4/18/2017)      Hyponatremia (4/18/2017)      SVT (supraventricular tachycardia) ([facility] [address] (4/19/2017)      Overview: Approximately 220 BPM on tele 4/19/17        Plan:     SVT: in setting of illness/surgery. 3-4 bursts overnight. Electrolytes stable. Non-significant ECHO  · Patient starting on clears, but will wait to transition IV metop to PO   · Parameters for when nursing to alert cardiology of sustained SVT  · Continue to monitor electrolytes and treat underlying condition. Hyponatremia: on TPN and IVF. Steady at 132. · Continue to monitor.         Sammy Yeh NP  DNP, RN, AGACNP-BC

## 2017-04-27 NOTE — PROGRESS NOTES
PCU SHIFT NURSING NOTE      0710: Bedside and Verbal shift change report given to Nohemi Bush RN and Angi Matute RN (oncoming nurse) by Amina Steinberg (offgoing nurse). Report included the following information SBAR, Kardex, Intake/Output, MAR, Accordion, Recent Results and Cardiac Rhythm NSR. Shift Summary:   0745: Pt resting quietly in bed. VSS. Call light within reach, will continue to monitor. 1000: PT/OT working with pt. PT/OT got pt up to chair with 2x assist.  1209: Pt had episode of sinus tach in the 160s that lasted for 1 minute. No further actions needed per cardiology because it didn't last more than two minutes. Will continue to monitor. 1910: Bedside and Verbal shift change report given to Zunilda CROCKER (oncoming nurse) by Nohemi Bush RN (offgoing nurse). Report included the following information SBAR, Kardex, Intake/Output, MAR, Accordion, Recent Results and Cardiac Rhythm NSR. Admission Date 4/17/2017   Admission Diagnosis Perforated Viscous  Perforated duodenal bulb ulcer ([facility] [address]  KIDNEY STONE, HYDRONEPHROSIS   Consults IP CONSULT TO UROLOGY  IP CONSULT TO CARDIOLOGY        Consults   [x]PT   [x]OT   []Speech   []Case Management      [] Palliative      Cardiac Monitoring Order   [x]Yes   []No     IV drips   []Yes    Drip:                            Dose:  Drip:                            Dose:  Drip:                            Dose:   [x]No     GI Prophylaxis   [x]Yes   []No         DVT Prophylaxis   SCDs:  Sequential Compression Device: Bilateral          Sammy stockings:         [x] Medication   []Contraindicated   []None      Activity Level Activity Level: Up with Assistance     Activity Assistance: Partial (two people)   Purposeful Rounding every 1-2 hour?    [x]Yes   Dexter Score  Total Score: 3   Bed Alarm (If score 3 or >)   [x]Yes   [] Refused (See signed refusal form in chart)   Pako Score  Pako Score: 14   Pako Score (if score 14 or less)   []PMT consult   []Wound Care consult [x]Specialty bed   [] Nutrition consult          Needs prior to discharge:   Home O2 required:    []Yes   [x]No    If yes, how much O2 required? Other:    Last Bowel Movement: Last Bowel Movement Date: 04/26/17      Influenza Vaccine Received Flu Vaccine for Current Season (usually Sept-March): Not Flu Season        Pneumonia Vaccine           Diet Active Orders   Diet    DIET CLEAR LIQUID      LDAs         PICC Double Lumen 85/65/91 Right;Basilic (Active)   Central Line Being Utilized Yes 4/27/2017  3:42 AM   Criteria for Appropriate Use Total parenteral nutrition 4/27/2017  3:42 AM   Site Assessment Clean, dry, & intact 4/27/2017  3:42 AM   Phlebitis Assessment 0 4/27/2017  3:42 AM   Infiltration Assessment 0 4/27/2017  3:42 AM   Arm Circumference (cm) 33 cm 4/19/2017 12:18 PM   Date of Last Dressing Change 04/25/17 4/27/2017  3:42 AM   Dressing Status Clean, dry, & intact 4/27/2017  3:42 AM   Action Taken Open ports on tubing capped 4/27/2017  3:42 AM   External Catheter Length (cm) 0 centimeters 4/19/2017 12:18 PM   Dressing Type Disk with Chlorhexadine gluconate (CHG); Transparent 4/27/2017  3:42 AM   Hub Color/Line Status Purple; Infusing;Flushed 4/27/2017  3:42 AM   Positive Blood Return (Site #1) Yes 4/27/2017  3:42 AM   Hub Color/Line Status Red; Infusing;Flushed 4/27/2017  3:42 AM   Positive Blood Return (Site #2) Yes 4/26/2017  2:30 PM   Alcohol Cap Used Yes 4/26/2017  2:30 PM          Peripheral IV 04/17/17 Right Antecubital (Active)   Site Assessment Clean, dry, & intact 4/27/2017  3:42 AM   Phlebitis Assessment 0 4/27/2017  3:42 AM   Infiltration Assessment 0 4/27/2017  3:42 AM   Dressing Status Clean, dry, & intact 4/27/2017  3:42 AM   Dressing Type Tape;Transparent 4/27/2017  3:42 AM   Hub Color/Line Status Pink;Capped;Flushed 4/27/2017  3:42 AM   Action Taken Open ports on tubing capped 4/26/2017  2:30 PM   Alcohol Cap Used Yes 4/26/2017  2:30 PM          Acie Michoacano #1 04/18/17 Left Abdomen (Active)   Site Assessment Clean, dry, & intact 4/27/2017  3:42 AM   Dressing Status Clean, dry, & intact 4/27/2017  3:42 AM   Drainage Description Serous 4/27/2017  3:42 AM   Rojas Drain Airleak No 4/27/2017  3:42 AM   Status Patent; Charged 4/27/2017  3:42 AM   Y Connector Used No 4/26/2017  7:23 PM   Drainage Chamber Level (ml) 0 ml 4/26/2017  2:30 PM   Output (ml) 0 ml 4/26/2017  2:30 PM       Justynjasbir Hodan #1 04/18/17 Right Abdomen (Active)   Site Assessment Clean, dry, & intact 4/27/2017  3:42 AM   Dressing Status Clean, dry, & intact 4/27/2017  3:42 AM   Drainage Description Serous 4/27/2017  3:42 AM   Rojas Drain Airleak No 4/27/2017  3:42 AM   Status Patent; Charged 4/27/2017  3:42 AM   Y Connector Used No 4/26/2017  7:23 PM   Drainage Chamber Level (ml) 0 ml 4/26/2017  2:30 PM   Output (ml) 0 ml 4/26/2017  2:30 PM                Urinary Catheter [REMOVED] Urinary Catheter 04/18/17 2- way; Amaya-Criteria for Appropriate Use: Obstruction/retention  [REMOVED] Urinary Catheter 04/21/17 2- way; Amaya-Criteria for Appropriate Use: Surgical procedure    Intake & Output   Date 04/26/17 0700 - 04/27/17 0659 04/27/17 0700 - 04/28/17 0659   Shift 0700-1859 1900-0659 24 Hour Total 0700-1859 1900-0659 24 Hour Total   I  N  T  A  K  E   I.V.  (mL/kg/hr)  160.7  (0.1) 160.7  (0.1)         Volume (piperacillin-tazobactam (ZOSYN) 3.375 g in 0.9% sodium chloride (MBP/ADV) 100 mL)  100 100         Volume (TPN ADULT - CENTRAL AA 5% D20% W/ CA + ELECTROLYTES)  48.4 48.4         Volume (fat emulsion 20% (LIPOSYN, INTRALIPID) infusion)  12.3 12.3       Shift Total  (mL/kg)  160.7  (1.6) 160.7  (1.6)      O  U  T  P  U  T   Urine  (mL/kg/hr) 1250  (1) 625  (0.5) 1875  (0.8)         Urine Voided  625 625         Urine Occurrence(s) 2 x 1 x 3 x         Urine Output (mL) ([REMOVED] Urinary Catheter 04/21/17 2- way; Amaya) 1250  1250       Drains 0  0         Output (ml) Dawson Huddle Drain #1 04/18/17 Left Abdomen) 0  0         Output (ml) Dawson Huddle Drain #1 04/18/17 Right Abdomen) 0  0       Stool            Stool Occurrence(s) 2 x 2 x 4 x       Shift Total  (mL/kg) 1250  (12.3) 625  (6.2) 1875  (18.5)      NET -1250 -464.3 -1714.3      Weight (kg) 101.7 101.6 101.6 101.6 101.6 101.6         Readmission Risk Assessment Tool Score Medium Risk            20       Total Score        3 Relationship with PCP    2 Patient Living Status    3 Patient Length of Stay > 5    4 More than 1 Admission in calendar year    5 Patient Insurance is Medicare, Medicaid or Self Pay    3 Charlson Comorbidity Score       Expected Length of Stay 5d 12h   Actual Length of Stay 9

## 2017-04-27 NOTE — PROGRESS NOTES
Chart review. PT is recommending [facility] vs. SNF. FYI to MD requesting consult if appropriate.     Eber Jennings RN CM  Ext [phone]

## 2017-04-27 NOTE — PROGRESS NOTES
[address] [address]  [phone]      Cardiology Progress Note      4/27/2017 1PM    Admit Date: 4/17/2017    Admit Diagnosis:   Perforated Viscous  Perforated duodenal bulb ulcer ([address]  KIDNEY STONE, HYDRONEPHROSIS    Subjective:     Leonardo Sparrow is a 79 y.o. male with PMH DM, CVA, HLD, HTN, colon CA who was admitted for a perforated viscous/duodenal bulb ulcer. Overnight events:  -continued intermittent SVT 4-5x overnight   -other VSS  -labs steady  -I/O incomplete; weights stable   -Mr. Clark is feeling well today. He denies complaints of pain, SOB, palpitations. He states he's been doing well with clear liquids.       Visit Vitals    /66    Pulse 76    Temp 98.4 °F (36.9 °C)    Resp 20    Ht 5' 9\" (1.753 m)    Wt 101.6 kg (223 lb 14.4 oz)    SpO2 99%    BMI 33.06 kg/m2       Current Facility-Administered Medications   Medication Dose Route Frequency    TPN ADULT - CENTRAL AA 5% D20% W/ CA + ELECTROLYTES   IntraVENous CONTINUOUS    TPN ADULT - CENTRAL AA 5% D20% W/ CA + ELECTROLYTES   IntraVENous CONTINUOUS    metoprolol (LOPRESSOR) injection 10 mg  10 mg IntraVENous Q6H    dilTIAZem (CARDIZEM) injection 10 mg  10 mg IntraVENous Q6H PRN    insulin lispro (HUMALOG) injection   SubCUTAneous Q6H    glucose chewable tablet 16 g  4 Tab Oral PRN    dextrose (D50W) injection syrg 12.5-25 g  12.5-25 g IntraVENous PRN    glucagon (GLUCAGEN) injection 1 mg  1 mg IntraMUSCular PRN    acetaminophen (TYLENOL) suppository 650 mg  650 mg Rectal Q4H PRN    sodium chloride (NS) 0.9 % flush        perflutren lipid microspheres (DEFINITY) 1.1 mg/mL contrast injection        enoxaparin (LOVENOX) injection 40 mg  40 mg SubCUTAneous Q24H    sodium chloride (NS) flush 10-30 mL  10-30 mL InterCATHeter PRN    sodium chloride (NS) flush 10 mL  10 mL InterCATHeter Q24H    sodium chloride (NS) flush 10 mL  10 mL InterCATHeter PRN    sodium chloride (NS) flush 10-40 mL 10-40 mL InterCATHeter Q8H    sodium chloride (NS) flush 20 mL  20 mL InterCATHeter Q24H    heparin (porcine) pf 300 Units  300 Units InterCATHeter PRN    piperacillin-tazobactam (ZOSYN) 3.375 g in 0.9% sodium chloride (MBP/ADV) 100 mL  3.375 g IntraVENous Q8H    timolol (TIMOPTIC) 0.25% ophthalmic solution  1-2 Drop Both Eyes BID    sodium chloride (NS) flush 5-10 mL  5-10 mL IntraVENous Q8H    sodium chloride (NS) flush 5-10 mL  5-10 mL IntraVENous PRN    HYDROmorphone (PF) (DILAUDID) injection 0.5 mg  0.5 mg IntraVENous Q2H PRN    ondansetron (ZOFRAN) injection 4 mg  4 mg IntraVENous Q4H PRN    pantoprazole (PROTONIX) 40 mg in sodium chloride 0.9 % 10 mL injection  40 mg IntraVENous Q12H       Objective:      Physical Exam:  General Appearance:  ill-appearing  male, appears older than stated age in NAD sitting in chair  Chest:   clear; diminished in bases  Cardiovascular:  Regular rate and rhythm, no murmur.   Abdomen:   obese, distended; non-tender  Extremities: palpable distal pulses. No edema  Skin:  Warm and dry. pale    Data Review:   Recent Labs      04/27/17   0342  04/25/17   0254   WBC  13.7*  16.2*   HGB  9.9*  10.0*   HCT  30.2*  30.5*   PLT  418*  304     Recent Labs      04/27/17   0342  04/26/17   0430  04/25/17   0254   NA  132*  132*  131*   K  4.1  4.3  4.3   CL  97  97  95*   CO2  28  28  26   GLU  268*  243*  269*   BUN  34*  30*  27*   CREA  1.15  1.11  1.13   CA  9.2  8.9  8.9   MG   --    --   2.2   PHOS   --    --   2.6   ALB   --    --   2.0*   TBILI   --    --   0.9   SGOT   --    --   26   ALT   --    --   41       No results for input(s): TROIQ, CPK, CKMB in the last 72 hours. Intake/Output Summary (Last 24 hours) at 04/27/17 1433  Last data filed at 04/27/17 0745   Gross per 24 hour   Intake           160.67 ml   Output             1075 ml   Net          -914.33 ml        Telemetry: SR; 4-5 bursts of SVT overnight.     EKG: NSR  Cxray: free intraperitoneal air  ECHO: EF 55-60%; No wall motion abnormalities;  Mild concentric hypertrophy     Assessment:     Principal Problem:    Perforated viscus (4/17/2017)    Active Problems:    Acute renal failure (ARF) ([address] (3/1/2017)      Hydroureter on right (4/17/2017)      Perforated duodenal bulb ulcer ([address] (4/18/2017)      Hyponatremia (4/18/2017)      SVT (supraventricular tachycardia) ([address] (4/19/2017)      Overview: Approximately 220 BPM on tele 4/19/17        Plan:     SVT: in setting of illness/surgery. Continues to have intermittent bursts overnight  Electrolytes stable. Non-significant ECHO  · Patient starting on clears, but will wait to transition IV metop to PO   · Parameters for when nursing to alert cardiology of sustained SVT  · Continue to monitor electrolytes and treat underlying condition. · If SVT continues despite continued improvement of clinical status, may need to consider an EP consult and SVT ablation in the future. Hyponatremia: on TPN and IVF. Steady at 132. · Continue to monitor. Alonzo Panda NP  DNP, RN, AGACNP-BC        [facility]    4/27/2017         Agree with note as outlined by  NP. I confirm findings in history and physical exam. No additional findings noted. Agree with plan as outlined above.      Nita Chery MD

## 2017-04-27 NOTE — DIABETES MGMT
DTC Progress Note    Recommendations/ Comments: Please consider increasing insulin in TPN to 15units/L. Chart reviewed on Rolf Clark. Patient is a 79 y.o. male with known Type 2 Diabetes on no DM medications at home noted. A1c:   Lab Results   Component Value Date/Time    Hemoglobin A1c 6.6 03/04/2017 04:58 AM    Hemoglobin A1c 6.5 03/02/2017 02:37 AM    Hemoglobin A1c, External 6.5 12/02/2015       Recent Glucose Results:   Lab Results   Component Value Date/Time     (H) 04/27/2017 03:42 AM    GLUCPOC 285 (H) 04/27/2017 11:24 AM    GLUCPOC 300 (H) 04/27/2017 07:50 AM    GLUCPOC 300 (H) 04/27/2017 06:21 AM        Lab Results   Component Value Date/Time    Creatinine 1.15 04/27/2017 03:42 AM       Active Orders   Diet    DIET CLEAR LIQUID        PO intake: No data found. Current hospital DM medication: humalog correction    Will continue to follow as needed.     Thank you  BOB Pineda, RN, [address]

## 2017-04-27 NOTE — PROGRESS NOTES
Surgery      Reports doing well with clears although tray shows taking about 50%    No new complaints, voiding ok after diallo out    WBC trending down    Continue clears for now    Start to decrease TPN    Cont present RX      Thea Kerns, [facility]  ED [facility]

## 2017-04-27 NOTE — PROGRESS NOTES
Problem: Self Care Deficits Care Plan (Adult)  Goal: *Acute Goals and Plan of Care (Insert Text)  Occupational Therapy Goals  Initiated 4/27/2017  1. Patient will perform grooming while seated EOB with set-up only within 7 day(s). 2. Patient will perform upper body dressing with supervision/set-up within 7 day(s). 3. Patient will perform lower body dressing with moderate assistance within 7 day(s). 4. Patient will perform toilet transfers with moderate assistance of 1 within 7 day(s). 5. Patient will perform all aspects of toileting with moderate assistance within 7 day(s). OCCUPATIONAL THERAPY EVALUATION  Patient: Alex Christopher (41 y.o. male)  Date: 4/27/2017  Primary Diagnosis: Perforated Viscous  Perforated duodenal bulb ulcer (HCC)  KIDNEY STONE, HYDRONEPHROSIS  Procedure(s) (LRB):  CYSTOSCOPY RIGHT PYELOGRAM INSERTION RIGHT URETERAL STENT (Right) 6 Days Post-Op   Precautions:  Fall      ASSESSMENT :  Based on the objective data described below, the patient presents with significant deficits in self-care, primarily due to R hemiparesis, decreased safety, decreased command following, decreased dynamic balance, decreased motor planning, and decreased activity tolerance. Patient will benefit from skilled intervention to address the above impairments.   Patients rehabilitation potential is considered to be Good  Factors which may influence rehabilitation potential include:   [ ]             None noted  [ ]             Mental ability/status  [X]             Medical condition  [ ]             Home/family situation and support systems  [X]             Safety awareness  [ ]             Pain tolerance/management  [ ]             Other:        PLAN :  Recommendations and Planned Interventions:  [X]               Self Care Training                  [X]        Therapeutic Activities  [X]               Functional Mobility Training    [X]        Cognitive Retraining  [X]               Therapeutic Exercises [X]        Endurance Activities  [X]               Balance Training                   [X]        Neuromuscular Re-Education  [ ]               Visual/Perceptual Training     [X]   Home Safety Training  [X]               Patient Education                 [X]        Family Training/Education  [ ]               Other (comment):     Frequency/Duration: Patient will be followed by occupational therapy 4 times a week to address goals. Discharge Recommendations: Cyril Fleming  Further Equipment Recommendations for Discharge: Defer to facility       SUBJECTIVE:   Patient reports being independent and driving prior to onset in early March. OBJECTIVE DATA SUMMARY:   HISTORY:   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     small cecal polyp    DM (diabetes mellitus) ([facility] [address] 12/14/2009    Hypercholesterolemia      Hyperlipidemia 12/14/2009    Hypertension 12/14/2009    Nephrolithiasis 12/14/2009    Nephrolithiasis 12/14/2009    Perforated duodenal bulb ulcer ([facility] [address] 4/18/2017    Recurrent UTI 12/14/2009    Stroke ([facility] [address] [facility]      SVT (supraventricular tachycardia) ([facility] [address] 4/19/2017     Past Surgical History:   Procedure Laterality Date    COLORECTAL SCRN; HI RISK IND   1/15/2014          ENDOSCOPY, COLON, DIAGNOSTIC   9/2/2008     small cecal tubular adenoma removed    HX HEENT         tonsillectomy    HX UROLOGICAL         for kidney stones        Prior Level of Function/Home Situation: Patient lives alone;  Reports being independent and driving prior to onset in early March.     Expanded or extensive additional review of patient history:      Home Situation  Home Environment: Private residence  # Steps to Enter: 4  One/Two Story Residence: One story  Living Alone: Yes  Support Systems: Skilled nursing facility  Patient Expects to be Discharged to[de-identified] Skilled nursing facility  Current DME Used/Available at Home: Brace/Splint, Cane, straight, Raised toilet seat, Grab bars, Tub transfer bench, Wheelchair  Tub or Shower Type: Tub/Shower combination  [ ]  Right hand dominant   [X]  Left hand dominant (since CVA in 1989)     EXAMINATION OF PERFORMANCE DEFICITS:  Cognitive/Behavioral Status:  Neurologic State: Alert  Orientation Level: Oriented to person;Oriented to place; Disoriented to time  Cognition: Decreased command following  Perception: Cues to maintain midline in standing  Perseveration: No perseveration noted  Safety/Judgement: Awareness of environment;Decreased insight into deficits; Decreased awareness of need for safety;Decreased awareness of need for assistance     Hearing: Auditory  Auditory Impairment: None     Vision/Perceptual:    Not formally assessed; No acute changes reported by patient                                 Range of Motion:  AROM: Generally decreased, functional                          Strength:  Strength: Generally decreased, functional                 Coordination:     Fine Motor Skills-Upper: Left Intact; Right Impaired    Gross Motor Skills-Upper: Left Intact; Right Impaired     Tone & Sensation:  Tone: Abnormal                          Balance:  Sitting: Intact  Standing: Impaired  Standing - Static: Fair  Standing - Dynamic : Poor     Functional Mobility and Transfers for ADLs:  Bed Mobility:  Rolling: Contact guard assistance; Additional time  Supine to Sit: Contact guard assistance; Additional time  Scooting: Contact guard assistance; Additional time     Transfers:  Sit to Stand: Minimum assistance;Assist x2  Stand to Sit: Moderate assistance;Assist x2  Bed to Chair: Moderate assistance;Maximum assistance;Assist x2  Toilet Transfer : Moderate assistance;Maximum assistance;Assist x2     ADL Assessment:  Feeding: Setup;Supervision     Oral Facial Hygiene/Grooming: Minimum assistance     Bathing: Maximum assistance     Upper Body Dressing: Moderate assistance     Lower Body Dressing: Total assistance     Toileting:  Total assistance;Assist x2                 ADL Intervention and task modifications:  Discussed role of OT and treatment plan. Re-orientation provided for time and situation. Initiated ADL education/training, including safety and fall prevention. Functional Measure:  Barthel Index:      Bathin  Bladder: 0  Bowels: 0  Groomin  Dressin  Feedin  Mobility: 0  Stairs: 0  Toilet Use: 0  Transfer (Bed to Chair and Back): 5  Total: 10         Barthel and G-code impairment scale:  Percentage of impairment CH  0% CI  1-19% CJ  20-39% CK  40-59% CL  60-79% CM  80-99% CN  100%   Barthel Score 0-100 100 99-80 79-60 59-40 20-39 1-19    0   Barthel Score 0-20 20 17-19 13-16 9-12 5-8 1-4 0      The Barthel ADL Index: Guidelines  1. The index should be used as a record of what a patient does, not as a record of what a patient could do. 2. The main aim is to establish degree of independence from any help, physical or verbal, however minor and for whatever reason. 3. The need for supervision renders the patient not independent. 4. A patient's performance should be established using the best available evidence. Asking the patient, friends/relatives and nurses are the usual sources, but direct observation and common sense are also important. However direct testing is not needed. 5. Usually the patient's performance over the preceding 24-48 hours is important, but occasionally longer periods will be relevant. 6. Middle categories imply that the patient supplies over 50 per cent of the effort. 7. Use of aids to be independent is allowed. Rubi Mendez., Barthel, D.W. ([id number]). Functional evaluation: the Barthel Index. [address] [facility] (14)2. ARRON Melton, Casandra Garcia., Hillary Coulter., [place], [address] [place] [address] (). Measuring the change indisability after inpatient rehabilitation; comparison of the responsiveness of the Barthel Index and Functional [place] Measure. Journal of Neurology, Neurosurgery, and Psychiatry, 66(4), 262-381.   Edi Harding NRachelleJ.AMY, Radha Erickson M.A. (2004.) Assessment of post-stroke quality of life in cost-effectiveness studies: The usefulness of the Barthel Index and the EuroQoL-5D. Quality of Life Research, 13, 555-57         G codes: In compliance with CMSs Claims Based Outcome Reporting, the following G-code set was chosen for this patient based on their primary functional limitation being treated: The outcome measure chosen to determine the severity of the functional limitation was the Barthel Index with a score of 10/100 which was correlated with the impairment scale. · Self Care:               - CURRENT STATUS:    CM - 80%-99% impaired, limited or restricted               - GOAL STATUS:           CL - 60%-79% impaired, limited or restricted               - D/C STATUS:                       ---------------To be determined---------------      Occupational Therapy Evaluation Charge Determination   History Examination Decision-Making   MEDIUM Complexity : Expanded review of history including physical, cognitive and psychosocial  history  MEDIUM Complexity : 3-5 performance deficits relating to physical, cognitive , or psychosocial skils that result in activity limitations and / or participation restrictions MEDIUM Complexity : Patient may present with comorbidities that affect occupational performnce.  Miniml to moderate modification of tasks or assistance (eg, physical or verbal ) with assesment(s) is necessary to enable patient to complete evaluation       Based on the above components, the patient evaluation is determined to be of the following complexity level: MEDIUM  Pain:  Pain Scale 1: Numeric (0 - 10)  Pain Intensity 1: 0              Activity Tolerance:   Fair  After treatment:   [X] Patient left in no apparent distress sitting up in chair  [ ] Patient left in no apparent distress in bed  [X] Call bell left within reach  [X] Nursing notified  [ ] Caregiver present  [ ] Bed alarm activated      COMMUNICATION/EDUCATION:   The patients plan of care was discussed with: Physical Therapist and Registered Nurse.  [X] Home safety education was provided and the patient/caregiver indicated understanding. [ ] Patient/family have participated as able in goal setting and plan of care. [X] Patient/family agree to work toward stated goals and plan of care. [ ] Patient understands intent and goals of therapy, but is neutral about his/her participation. [ ] Patient is unable to participate in goal setting and plan of care. This patients plan of care is appropriate for delegation to [facility].      Thank you for this referral.  Elizabeth Munoz OTR/L  Time Calculation: 35 mins

## 2017-04-27 NOTE — PROGRESS NOTES
Problem: Mobility Impaired (Adult and Pediatric)  Goal: *Acute Goals and Plan of Care (Insert Text)  Physical Therapy Goals  Initiated 4/26/2017  1. Patient will move from supine to sit and sit to supine in bed with supervision/set-up within 7 day(s). 2. Patient will transfer from bed to chair and chair to bed with minimal assistance/contact guard assist using the least restrictive device within 7 day(s). 3. Patient will perform sit to stand with minimal assistance/contact guard assist within 7 day(s). 4. Patient will ambulate with moderate assistance for 20 feet with the least restrictive device within 7 day(s). PHYSICAL THERAPY TREATMENT  Patient: Yosef December (34 y.o. male)  Date: 4/27/2017  Diagnosis: Perforated Viscous  Perforated duodenal bulb ulcer ([facility] [address]  KIDNEY STONE, HYDRONEPHROSIS Perforated viscus  Procedure(s) (LRB):  CYSTOSCOPY RIGHT PYELOGRAM INSERTION RIGHT URETERAL STENT (Right) 6 Days Post-Op  Precautions: Fall      ASSESSMENT:  Pt in bed, agreeable to participation with therapy. RA VSS. Pt overall SBA/min assist for bed mobility when using bed rail & for sit to stand to RW. Pt was able to stand several minutes for hygiene ( found to be incontinent of stool ). Pt, however, stands with flexed posture & required mod/max assist x 2 to take a few pivoting steps to the [address] & then to chair,great difficulty moving both of his feet. Pt positioned up in chair, all needs met, clear liquid tray offered/declined by pt. Pt is functionally dependent with all mobility/will need return to rehab. Progression toward goals:  [ ]    Improving appropriately and progressing toward goals  [X]    Improving slowly and progressing toward goals  [ ]    Not making progress toward goals and plan of care will be adjusted       PLAN:  Patient continues to benefit from skilled intervention to address the above impairments. Continue treatment per established plan of care.   Discharge Recommendations:  Rehab: SNF vs [facility]  Further Equipment Recommendations for Discharge:  TBD at rehab       SUBJECTIVE:   Patient stated I was driving when I was at home.       OBJECTIVE DATA SUMMARY:   Critical Behavior:  Neurologic State: Alert  Orientation Level: Oriented to person, Oriented to place, Disoriented to time  Cognition: Decreased command following  Safety/Judgement: Awareness of environment, Decreased insight into deficits, Decreased awareness of need for safety, Decreased awareness of need for assistance  Functional Mobility Training:  Bed Mobility:  Rolling: Contact guard assistance; Additional time  Supine to Sit: Contact guard assistance; Additional time     Scooting: Contact guard assistance/min assist ;Additional time        Transfers:  Sit to Stand: Minimum assistance;Assist x2  Stand to Sit: Moderate assistance;Assist x2        Bed to Chair: Moderate assistance;Maximum assistance;Assist x2, very unsafe transfers                    Balance:  Sitting: Intact  Standing: Impaired  Standing - Static: Fair  Standing - Dynamic : Poor  Ambulation/Gait Training:                    R KARLAO donned, flexed posture, great difficulty moving his feet, shuffling/pivoting steps with RW  Pain:  Pain Scale 1: Numeric (0 - 10)  Pain Intensity 1: 0              Activity Tolerance:   Poor, RA VSS  Please refer to the flowsheet for vital signs taken during this treatment.   After treatment:   [X]    Patient left in no apparent distress sitting up in chair  [ ]    Patient left in no apparent distress in bed  [X]    Call bell left within reach  [X]    Nursing notified  [ ]    Caregiver present  [ ]    Bed alarm activated      COMMUNICATION/COLLABORATION:   The patients plan of care was discussed with: Occupational Therapist, Registered Nurse and      Taisha Reyna, PT   Time Calculation: 25 mins

## 2017-04-28 NOTE — PROGRESS NOTES
Nutrition Assessment:    INTERVENTIONS/RECOMMENDATIONS:   Meals/Snacks: General/healthful diet: Full liquids; advance diet as tolerated   Enteral/Parenteral Nutrition:  (Continue to wean TPN as PO improves and diet advances)    ASSESSMENT:   Chart reviewed; TPN weaned down to 42 mL/hr. Diet advanced to full liquids this morning. Patient with tray at bedside; he states he hasn't tried any of the meal yet and doesn't want to. He reports a poor appetite. Encouraged to try a little something at each meal to ensure he can tolerate PO. Offered supplements to help with intake and patient adamantly declined. Will continue to monitor diet advancement, appetite, and tolerance of PO. Readdress supplements as patient allows. Diet Order: Full liquids (TPN AA5% D20% @ 42 mL/hr; provides 887 kcal, 5g protein)  % Eaten:  No data found. Pertinent Medications: [x] Reviewed []Other: Lopressor, Humalog, Protonix  Pertinent Labs: [x]Reviewed  []Other: -[phone]  Food Allergies: [x]None []Other:     Last BM: 4/27   []Active     []Hyperactive  [x]Hypoactive       [] Absent  BS  Skin:    [] Intact   [x] Incision  [] Breakdown   []Edema   []Other:    Anthropometrics: Height: 5' 9\" (175.3 cm) Weight: 101.7 kg (224 lb 3.3 oz)    IBW (%IBW):   ( ) UBW (%UBW):   (  %)    BMI: Body mass index is 33.11 kg/(m^2). This BMI is indicative of:  []Underweight   []Normal   []Overweight   [x] Obesity   [] Extreme Obesity (BMI>40)  Last Weight Metrics:  Weight Loss Metrics 4/28/2017 3/3/2017 3/1/2017 11/7/2016 1/7/2016 8/17/2015 11/19/2014   Today's Wt 224 lb 3.3 oz 210 lb 220 lb 225 lb 246 lb 3.2 oz 247 lb 231 lb   BMI 33.11 kg/m2 31.01 kg/m2 31.57 kg/m2 33.23 kg/m2 36.34 kg/m2 36.46 kg/m2 34.1 kg/m2       Estimated Nutrition Needs (Based on): 2071 Kcals/day (BMR (1785) x 1. 3AF -250kcal) , 82 g (-102g (0.8-1.0 g/kg bw)) Protein  Carbohydrate:  At Least 130 g/day  Fluids: 2050 mL/day     Pt expected to meet estimated nutrient needs: [x]Yes []No     NUTRITION DIAGNOSES:   Problem:  Altered GI function      Etiology: related to perforared duodenal bulb ulcer, s/p ex lap     Signs/Symptoms: as evidenced by TPN infusing, slow diet advancement      NUTRITION INTERVENTIONS:  Meals/Snacks: General/healthful diet Enteral/Parenteral Nutrition:  (Continue to wean TPN as PO improves and diet advances)                GOAL:   PO intake >25% of meals next 2-4 days    NUTRITION MONITORING AND EVALUATION   Food/Nutrient Intake Outcomes:  Total energy intake, Enteral/parenteral nutrition intake  Physical Signs/Symptoms Outcomes: Weight/weight change, GI profile, Glucose profile, Electrolyte and renal profile    Previous Goal Met:   [x] Met              [] Progressing Towards Goal              [] Not Progressing Towards Goal   Previous Recommendations:   [x] Implemented          [] Not Implemented          [] Not Applicable    LEARNING NEEDS (Diet, Food/Nutrient-Drug Interaction):    [x] None Identified   [] Identified and Education Provided/Documented   [] Identified and Pt declined/was not appropriate     Cultural, Spiritism, OR Ethnic Dietary Needs:    [x] None Identified   [] Identified and Addressed     [x] Interdisciplinary Care Plan Reviewed/Documented    [x] Discharge Planning: Continue diet advancement as tolerated; Consistent carbohydrate diet   [] Participated in Interdisciplinary Rounds    NUTRITION RISK:    [x] High              [] Moderate           []  Low  []  Minimal/Uncompromised      Allegra Fish  Pager [phone]              Weekend Pager [phone]

## 2017-04-28 NOTE — PROGRESS NOTES
Surgery      Poor po intake, but tolerationg, will increase to full liquids and see how he does    Cont TPN for now at 42cc/Hr    Add clarithromycin to regimen for RX of h pylori    Labs in AM    Cont present Rx    Arslan Layne  [facility]

## 2017-04-28 NOTE — PROGRESS NOTES
PCU SHIFT NURSING NOTE      Bedside shift change report given to LIZZETTE Mauro (oncoming nurse) by Tierra Trujillo RN (offgoing nurse). Report included the following information SBAR. Shift Summary:     1933: Assist back to bed x3. Incontinent of stool and urine x1. New condom catheter placed. VSS, assessment completed. No complaints at this time. Will continue to monitor. Admission Date 4/17/2017   Admission Diagnosis Perforated Viscous  Perforated duodenal bulb ulcer ([address]  KIDNEY STONE, HYDRONEPHROSIS   Consults IP CONSULT TO UROLOGY  IP CONSULT TO CARDIOLOGY        Consults   []PT   []OT   []Speech   []Case Management      [] Palliative      Cardiac Monitoring Order   []Yes   []No     IV drips   []Yes    Drip:                            Dose:  Drip:                            Dose:  Drip:                            Dose:   []No     GI Prophylaxis   []Yes   []No         DVT Prophylaxis   SCDs:  Sequential Compression Device: Bilateral          Sammy stockings:         [] Medication   []Contraindicated   []None      Activity Level Activity Level: Up with Assistance     Activity Assistance: Partial (one person)   Purposeful Rounding every 1-2 hour? []Yes   Dexter Score  Total Score: 4   Bed Alarm (If score 3 or >)   []Yes   [] Refused (See signed refusal form in chart)   Pako Score  Pako Score: 14   Pako Score (if score 14 or less)   []PMT consult   []Wound Care consult      []Specialty bed   [] Nutrition consult          Needs prior to discharge:   Home O2 required:    []Yes   []No    If yes, how much O2 required?     Other:    Last Bowel Movement: Last Bowel Movement Date: 04/27/17      Influenza Vaccine Received Flu Vaccine for Current Season (usually Sept-March): Not Flu Season        Pneumonia Vaccine           Diet Active Orders   Diet    DIET CLEAR LIQUID      LDAs         PICC Double Lumen 06/68/47 Right;Basilic (Active)   Central Line Being Utilized Yes 4/27/2017  7:28 PM   Criteria for Appropriate Use Total parenteral nutrition 4/27/2017  7:28 PM   Site Assessment Clean, dry, & intact 4/27/2017  7:28 PM   Phlebitis Assessment 0 4/27/2017  7:28 PM   Infiltration Assessment 0 4/27/2017  7:28 PM   Arm Circumference (cm) 33 cm 4/19/2017 12:18 PM   Date of Last Dressing Change 04/25/17 4/27/2017  7:28 PM   Dressing Status Clean, dry, & intact 4/27/2017  7:28 PM   Action Taken Open ports on tubing capped 4/27/2017  7:28 PM   External Catheter Length (cm) 0 centimeters 4/19/2017 12:18 PM   Dressing Type Disk with Chlorhexadine gluconate (CHG); Transparent 4/27/2017  7:28 PM   Hub Color/Line Status Purple;Capped 4/27/2017  7:28 PM   Positive Blood Return (Site #1) Yes 4/27/2017  3:52 PM   Hub Color/Line Status Red; Infusing 4/27/2017  7:28 PM   Positive Blood Return (Site #2) Yes 4/27/2017  3:52 PM   Alcohol Cap Used Yes 4/27/2017  7:28 PM          Peripheral IV 04/17/17 Right Antecubital (Active)   Site Assessment Clean, dry, & intact 4/27/2017  7:28 PM   Phlebitis Assessment 0 4/27/2017  7:28 PM   Infiltration Assessment 0 4/27/2017  7:28 PM   Dressing Status Clean, dry, & intact 4/27/2017  7:28 PM   Dressing Type Tape;Transparent 4/27/2017  7:28 PM   Hub Color/Line Status Pink;Capped 4/27/2017  7:28 PM   Action Taken Open ports on tubing capped 4/27/2017  3:52 PM   Alcohol Cap Used Yes 4/27/2017  3:52 PM          Elnita Pacheco #1 04/18/17 Left Abdomen (Active)   Site Assessment Clean, dry, & intact 4/27/2017  7:28 PM   Dressing Status Clean, dry, & intact 4/27/2017  7:28 PM   Drainage Description Serous 4/27/2017  7:28 PM   Rojas Drain Airleak No 4/27/2017  7:28 PM   Status Charged; Patent;Draining 4/27/2017  7:28 PM   Y Connector Used No 4/27/2017  7:28 PM   Drainage Chamber Level (ml) 0 ml 4/27/2017  7:28 PM   Output (ml) 0 ml 4/27/2017  7:28 PM       Melvin Bergman #1 04/18/17 Right Abdomen (Active)   Site Assessment Clean, dry, & intact 4/27/2017  7:28 PM   Dressing Status Clean, dry, & intact 4/27/2017  7:28 PM Drainage Description Serous 4/27/2017  7:28 PM   Rojas Drain Airleak No 4/27/2017  7:28 PM   Status Patent; Charged 4/27/2017  7:28 PM   Y Connector Used No 4/27/2017  7:28 PM   Drainage Chamber Level (ml) 0 ml 4/27/2017  7:28 PM   Output (ml) 0 ml 4/27/2017  7:28 PM                Urinary Catheter [REMOVED] Urinary Catheter 04/18/17 2- way; Amaya-Criteria for Appropriate Use: Obstruction/retention  [REMOVED] Urinary Catheter 04/21/17 2- way; Amaya-Criteria for Appropriate Use: Surgical procedure    Intake & Output   Date 04/26/17 1900 - 04/27/17 0659 04/27/17 0700 - 04/28/17 0659   Shift 6659-6010 24 Hour Total 9923-5497 9917-8745 24 Hour Total   I  N  T  A  K  E   I.V.  (mL/kg/hr) 160.7 160.7  46.9 46.9      Volume (piperacillin-tazobactam (ZOSYN) 3.375 g in 0.9% sodium chloride (MBP/ADV) 100 mL) 100 100         Volume (TPN ADULT - CENTRAL AA 5% D20% W/ CA + ELECTROLYTES) 48.4 48.4         Volume (TPN ADULT - CENTRAL AA 5% D20% W/ CA + ELECTROLYTES)    46.9 46.9      Volume (fat emulsion 20% (LIPOSYN, INTRALIPID) infusion) 12.3 12.3       Shift Total  (mL/kg) 160.7  (1.6) 160.7  (1.6)  46.9  (0.5) 46.9  (0.5)   O  U  T  P  U  T   Urine  (mL/kg/hr) 625 1875 450  (0.4)  450      Urine Voided 625 625 450  450      Urine Occurrence(s) 1 x 3 x 1 x 1 x 2 x      Urine Output (mL) ([REMOVED] Urinary Catheter 04/21/17 2- way; Amaya)  1250       Drains  0 0 0 0      Output (ml) (Rojas Drain #1 04/18/17 Left Abdomen)  0 0 0 0      Output (ml) (Rojas Drain #1 04/18/17 Right Abdomen)  0 0 0 0    Stool           Stool Occurrence(s) 2 x 4 x  1 x 1 x    Shift Total  (mL/kg) 625  (6.2) 1875  (18.5) 450  (4.4) 0  (0) 450  (4.4)   NET -464.3 -1714.3 -450 46.9 -403.1   Weight (kg) 101.6 101.6 101.6 101.6 101.6         Readmission Risk Assessment Tool Score Medium Risk            20       Total Score        3 Relationship with PCP    2 Patient Living Status    3 Patient Length of Stay > 5    4 More than 1 Admission in calendar year    5 Patient Insurance is Medicare, Medicaid or Self Pay    3 Charlson Comorbidity Score       Expected Length of Stay 5d 12h   Actual Length of Stay 9

## 2017-04-28 NOTE — DIABETES MGMT
DTC Progress Note    Recommendations/ Comments: Noted insulin in TPN increased to 6 units/L today. Will continue to follow as needed. Chart reviewed on Rolf Clark for hyperglycemia. Patient is a 79 y.o. male with known Type 2 Diabetes on no DM medications at home noted. A1c:   Lab Results   Component Value Date/Time    Hemoglobin A1c 6.6 03/04/2017 04:58 AM    Hemoglobin A1c 6.5 03/02/2017 02:37 AM    Hemoglobin A1c, External 6.5 12/02/2015       Recent Glucose Results:   Lab Results   Component Value Date/Time     (H) 04/28/2017 04:29 AM    GLUCPOC 181 (H) 04/27/2017 11:25 PM    GLUCPOC 306 (H) 04/27/2017 05:12 PM    GLUCPOC 285 (H) 04/27/2017 11:24 AM        Lab Results   Component Value Date/Time    Creatinine 1.14 04/28/2017 04:29 AM       Active Orders   Diet    DIET CLEAR LIQUID        PO intake: No data found. Current hospital DM medication: humalog correction, TPN 6 units insulin/L    Will continue to follow as needed.     Thank you  PATTI RedN, RN, [address]

## 2017-04-28 NOTE — PROGRESS NOTES
Occupational Therapy  Medical record reviewed and attempted to initiate OT treatment. Pt was seated in recliner upon arrival and declined therapy, despite encouragement to participate. Pt reports feeling weak. He had not touched his breakfast.  Pt is very familiar with rehab and expect that he will need rehab at discharge. Will defer and continue to follow.

## 2017-04-28 NOTE — PROGRESS NOTES
Pt up in recliner with nsg. PT/OT co tx for mobility/functional activity attempted but pt adamantly yet politely declined any/all therpy participation. Pt stated he did not feel good & simply couldn't do it. PT will continue to follow.

## 2017-04-28 NOTE — PROGRESS NOTES
PCU SHIFT NURSING NOTE      Bedside and Verbal shift change report given to Wili García RN (oncoming nurse) by Tonya Mitchell RN (offgoing nurse). Report included the following information SBAR, Kardex, ED Summary, OR Summary, Procedure Summary, Intake/Output, MAR, Recent Results, Med Rec Status, Cardiac Rhythm NSR and Alarm Parameters . Shift Summary:         Admission Date 4/17/2017   Admission Diagnosis Perforated Viscous  Perforated duodenal bulb ulcer ([address]  KIDNEY STONE, HYDRONEPHROSIS   Consults IP CONSULT TO UROLOGY  IP CONSULT TO CARDIOLOGY        Consults   [x]PT   [x]OT   [x]Speech   [x]Case Management      [x] Palliative      Cardiac Monitoring Order   [x]Yes   []No     IV drips   []Yes    Drip:                            Dose:  Drip:                            Dose:  Drip:                            Dose:   [x]No     GI Prophylaxis   [x]Yes   []No         DVT Prophylaxis   SCDs:  Sequential Compression Device: Bilateral          Sammy stockings:         [x] Medication   []Contraindicated   []None      Activity Level Activity Level: Up with Assistance     Activity Assistance: Partial (two people)   Purposeful Rounding every 1-2 hour? [x]Yes   Dexter Score  Total Score: 4   Bed Alarm (If score 3 or >)   [x]Yes   [] Refused (See signed refusal form in chart)   Pako Score  Pako Score: 14   Pako Score (if score 14 or less)   [x]PMT consult   [x]Wound Care consult      []Specialty bed   [x] Nutrition consult          Needs prior to discharge:   Home O2 required:    []Yes   [x]No    If yes, how much O2 required?     Other:    Last Bowel Movement: Last Bowel Movement Date: 04/27/17      Influenza Vaccine Received Flu Vaccine for Current Season (usually Sept-March): Not Flu Season        Pneumonia Vaccine           Diet Active Orders   Diet    DIET FULL LIQUID      LDAs         PICC Double Lumen 50/03/55 Right;Basilic (Active)   Central Line Being Utilized Yes 4/28/2017 11:00 AM   Criteria for Appropriate Use Total parenteral nutrition 4/28/2017 11:00 AM   Site Assessment Clean, dry, & intact 4/28/2017 11:00 AM   Phlebitis Assessment 0 4/28/2017 11:00 AM   Infiltration Assessment 0 4/28/2017 11:00 AM   Arm Circumference (cm) 33 cm 4/19/2017 12:18 PM   Date of Last Dressing Change 04/25/17 4/28/2017 11:00 AM   Dressing Status Clean, dry, & intact 4/28/2017 11:00 AM   Action Taken Open ports on tubing capped 4/28/2017 11:00 AM   External Catheter Length (cm) 0 centimeters 4/19/2017 12:18 PM   Dressing Type Disk with Chlorhexadine gluconate (CHG); Transparent 4/28/2017 11:00 AM   Hub Color/Line Status Purple; Infusing;Flushed 4/28/2017 11:00 AM   Positive Blood Return (Site #1) No 4/28/2017 11:00 AM   Hub Color/Line Status Red;Capped;Flushed 4/28/2017 11:00 AM   Positive Blood Return (Site #2) No 4/28/2017 11:00 AM   Alcohol Cap Used Yes 4/28/2017 11:00 AM                Rojas Drain #1 04/18/17 Left Abdomen (Active)   Site Assessment Clean, dry, & intact 4/28/2017 11:00 AM   Dressing Status Clean, dry, & intact 4/28/2017 11:00 AM   Drainage Description Serous 4/28/2017 11:00 AM   Rojas Drain Airleak No 4/28/2017 11:00 AM   Status Charged;Draining 4/28/2017 11:00 AM   Y Connector Used No 4/28/2017 11:00 AM   Drainage Chamber Level (ml) 0 ml 4/28/2017 11:00 AM   Output (ml) 0 ml 4/28/2017 11:00 AM       Rojas Drain #1 04/18/17 Right Abdomen (Active)   Site Assessment Clean, dry, & intact 4/28/2017 11:00 AM   Dressing Status Clean, dry, & intact 4/28/2017 11:00 AM   Drainage Description Serous 4/28/2017 11:00 AM   Rojas Drain Airleak No 4/28/2017 11:00 AM   Status Charged;Draining 4/28/2017 11:00 AM   Y Connector Used No 4/28/2017 11:00 AM   Drainage Chamber Level (ml) 0 ml 4/28/2017 11:00 AM   Output (ml) 0 ml 4/28/2017 11:00 AM                Urinary Catheter [REMOVED] Urinary Catheter 04/18/17 2- way; Amaya-Criteria for Appropriate Use: Obstruction/retention  [REMOVED] Urinary Catheter 04/21/17 2- way;Amaya-Criteria for Appropriate Use: Surgical procedure    Intake & Output   Date 04/27/17 0700 - 04/28/17 0659 04/28/17 0700 - 04/29/17 0659   Shift 8860-66561859 1900-0659 24 Hour Total 0700-1859 1900-0659 24 Hour Total   I  N  T  A  K  E   I.V.  (mL/kg/hr)  830.6  (0.7) 830.6  (0.3) 252.7  252.7      Cardizem Volume    0  0      Volume (piperacillin-tazobactam (ZOSYN) 3.375 g in 0.9% sodium chloride (MBP/ADV) 100 mL)  300 300 0  0      Volume (TPN ADULT - CENTRAL AA 5% D20% W/ CA + ELECTROLYTES)  530.6 530.6 252.7  252.7      Volume (TPN ADULT - CENTRAL AA 5% D20% W/ CA + ELECTROLYTES)    0  0    Shift Total  (mL/kg)  830.6  (8.2) 830.6  (8.2) 252.7  (2.5)  252.7  (2.5)   O  U  T  P  U  T   Urine  (mL/kg/hr) 450  (0.4) 600  (0.5) 1050  (0.4) 700  700      Urine Voided  700  700      Urine Occurrence(s) 1 x 1 x 2 x       Drains 0 0 0 0  0      Output (ml) (Rojas Drain #1 04/18/17 Left Abdomen) 0 0 0 0  0      Output (ml) (Rojas Drain #1 04/18/17 Right Abdomen) 0 0 0 0  0    Stool            Stool Occurrence(s)  1 x 1 x       Shift Total  (mL/kg) 450  (4.4) 600  (5.9) 1050  (10.3) 700  (6.9)  700  (6.9)   NET -450 230.6 -219.4 -447.3  -447. 3   Weight (kg) 101.6 101.7 101.7 101.7 101.7 101.7         Readmission Risk Assessment Tool Score Medium Risk            20       Total Score        3 Relationship with PCP    2 Patient Living Status    3 Patient Length of Stay > 5    4 More than 1 Admission in calendar year    5 Patient Insurance is Medicare, Medicaid or Self Pay    3 Charlson Comorbidity Score       Expected Length of Stay 5d 12h   Actual Length of Stay 10

## 2017-04-28 NOTE — PROGRESS NOTES
[address] [address] [address] [facility]  [phone]      Cardiology Progress Note      4/28/2017 7AM    Admit Date: 4/17/2017    Admit Diagnosis:   Perforated Viscous  Perforated duodenal bulb ulcer ([address]  KIDNEY STONE, HYDRONEPHROSIS    Subjective:     Wade Gonzalez is a 79 y.o. male with PMH DM, CVA, HLD, HTN, colon CA who was admitted for a perforated viscous/duodenal bulb ulcer. Overnight events:  -no episodes of SVT overnight, 5 beats NSVT  -other VSS  -labs steady  -I/O incomplete; weights stable   -Mr. Clark is feeling well today. He denies complaints of pain, SOB, palpitations. He states he's been doing well with clear liquids and denies any nausea or vomiting.       Visit Vitals    /64 (BP 1 Location: Left arm, BP Patient Position: At rest)    Pulse 73    Temp 97.5 °F (36.4 °C)    Resp 20    Ht 5' 9\" (1.753 m)    Wt 101.7 kg (224 lb 3.3 oz)    SpO2 99%    BMI 33.11 kg/m2       Current Facility-Administered Medications   Medication Dose Route Frequency    TPN ADULT - CENTRAL AA 5% D20% W/ CA + ELECTROLYTES   IntraVENous CONTINUOUS    metoprolol (LOPRESSOR) injection 10 mg  10 mg IntraVENous Q6H    dilTIAZem (CARDIZEM) injection 10 mg  10 mg IntraVENous Q6H PRN    insulin lispro (HUMALOG) injection   SubCUTAneous Q6H    glucose chewable tablet 16 g  4 Tab Oral PRN    dextrose (D50W) injection syrg 12.5-25 g  12.5-25 g IntraVENous PRN    glucagon (GLUCAGEN) injection 1 mg  1 mg IntraMUSCular PRN    acetaminophen (TYLENOL) suppository 650 mg  650 mg Rectal Q4H PRN    sodium chloride (NS) 0.9 % flush        perflutren lipid microspheres (DEFINITY) 1.1 mg/mL contrast injection        enoxaparin (LOVENOX) injection 40 mg  40 mg SubCUTAneous Q24H    sodium chloride (NS) flush 10-30 mL  10-30 mL InterCATHeter PRN    sodium chloride (NS) flush 10 mL  10 mL InterCATHeter Q24H    sodium chloride (NS) flush 10 mL  10 mL InterCATHeter PRN    sodium chloride (NS) flush 10-40 mL  10-40 mL InterCATHeter Q8H    sodium chloride (NS) flush 20 mL  20 mL InterCATHeter Q24H    heparin (porcine) pf 300 Units  300 Units InterCATHeter PRN    piperacillin-tazobactam (ZOSYN) 3.375 g in 0.9% sodium chloride (MBP/ADV) 100 mL  3.375 g IntraVENous Q8H    timolol (TIMOPTIC) 0.25% ophthalmic solution  1-2 Drop Both Eyes BID    sodium chloride (NS) flush 5-10 mL  5-10 mL IntraVENous Q8H    sodium chloride (NS) flush 5-10 mL  5-10 mL IntraVENous PRN    HYDROmorphone (PF) (DILAUDID) injection 0.5 mg  0.5 mg IntraVENous Q2H PRN    ondansetron (ZOFRAN) injection 4 mg  4 mg IntraVENous Q4H PRN    pantoprazole (PROTONIX) 40 mg in sodium chloride 0.9 % 10 mL injection  40 mg IntraVENous Q12H       Objective:      Physical Exam:  General Appearance:  ill-appearing  male, appears older than stated age resting in bed in NAD   Chest:   clear; diminished in bases  Cardiovascular:  Regular rate and rhythm, no murmur.   Abdomen:   obese, distended; non-tender  Extremities: palpable distal pulses. No edema  Skin:  Warm and dry. pale    Data Review:   Recent Labs      04/28/17 0429 04/27/17   0342   WBC  13.5*  13.7*   HGB  9.5*  9.9*   HCT  30.0*  30.2*   PLT  466*  418*     Recent Labs      04/28/17   0429  04/27/17   0342  04/26/17   0430   NA  136  132*  132*   K  4.3  4.1  4.3   CL  100  97  97   CO2  28 28 28   GLU  153*  268*  243*   BUN  34*  34*  30*   CREA  1.14  1.15  1.11   CA  8.8  9.2  8.9       No results for input(s): TROIQ, CPK, CKMB in the last 72 hours. Intake/Output Summary (Last 24 hours) at 04/28/17 0732  Last data filed at 04/28/17 035   Gross per 24 hour   Intake            603.2 ml   Output             1050 ml   Net           -446.8 ml        Telemetry: SR; 5 beats NSVT overnight   EKG: NSR  Cxray: free intraperitoneal air  ECHO: EF 55-60%;   No wall motion abnormalities;  Mild concentric hypertrophy     Assessment:     Principal Problem:    Perforated viscus (4/17/2017)    Active Problems:    Acute renal failure (ARF) ([facility] (3/1/2017)      Hydroureter on right (4/17/2017)      Perforated duodenal bulb ulcer ([facility] (4/18/2017)      Hyponatremia (4/18/2017)      SVT (supraventricular tachycardia) ([facility] (4/19/2017)      Overview: Approximately 220 BPM on tele 4/19/17        Plan:     SVT: in setting of illness/surgery. No SVT overnight. 5 beats NSVT. Electrolytes stable. Non-significant ECHO  · When patient's diet advances from clears, consider switching meds to PO  · Continue metop  · Parameters for when nursing to alert cardiology of sustained SVT  · Continue to monitor electrolytes and treat underlying condition. Hyponatremia: on TPN and IVF.   improved          Syed Minaya NP  DNP, RN, AGACNP-BC

## 2017-04-29 NOTE — PROGRESS NOTES
4/29/2017 9:03 AM    Admit Date: 4/17/2017    Admit Diagnosis: Perforated Viscous; Perforated duodenal bulb ulcer (HCC);KID*    Subjective:     No events per nursing staff. Now taking oral meds.      Visit Vitals    /55 (BP 1 Location: Left arm, BP Patient Position: At rest)    Pulse 81    Temp 98.3 °F (36.8 °C)    Resp 16    Ht 5' 9\" (1.753 m)    Wt 223 lb 8.7 oz (101.4 kg)    SpO2 95%    BMI 33.01 kg/m2     Current Facility-Administered Medications   Medication Dose Route Frequency    metoprolol tartrate (LOPRESSOR) tablet 25 mg  25 mg Oral Q12H    TPN ADULT - CENTRAL AA 5% D20% W/ CA + ELECTROLYTES   IntraVENous CONTINUOUS    clarithromycin (BIAXIN) tablet 500 mg  500 mg Oral BID    insulin lispro (HUMALOG) injection   SubCUTAneous Q6H    glucose chewable tablet 16 g  4 Tab Oral PRN    dextrose (D50W) injection syrg 12.5-25 g  12.5-25 g IntraVENous PRN    glucagon (GLUCAGEN) injection 1 mg  1 mg IntraMUSCular PRN    acetaminophen (TYLENOL) suppository 650 mg  650 mg Rectal Q4H PRN    perflutren lipid microspheres (DEFINITY) 1.1 mg/mL contrast injection        enoxaparin (LOVENOX) injection 40 mg  40 mg SubCUTAneous Q24H    sodium chloride (NS) flush 10-30 mL  10-30 mL InterCATHeter PRN    sodium chloride (NS) flush 10 mL  10 mL InterCATHeter Q24H    sodium chloride (NS) flush 10 mL  10 mL InterCATHeter PRN    sodium chloride (NS) flush 10-40 mL  10-40 mL InterCATHeter Q8H    sodium chloride (NS) flush 20 mL  20 mL InterCATHeter Q24H    heparin (porcine) pf 300 Units  300 Units InterCATHeter PRN    piperacillin-tazobactam (ZOSYN) 3.375 g in 0.9% sodium chloride (MBP/ADV) 100 mL  3.375 g IntraVENous Q8H    timolol (TIMOPTIC) 0.25% ophthalmic solution  1-2 Drop Both Eyes BID    sodium chloride (NS) flush 5-10 mL  5-10 mL IntraVENous Q8H    sodium chloride (NS) flush 5-10 mL  5-10 mL IntraVENous PRN    HYDROmorphone (PF) (DILAUDID) injection 0.5 mg  0.5 mg IntraVENous Q2H PRN    ondansetron (ZOFRAN) injection 4 mg  4 mg IntraVENous Q4H PRN    pantoprazole (PROTONIX) 40 mg in sodium chloride 0.9 % 10 mL injection  40 mg IntraVENous Q12H         Objective:      Visit Vitals    /55 (BP 1 Location: Left arm, BP Patient Position: At rest)    Pulse 81    Temp 98.3 °F (36.8 °C)    Resp 16    Ht 5' 9\" (1.753 m)    Wt 223 lb 8.7 oz (101.4 kg)    SpO2 95%    BMI 33.01 kg/m2       Physical Exam:  Abdomen: soft, non-tender.   Extremities: no cyanosis or edema  Heart: regular rate and rhythm, S1, S2 normal, no murmur, click, rub or gallop  Lungs: clear to auscultation bilaterally    Data Review:   Labs:    Recent Results (from the past 24 hour(s))   GLUCOSE, POC    Collection Time: 04/28/17 12:24 PM   Result Value Ref Range    Glucose (POC) 210 (H) 65 - 100 mg/dL    Performed by [address] [place] [address], POC    Collection Time: 04/28/17  5:40 PM   Result Value Ref Range    Glucose (POC) 194 (H) 65 - 100 mg/dL    Performed by [address] [place] [address], POC    Collection Time: 04/29/17 12:12 AM   Result Value Ref Range    Glucose (POC) 169 (H) 65 - 100 mg/dL    Performed by Paolo HACKETT    CBC W/O DIFF    Collection Time: 04/29/17  3:30 AM   Result Value Ref Range    WBC 12.3 (H) 4.1 - 11.1 K/uL    RBC 3.40 (L) 4.10 - 5.70 M/uL    HGB 9.7 (L) 12.1 - 17.0 g/dL    HCT 29.4 (L) 36.6 - 50.3 %    MCV 86.5 80.0 - 99.0 FL    MCH 28.5 26.0 - 34.0 PG    MCHC 33.0 30.0 - 36.5 g/dL    RDW 14.4 11.5 - 14.5 %    PLATELET 996 (H) 218 - 351 K/uL   METABOLIC PANEL, BASIC    Collection Time: 04/29/17  3:30 AM   Result Value Ref Range    Sodium 134 (L) 136 - 145 mmol/L    Potassium 4.2 3.5 - 5.1 mmol/L    Chloride 100 97 - 108 mmol/L    CO2 26 21 - 32 mmol/L    Anion gap 8 5 - 15 mmol/L    Glucose 186 (H) 65 - 100 mg/dL    BUN 30 (H) 6 - 20 MG/DL    Creatinine 1.10 0.70 - 1.30 MG/DL    BUN/Creatinine ratio 27 (H) 12 - 20      GFR est AA >60 >60 ml/min/1.73m2    GFR est non-AA >60 >60 ml/min/1.73m2    Calcium 9.0 8.5 - 10.1 MG/DL   GLUCOSE, POC    Collection Time: 04/29/17  6:22 AM   Result Value Ref Range    Glucose (POC) 205 (H) 65 - 100 mg/dL    Performed by Stacey HACKETT        Telemetry: normal sinus rhythm      Assessment:     Principal Problem:    Perforated viscus (4/17/2017)    Active Problems:    Acute renal failure (ARF) ([address] (3/1/2017)      Hydroureter on right (4/17/2017)      Perforated duodenal bulb ulcer ([address] (4/18/2017)      Hyponatremia (4/18/2017)      SVT (supraventricular tachycardia) ([address] (4/19/2017)      Overview: Approximately 220 BPM on tele 4/19/17        Plan:     No further SVT. Since taking oral meds, will switch to PO BB. Monitor.

## 2017-04-29 NOTE — PROGRESS NOTES
Surgery      Bing fulls, but does not want anything further yet    Will wean off TPN today    WBC down to 12.3    Cont present RX      Maurilio Posey MD, [facility]

## 2017-04-29 NOTE — PROGRESS NOTES
PCU SHIFT NURSING NOTE      Bedside and Verbal shift change report given to Alysha Jeronimo RN (oncoming nurse) by Moises Trejo RN (offgoing nurse). Report included the following information SBAR, Kardex, ED Summary, OR Summary, Procedure Summary, Intake/Output, MAR, Recent Results, Med Rec Status, Cardiac Rhythm NSR and Alarm Parameters . Shift Summary:         Admission Date 4/17/2017   Admission Diagnosis Perforated Viscous  Perforated duodenal bulb ulcer ([address]  KIDNEY STONE, HYDRONEPHROSIS   Consults IP CONSULT TO UROLOGY  IP CONSULT TO CARDIOLOGY        Consults   [x]PT   [x]OT   [x]Speech   [x]Case Management      [x] Palliative      Cardiac Monitoring Order   [x]Yes   []No     IV drips   []Yes    Drip:                            Dose:  Drip:                            Dose:  Drip:                            Dose:   [x]No     GI Prophylaxis   [x]Yes   []No         DVT Prophylaxis   SCDs:  Sequential Compression Device: Bilateral          Sammy stockings:         [x] Medication   []Contraindicated   []None      Activity Level Activity Level: Up with Assistance     Activity Assistance: Partial (two people)   Purposeful Rounding every 1-2 hour? [x]Yes   Dexter Score  Total Score: 4   Bed Alarm (If score 3 or >)   [x]Yes   [] Refused (See signed refusal form in chart)   Pako Score  Pako Score: 14   Pako Score (if score 14 or less)   [x]PMT consult   [x]Wound Care consult      []Specialty bed   [x] Nutrition consult          Needs prior to discharge:   Home O2 required:    []Yes   [x]No    If yes, how much O2 required?     Other:    Last Bowel Movement: Last Bowel Movement Date: 04/28/17      Influenza Vaccine Received Flu Vaccine for Current Season (usually Sept-March): Not Flu Season        Pneumonia Vaccine           Diet Active Orders   Diet    DIET FULL LIQUID      LDAs         PICC Double Lumen 16/70/03 Right;Basilic (Active)   Central Line Being Utilized Yes 4/29/2017  7:30 AM   Criteria for Appropriate Use Total parenteral nutrition 4/29/2017  7:30 AM   Site Assessment Clean, dry, & intact 4/29/2017  7:30 AM   Phlebitis Assessment 0 4/29/2017  7:30 AM   Infiltration Assessment 0 4/29/2017  7:30 AM   Arm Circumference (cm) 33 cm 4/19/2017 12:18 PM   Date of Last Dressing Change 04/25/17 4/29/2017  7:30 AM   Dressing Status Clean, dry, & intact 4/29/2017  7:30 AM   Action Taken Open ports on tubing capped 4/28/2017  8:00 PM   External Catheter Length (cm) 0 centimeters 4/19/2017 12:18 PM   Dressing Type Disk with Chlorhexadine gluconate (CHG); Transparent 4/29/2017  7:30 AM   Hub Color/Line Status Purple; Infusing;Flushed 4/29/2017  7:30 AM   Positive Blood Return (Site #1) Yes 4/29/2017  7:30 AM   Hub Color/Line Status Red; Infusing;Flushed 4/29/2017  7:30 AM   Positive Blood Return (Site #2) Yes 4/29/2017  7:30 AM   Alcohol Cap Used Yes 4/29/2017  7:30 AM                Rojas Drain #1 04/18/17 Left Abdomen (Active)   Site Assessment Clean, dry, & intact 4/29/2017  7:30 AM   Dressing Status Clean, dry, & intact 4/29/2017  7:30 AM   Drainage Description Serous 4/29/2017  7:30 AM   Rojas Drain Airleak No 4/29/2017  7:30 AM   Status Charged;Draining 4/29/2017  7:30 AM   Y Connector Used No 4/29/2017  7:30 AM   Drainage Chamber Level (ml) 0 ml 4/29/2017  7:30 AM   Output (ml) 0 ml 4/29/2017  7:30 AM       Rojas Drain #1 04/18/17 Right Abdomen (Active)   Site Assessment Clean, dry, & intact 4/29/2017  7:30 AM   Dressing Status Clean, dry, & intact 4/29/2017  7:30 AM   Drainage Description Serous 4/29/2017  7:30 AM   Rojas Drain Airleak No 4/29/2017  7:30 AM   Status Charged;Draining 4/29/2017  7:30 AM   Y Connector Used No 4/29/2017  7:30 AM   Drainage Chamber Level (ml) 0 ml 4/29/2017  7:30 AM   Output (ml) 0 ml 4/29/2017  7:30 AM                Urinary Catheter [REMOVED] Urinary Catheter 04/18/17 2- way; Amaya-Criteria for Appropriate Use: Obstruction/retention  [REMOVED] Urinary Catheter 04/21/17 2- way;Amaya-Criteria for Appropriate Use: Surgical procedure    Intake & Output   Date 04/28/17 0700 - 04/29/17 0659 04/29/17 0700 - 04/30/17 0659   Shift 1771-76251859 1900-0659 24 Hour Total 9467-1239 1587-4563 24 Hour Total   I  N  T  A  K  E   I.V.  (mL/kg/hr) 487.9  (0.4) 720.8  (0.6) 1208.7  (0.5) 21.7  21.7      Cardizem Volume 0  0         Volume (piperacillin-tazobactam (ZOSYN) 3.375 g in 0.9% sodium chloride (MBP/ADV) 100 mL) 0 200 200 0  0      Volume (TPN ADULT - CENTRAL AA 5% D20% W/ CA + ELECTROLYTES) 477.4  477.4         Volume (TPN ADULT - CENTRAL AA 5% D20% W/ CA + ELECTROLYTES) 10.5 520.8 531.3 21.7  21.7    Shift Total  (mL/kg) 487.9  (4.8) 720.8  (7.1) 1208.7  (11.9) 21.7  (0.2)  21.7  (0.2)   O  U  T  P  U  T   Urine  (mL/kg/hr) 900  (0.7) 1650  (1.4) 2550  (1)         Urine Voided 900 1650 2550       Drains 0  0 0  0      Output (ml) (Rojas Drain #1 04/18/17 Left Abdomen) 0  0 0  0      Output (ml) (Rojas Drain #1 04/18/17 Right Abdomen) 0  0 0  0    Stool            Stool Occurrence(s)  1 x 1 x       Shift Total  (mL/kg) 900  (8.8) 1650  (16.3) 2550  (25.1) 0  (0)  0  (0)   NET -412.1 -929.2 -1341.3 21.7  21.7   Weight (kg) 101.7 101.4 101.4 101.4 101.4 101.4         Readmission Risk Assessment Tool Score Medium Risk            20       Total Score        3 Relationship with PCP    2 Patient Living Status    3 Patient Length of Stay > 5    4 More than 1 Admission in calendar year    5 Patient Insurance is Medicare, Medicaid or Self Pay    3 Charlson Comorbidity Score       Expected Length of Stay 5d 12h   Actual Length of Stay 11

## 2017-04-29 NOTE — PROGRESS NOTES
PCU SHIFT NURSING NOTE      Bedside shift change report given to Jessie Oliva RN (oncoming nurse) by Lossie Leyden, RN (offgoing nurse). Report included the following information SBAR, Kardex and Recent Results. Shift Summary:     2000:  Pt sitting in recliner with his eyes open. Alert and oriented x3. VSS. No signs of distress noted. Pt transferred from recliner to bed with maximum assitance x 3. Incontinence care performed. Lung fields clear throughout. No edema noted. Safety precautions in place. Will continue to monitor. 0300:  Pt resting quietly in bed with his eyes closed. No signs of distress noted. VSS. Safety precautions in place. Will continue to monitor. Admission Date 4/17/2017   Admission Diagnosis Perforated Viscous  Perforated duodenal bulb ulcer ([facility] [address]  KIDNEY STONE, HYDRONEPHROSIS   Consults IP CONSULT TO UROLOGY  IP CONSULT TO CARDIOLOGY        Consults   [x]PT   [x]OT   []Speech   []Case Management      [] Palliative      Cardiac Monitoring Order   [x]Yes   []No     IV drips   [x]Yes    Drip:    TPN                 Dose:   42 ml/hr  Drip:                            Dose:  Drip:                            Dose:   []No     GI Prophylaxis   []Yes   []No         DVT Prophylaxis   SCDs:  Sequential Compression Device: Bilateral          Sammy stockings:         [x] Medication   []Contraindicated   []None      Activity Level Activity Level: Up with Assistance     Activity Assistance: Partial (two people)   Purposeful Rounding every 1-2 hour? [x]Yes   Dexter Score  Total Score: 4   Bed Alarm (If score 3 or >)   [x]Yes   [] Refused (See signed refusal form in chart)   Pako Score  Pako Score: 14   Pako Score (if score 14 or less)   []PMT consult   []Wound Care consult      []Specialty bed   [] Nutrition consult          Needs prior to discharge:   Home O2 required:    []Yes   [x]No    If yes, how much O2 required?     Other:    Last Bowel Movement: Last Bowel Movement Date: 04/27/17      Influenza Vaccine Received Flu Vaccine for Current Season (usually Sept-March): Not Flu Season        Pneumonia Vaccine           Diet Active Orders   Diet    DIET FULL LIQUID      LDAs         PICC Double Lumen 29/13/24 Right;Basilic (Active)   Central Line Being Utilized Yes 4/28/2017  6:21 PM   Criteria for Appropriate Use Total parenteral nutrition 4/28/2017  6:21 PM   Site Assessment Clean, dry, & intact 4/28/2017  6:21 PM   Phlebitis Assessment 0 4/28/2017  6:21 PM   Infiltration Assessment 0 4/28/2017  6:21 PM   Arm Circumference (cm) 33 cm 4/19/2017 12:18 PM   Date of Last Dressing Change 04/25/17 4/28/2017  6:21 PM   Dressing Status Clean, dry, & intact 4/28/2017  6:21 PM   Action Taken Open ports on tubing capped 4/28/2017  6:21 PM   External Catheter Length (cm) 0 centimeters 4/19/2017 12:18 PM   Dressing Type Disk with Chlorhexadine gluconate (CHG); Transparent 4/28/2017  6:21 PM   Hub Color/Line Status Purple; Infusing;Flushed 4/28/2017  6:21 PM   Positive Blood Return (Site #1) No 4/28/2017  6:21 PM   Hub Color/Line Status Red; Infusing;Flushed 4/28/2017  6:21 PM   Positive Blood Return (Site #2) Yes 4/28/2017  6:21 PM   Alcohol Cap Used Yes 4/28/2017  6:21 PM                Natha Port #1 04/18/17 Left Abdomen (Active)   Site Assessment Clean, dry, & intact 4/28/2017  6:21 PM   Dressing Status Clean, dry, & intact 4/28/2017  6:21 PM   Drainage Description Serous 4/28/2017  6:21 PM   Rojas Drain Airleak No 4/28/2017  6:21 PM   Status Charged;Draining 4/28/2017  6:21 PM   Y Connector Used No 4/28/2017  6:21 PM   Drainage Chamber Level (ml) 0 ml 4/28/2017  6:21 PM   Output (ml) 0 ml 4/28/2017  6:21 PM       Natha Port #1 04/18/17 Right Abdomen (Active)   Site Assessment Clean, dry, & intact 4/28/2017  6:21 PM   Dressing Status Clean, dry, & intact 4/28/2017  6:21 PM   Drainage Description Serous 4/28/2017  6:21 PM   Rojas Drain Airleak No 4/28/2017  6:21 PM   Status Charged;Draining 4/28/2017 6:21 PM   Y Connector Used No 4/28/2017  6:21 PM   Drainage Chamber Level (ml) 0 ml 4/28/2017  6:21 PM   Output (ml) 0 ml 4/28/2017  6:21 PM                Urinary Catheter [REMOVED] Urinary Catheter 04/18/17 2- way; Amaya-Criteria for Appropriate Use: Obstruction/retention  [REMOVED] Urinary Catheter 04/21/17 2- way; Amaya-Criteria for Appropriate Use: Surgical procedure    Intake & Output   Date 04/27/17 1900 - 04/28/17 0659 04/28/17 0700 - 04/29/17 0659   Shift 3179-7047 24 Hour Total 7411-2859 9195-2335 24 Hour Total   I  N  T  A  K  E   I.V.  (mL/kg/hr) 830.6 830.6 487.9  (0.4)  487. 9      Cardizem Volume   0  0      Volume (piperacillin-tazobactam (ZOSYN) 3.375 g in 0.9% sodium chloride (MBP/ADV) 100 mL) 300 300 0  0      Volume (TPN ADULT - CENTRAL AA 5% D20% W/ CA + ELECTROLYTES) 530.6 530.6 477.4  477.4      Volume (TPN ADULT - CENTRAL AA 5% D20% W/ CA + ELECTROLYTES)   10.5  10.5    Shift Total  (mL/kg) 830.6  (8.2) 830.6  (8.2) 487.9  (4.8)  487.9  (4.8)   O  U  T  P  U  T   Urine  (mL/kg/hr) 600 1050 900  (0.7)  900      Urine Voided 600 1050 900  900      Urine Occurrence(s) 1 x 2 x       Drains 0 0 0  0      Output (ml) (Rojas Drain #1 04/18/17 Left Abdomen) 0 0 0  0      Output (ml) (Rojas Drain #1 04/18/17 Right Abdomen) 0 0 0  0    Stool           Stool Occurrence(s) 1 x 1 x       Shift Total  (mL/kg) 600  (5.9) 1050  (10.3) 900  (8.8)  900  (8.8)   .6 -219.4 -412.1  -412.1   Weight (kg) 101.7 101.7 101.7 101.7 101.7         Readmission Risk Assessment Tool Score Medium Risk            20       Total Score        3 Relationship with PCP    2 Patient Living Status    3 Patient Length of Stay > 5    4 More than 1 Admission in calendar year    5 Patient Insurance is Medicare, Medicaid or Self Pay    3 Charlson Comorbidity Score       Expected Length of Stay 5d 12h   Actual Length of Stay 10

## 2017-04-30 NOTE — PROGRESS NOTES
4/30/2017 9:40 AM    Admit Date: 4/17/2017    Admit Diagnosis: Perforated Viscous; Perforated duodenal bulb ulcer (HCC);KID*    Subjective:     Rolf Clark denies chest pain, chest pressure/discomfort, dyspnea, palpitations, irregular heart beats. Episode of SVT overnight.      Visit Vitals    /54 (BP 1 Location: Left arm, BP Patient Position: At rest)    Pulse 70    Temp 98.1 °F (36.7 °C)    Resp 18    Ht 5' 9\" (1.753 m)    Wt 216 lb 11.4 oz (98.3 kg)    SpO2 97%    BMI 32 kg/m2     Current Facility-Administered Medications   Medication Dose Route Frequency    metoprolol (LOPRESSOR) injection 10 mg  10 mg IntraVENous Q6H    clarithromycin (BIAXIN) tablet 500 mg  500 mg Oral BID    insulin lispro (HUMALOG) injection   SubCUTAneous Q6H    glucose chewable tablet 16 g  4 Tab Oral PRN    dextrose (D50W) injection syrg 12.5-25 g  12.5-25 g IntraVENous PRN    glucagon (GLUCAGEN) injection 1 mg  1 mg IntraMUSCular PRN    acetaminophen (TYLENOL) suppository 650 mg  650 mg Rectal Q4H PRN    perflutren lipid microspheres (DEFINITY) 1.1 mg/mL contrast injection        enoxaparin (LOVENOX) injection 40 mg  40 mg SubCUTAneous Q24H    sodium chloride (NS) flush 10-30 mL  10-30 mL InterCATHeter PRN    sodium chloride (NS) flush 10 mL  10 mL InterCATHeter Q24H    sodium chloride (NS) flush 10 mL  10 mL InterCATHeter PRN    sodium chloride (NS) flush 10-40 mL  10-40 mL InterCATHeter Q8H    sodium chloride (NS) flush 20 mL  20 mL InterCATHeter Q24H    heparin (porcine) pf 300 Units  300 Units InterCATHeter PRN    piperacillin-tazobactam (ZOSYN) 3.375 g in 0.9% sodium chloride (MBP/ADV) 100 mL  3.375 g IntraVENous Q8H    timolol (TIMOPTIC) 0.25% ophthalmic solution  1-2 Drop Both Eyes BID    sodium chloride (NS) flush 5-10 mL  5-10 mL IntraVENous Q8H    sodium chloride (NS) flush 5-10 mL  5-10 mL IntraVENous PRN    HYDROmorphone (PF) (DILAUDID) injection 0.5 mg  0.5 mg IntraVENous Q2H PRN    ondansetron (ZOFRAN) injection 4 mg  4 mg IntraVENous Q4H PRN    pantoprazole (PROTONIX) 40 mg in sodium chloride 0.9 % 10 mL injection  40 mg IntraVENous Q12H         Objective:      Visit Vitals    /54 (BP 1 Location: Left arm, BP Patient Position: At rest)    Pulse 70    Temp 98.1 °F (36.7 °C)    Resp 18    Ht 5' 9\" (1.753 m)    Wt 216 lb 11.4 oz (98.3 kg)    SpO2 97%    BMI 32 kg/m2       Physical Exam:  Abdomen: soft, non-tender. Bowel sounds normal.   Extremities: no cyanosis or edema  Heart: regular rate and rhythm, S1, S2 normal, no murmur, click, rub or gallop  Lungs: clear to auscultation bilaterally  Neurologic: Grossly normal    Data Review:   Labs:    Recent Results (from the past 24 hour(s))   GLUCOSE, POC    Collection Time: 04/29/17 11:12 AM   Result Value Ref Range    Glucose (POC) 223 (H) 65 - 100 mg/dL    Performed by [address] [place] [address], POC    Collection Time: 04/29/17  6:37 PM   Result Value Ref Range    Glucose (POC) 162 (H) 65 - 100 mg/dL    Performed by [address] [place] [address], POC    Collection Time: 04/30/17 12:08 AM   Result Value Ref Range    Glucose (POC) 138 (H) 65 - 100 mg/dL    Performed by [facility]    GLUCOSE, POC    Collection Time: 04/30/17  5:55 AM   Result Value Ref Range    Glucose (POC) 140 (H) 65 - 100 mg/dL    Performed by Nadine HACKETT        Telemetry: normal sinus rhythm, SVT    Assessment:     Principal Problem:    Perforated viscus (4/17/2017)    Active Problems:    Acute renal failure (ARF) ([address] (3/1/2017)      Hydroureter on right (4/17/2017)      Perforated duodenal bulb ulcer ([address] (4/18/2017)      Hyponatremia (4/18/2017)      SVT (supraventricular tachycardia) ([address] (4/19/2017)      Overview: Approximately 220 BPM on tele 4/19/17        Plan:     Due to episode of SVT last night will switch back to IV BB and resume orally once he is clearly tolerating PO solid food.

## 2017-04-30 NOTE — PROGRESS NOTES
PCU SHIFT NURSING NOTE      Bedside and Verbal shift change report given to Jayesh Schmid RN (oncoming nurse) by Kd Cooper RN (offgoing nurse). Report included the following information SBAR, Kardex, ED Summary, OR Summary, Procedure Summary, Intake/Output, MAR, Recent Results, Med Rec Status, Cardiac Rhythm NSR and Alarm Parameters . Shift Summary:         Admission Date 4/17/2017   Admission Diagnosis Perforated Viscous  Perforated duodenal bulb ulcer ([address]  KIDNEY STONE, HYDRONEPHROSIS   Consults IP CONSULT TO UROLOGY  IP CONSULT TO CARDIOLOGY        Consults   [x]PT   [x]OT   [x]Speech   [x]Case Management      [x] Palliative      Cardiac Monitoring Order   [x]Yes   []No     IV drips   []Yes    Drip:                            Dose:  Drip:                            Dose:  Drip:                            Dose:   [x]No     GI Prophylaxis   [x]Yes   []No         DVT Prophylaxis   SCDs:  Sequential Compression Device: Bilateral          Sammy stockings:         [x] Medication   []Contraindicated   []None      Activity Level Activity Level: Up with Assistance     Activity Assistance: Partial (two people)   Purposeful Rounding every 1-2 hour? [x]Yes   Dexter Score  Total Score: 4   Bed Alarm (If score 3 or >)   [x]Yes   [] Refused (See signed refusal form in chart)   Apko Score  Pako Score: 14   Pako Score (if score 14 or less)   [x]PMT consult   [x]Wound Care consult      []Specialty bed   [x] Nutrition consult          Needs prior to discharge:   Home O2 required:    []Yes   [x]No    If yes, how much O2 required?     Other:    Last Bowel Movement: Last Bowel Movement Date: 04/29/17      Influenza Vaccine Received Flu Vaccine for Current Season (usually Sept-March): Not Flu Season        Pneumonia Vaccine           Diet Active Orders   Diet    DIET FULL LIQUID      LDAs         PICC Double Lumen 02/72/76 Right;Basilic (Active)   Central Line Being Utilized Yes 4/30/2017  7:15 AM   Criteria for Appropriate Use Hemodynamically unstable, requiring monitoring lines, vasopressors, or volume resuscitation 4/30/2017  7:15 AM   Site Assessment Clean, dry, & intact 4/30/2017  7:15 AM   Phlebitis Assessment 0 4/30/2017  7:15 AM   Infiltration Assessment 0 4/30/2017  7:15 AM   Arm Circumference (cm) 33 cm 4/19/2017 12:18 PM   Date of Last Dressing Change 04/25/17 4/30/2017  7:15 AM   Dressing Status Clean, dry, & intact 4/30/2017  7:15 AM   Action Taken Open ports on tubing capped 4/30/2017  7:15 AM   External Catheter Length (cm) 0 centimeters 4/19/2017 12:18 PM   Dressing Type Disk with Chlorhexadine gluconate (CHG); Transparent 4/30/2017  7:15 AM   Hub Color/Line Status Purple;Capped;Flushed 4/30/2017  7:15 AM   Positive Blood Return (Site #1) Yes 4/30/2017  7:15 AM   Hub Color/Line Status Red; Infusing;Flushed 4/30/2017  7:15 AM   Positive Blood Return (Site #2) Yes 4/30/2017  7:15 AM   Alcohol Cap Used Yes 4/30/2017  7:15 AM                Rojas Drain #1 04/18/17 Left Abdomen (Active)   Site Assessment Clean, dry, & intact 4/30/2017  7:15 AM   Dressing Status Clean, dry, & intact 4/30/2017  7:15 AM   Drainage Description Serous 4/30/2017  7:15 AM   Rojas Drain Airleak No 4/30/2017  7:15 AM   Status Charged;Draining 4/30/2017  7:15 AM   Y Connector Used No 4/30/2017  7:15 AM   Drainage Chamber Level (ml) 0 ml 4/30/2017  7:15 AM   Output (ml) 0 ml 4/30/2017  7:15 AM       Rojas Drain #1 04/18/17 Right Abdomen (Active)   Site Assessment Clean, dry, & intact 4/30/2017  7:15 AM   Dressing Status Clean, dry, & intact 4/30/2017  7:15 AM   Drainage Description Serous 4/30/2017  7:15 AM   Rojas Drain Airleak No 4/30/2017  7:15 AM   Status Charged;Draining 4/30/2017  7:15 AM   Y Connector Used No 4/30/2017  7:15 AM   Drainage Chamber Level (ml) 0 ml 4/30/2017  7:15 AM   Output (ml) 0 ml 4/30/2017  7:15 AM                Urinary Catheter [REMOVED] Urinary Catheter 04/18/17 2- way; Amaya-Criteria for Appropriate Use: Obstruction/retention  [REMOVED] Urinary Catheter 04/21/17 2- way; Amaya-Criteria for Appropriate Use: Surgical procedure    Intake & Output   Date 04/29/17 0700 - 04/30/17 0659 04/30/17 0700 - 05/01/17 0659   Shift 0700-1859 1900-0659 24 Hour Total 0700-1859 1900-0659 24 Hour Total   I  N  T  A  K  E   I.V.  (mL/kg/hr) 339.2  (0.3) 200  (0.2) 539.2  (0.2) 0  0      Cardizem Volume    0  0      Volume (piperacillin-tazobactam (ZOSYN) 3.375 g in 0.9% sodium chloride (MBP/ADV) 100 mL) 100 200 300 0  0      Volume (TPN ADULT - CENTRAL AA 5% D20% W/ CA + ELECTROLYTES) 126.7  126.7         Volume (TPN ADULT - CENTRAL AA 5% D20% W/ CA + ELECTROLYTES) 112.5  112.5       Shift Total  (mL/kg) 339.2  (3.3) 200  (2) 539.2  (5.5) 0  (0)  0  (0)   O  U  T  P  U  T   Urine  (mL/kg/hr) 1725  (1.4) 525  (0.4) 2250  (1) 400  400      Urine Voided 4650 179 4963 400  400      Urine Occurrence(s)  1 x 1 x       Drains 0  0 0  0      Output (ml) (Rojas Drain #1 04/18/17 Left Abdomen) 0  0 0  0      Output (ml) (Rojas Drain #1 04/18/17 Right Abdomen) 0  0 0  0    Stool            Stool Occurrence(s)  1 x 1 x       Shift Total  (mL/kg) 1725  (17) 525  (5.3) 2250  (22.9) 400  (4.1)  400  (4.1)   NET -1385.8 -325 -1710.8 -400  -400   Weight (kg) 101.4 98.3 98.3 98.3 98.3 98.3         Readmission Risk Assessment Tool Score Medium Risk            20       Total Score        3 Relationship with PCP    2 Patient Living Status    3 Patient Length of Stay > 5    4 More than 1 Admission in calendar year    5 Patient Insurance is Medicare, Medicaid or Self Pay    3 Charlson Comorbidity Score       Expected Length of Stay 5d 12h   Actual Length of Stay 12

## 2017-04-30 NOTE — PROGRESS NOTES
PCU SHIFT NURSING NOTE      Bedside shift change report given to George Gómez RN (oncoming nurse) by Una Rust RN (offgoing nurse). Report included the following information SBAR, Kardex and Recent Results. Shift Summary:     2000:  Pt sitting in recliner with his eyes open. Family present. Assisted back to bed with max assistance x3. Incontinence care provided. VSS. Lung fields clear throughout. No edema noted. Safety precautions in place. Will continue to monitor. 2330: Oncall Cardiologist paged for -170 range. /58. Discussed with Dr. Jeff Forward. New orders received for 5 mg IV Lopressor NOW.    0010:  Elevated HR lasted from 2313 to 2348. HR Post NOW dose of Lopressor 80. Will continue to monitor. 0200:  Pt lying in bed with his eyes closed. No signs of distress noted. Respirations even and unlabored. Safety precautions in place. Will continue to monitor. Admission Date 4/17/2017   Admission Diagnosis Perforated Viscous  Perforated duodenal bulb ulcer ([address]  KIDNEY STONE, HYDRONEPHROSIS   Consults IP CONSULT TO UROLOGY  IP CONSULT TO CARDIOLOGY        Consults   [x]PT   [x]OT   []Speech   []Case Management      [] Palliative      Cardiac Monitoring Order   [x]Yes   []No     IV drips   []Yes    Drip:                            Dose:  Drip:                            Dose:  Drip:                            Dose:   [x]No     GI Prophylaxis   []Yes   []No         DVT Prophylaxis   SCDs:  Sequential Compression Device: Bilateral          Sammy stockings:         [x] Medication   []Contraindicated   []None      Activity Level Activity Level: Up with Assistance     Activity Assistance: Partial (two people)   Purposeful Rounding every 1-2 hour?    [x]Yes   Dexter Score  Total Score: 3   Bed Alarm (If score 3 or >)   [x]Yes   [] Refused (See signed refusal form in chart)   Pako Score  Pako Score: 14   Pako Score (if score 14 or less)   []PMT consult   []Wound Care consult []Specialty bed   [] Nutrition consult          Needs prior to discharge:   Home O2 required:    []Yes   [x]No    If yes, how much O2 required? Other:    Last Bowel Movement: Last Bowel Movement Date: 04/28/17      Influenza Vaccine Received Flu Vaccine for Current Season (usually Sept-March): Not Flu Season        Pneumonia Vaccine           Diet Active Orders   Diet    DIET FULL LIQUID      LDAs         PICC Double Lumen 78/58/30 Right;Basilic (Active)   Central Line Being Utilized Yes 4/29/2017  6:00 PM   Criteria for Appropriate Use Total parenteral nutrition 4/29/2017  6:00 PM   Site Assessment Clean, dry, & intact 4/29/2017  6:00 PM   Phlebitis Assessment 0 4/29/2017  6:00 PM   Infiltration Assessment 0 4/29/2017  6:00 PM   Arm Circumference (cm) 33 cm 4/19/2017 12:18 PM   Date of Last Dressing Change 04/25/17 4/29/2017  6:00 PM   Dressing Status Clean, dry, & intact 4/29/2017  6:00 PM   Action Taken Open ports on tubing capped 4/29/2017  6:00 PM   External Catheter Length (cm) 0 centimeters 4/19/2017 12:18 PM   Dressing Type Disk with Chlorhexadine gluconate (CHG); Transparent 4/29/2017  6:00 PM   Hub Color/Line Status Purple; Infusing;Flushed 4/29/2017  6:00 PM   Positive Blood Return (Site #1) Yes 4/29/2017  6:00 PM   Hub Color/Line Status Red; Infusing;Flushed 4/29/2017  6:00 PM   Positive Blood Return (Site #2) Yes 4/29/2017  6:00 PM   Alcohol Cap Used Yes 4/29/2017  6:00 PM                Rojas Drain #1 04/18/17 Left Abdomen (Active)   Site Assessment Clean, dry, & intact 4/29/2017  6:00 PM   Dressing Status Clean, dry, & intact 4/29/2017  6:00 PM   Drainage Description Serous 4/29/2017  6:00 PM   Rojas Drain Airleak No 4/29/2017  6:00 PM   Status Charged;Draining 4/29/2017  6:00 PM   Y Connector Used No 4/29/2017  6:00 PM   Drainage Chamber Level (ml) 0 ml 4/29/2017  6:00 PM   Output (ml) 0 ml 4/29/2017  6:00 PM       Rojas Drain #1 04/18/17 Right Abdomen (Active)   Site Assessment Clean, dry, & intact 4/29/2017  6:00 PM   Dressing Status Clean, dry, & intact 4/29/2017  6:00 PM   Drainage Description Serous 4/29/2017  6:00 PM   Rojas Drain Airleak No 4/29/2017  6:00 PM   Status Charged;Draining 4/29/2017  6:00 PM   Y Connector Used No 4/29/2017  6:00 PM   Drainage Chamber Level (ml) 0 ml 4/29/2017  6:00 PM   Output (ml) 0 ml 4/29/2017  6:00 PM                Urinary Catheter [REMOVED] Urinary Catheter 04/18/17 2- way; Amaya-Criteria for Appropriate Use: Obstruction/retention  [REMOVED] Urinary Catheter 04/21/17 2- way; Amaya-Criteria for Appropriate Use: Surgical procedure    Intake & Output   Date 04/28/17 1900 - 04/29/17 0659 04/29/17 0700 - 04/30/17 0659   Shift 5149-5238 24 Hour Total 3632-1095 7947-6421 24 Hour Total   I  N  T  A  K  E   I.V.  (mL/kg/hr) 720.8 1208.7 339.2  (0.3)  339.2      Cardizem Volume  0         Volume (piperacillin-tazobactam (ZOSYN) 3.375 g in 0.9% sodium chloride (MBP/ADV) 100 mL) 200 200 100  100      Volume (TPN ADULT - CENTRAL AA 5% D20% W/ CA + ELECTROLYTES)  477.4         Volume (TPN ADULT - CENTRAL AA 5% D20% W/ CA + ELECTROLYTES) 520.8 531.3 126.7  126.7      Volume (TPN ADULT - CENTRAL AA 5% D20% W/ CA + ELECTROLYTES)   112.5  112.5    Shift Total  (mL/kg) 720.8  (7.1) 1208.7  (11.9) 339.2  (3.3)  339.2  (3.3)   O  U  T  P  U  T   Urine  (mL/kg/hr) 1650 2550 1725  (1.4)  1725      Urine Voided 1650 2550 1725  1725    Drains  0 0  0      Output (ml) (Rojas Drain #1 04/18/17 Left Abdomen)  0 0  0      Output (ml) (Rojas Drain #1 04/18/17 Right Abdomen)  0 0  0    Stool           Stool Occurrence(s) 1 x 1 x       Shift Total  (mL/kg) 1650  (16.3) 2550  (25.1) 1725  (17)  1725  (17)   NET -929.2 -1341.3 -1385.8  -1385.8   Weight (kg) 101.4 101.4 101.4 101.4 101.4         Readmission Risk Assessment Tool Score Medium Risk            20       Total Score        3 Relationship with PCP    2 Patient Living Status    3 Patient Length of Stay > 5    4 More than 1 Admission in calendar year    5 Patient Insurance is Medicare, Medicaid or Self Pay    3 Charlson Comorbidity Score       Expected Length of Stay 5d 12h   Actual Length of Stay 11

## 2017-04-30 NOTE — PROGRESS NOTES
Surgery      Cardiology assessmnet and management greatly appreciated    Eating about half of food offered, does not want to advance diet    Needs to increase po intqake    Will need placement    Dr Jaren Melo returns in AM to resume Kadie Henao MD, Located within [facility]  [id number] [facility]

## 2017-05-01 NOTE — PROGRESS NOTES
Patient's sister is on site and asked CM to follow up. [facility] decision pending. Ms. Rodriguez Baca has informed CM that 2nd choice is [place] and 3rd choice is [facility]. FOC copy on chart and handed to Ms. Brandon Mathias. Ms. Brandon Mathias asked about MD communication. FYI on chart and CM will call office to leave message.     Manda Yousif RN CM  Ext [phone]

## 2017-05-01 NOTE — PROGRESS NOTES
Cm followed up with [address]. Message left for Lyudmila.     [address] [facility]  Ext [phone]

## 2017-05-01 NOTE — PROGRESS NOTES
PCU SHIFT NURSING NOTE      Bedside and Verbal shift change report given to Rena Booker RN (oncoming nurse) by Epi Rodriges RN (offgoing nurse). Report included the following information SBAR, Kardex, ED Summary, Procedure Summary, Intake/Output, MAR, Recent Results, Med Rec Status, Cardiac Rhythm NSR and Alarm Parameters . Shift Summary:         Admission Date 4/17/2017   Admission Diagnosis Perforated Viscous  Perforated duodenal bulb ulcer ([address]  KIDNEY STONE, HYDRONEPHROSIS   Consults IP CONSULT TO UROLOGY  IP CONSULT TO CARDIOLOGY        Consults   [x]PT   [x]OT   [x]Speech   [x]Case Management      [] Palliative      Cardiac Monitoring Order   [x]Yes   []No     IV drips   []Yes    Drip:                            Dose:  Drip:                            Dose:  Drip:                            Dose:   [x]No     GI Prophylaxis   [x]Yes   []No         DVT Prophylaxis   SCDs:  Sequential Compression Device: Bilateral          Sammy stockings:         [x] Medication   []Contraindicated   []None      Activity Level Activity Level: Bed Rest, Up with Assistance     Activity Assistance: Partial (two people)   Purposeful Rounding every 1-2 hour? [x]Yes   Dexter Score  Total Score: 4   Bed Alarm (If score 3 or >)   [x]Yes   [] Refused (See signed refusal form in chart)   Pako Score  Pako Score: 15   Pako Score (if score 14 or less)   [x]PMT consult   []Wound Care consult      []Specialty bed   [x] Nutrition consult          Needs prior to discharge:   Home O2 required:    []Yes   [x]No    If yes, how much O2 required?     Other:    Last Bowel Movement: Last Bowel Movement Date: 04/30/17      Influenza Vaccine Received Flu Vaccine for Current Season (usually Sept-March): Not Flu Season        Pneumonia Vaccine           Diet Active Orders   Diet    DIET GI LITE (POST SURGICAL)      LDAs         PICC Double Lumen 33/86/19 Right;Basilic (Active)   Central Line Being Utilized Yes 5/1/2017  9:00 AM   Criteria for Appropriate Use Long term IV/antibiotic administration 5/1/2017  9:00 AM   Site Assessment Clean, dry, & intact 5/1/2017  9:00 AM   Phlebitis Assessment 0 5/1/2017  9:00 AM   Infiltration Assessment 0 5/1/2017  9:00 AM   Arm Circumference (cm) 33 cm 4/19/2017 12:18 PM   Date of Last Dressing Change 04/25/17 5/1/2017  9:00 AM   Dressing Status Clean, dry, & intact 5/1/2017  9:00 AM   Action Taken Open ports on tubing capped 5/1/2017  9:00 AM   External Catheter Length (cm) 0 centimeters 4/19/2017 12:18 PM   Dressing Type Disk with Chlorhexadine gluconate (CHG); Transparent 5/1/2017  9:00 AM   Hub Color/Line Status Purple; Infusing;Flushed 5/1/2017  9:00 AM   Positive Blood Return (Site #1) Yes 5/1/2017  9:00 AM   Hub Color/Line Status Red;Capped;Flushed 5/1/2017  9:00 AM   Positive Blood Return (Site #2) Yes 5/1/2017  9:00 AM   Alcohol Cap Used Yes 5/1/2017  9:00 AM                            Urinary Catheter [REMOVED] Urinary Catheter 04/18/17 2- way; Amaya-Criteria for Appropriate Use: Obstruction/retention  [REMOVED] Urinary Catheter 04/21/17 2- way; Amaya-Criteria for Appropriate Use: Surgical procedure    Intake & Output   Date 04/30/17 0700 - 05/01/17 0659 05/01/17 0700 - 05/02/17 0659   Shift 0700-1859 1900-0659 24 Hour Total 0700-1859 1900-0659 24 Hour Total   I  N  T  A  K  E   I.V.  (mL/kg/hr) 100  (0.1) 400  (0.3) 500  (0.2) 0  0      Cardizem Volume 0  0         Volume (piperacillin-tazobactam (ZOSYN) 3.375 g in 0.9% sodium chloride (MBP/ADV) 100 mL) 100 400 500 0  0    Shift Total  (mL/kg) 100  (1) 400  (4) 500  (5) 0  (0)  0  (0)   O  U  T  P  U  T   Urine  (mL/kg/hr) 1775  (1.5) 975  (0.8) 2750  (1.1) 350  350      Urine Voided 4736 580 0245 350  350    Drains 0  0 0  0      Output (ml) ([REMOVED] Rojas Drain #1 04/18/17 Left Abdomen) 0  0 0  0      Output (ml) ([REMOVED] Rojas Drain #1 04/18/17 Right Abdomen) 0  0 0  0    Stool            Stool Occurrence(s)  1 x 1 x       Shift Total  (mL/kg) 1775  (18.1) 975  (9.7) 2750  (27.5) 350  (3.5)  350  (3.5)   NET -1675 -575 -2250 -350  -350   Weight (kg) 98.3 100.1 100.1 100.1 100.1 100.1         Readmission Risk Assessment Tool Score Medium Risk            20       Total Score        3 Relationship with PCP    2 Patient Living Status    3 Patient Length of Stay > 5    4 More than 1 Admission in calendar year    5 Patient Insurance is Medicare, Medicaid or Self Pay    3 Charlson Comorbidity Score       Expected Length of Stay 5d 12h   Actual Length of Stay 13

## 2017-05-01 NOTE — PROGRESS NOTES
PCU SHIFT NURSING NOTE      Bedside shift change report given to Relda Bernheim, RN (oncoming nurse) by Ariel Balderas RN (offgoing nurse). Report included the following information SBAR, Kardex and Recent Results. Shift Summary:     2000:  Pt lying in bed with his eyes open. Alert and oriented x4. VSS. Denies pain. Lung fields clear throughout. No edema noted. Bowels hypoactive. Double lumen PICC line to RUE patent and intact. External catheter patent and intact draining rula colored urine. Dressing dry and intact to midline abdomen. Safety precautions in place. Will continue to monitor. Admission Date 4/17/2017   Admission Diagnosis Perforated Viscous  Perforated duodenal bulb ulcer ([facility] [address]  KIDNEY STONE, HYDRONEPHROSIS   Consults IP CONSULT TO UROLOGY  IP CONSULT TO CARDIOLOGY        Consults   [x]PT   [x]OT   []Speech   []Case Management      [] Palliative      Cardiac Monitoring Order   [x]Yes   []No     IV drips   []Yes    Drip:                            Dose:  Drip:                            Dose:  Drip:                            Dose:   [x]No     GI Prophylaxis   []Yes   []No         DVT Prophylaxis   SCDs:  Sequential Compression Device: Bilateral          Sammy stockings:         [] Medication   []Contraindicated   []None      Activity Level Activity Level: Up with Assistance     Activity Assistance: Partial (two people)   Purposeful Rounding every 1-2 hour? [x]Yes   Dexter Score  Total Score: 4   Bed Alarm (If score 3 or >)   []Yes   [] Refused (See signed refusal form in chart)   Pako Score  Pako Score: 14   Pako Score (if score 14 or less)   []PMT consult   []Wound Care consult      []Specialty bed   [] Nutrition consult          Needs prior to discharge:   Home O2 required:    []Yes   [x]No    If yes, how much O2 required?     Other:    Last Bowel Movement: Last Bowel Movement Date: 04/29/17      Influenza Vaccine Received Flu Vaccine for Current Season (usually Sept-March): Not Flu Season        Pneumonia Vaccine           Diet Active Orders   Diet    DIET FULL LIQUID      LDAs         PICC Double Lumen 06/88/77 Right;Basilic (Active)   Central Line Being Utilized Yes 4/30/2017  5:00 PM   Criteria for Appropriate Use Long term IV/antibiotic administration 4/30/2017  5:00 PM   Site Assessment Clean, dry, & intact 4/30/2017  5:00 PM   Phlebitis Assessment 0 4/30/2017  5:00 PM   Infiltration Assessment 0 4/30/2017  5:00 PM   Arm Circumference (cm) 33 cm 4/19/2017 12:18 PM   Date of Last Dressing Change 04/25/17 4/30/2017  5:00 PM   Dressing Status Clean, dry, & intact 4/30/2017  5:00 PM   Action Taken Open ports on tubing capped 4/30/2017  5:00 PM   External Catheter Length (cm) 0 centimeters 4/19/2017 12:18 PM   Dressing Type Disk with Chlorhexadine gluconate (CHG); Transparent 4/30/2017  5:00 PM   Hub Color/Line Status Purple; Infusing;Flushed 4/30/2017  5:00 PM   Positive Blood Return (Site #1) Yes 4/30/2017  5:00 PM   Hub Color/Line Status Red;Capped;Flushed 4/30/2017  5:00 PM   Positive Blood Return (Site #2) Yes 4/30/2017  5:00 PM   Alcohol Cap Used Yes 4/30/2017  5:00 PM                Santino Nguyen #1 04/18/17 Left Abdomen (Active)   Site Assessment Clean, dry, & intact 4/30/2017  3:45 PM   Dressing Status Clean, dry, & intact 4/30/2017  3:45 PM   Drainage Description Serous 4/30/2017  3:45 PM   Rojas Drain Airleak No 4/30/2017  3:45 PM   Status Charged;Draining 4/30/2017  3:45 PM   Y Connector Used No 4/30/2017  3:45 PM   Drainage Chamber Level (ml) 0 ml 4/30/2017  3:45 PM   Output (ml) 0 ml 4/30/2017  3:45 PM       Rojas Drain #1 04/18/17 Right Abdomen (Active)   Site Assessment Clean, dry, & intact 4/30/2017  3:45 PM   Dressing Status Clean, dry, & intact 4/30/2017  3:45 PM   Drainage Description Serous 4/30/2017  3:45 PM   Rojas Drain Airleak No 4/30/2017  3:45 PM   Status Charged;Draining 4/30/2017  3:45 PM   Y Connector Used No 4/30/2017  3:45 PM   Drainage Chamber Level (ml) 0 ml 4/30/2017  3:45 PM   Output (ml) 0 ml 4/30/2017  3:45 PM                Urinary Catheter [REMOVED] Urinary Catheter 04/18/17 2- way; Amaya-Criteria for Appropriate Use: Obstruction/retention  [REMOVED] Urinary Catheter 04/21/17 2- way; Amaya-Criteria for Appropriate Use: Surgical procedure    Intake & Output   Date 04/29/17 1900 - 04/30/17 0659 04/30/17 0700 - 05/01/17 0659   Shift 0007-5333 24 Hour Total 0772-8378 8141-4829 24 Hour Total   I  N  T  A  K  E   I.V.  (mL/kg/hr) 200 539.2 100  (0.1)  100      Cardizem Volume   0  0      Volume (piperacillin-tazobactam (ZOSYN) 3.375 g in 0.9% sodium chloride (MBP/ADV) 100 mL) 200 300 100  100      Volume (TPN ADULT - CENTRAL AA 5% D20% W/ CA + ELECTROLYTES)  126.7         Volume (TPN ADULT - CENTRAL AA 5% D20% W/ CA + ELECTROLYTES)  112.5       Shift Total  (mL/kg) 200  (2) 539.2  (5.5) 100  (1)  100  (1)   O  U  T  P  U  T   Urine  (mL/kg/hr) 525 2250 1775  (1.5)  1775      Urine Voided 525 2250 1775  1775      Urine Occurrence(s) 1 x 1 x       Drains  0 0  0      Output (ml) (Rojas Drain #1 04/18/17 Left Abdomen)  0 0  0      Output (ml) Dawson Huddle Drain #1 04/18/17 Right Abdomen)  0 0  0    Stool           Stool Occurrence(s) 1 x 1 x       Shift Total  (mL/kg) 525  (5.3) 2250  (22.9) 1775  (18.1)  1775  (18.1)   NET -325 -1710.8 -1675  -1675   Weight (kg) 98.3 98.3 98.3 98.3 98.3         Readmission Risk Assessment Tool Score Medium Risk            20       Total Score        3 Relationship with PCP    2 Patient Living Status    3 Patient Length of Stay > 5    4 More than 1 Admission in calendar year    5 Patient Insurance is Medicare, Medicaid or Self Pay    3 Charlson Comorbidity Score       Expected Length of Stay 5d 12h   Actual Length of Stay 12

## 2017-05-01 NOTE — PROGRESS NOTES
Chart reviewed, spoke with nsg who reports pt was up in chair both wkend days with max assist x 2 nurses(  pt unable to move his feet to take steps to the chair.)   Nsg also reports pt has been incontinent of loose stools this am, but agreeable to assist PT with OOB/standing with RW/transfer to chair. Pt again found to have been incontinent of loose stool after being cleansed about 10 minutes prior. PT offered assist for rolling for hygiene/etc but nsg tech in to assist. Nsg requests up in chair be deferred at this time. PT will continue to follow.

## 2017-05-01 NOTE — PROGRESS NOTES
Cm acknowledged consult for [facility]. Submitted via ecin.     Norma Bautista RN CM  Ext [phone]

## 2017-05-01 NOTE — PROGRESS NOTES
Problem: Self Care Deficits Care Plan (Adult)  Goal: *Acute Goals and Plan of Care (Insert Text)  Occupational Therapy Goals  Initiated 4/27/2017  1. Patient will perform grooming while seated EOB with set-up only within 7 day(s). 2. Patient will perform upper body dressing with supervision/set-up within 7 day(s). 3. Patient will perform lower body dressing with moderate assistance within 7 day(s). 4. Patient will perform toilet transfers with moderate assistance of 1 within 7 day(s). 5. Patient will perform all aspects of toileting with moderate assistance within 7 day(s). OCCUPATIONAL THERAPY TREATMENT  Patient: Lyla Hankins (85 y.o. male)  Date: 5/1/2017  Diagnosis: Perforated Viscous  Perforated duodenal bulb ulcer ([address]  KIDNEY STONE, HYDRONEPHROSIS Perforated viscus  Procedure(s) (LRB):  CYSTOSCOPY RIGHT PYELOGRAM INSERTION RIGHT URETERAL STENT (Right) 10 Days Post-Op  Precautions: Fall      ASSESSMENT:  Pt with increased bowel incontinence and diarrhea this date. RN requested pt remain in bed due to frequent need for cleaning. Pt agreeable to participate in supine UE and LE exercises, pt has baseline R UE and LE hemiplegia with increased tone in both upper and lower extremities. Pt completed exercises bilaterally with AROM L UE to AAROM R UE 2* increased tone. Pt with decreased processing and sequencing for exercises, required mod tactile and verbal cues for proper form during exercises. Pt with improved ROM R UE and LE after gentle stretching and oscillation. Recommend SNF rehab. Progression toward goals:  [ ]       Improving appropriately and progressing toward goals  [X]       Improving slowly and progressing toward goals  [ ]       Not making progress toward goals and plan of care will be adjusted       PLAN:  Patient continues to benefit from skilled intervention to address the above impairments. Continue treatment per established plan of care.   Discharge Recommendations:  Skilled Nursing Facility  Further Equipment Recommendations for Discharge:  TBD       SUBJECTIVE:   Patient stated ok we can try.       OBJECTIVE DATA SUMMARY:   Cognitive/Behavioral Status:  Neurologic State: Alert; Appropriate for age  Orientation Level: Disoriented to time;Oriented to person;Oriented to place;Oriented to situation  Cognition: Decreased attention/concentration; Follows commands; Impaired decision making;Recognition of people/places         Pt with increased bowel movements this date after change in diet. RN requested no OOB this date due to need for frequent cleaning due to incontinence. Therapeutic Exercises:   Pt completed 10 reps of AROM of L UE elbow flexion/extension, shoulder flexion/extension, L LE hip adduction/abduction, knee flexion/extension, ankle dorsiflexion/plantarflexion     AAROM R UE elbow flexion/extension, shoulder flexion/extension, L LE hip adduction/abduction, knee flexion/extension, ankle dorsiflexion/plantarflexion. Provided gentle stretch to pectoralis muscles with horizontal abduction and ER of R shoulder     After exercises noted pt with increased ROM and functional use of R UE for simulated ADL task. Pain Scale 1: Numeric (0 - 10)  Pain Intensity 1: 0              Activity Tolerance:   VSS  Please refer to the flowsheet for vital signs taken during this treatment.   After treatment:   [ ] Patient left in no apparent distress sitting up in chair  [X] Patient left in no apparent distress in bed  [X] Call bell left within reach  [X] Nursing notified  [X] Caregiver present  [ ] Bed alarm activated      COMMUNICATION/COLLABORATION:   The patients plan of care was discussed with: Physical Therapist and Registered Nurse     Amaury Petit OT  Time Calculation: 18 mins

## 2017-05-01 NOTE — PROGRESS NOTES
Riley called [address] [phone] to follow up on receipt of referral.  Ismael Lloyd at [address] - she will research and return call.     Juan Hall RN CM  Ext [phone]

## 2017-05-01 NOTE — PROGRESS NOTES
[facility] cannot provide service at this time. SNF list provided to patient with [address].     Cindy Pugh RN CM  Ext [phone]

## 2017-05-01 NOTE — PROGRESS NOTES
Cm met with patient to discuss discharge planning. Mr. Ramiro Gallegos has differed decisions regarding facility to his Bartholome Shilo [phone]. Sister informed that [facility] is not able to provide service at this time. Ms. Fatoumata Rosario has asked for a referral to be submitted to [address] [place] [address]. Referral submitted via ecin. Ms. Fatoumata Rosario has asked that MD call to review plan of care and medical progress. RN informed. FYI on chart.      Mary Ellen Virk RNCM

## 2017-05-01 NOTE — PROGRESS NOTES
Admit Date: 2017    POD 10 Days Post-Op    Procedure:  Procedure(s):  CYSTOSCOPY RIGHT PYELOGRAM INSERTION RIGHT URETERAL STENT    Subjective:     Patient has no new complaints. Bing full liquids well. No YARON o/p    Objective:     Blood pressure 142/58, pulse 72, temperature 98.2 °F (36.8 °C), resp. rate 20, height 5' 9\" (1.753 m), weight 220 lb 10.9 oz (100.1 kg), SpO2 97 %. Temp (24hrs), Av.2 °F (36.8 °C), Min:98 °F (36.7 °C), Max:98.4 °F (36.9 °C)      Physical Exam:  GENERAL: alert, cooperative, no distress, appears stated age, LUNG: clear to auscultation bilaterally, HEART: regular rate and rhythm, S1, S2 normal, no murmur, click, rub or gallop, ABDOMEN: soft, non-tender.  Bowel sounds normal. No masses,  no organomegaly, wound c/d/i, EXTREMITIES:  extremities normal, atraumatic, no cyanosis or edema    Labs:   Recent Results (from the past 24 hour(s))   GLUCOSE, POC    Collection Time: 17 11:28 AM   Result Value Ref Range    Glucose (POC) 196 (H) 65 - 100 mg/dL    Performed by [address] Qu[address], POC    Collection Time: 17  6:11 PM   Result Value Ref Range    Glucose (POC) 124 (H) 65 - 100 mg/dL    Performed by [address], POC    Collection Time: 17 12:51 AM   Result Value Ref Range    Glucose (POC) 122 (H) 65 - 100 mg/dL    Performed by Gaby HACKETT    CBC W/O DIFF    Collection Time: 17  4:22 AM   Result Value Ref Range    WBC 9.0 4.1 - 11.1 K/uL    RBC 3.43 (L) 4.10 - 5.70 M/uL    HGB 9.4 (L) 12.1 - 17.0 g/dL    HCT 29.4 (L) 36.6 - 50.3 %    MCV 85.7 80.0 - 99.0 FL    MCH 27.4 26.0 - 34.0 PG    MCHC 32.0 30.0 - 36.5 g/dL    RDW 14.6 (H) 11.5 - 14.5 %    PLATELET 482 (H) 747 - 400 K/uL   GLUCOSE, POC    Collection Time: 17  5:20 AM   Result Value Ref Range    Glucose (POC) 137 (H) 65 - 100 mg/dL    Performed by Antolin Tristan Review images and reports reviewed    Assessment:     Principal Problem:    Perforated viscus (4/17/2017)    Active Problems:    Acute renal failure (ARF) ([facility] [address] (3/1/2017)      Hydroureter on right (4/17/2017)      Perforated duodenal bulb ulcer ([address] (4/18/2017)      Hyponatremia (4/18/2017)      SVT (supraventricular tachycardia) ([address] (4/19/2017)      Overview: Approximately 220 BPM on tele 4/19/17        Plan/Recommendations/Medical Decision Making:     Continue present treatment   Amaya out, still with right ureteral stent  Pt will benefit from rehab placement, screening requested  GI lite diet  YARON's out after taking gi lite  Ready for d/c to rehab when bed available. On treatment for h pylori    Guerrero Galindo MD, [facility]

## 2017-05-01 NOTE — PROGRESS NOTES
5/1/2017 10:46 AM    Admit Date: 4/17/2017    Admit Diagnosis:   Perforated Viscous; Perforated duodenal bulb ulcer (HCC);KID*    Subjective:     Rolf Clark denies chest pain or shortness of breath. He is eating solid food today.     Current Facility-Administered Medications   Medication Dose Route Frequency    metoprolol tartrate (LOPRESSOR) tablet 50 mg  50 mg Oral Q12H    metoprolol (LOPRESSOR) injection 2.5 mg  2.5 mg IntraVENous Q6H PRN    clarithromycin (BIAXIN) tablet 500 mg  500 mg Oral BID    insulin lispro (HUMALOG) injection   SubCUTAneous Q6H    glucose chewable tablet 16 g  4 Tab Oral PRN    dextrose (D50W) injection syrg 12.5-25 g  12.5-25 g IntraVENous PRN    glucagon (GLUCAGEN) injection 1 mg  1 mg IntraMUSCular PRN    acetaminophen (TYLENOL) suppository 650 mg  650 mg Rectal Q4H PRN    perflutren lipid microspheres (DEFINITY) 1.1 mg/mL contrast injection        enoxaparin (LOVENOX) injection 40 mg  40 mg SubCUTAneous Q24H    sodium chloride (NS) flush 10-30 mL  10-30 mL InterCATHeter PRN    sodium chloride (NS) flush 10 mL  10 mL InterCATHeter Q24H    sodium chloride (NS) flush 10 mL  10 mL InterCATHeter PRN    sodium chloride (NS) flush 10-40 mL  10-40 mL InterCATHeter Q8H    sodium chloride (NS) flush 20 mL  20 mL InterCATHeter Q24H    heparin (porcine) pf 300 Units  300 Units InterCATHeter PRN    piperacillin-tazobactam (ZOSYN) 3.375 g in 0.9% sodium chloride (MBP/ADV) 100 mL  3.375 g IntraVENous Q8H    timolol (TIMOPTIC) 0.25% ophthalmic solution  1-2 Drop Both Eyes BID    sodium chloride (NS) flush 5-10 mL  5-10 mL IntraVENous Q8H    sodium chloride (NS) flush 5-10 mL  5-10 mL IntraVENous PRN    HYDROmorphone (PF) (DILAUDID) injection 0.5 mg  0.5 mg IntraVENous Q2H PRN    ondansetron (ZOFRAN) injection 4 mg  4 mg IntraVENous Q4H PRN    pantoprazole (PROTONIX) 40 mg in sodium chloride 0.9 % 10 mL injection  40 mg IntraVENous Q12H         Objective:      Physical Exam:    Visit Vitals    /58 (BP 1 Location: Left arm, BP Patient Position: At rest)    Pulse 72    Temp 98.2 °F (36.8 °C)    Resp 20    Ht 5' 9\" (1.753 m)    Wt 220 lb 10.9 oz (100.1 kg)    SpO2 97%    BMI 32.59 kg/m2     Gen:  NAD  Mental Status - Alert. General Appearance - Not in acute distress. Chest and Lung Exam   Inspection: Accessory muscles - No use of accessory muscles in breathing. Auscultation:   Breath sounds: - Normal.   Cardiovascular   Inspection: Jugular vein - Bilateral - Inspection Normal.   Palpation/Percussion:   Apical Impulse: - Normal.   Auscultation: Rhythm - Regular. Heart Sounds - S1 WNL and S2 WNL. No S3 or S4. Murmurs & Other Heart Sounds: Auscultation of the heart reveals - No Murmurs. Peripheral Vascular   Upper Extremity: Inspection - Bilateral - No Cyanotic nailbeds or Digital clubbing. Lower Extremity:   Palpation: Edema - Bilateral - No edema. Abdomen:   Soft, non-tender, bowel sounds are active. Neuro: A&O times 3, CN and motor grossly WNL    Data Review:   Recent Labs      05/01/17   0422  04/29/17   0330   WBC  9.0  12.3*   HGB  9.4*  9.7*   HCT  29.4*  29.4*   PLT  467*  475*     Recent Labs      04/29/17   0330   NA  134*   K  4.2   CL  100   CO2  26   GLU  186*   BUN  30*   CREA  1.10   CA  9.0       No results for input(s): TROIQ, CPK, CKMB in the last 72 hours.       Intake/Output Summary (Last 24 hours) at 05/01/17 1046  Last data filed at 05/01/17 0900   Gross per 24 hour   Intake              500 ml   Output             2425 ml   Net            -1925 ml        Telemetry: normal sinus rhythm    Assessment:     Principal Problem:    Perforated viscus (4/17/2017)    Active Problems:    Acute renal failure (ARF) ([address] (3/1/2017)      Hydroureter on right (4/17/2017)      Perforated duodenal bulb ulcer ([address] (4/18/2017)      Hyponatremia (4/18/2017)      SVT (supraventricular tachycardia) ([address] (4/19/2017)      Overview: Approximately 220 BPM on tele 4/19/17        Plan:     SVT: in setting of illness/surgery. Electrolytes stable. No significant pathology on ECHO. Still with some occasional bursts of SVT. · Change IV to higher dose p.o. metoprolol at 50 mg now and every 12 hours  · Overlap IV beta-blocker as needed for heart rate greater than 150 for greater than 3 minutes  · Continue to monitor electrolytes and treat underlying condition.    · May eventually benefit from ablation if persistent SVT after he recovers from his illness more fully

## 2017-05-01 NOTE — PROGRESS NOTES
Chart review. Cardiology is seeing patient. Reviewed with RODERICK Pringle to discuss discharge planning. Patient will need to be on PO Beta Blockers for 24 hours prior to discharge. Cm will review SNF facilities with patient. Anticipate d/c possibly 5/2/2017.     3559 Tasha Tristan RN CM  Ext [phone]

## 2017-05-02 NOTE — PROGRESS NOTES
[facility]. and Rehab can provide service. FYI to MD.    Family has not yet been informed. Waiting on [employer] decision.     [address] [facility]  Ext [phone]

## 2017-05-02 NOTE — PROGRESS NOTES
Dr. Young Hess informed CM that patient needs to go to rehab follwing d/c from [facility]. Strongly recommends rehab vs. SNF. CM will submit referrals to area Rehab centers. [facility] has reviewed and declined services.     Ana Laura Ross RN CM  Ext [phone]

## 2017-05-02 NOTE — PROGRESS NOTES
Admit Date: 2017    POD 11 Days Post-Op    Procedure:  Procedure(s):  CYSTOSCOPY RIGHT PYELOGRAM INSERTION RIGHT URETERAL STENT    Subjective:     Patient has no new complaints. Bing diet well. No YARON o/p    Objective:     Blood pressure 116/59, pulse 85, temperature 97.3 °F (36.3 °C), resp. rate 22, height 5' 9\" (1.753 m), weight 216 lb 0.8 oz (98 kg), SpO2 95 %. Temp (24hrs), Av.2 °F (36.8 °C), Min:97.3 °F (36.3 °C), Max:98.9 °F (37.2 °C)      Physical Exam:  GENERAL: alert, cooperative, no distress, appears stated age, LUNG: clear to auscultation bilaterally, HEART: regular rate and rhythm, S1, S2 normal, no murmur, click, rub or gallop, ABDOMEN: soft, non-tender.  Bowel sounds normal. No masses,  no organomegaly, wound c/d/i, EXTREMITIES:  extremities normal, atraumatic, no cyanosis or edema    Labs:   Recent Results (from the past 24 hour(s))   GLUCOSE, POC    Collection Time: 17 12:11 PM   Result Value Ref Range    Glucose (POC) 198 (H) 65 - 100 mg/dL    Performed by Papo Headley    GLUCOSE, POC    Collection Time: 17  5:12 PM   Result Value Ref Range    Glucose (POC) 105 (H) 65 - 100 mg/dL    Performed by Torrie Garcia, POC    Collection Time: 17  1:33 AM   Result Value Ref Range    Glucose (POC) 131 (H) 65 - 100 mg/dL    Performed by Roman Wang    GLUCOSE, POC    Collection Time: 17  5:50 AM   Result Value Ref Range    Glucose (POC) 133 (H) 65 - 100 mg/dL    Performed by Antolin Tristan Review images and reports reviewed    Assessment:     Principal Problem:    Perforated viscus (2017)    Active Problems:    Acute renal failure (ARF) ([address] (3/1/2017)      Hydroureter on right (2017)      Perforated duodenal bulb ulcer ([address] (2017)      Hyponatremia (2017)      SVT (supraventricular tachycardia) ([facility] [address] (2017)      Overview: Approximately 220 BPM on tele 17        Plan/Recommendations/Medical Decision Making: Continue present treatment   Amaya out, still with right ureteral stent  Pt will benefit from rehab placement, denied by [facility]  GI lite diet  YARON's out today  Ready for d/c to rehab when accepted. [employer] out on discharge  On treatment for h pylori    Colie Zachary Moulton MD, [facility]

## 2017-05-02 NOTE — PROGRESS NOTES
Update for Acute Rehab referrals:    [facility] - no unable to provide service  [facility] - no unable to provide service -   [facility] - reviewing case  [address] - pending      [address]  cannot provide service - : No, unable to accept patient Patient does not meet acute inpatient criteria. Rec. SNF level of care or home with home health if patient has assistance at home. [address]. FYI to Dr. Heaven Pimentel.       Dileep Wyatt RN CM  Ext [phone]

## 2017-05-02 NOTE — PROGRESS NOTES
CM informed G[facility]mez Mojica that [address] [address] is unable to provide care at this time. Informed Ms. Cassidy Lorenzo that Dr. Eloisa Webster prefers Rehab vs. SNF for discharge plan. MsRachelle Cassidy Lroenzo verbalized understanding. Reviewed Acute Rehab Facilities with Ms. Cassidy Lorenzo. Referral is to be submitted to:  [facility], [facility]. CM informed [facility] and Rehab of Dr. Dylan Segovia preference. FOC copy on chart and in room. Referrals submitted via ecin.     Pedro Roblero RN CM  Ext [phone]

## 2017-05-02 NOTE — PROGRESS NOTES
PCU SHIFT NURSING NOTE      Bedside shift change report given to Sonia Rodriguez RN (oncoming nurse) by Lopez Peña RN (offgoing nurse). Report included the following information SBAR, Kardex and Recent Results. Shift Summary:     2000:  Pt lying in bed with his eyes open. Alert and oriented x3. Denies pain, SOB or chest pain. VSS. Lung fields clear throughout. No edema noted. Pt repositioned in bed. External catheter patent and intact. Safety precautions in place. Will continue to monitor. 2140:  HR escalated to 170-180 range while RN in room with pt. Pt denies any palpitations or chest pain. No signs of distress noted. 2143:  HR maintaining in the 80-90 range. Will continue to monitor. 0250:  PRN Metoprolol 2.5 mg, given via IV for sustained HR upper 127-130. Will continue to monitor. 0340:  HR currently 125. Will continue to monitor. 0500:  Spoke with Dr. Natividad Hager in regards to pt's sustained HR in the upper 120's, lower 130's. New orders received to try an initial bolus of Cardizem 10 mg. If that is effective, Dr. Natividad Hager wishes to begin Cardizem 30 mg PO every 6 hours. However, if the bolus is not effective, Dr. Natividad Hager has requested that the pt be placed on a Cardizem drip titrated between 5-15. Discussed with Dr. Natividad Hager the possible cardiac issues that could occur due to the pt also taking Biaxin. Dr. Natividad Hager advised to continue with the Cardizem order. Will continue to monitor. 1105:  10 mg bolus of Cardizem given via IV. Will continue to monitor. 0540:  30 mg Cardizem given PO. . Will continue to monitor. 0555:  -121. Discussed Cardizem effectiveness, timeframe of PO form with Pharmacist, Cara Johnston. Will continue to monitor. 6497:  HR 80. Will continue to monitor.           Admission Date 4/17/2017   Admission Diagnosis Perforated Viscous  Perforated duodenal bulb ulcer (HCC)  KIDNEY STONE, HYDRONEPHROSIS   Consults IP CONSULT TO UROLOGY  IP CONSULT TO CARDIOLOGY        Consults   [x]PT   [x]OT   []Speech   []Case Management      [] Palliative      Cardiac Monitoring Order   [x]Yes   []No     IV drips   []Yes    Drip:                            Dose:  Drip:                            Dose:  Drip:                            Dose:   [x]No     GI Prophylaxis   []Yes   []No         DVT Prophylaxis   SCDs:  Sequential Compression Device: Bilateral          Sammy stockings:         [] Medication   []Contraindicated   []None      Activity Level Activity Level: Bed Rest, Up with Assistance     Activity Assistance: Partial (two people)   Purposeful Rounding every 1-2 hour? [x]Yes   Dexter Score  Total Score: 4   Bed Alarm (If score 3 or >)   []Yes   [] Refused (See signed refusal form in chart)   Pako Score  Pako Score: 14   Pako Score (if score 14 or less)   []PMT consult   []Wound Care consult      []Specialty bed   [] Nutrition consult          Needs prior to discharge:   Home O2 required:    []Yes   [x]No    If yes, how much O2 required?     Other:    Last Bowel Movement: Last Bowel Movement Date: 04/30/17      Influenza Vaccine Received Flu Vaccine for Current Season (usually Sept-March): Not Flu Season        Pneumonia Vaccine           Diet Active Orders   Diet    DIET GI LITE (POST SURGICAL)      LDAs         PICC Double Lumen 89/30/58 Right;Basilic (Active)   Central Line Being Utilized Yes 5/1/2017  6:00 PM   Criteria for Appropriate Use Long term IV/antibiotic administration 5/1/2017  6:00 PM   Site Assessment Clean, dry, & intact 5/1/2017  6:00 PM   Phlebitis Assessment 0 5/1/2017  6:00 PM   Infiltration Assessment 0 5/1/2017  6:00 PM   Arm Circumference (cm) 33 cm 4/19/2017 12:18 PM   Date of Last Dressing Change 04/25/17 5/1/2017  6:00 PM   Dressing Status Clean, dry, & intact 5/1/2017  6:00 PM   Action Taken Open ports on tubing capped 5/1/2017  6:00 PM   External Catheter Length (cm) 0 centimeters 4/19/2017 12:18 PM   Dressing Type Disk with Chlorhexadine gluconate (CHG); Transparent 5/1/2017  6:00 PM   Hub Color/Line Status Purple; Infusing;Flushed 5/1/2017  6:00 PM   Positive Blood Return (Site #1) Yes 5/1/2017  6:00 PM   Hub Color/Line Status Red;Capped;Flushed 5/1/2017  6:00 PM   Positive Blood Return (Site #2) Yes 5/1/2017  6:00 PM   Alcohol Cap Used Yes 5/1/2017  6:00 PM                            Urinary Catheter [REMOVED] Urinary Catheter 04/18/17 2- way; Amaya-Criteria for Appropriate Use: Obstruction/retention  [REMOVED] Urinary Catheter 04/21/17 2- way; Amaya-Criteria for Appropriate Use: Surgical procedure    Intake & Output   Date 04/30/17 1900 - 05/01/17 0659 05/01/17 0700 - 05/02/17 0659   Shift 5878-2075 24 Hour Total 9011-5598 2656-0668 24 Hour Total   I  N  T  A  K  E   I.V.  (mL/kg/hr) 400 500 100  (0.1)  100      Cardizem Volume  0         Volume (piperacillin-tazobactam (ZOSYN) 3.375 g in 0.9% sodium chloride (MBP/ADV) 100 mL) 400 500 100  100    Shift Total  (mL/kg) 400  (4) 500  (5) 100  (1)  100  (1)   O  U  T  P  U  T   Urine  (mL/kg/hr) 975 2750 1075  (0.9)  1075      Urine Voided 975 2750 1075  1075    Drains  0 0  0      Output (ml) ([REMOVED] Rojas Drain #1 04/18/17 Left Abdomen)  0 0  0      Output (ml) ([REMOVED] Rojas Drain #1 04/18/17 Right Abdomen)  0 0  0    Stool           Stool Occurrence(s) 1 x 1 x 5 x  5 x    Shift Total  (mL/kg) 975  (9.7) 2750  (27.5) 1075  (10.7)  1075  (10.7)   NET -575 -2250 -975  -975   Weight (kg) 100.1 100.1 100.1 100.1 100.1         Readmission Risk Assessment Tool Score Medium Risk            20       Total Score        3 Relationship with PCP    2 Patient Living Status    3 Patient Length of Stay > 5    4 More than 1 Admission in calendar year    5 Patient Insurance is Medicare, Medicaid or Self Pay    3 Charlson Comorbidity Score       Expected Length of Stay 5d 12h   Actual Length of Stay 13

## 2017-05-02 NOTE — PROGRESS NOTES
[address] cannot provide service. 850am - referrals submitted to [facility] via cclink. CM will continue to follow.     [address] [facility]  Ext  [phone]

## 2017-05-02 NOTE — PROGRESS NOTES
Nutrition Assessment:    INTERVENTIONS/RECOMMENDATIONS:   Meals/Snacks: General/healthful diet  If Po intake trends <50%, will add Glucerna daily. ASSESSMENT:   Chart reviewed; Pt seen at bedside with no family members present. Pt states that his appetite is fine and he is receiving an adequate amount of food. Pt denies any N/V or difficulty chewing/swallowing. Pt states he had 4 occurrences of diarrhea last night. Pt states he feeds himself without assistance. Noted untouched breakfast tray at time of visit.; encouraged PO intake at meal times to meet energy needs. Supplements discussed, pt denied Glucerna. Appetite/PO intake discussed with nurse. Nurse states that the pt did well yesterday during meal times with GI Lite diet. Nurses place pt upward in bed or in a chair for feedings and assist in cutting foods due to right side weakness. Will monitor tolerance to diet and need for ONS. Diet Order: Other (comment) (GI Lite)  % Eaten:  No data found. Pertinent Medications: [x] Reviewed []Other: Biaxin; Cardizem; Lovenox; Heparin; Dilaudid; Humalog; Lopressor; Zofran; Protonix; Definity; Zosyn  Pertinent Labs: [x]Reviewed  []Other: BG range 105-198; Na 134  Food Allergies: [x]None []Other:     Last BM: 5/1   [x]Active     []Hyperactive  []Hypoactive       [] Absent  BS  Skin:    [] Intact   [x] Incision  [] Breakdown   []Edema   []Other:    Anthropometrics: Height: 5' 9\" (175.3 cm) Weight: 98 kg (216 lb 0.8 oz)    IBW (%IBW):   ( ) UBW (%UBW):   (  %)    BMI: Body mass index is 31.91 kg/(m^2).     This BMI is indicative of:  []Underweight   []Normal   []Overweight   [x] Obesity   [] Extreme Obesity (BMI>40)  Last Weight Metrics:  Weight Loss Metrics 5/2/2017 3/3/2017 3/1/2017 11/7/2016 1/7/2016 8/17/2015 11/19/2014   Today's Wt 216 lb 0.8 oz 210 lb 220 lb 225 lb 246 lb 3.2 oz 247 lb 231 lb   BMI 31.91 kg/m2 31.01 kg/m2 31.57 kg/m2 33.23 kg/m2 36.34 kg/m2 36.46 kg/m2 34.1 kg/m2       Estimated Nutrition Needs (Based on): 1835 Kcals/day (bmr 1738 x 1.2 AF - 250kcals) , 98 g (1.0g/kg bw) Protein  Carbohydrate: At Least 130 g/day  Fluids: 1850 mL/day     Pt expected to meet estimated nutrient needs: [x]Yes []No    NUTRITION DIAGNOSES:   Problem:  Altered GI function      Etiology: related to perforared duodenal bulb ulcer, s/p ex lap     Signs/Symptoms: as evidenced by NPO, NGT to suction, TPN starting      Prior Dx: Resolved    NUTRITION INTERVENTIONS:  Meals/Snacks: General/healthful diet      GOAL:   Pt will consume >50% meals next 2-4 days    Previous Goal: TPN Advanced to goal with electrolytes WNL next 2-4 days  Resolved. NUTRITION MONITORING AND EVALUATION   Behavioral-Environmental Outcomes: Behavior  Food/Nutrient Intake Outcomes:  Total energy intake  Physical Signs/Symptoms Outcomes: Weight/weight change, GI profile, Glucose profile, Electrolyte and renal profile, GI    Previous Goal Met:   [x] Met              [] Progressing Towards Goal              [] Not Progressing Towards Goal   Previous Recommendations:   [x] Implemented          [] Not Implemented          [] Not Applicable    LEARNING NEEDS (Diet, Food/Nutrient-Drug Interaction):    [x] None Identified   [] Identified and Education Provided/Documented   [] Identified and Pt declined/was not appropriate     Cultural, Adventist, OR Ethnic Dietary Needs:    [x] None Identified   [] Identified and Addressed     [x] Interdisciplinary Care Plan Reviewed/Documented    [x] Discharge Planning: TBD   [] Participated in Interdisciplinary Rounds    NUTRITION RISK:    [] High              [x] Moderate           []  Low  []  Minimal/Uncompromised      Marjorie Rios  Pager [phone]  Weekend Pager [phone]

## 2017-05-03 NOTE — PROGRESS NOTES
Med reconcile did not list metoprolol that he was started on at [facility] prior to admission. Also was taking Levaquin PTA but no insulin or any DM med's when left [facility]. Had to call cardiology to add the new cardiology med's. The pt needs these due to SVT and recent Afib. New AVS printed sent and reviewed with pt's sister.  Report to [address], RN

## 2017-05-03 NOTE — PROGRESS NOTES
Problem: Self Care Deficits Care Plan (Adult)  Goal: *Acute Goals and Plan of Care (Insert Text)  Occupational Therapy Goals  Initiated 4/27/2017  1. Patient will perform grooming while seated EOB with set-up only within 7 day(s). 2. Patient will perform upper body dressing with supervision/set-up within 7 day(s). 3. Patient will perform lower body dressing with moderate assistance within 7 day(s). 4. Patient will perform toilet transfers with moderate assistance of 1 within 7 day(s). 5. Patient will perform all aspects of toileting with moderate assistance within 7 day(s). OCCUPATIONAL THERAPY TREATMENT  Patient: Wade Gonzalez (9 y.o. male)  Date: 5/3/2017  Diagnosis: Perforated Viscous  Perforated duodenal bulb ulcer ([facility] [address]  KIDNEY STONE, HYDRONEPHROSIS Perforated viscus  Procedure(s) (LRB):  CYSTOSCOPY RIGHT PYELOGRAM INSERTION RIGHT URETERAL STENT (Right) 12 Days Post-Op  Precautions: Fall      ASSESSMENT:  Pt was sitting in recliner and cleared by nursing. Pt had difficulty with motor planning and was not able to perform shoulder retraction even with max physical cues and verbal cues. Pt was max of 2 to stand and worked on pt standing upright and tried to have pt take one hand off the walker but he could not stand up right in order to do this. Attempted second stand and was max assist of 2 , gave max physical cues to hold his shoulders back and push through his arms. Pt sat down and OT worked on BUE exercises and holding shoulders back and scooting forward in his chair, and pt was total assist for scooting. Recommend that pt have further therapy at discharge and SNF. Progression toward goals:  [ ]       Improving appropriately and progressing toward goals  [X]       Improving slowly and progressing toward goals  [ ]       Not making progress toward goals and plan of care will be adjusted       PLAN:  Patient continues to benefit from skilled intervention to address the above impairments.   Continue treatment per established plan of care. Discharge Recommendations:  Cyril Fleming  Further Equipment Recommendations for Discharge:  tbd       SUBJECTIVE:   Patient stated Gifty Aggarwal   Thank you.       OBJECTIVE DATA SUMMARY:   Cognitive/Behavioral Status:  Neurologic State: Alert  Orientation Level: Oriented X4  Cognition: Impaired decision making  Perception: Appears intact  Perseveration: No perseveration noted  Safety/Judgement: Fall prevention     Functional Mobility and Transfers for ADLs:  Bed Mobility:  Rolling: Stand-by asssistance  Supine to Sit: Moderate assistance;Assist x1;Additional time  Scooting: Minimum assistance;Assist x1;Additional time     Transfers:  Sit to Stand: Moderate assistance;Assist x1;Additional time        Balance:  Sitting: Intact; Without support  Standing: Impaired; With support  Standing - Static: Fair;Constant support  Standing - Dynamic : Poor     ADL Intervention:     Total assist for ADLs. Cognitive Retraining  Safety/Judgement: Fall prevention        Pain:  Pain Scale 1: Numeric (0 - 10)  Pain Intensity 1: 0              Activity Tolerance:   vss  Please refer to the flowsheet for vital signs taken during this treatment.   After treatment:   [X] Patient left in no apparent distress sitting up in chair  [ ] Patient left in no apparent distress in bed  [X] Call bell left within reach  [ ] Nursing notified  [ ] Caregiver present  [X] Bed alarm activated      COMMUNICATION/COLLABORATION:   The patients plan of care was discussed with: Physical Therapist and Registered Nurse     Faisal Bright OT  Time Calculation: 22 mins

## 2017-05-03 NOTE — PROGRESS NOTES
[address] [place], [address] [facility]  [phone]      Cardiology Progress Note      5/3/2017 1130AM    Admit Date: 4/17/2017    Admit Diagnosis:   Perforated Viscous  Perforated duodenal bulb ulcer (HCC)  KIDNEY STONE, HYDRONEPHROSIS    Subjective:     Lea Woodruff is a 79 y.o. male with PMH DM, CVA, HLD, HTN, colon CA who was admitted for a perforated viscous/duodenal bulb ulcer. Overnight events:  -no episodes of SVT overnight, occasional PVCs  -other VSS  -no labs   -I/O incomplete; weights stable   -Mr. Clark is feeling well today. He denies complaints of pain, SOB, palpitations. He states he's been tolerating his diet well.       Visit Vitals    /58    Pulse 78    Temp 97.5 °F (36.4 °C)    Resp 16    Ht 5' 9\" (1.753 m)    Wt 97.6 kg (215 lb 1.9 oz)    SpO2 98%    BMI 31.77 kg/m2       Current Facility-Administered Medications   Medication Dose Route Frequency    acetaminophen (TYLENOL) tablet 650 mg  650 mg Oral Q4H PRN    dilTIAZem (CARDIZEM) IR tablet 30 mg  30 mg Oral Q6H    metoprolol tartrate (LOPRESSOR) tablet 50 mg  50 mg Oral Q12H    metoprolol (LOPRESSOR) injection 2.5 mg  2.5 mg IntraVENous Q6H PRN    clarithromycin (BIAXIN) tablet 500 mg  500 mg Oral BID    insulin lispro (HUMALOG) injection   SubCUTAneous Q6H    glucose chewable tablet 16 g  4 Tab Oral PRN    dextrose (D50W) injection syrg 12.5-25 g  12.5-25 g IntraVENous PRN    glucagon (GLUCAGEN) injection 1 mg  1 mg IntraMUSCular PRN    acetaminophen (TYLENOL) suppository 650 mg  650 mg Rectal Q4H PRN    perflutren lipid microspheres (DEFINITY) 1.1 mg/mL contrast injection        enoxaparin (LOVENOX) injection 40 mg  40 mg SubCUTAneous Q24H    sodium chloride (NS) flush 10-30 mL  10-30 mL InterCATHeter PRN    sodium chloride (NS) flush 10 mL  10 mL InterCATHeter Q24H    sodium chloride (NS) flush 10 mL  10 mL InterCATHeter PRN    sodium chloride (NS) flush 10-40 mL  10-40 mL InterCATHeter Q8H  sodium chloride (NS) flush 20 mL  20 mL InterCATHeter Q24H    heparin (porcine) pf 300 Units  300 Units InterCATHeter PRN    piperacillin-tazobactam (ZOSYN) 3.375 g in 0.9% sodium chloride (MBP/ADV) 100 mL  3.375 g IntraVENous Q8H    timolol (TIMOPTIC) 0.25% ophthalmic solution  1-2 Drop Both Eyes BID    sodium chloride (NS) flush 5-10 mL  5-10 mL IntraVENous Q8H    sodium chloride (NS) flush 5-10 mL  5-10 mL IntraVENous PRN    HYDROmorphone (PF) (DILAUDID) injection 0.5 mg  0.5 mg IntraVENous Q2H PRN    ondansetron (ZOFRAN) injection 4 mg  4 mg IntraVENous Q4H PRN    pantoprazole (PROTONIX) 40 mg in sodium chloride 0.9 % 10 mL injection  40 mg IntraVENous Q12H       Objective:      Physical Exam:  General Appearance:  pale  male, appears older than stated age sitting in chair in NAD   Chest:   clear; diminished in bases  Cardiovascular:  distant heart sounds; Regular rate and rhythm, no murmur.   Abdomen:   obese, distended; non-tender  Extremities: palpable distal pulses. No edema  Skin:  Warm and dry. pale    Data Review:   Recent Labs      05/01/17   0422   WBC  9.0   HGB  9.4*   HCT  29.4*   PLT  467*     No results for input(s): NA, K, CL, CO2, GLU, BUN, CREA, CA, MG, PHOS, ALB, TBIL, TBILI, SGOT, ALT, INR in the last 72 hours. No lab exists for component: INREXT, INREXT    No results for input(s): TROIQ, CPK, CKMB in the last 72 hours. Intake/Output Summary (Last 24 hours) at 05/03/17 1427  Last data filed at 05/03/17 0653   Gross per 24 hour   Intake              200 ml   Output              875 ml   Net             -675 ml        Telemetry: SR; occasional PVCs  EKG: NSR  Cxray: free intraperitoneal air  ECHO: EF 55-60%;   No wall motion abnormalities;  Mild concentric hypertrophy     Assessment:     Principal Problem:    Perforated viscus (4/17/2017)    Active Problems:    Acute renal failure (ARF) ([address] (3/1/2017)      Hydroureter on right (4/17/2017)      Perforated duodenal bulb ulcer ([facility] [address] (4/18/2017)      Hyponatremia (4/18/2017)      SVT (supraventricular tachycardia) ([facility] [address] (4/19/2017)      Overview: Approximately 220 BPM on tele 4/19/17        Plan:     SVT: improving as clinical condition improves. No SVT overnight. PVCs.  Non-significant ECHO  · Continue PO metop and cardizem  · PRN IV metop for HR > 150 for more than 3 minutes  · Switch cardizem to long-acting tomorrow    · May eventually benefit from ablation if SVT persists despite recovery from current illness           Josie Scott NP  DNP, RN, AGACNP-BC

## 2017-05-03 NOTE — PROGRESS NOTES
Pt has been accepted at [facility] for inpt rehab services and is being d/c today. Pt will travel via ambulance arranged with [facility] with a 3 pm . PCS on chart. Pt, family, and nursing aware. Nursing given the number( [phone]) to call report. EMTALA forms signed by MD; CM section completed. D/C summary faxed to [facility] at . Care Management Interventions  PCP Verified by CM: Yes  Mode of Transport at Discharge: BLS ([facility])  Hospital Transport Time of Discharge: 1500  Transition of Care Consult (CM Consult):  Other (Inpatient Rehab at [facility])  Discharge [address]: No  Physical Therapy Consult: Yes  Occupational Therapy Consult: Yes  Speech Therapy Consult: No  Current Support Network: Anita discussed with Pt/Family/Caregiver: Yes  Freedom of Choice Offered: Yes  Discharge Location  Discharge Placement: Rehab hospital/unit acute ([facility])    Nina Rossi BSW  [phone]

## 2017-05-03 NOTE — DISCHARGE INSTRUCTIONS
Zero9 Activation    Thank you for requesting access to Zero9. Please follow the instructions below to securely access and download your online medical record. Zero9 allows you to send messages to your doctor, view your test results, renew your prescriptions, schedule appointments, and more. How Do I Sign Up? 1. In your internet browser, go to [email or web address]  2. Click on the First Time User? Click Here link in the Sign In box. You will be redirect to the New Member Sign Up page. 3. Enter your Zero9 Access Code exactly as it appears below. You will not need to use this code after youve completed the sign-up process. If you do not sign up before the expiration date, you must request a new code. Zero9 Access Code: [email or web address]  Expires: 2017  4:02 PM (This is the date your Zero9 access code will )    4. Enter the last four digits of your Social Security Number (xxxx) and Date of Birth (mm/dd/yyyy) as indicated and click Submit. You will be taken to the next sign-up page. 5. Create a Zero9 ID. This will be your Zero9 login ID and cannot be changed, so think of one that is secure and easy to remember. 6. Create a Zero9 password. You can change your password at any time. 7. Enter your Password Reset Question and Answer. This can be used at a later time if you forget your password. 8. Enter your e-mail address. You will receive e-mail notification when new information is available in [address] 9. Click Sign Up. You can now view and download portions of your medical record. 10. Click the Download Summary menu link to download a portable copy of your medical information. Additional Information    If you have questions, please visit the Frequently Asked Questions section of the Zero9 website at [email or web address] Remember, Zero9 is NOT to be used for urgent needs. For medical emergencies, dial 911.

## 2017-05-03 NOTE — DISCHARGE SUMMARY
Physician Discharge Summary     Patient ID:  Chaka Mendiola  [id number]  31 y.o.  1950    Admit Date: 4/17/2017    Discharge Date: 5/3/2017    Admission Diagnoses: Perforated Viscous; Perforated duodenal bulb ulcer (HCC);KID*    Discharge Diagnoses:  Principal Problem:    Perforated viscus (4/17/2017)    Active Problems:    Acute renal failure (ARF) ([facility] [address] (3/1/2017)      Hydroureter on right (4/17/2017)      Perforated duodenal bulb ulcer ([facility] [address] (4/18/2017)      Hyponatremia (4/18/2017)      SVT (supraventricular tachycardia) ([facility] [address] (4/19/2017)      Overview: Approximately 220 BPM on tele 4/19/17         Admission Condition: Poor    Discharge Condition: Good    Last Procedure: Procedure(s):  CYSTOSCOPY RIGHT [place] Course:   Normal hospital course for this procedure. Consults: None    Disposition: [facility]    Patient Instructions:   Current Discharge Medication List      START taking these medications    Details   clarithromycin (BIAXIN) 500 mg tablet Take 1 Tab by mouth two (2) times a day for 10 days. Qty: 20 Tab, Refills: 0      pantoprazole (PROTONIX) 40 mg tablet Take 1 Tab by mouth two (2) times a day for 60 days. Qty: 60 Tab, Refills: 1      amoxicillin 500 mg tab Take 1,000 mg by mouth two (2) times a day for 10 days. Qty: 40 Tab, Refills: 0         CONTINUE these medications which have NOT CHANGED    Details   lisinopril (PRINIVIL, ZESTRIL) 10 mg tablet Take 10 mg by mouth daily. atorvastatin (LIPITOR) 40 mg tablet TAKE 1 TABLET BY MOUTH AT BEDTIME  Qty: 90 Tab, Refills: 1      colesevelam (WELCHOL) 625 mg tablet Take 2 Tabs by mouth two (2) times daily (with meals). Qty: 360 Tab, Refills: 1      aspirin delayed-release 81 mg tablet Take 81 mg by mouth daily. timolol (BETIMOL) 0.25 % ophthalmic solution Administer 1-2 Drops to both eyes two (2) times a day.  use in affected eye(s)      ketoconazole (NIZORAL) 2 % topical cream Apply  to affected area daily.      hydrocortisone (ALA-ONEIL) 1 % lotion Apply  to affected area two (2) times a day. use thin layer          STOP taking these medications       OTHER Comments:   Reason for Stopping:             Activity: Activity as tolerated  Diet: Regular Diet  Wound Care: Keep wound clean and dry    Follow-up with Dr. Ann Phan in 2 weeks.   Follow-up tests/labs none    Signed:  Amaris Bajwa MD  [facility]  5/3/2017  9:17 AM

## 2017-05-03 NOTE — PROGRESS NOTES
5/2/2017 8:02 PM    Admit Date: 4/17/2017    Admit Diagnosis:   Perforated Viscous; Perforated duodenal bulb ulcer (HCC);KID*    Subjective:     Rolf Clark denies chest pain or shortness of breath, but had some SVT last night requiring an IV bolus of diltiazem. Started on short acting diltiazem after that.     Current Facility-Administered Medications   Medication Dose Route Frequency    dilTIAZem (CARDIZEM) IR tablet 30 mg  30 mg Oral Q6H    metoprolol tartrate (LOPRESSOR) tablet 50 mg  50 mg Oral Q12H    metoprolol (LOPRESSOR) injection 2.5 mg  2.5 mg IntraVENous Q6H PRN    clarithromycin (BIAXIN) tablet 500 mg  500 mg Oral BID    insulin lispro (HUMALOG) injection   SubCUTAneous Q6H    glucose chewable tablet 16 g  4 Tab Oral PRN    dextrose (D50W) injection syrg 12.5-25 g  12.5-25 g IntraVENous PRN    glucagon (GLUCAGEN) injection 1 mg  1 mg IntraMUSCular PRN    acetaminophen (TYLENOL) suppository 650 mg  650 mg Rectal Q4H PRN    perflutren lipid microspheres (DEFINITY) 1.1 mg/mL contrast injection        enoxaparin (LOVENOX) injection 40 mg  40 mg SubCUTAneous Q24H    sodium chloride (NS) flush 10-30 mL  10-30 mL InterCATHeter PRN    sodium chloride (NS) flush 10 mL  10 mL InterCATHeter Q24H    sodium chloride (NS) flush 10 mL  10 mL InterCATHeter PRN    sodium chloride (NS) flush 10-40 mL  10-40 mL InterCATHeter Q8H    sodium chloride (NS) flush 20 mL  20 mL InterCATHeter Q24H    heparin (porcine) pf 300 Units  300 Units InterCATHeter PRN    piperacillin-tazobactam (ZOSYN) 3.375 g in 0.9% sodium chloride (MBP/ADV) 100 mL  3.375 g IntraVENous Q8H    timolol (TIMOPTIC) 0.25% ophthalmic solution  1-2 Drop Both Eyes BID    sodium chloride (NS) flush 5-10 mL  5-10 mL IntraVENous Q8H    sodium chloride (NS) flush 5-10 mL  5-10 mL IntraVENous PRN    HYDROmorphone (PF) (DILAUDID) injection 0.5 mg  0.5 mg IntraVENous Q2H PRN    ondansetron (ZOFRAN) injection 4 mg  4 mg IntraVENous Q4H PRN  pantoprazole (PROTONIX) 40 mg in sodium chloride 0.9 % 10 mL injection  40 mg IntraVENous Q12H         Objective:      Physical Exam:    Visit Vitals    /56 (BP 1 Location: Right arm, BP Patient Position: At rest)    Pulse 73    Temp 98.2 °F (36.8 °C)    Resp 18    Ht 5' 9\" (1.753 m)    Wt 216 lb 0.8 oz (98 kg)    SpO2 97%    BMI 31.91 kg/m2     Gen:  NAD  Mental Status - Alert. General Appearance - Not in acute distress. Chest and Lung Exam   Inspection: Accessory muscles - No use of accessory muscles in breathing. Auscultation:   Breath sounds: - Normal.   Cardiovascular   Inspection: Jugular vein - Bilateral - Inspection Normal.   Palpation/Percussion:   Apical Impulse: - Normal.   Auscultation: Rhythm - Regular. Heart Sounds - S1 WNL and S2 WNL. No S3 or S4. Murmurs & Other Heart Sounds: Auscultation of the heart reveals - No Murmurs. Peripheral Vascular   Upper Extremity: Inspection - Bilateral - No Cyanotic nailbeds or Digital clubbing. Lower Extremity:   Palpation: Edema - Bilateral - No edema. Abdomen:   Soft, non-tender, bowel sounds are active. Neuro: A&O times 3, CN and motor grossly WNL    Data Review:   Recent Labs      05/01/17   0422   WBC  9.0   HGB  9.4*   HCT  29.4*   PLT  467*     No results for input(s): NA, K, CL, CO2, GLU, BUN, CREA, CA, MG, PHOS, ALB, TBIL, TBILI, SGOT, ALT, INR in the last 72 hours. No lab exists for component: INREXT    No results for input(s): TROIQ, CPK, CKMB in the last 72 hours.       Intake/Output Summary (Last 24 hours) at 05/02/17 2002  Last data filed at 05/02/17 1457   Gross per 24 hour   Intake              300 ml   Output             2200 ml   Net            -1900 ml        Telemetry: normal sinus rhythm with some SVT overnight    Assessment:     Principal Problem:    Perforated viscus (4/17/2017)    Active Problems:    Acute renal failure (ARF) ([facility]) (3/1/2017)      Hydroureter on right (4/17/2017)      Perforated duodenal bulb ulcer ([facility] [address] (4/18/2017)      Hyponatremia (4/18/2017)      SVT (supraventricular tachycardia) ([facility] [address] (4/19/2017)      Overview: Approximately 220 BPM on tele 4/19/17        Plan:     SVT: in setting of illness/surgery. Electrolytes stable. No significant pathology on ECHO. Still with some occasional bursts of SVT. · Changed IV to higher dose p.o. metoprolol at 50 mg now and every 12 hours  · Overlap IV beta-blocker as needed for heart rate greater than 150 for greater than 3 minutes  · Added p.o. short acting diltiazem after a bolus of IV diltiazem 5/1/17  · Continue to monitor electrolytes and treat underlying condition.    · May eventually benefit from ablation if persistent SVT after he recovers from his illness more fully

## 2017-05-03 NOTE — PROGRESS NOTES
Follow up visit with patient on PCU. No visitors present. Provided pastoral presence. Patient appeared sleepy this morning and not very talkative. Acknowledged having good spiritual support and being receptive to assurance of prayer. Patient is to be discharged sometime today.   JAY Mayes, [facility], [address] [place] [address] [address]     Paging Service  [phone]

## 2017-05-14 NOTE — IP AVS SNAPSHOT
[address] [place] [address] 
[phone] Patient: [place] Guerrero MRN: [id number] SHEILA:[birth year] You are allergic to the following Allergen Reactions Bactrim (Sulfamethoxazole-Trimethoprim) Nausea and Vomiting Recent Documentation Height Weight BMI Smoking Status 1.727 m 96.7 kg 32.4 kg/m2 Former Smoker Unresulted Labs Order Current Status ANTI-NEUTROPHIL CYTOPLASMIC AB In process CRYOGLOBULIN, QL In process DNASE-B AB In process GLOMERULAR BASEMENT MEMBRANE AB In process PROTEIN ELECTROPHORESIS In process CULTURE, BLOOD, PAIRED Preliminary result Emergency Contacts Name Discharge Info Relation Home Work Mobile Padmini Martinez  Other Relative [6] [phone] About your hospitalization You were admitted on:  May 15, 2017 You last received care in the:  [facility] 6S NEURO-SCI TELE You were discharged on:  May 16, 2017 Unit phone number:  [phone] Why you were hospitalized Your primary diagnosis was: Altered Mental Status Your diagnoses also included:  Uti (Urinary Tract Infection), Dehydration Providers Seen During Your Hospitalizations Provider Role Specialty Primary office phone Renzo Javier MD Attending Provider Emergency Medicine [phone] Amaris Parks MD Attending Provider [facility] [phone] Gavino Lorenzo MD Attending Provider Internal Medicine [phone] Jennifer Perez MD Attending Provider Internal Medicine [phone] Your Primary Care Physician (PCP) Primary Care Physician Office Phone Office Fax [facility] [address] [facility] [phone] Follow-up Information Follow up With Details Comments Contact Info Traci Chambers MD   [address] [facility] [address] 
[phone]  [address] [place] [address], [address]  Resume inpatient rehabilitation [address] [address] 
[phone] Current Discharge Medication List  
  
START taking these medications Dose & Instructions Dispensing Information Comments Morning Noon Evening Bedtime  
 fluconazole 100 mg tablet Commonly known as:  DIFLUCAN Your last dose was: Your next dose is:    
   
   
 Dose:  100 mg Take 1 Tab by mouth daily for 2 days. FDA advises cautious prescribing of oral fluconazole in pregnancy. Quantity:  2 Tab Refills:  0 CONTINUE these medications which have NOT CHANGED Dose & Instructions Dispensing Information Comments Morning Noon Evening Bedtime  
 amLODIPine 5 mg tablet Commonly known as:  Britni Manning Your last dose was: Your next dose is:    
   
   
 Dose:  5 mg Take 5 mg by mouth daily. Refills:  0  
     
   
   
   
  
 aspirin delayed-release 81 mg tablet Your last dose was: Your next dose is:    
   
   
 Dose:  81 mg Take 81 mg by mouth daily. Refills:  0  
     
   
   
   
  
 atorvastatin 40 mg tablet Commonly known as:  LIPITOR Your last dose was: Your next dose is: TAKE 1 TABLET BY MOUTH AT BEDTIME Quantity:  90 Tab Refills:  1 B COMPLEX 1 tablet Generic drug:  b complex vitamins Your last dose was: Your next dose is:    
   
   
 Dose:  1 Tab Take 1 Tab by mouth daily. Refills:  0  
     
   
   
   
  
 bisacodyl 10 mg suppository Commonly known as:  DULCOLAX Your last dose was: Your next dose is:    
   
   
 Dose:  10 mg Insert 10 mg into rectum daily as needed (constipation). Refills:  0  
     
   
   
   
  
 butalbital-acetaminophen-caffeine -40 mg per tablet Commonly known as:  Swapna Rowan Your last dose was: Your next dose is:    
   
   
 Dose:  1 Tab Take 1 Tab by mouth every four (4) hours as needed for Headache. Refills:  0  
     
   
   
   
  
 colesevelam 625 mg tablet Commonly known as:  [place] TREAT[facility] [place] Your last dose was: Your next dose is:    
   
   
 Dose:  1250 mg Take 2 Tabs by mouth two (2) times daily (with meals). Quantity:  360 Tab Refills:  1  
     
   
   
   
  
 cycloSPORINE 0.05 % ophthalmic emulsion Commonly known as:  RESTASIS Your last dose was: Your next dose is:    
   
   
 Dose:  1 Drop Administer 1 Drop to both eyes two (2) times a day. Refills:  0  
     
   
   
   
  
 KAREN-Q PO Your last dose was: Your next dose is:    
   
   
 Dose:  1 Tab Take 1 Tab by mouth two (2) times a day. Refills:  0  
     
   
   
   
  
 gabapentin 300 mg capsule Commonly known as:  NEURONTIN Your last dose was: Your next dose is:    
   
   
 Dose:  300 mg Take 300 mg by mouth two (2) times a day. Refills:  0  
     
   
   
   
  
 hydrocortisone 1 % lotion Commonly known as:  ALA-ONEIL Your last dose was: Your next dose is:    
   
   
 Apply  to affected area two (2) times daily as needed (rash). use thin layer Refills:  0  
     
   
   
   
  
 insulin regular 100 unit/mL injection Commonly known as:  CATHI Groves Your last dose was: Your next dose is:    
   
   
 by SubCUTAneous route Before breakfast, lunch, dinner and at bedtime. 151-200 = 2 units 201-250= 4  251-300=6 301-350= 8 351-400=10 >400 = 12 Refills:  0  
     
   
   
   
  
 ketoconazole 2 % topical cream  
Commonly known as:  NIZORAL Your last dose was: Your next dose is:    
   
   
 Apply  to affected area daily. Indications: TINEA CORPORIS Refills:  0 LEVAQUIN 500 mg tablet Generic drug:  levoFLOXacin Your last dose was:     
   
Your next dose is:    
   
   
 Dose:  500 mg  
 Take 500 mg by mouth daily. Indications: BACTERIAL URINARY TRACT INFECTION Refills:  0  
     
   
   
   
  
 lisinopril 20 mg tablet Commonly known as:  Tushar Leaver Your last dose was: Your next dose is:    
   
   
 Dose:  20 mg Take 20 mg by mouth daily. Refills:  0  
     
   
   
   
  
 metFORMIN 500 mg tablet Commonly known as:  GLUCOPHAGE Your last dose was: Your next dose is:    
   
   
 Dose:  500 mg Take 500 mg by mouth daily (with breakfast). Refills:  0  
     
   
   
   
  
 metoprolol tartrate 25 mg tablet Commonly known as:  LOPRESSOR Your last dose was: Your next dose is:    
   
   
 Dose:  12.5 mg Take 12.5 mg by mouth two (2) times a day. Refills:  0 MIRALAX 17 gram packet Generic drug:  polyethylene glycol Your last dose was: Your next dose is:    
   
   
 Dose:  17 g Take 17 g by mouth daily as needed (constipation). Refills:  0 NORCO 5-325 mg per tablet Generic drug:  HYDROcodone-acetaminophen Your last dose was: Your next dose is:    
   
   
 Dose:  1 Tab Take 1 Tab by mouth every four (4) hours as needed (moderate pain). Refills:  0  
     
   
   
   
  
 ondansetron 4 mg disintegrating tablet Commonly known as:  ZOFRAN ODT Your last dose was: Your next dose is:    
   
   
 Dose:  4 mg Take 4 mg by mouth every six (6) hours as needed for Nausea. Refills:  0  
     
   
   
   
  
 OTHER(NON-FORMULARY) Your last dose was: Your next dose is:    
   
   
 Dose:  1 Cap Take 1 Cap by mouth daily. Maxitears Dry eye formula Refills:  0  
     
   
   
   
  
 oxyCODONE IR 10 mg Tab immediate release tablet Commonly known as:  [place] Your last dose was: Your next dose is:    
   
   
 Dose:  10 mg Take 10 mg by mouth every four (4) hours as needed (severe pain). Refills:  0  
     
   
   
   
  
 pantoprazole 40 mg tablet Commonly known as:  PROTONIX Your last dose was: Your next dose is:    
   
   
 Dose:  40 mg Take 1 Tab by mouth two (2) times a day for 60 days. Quantity:  60 Tab Refills:  1 HOGAN MILK OF MAGNESIA 400 mg/5 mL suspension Generic drug:  magnesium hydroxide Your last dose was: Your next dose is:    
   
   
 Dose:  30 mL Take 30 mL by mouth daily as needed for Constipation. Refills:  0 Senna 8.6 mg tablet Generic drug:  senna Your last dose was: Your next dose is:    
   
   
 Dose:  2 Tab Take 2 Tabs by mouth nightly as needed for Constipation. Refills:  0 SENNA PLUS 8.6-50 mg per tablet Generic drug:  senna-docusate Your last dose was: Your next dose is:    
   
   
 Dose:  2 Tab Take 2 Tabs by mouth nightly. Refills:  0  
     
   
   
   
  
 sodium chloride 5 % ophthalmic solution Commonly known as:  ENEIDA-5 Your last dose was: Your next dose is:    
   
   
 Dose:  1 Drop Administer 1 Drop to both eyes three (3) times daily. Refills:  0 SUMAtriptan 50 mg tablet Commonly known as:  IMITREX Your last dose was: Your next dose is:    
   
   
 Dose:  50 mg Take 50 mg by mouth daily as needed for Migraine. Refills:  0  
     
   
   
   
  
 timolol 0.25 % ophthalmic solution Commonly known as:  Maurilioyn Nina Your last dose was: Your next dose is:    
   
   
 Dose:  1 Drop Administer 1 Drop to both eyes two (2) times a day. Refills:  0  
     
   
   
   
  
 TYLENOL EXTRA STRENGTH 500 mg tablet Generic drug:  acetaminophen Your last dose was: Your next dose is:    
   
   
 Dose:  500 mg Take 500 mg by mouth every four (4) hours as needed (mild pain). Refills:  0 STOP taking these medications   
 amoxicillin 500 mg Tab  
   
  
 clarithromycin 500 mg tablet Commonly known as:  Barry Crenshaw Where to Get Your Medications Information on where to get these meds will be given to you by the nurse or doctor. ! Ask your nurse or doctor about these medications  
  fluconazole 100 mg tablet Discharge Instructions Discharge SNF/Rehab Instructions/LTAC  
 
 
PATIENT ID: Leeann Mahan MRN: [id number] YOB: 1950 DATE OF ADMISSION: 5/14/2017 11:09 PM   
DATE OF DISCHARGE: 5/16/2017 PRIMARY CARE PROVIDER: Brandon Mckenna MD  
 
 
ATTENDING PHYSICIAN: Renita Amezquita MD 
DISCHARGING PROVIDER: Renita Amezquita MD    
To contact this individual call [phone] and ask the  to page. If unavailable ask to be transferred the Adult Hospitalist Department. CONSULTATIONS: IP CONSULT TO HOSPITALIST 
IP CONSULT TO NEPHROLOGY 
IP CONSULT TO GENERAL SURGERY 
 
PROCEDURES/SURGERIES: * No surgery found * [address] COURSE:  
70-year-old white male with past medical history of SVT, type 2 diabetes mellitus, hypertension, stroke,  
nephrolithiasis, presented to the emergency department from [address] [place] [address] via EMS with a reported low blood pressure. The patient is a limited historian recently had been  
hospitalized on 04/17/2017 to 05/02/2017 with perforated viscus and perforated duodenal ulcer and underwent exploratory laparotomy with repair of the ulcer and right ureteral stent with noted kidney stones and  
hydronephrosis, according to the ER records 
  
  
Assessment & Plan:  
  
UTI POA 
-d/c Ceftriaxone 
-per c/s start diflucal candida > 100k 2 more days oral completed course before 
  
Probable AMS 
-Probably multifactorial including metabolic encephalopathy from dehydration, UTI, hypotension and probable undiagnosed dementia -I doubt that the patient is very far from his baseline 
  
Dehydration 
-On IV fluids 
-resolved 
  
SKYLER Sec to above 
-Continue IV fluids resolved 
  
Shock hypotensive 
-much improved 
  
DM type 2 
-On SSI 
-Monitor 
  
Air in left hepatic ducts 
-CT shows small amount of left sided intrahepatic pneumobilia. This is likely left from his recent perforated duodenal ulcer and operation. 
   
 
 
 
PENDING TEST RESULTS:  
At the time of discharge the following test results are still pending: FOLLOW UP APPOINTMENTS:   
Follow-up Information Follow up With Details Comments Contact Info Ping Canada MD   [address] [address] 
[phone] ADDITIONAL CARE RECOMMENDATIONS:  
 
DIET: Cardiac Diet TUBE FEEDING INSTRUCTIONS:  
 
OXYGEN / BiPAP SETTINGS:  
 
ACTIVITY: Activity as tolerated WOUND CARE:  
 
EQUIPMENT needed:  
 
 
DISCHARGE MEDICATIONS: 
 See Medication Reconciliation Form NOTIFY YOUR PHYSICIAN FOR ANY OF THE FOLLOWING:  
Fever over 101 degrees for 24 hours. Chest pain, shortness of breath, fever, chills, nausea, vomiting, diarrhea, change in mentation, falling, weakness, bleeding. Severe pain or pain not relieved by medications. Or, any other signs or symptoms that you may have questions about. DISPOSITION: 
  Home With: 
 OT  PT  HH  RN  
  
x SNF/Inpatient Rehab/LTAC Independent/assisted living Hospice Other:  
 
 
PATIENT CONDITION AT DISCHARGE:  
 
Functional status Poor   
x Deconditioned Independent Cognition Lucid   
x Forgetful Dementia Catheters/lines (plus indication) x Amaya PICC   
 PEG None Code status  
x  Full code DNR   
 
PHYSICAL EXAMINATION AT DISCHARGE: 
 Refer to Progress Note CHRONIC MEDICAL DIAGNOSES: 
Problem List as of 5/16/2017  Date Reviewed: 5/16/2017 Codes Class Noted - Resolved SVT (supraventricular tachycardia) (HCC) ICD-10-CM: I47.1 ICD-9-CM: 427.89  4/19/2017 - Present Overview Signed 4/19/2017  7:55 PM by Fawn Whyte MD  
  Approximately 220 BPM on tele 4/19/17 Perforated duodenal bulb ulcer ([facility] ICD-10-CM: K26.5 ICD-9-CM: 532.50  4/18/2017 - Present Hyponatremia ICD-10-CM: E87.1 ICD-9-CM: 276.1  4/18/2017 - Present Perforated viscus ICD-10-CM: R19.8 ICD-9-CM: 799.89  4/17/2017 - Present Hydroureter on right ICD-10-CM: N13.4 ICD-9-CM: 593.5  4/17/2017 - Present History of stroke ICD-10-CM: Z86.73 
ICD-9-CM: V12.54  3/3/2017 - Present Acute renal failure (ARF) (HCC) ICD-10-CM: N17.9 ICD-9-CM: 584.9  3/1/2017 - Present Hyperkalemia ICD-10-CM: E87.5 ICD-9-CM: 276.7  3/1/2017 - Present Hypertension ICD-10-CM: I10 
ICD-9-CM: 401.9  12/14/2009 - Present Hyperlipidemia ICD-10-CM: E78.5 ICD-9-CM: 272.4  12/14/2009 - Present DM (diabetes mellitus) ([facility] ICD-10-CM: E11.9 ICD-9-CM: 250.00  12/14/2009 - Present H/O ICD-10-CM: Z86.73 
ICD-9-CM: V12.54  12/14/2009 - Present Recurrent UTI ICD-10-CM: N39.0 ICD-9-CM: 599.0  12/14/2009 - Present Overview Signed 12/14/2009 10:42 AM by Mallory Lantigua Followed by Rachelle Navarro Nephrolithiasis ICD-10-CM: N20.0 ICD-9-CM: 592.0  12/14/2009 - Present Overview Signed 12/14/2009 10:42 AM by Mallory Lantigua Followed by Dr. Michoacano Michelle RESOLVED: Dehydration ICD-10-CM: E86.0 ICD-9-CM: 276.51  5/15/2017 - 5/16/2017 * (Principal)RESOLVED: Altered mental status ICD-10-CM: R41.82 
ICD-9-CM: 780.97  5/15/2017 - 5/16/2017 RESOLVED: SIRS (systemic inflammatory response syndrome) (HCC) ICD-10-CM: R65.10 ICD-9-CM: 995.90  4/22/2017 - 4/22/2017 RESOLVED: UTI (urinary tract infection) ICD-10-CM: N39.0 ICD-9-CM: 599.0  3/3/2017 - 5/16/2017 CDMP Checked:  
Yes x PROBLEM LIST Updated: 
Yes x Signed:  
Kathryn Corado MD 
5/16/2017 
3:20 PM 
 
  
 Discharge Orders None ACO Transitions of Care Introducing [facility] [employer] offers a voluntary care coordination program to provide high quality service and care to [facility] fee-for-service beneficiaries. Day Lyles was designed to help you enhance your health and well-being through the following services: ? Transitions of Care  support for individuals who are transitioning from one care setting to another (example: Hospital to home). ? Chronic and Complex Care Coordination  support for individuals and caregivers of those with serious or chronic illnesses or with more than one chronic (ongoing) condition and those who take a number of different medications. If you meet specific medical criteria, a [address] [place] [address] Rd may call you directly to coordinate your care with your primary care physician and your other care providers. For questions about the [facility] programs, please, contact your physicians office. For general questions or additional information about Accountable Care Organizations: 
Please visit www.medicare.gov/acos. html or call 1-800-MEDICARE (0-711.384.6257) TTY users should call 4-593.845.2137. [employer] Announcement We are excited to announce that we are making your provider's discharge notes available to you in [employer]. You will see these notes when they are completed and signed by the physician that discharged you from your recent hospital stay. If you have any questions or concerns about any information you see in [employer], please call the Health Information Department where you were seen or reach out to your Primary Care Provider for more information about your plan of care. Introducing [facility]! Bailey Wilkins introduces [employer] patient portal. Now you can access parts of your medical record, email your doctor's office, and request medication refills online. 1. In your internet browser, go to [email or web address] 2. Click on the First Time User? Click Here link in the Sign In box. You will see the New Member Sign Up page. 3. Enter your You[employer] Access Code exactly as it appears below. You will not need to use this code after youve completed the sign-up process. If you do not sign up before the expiration date, you must request a new code. · You[employer] Access Code: [email or web address] Expires: 5/30/2017  4:02 PM 
 
4. Enter the last four digits of your Social Security Number (xxxx) and Date of Birth (mm/dd/yyyy) as indicated and click Submit. You will be taken to the next sign-up page. 5. Create a YouWeb ID. This will be your YouWeb login ID and cannot be changed, so think of one that is secure and easy to remember. 6. Create a You[employer] password. You can change your password at any time. 7. Enter your Password Reset Question and Answer. This can be used at a later time if you forget your password. 8. Enter your e-mail address. You will receive e-mail notification when new information is available in [address] 9. Click Sign Up. You can now view and download portions of your medical record. 10. Click the Download Summary menu link to download a portable copy of your medical information. If you have questions, please visit the Frequently Asked Questions section of the You[employer] website. Remember, You[employer] is NOT to be used for urgent needs. For medical emergencies, dial 911. Now available from your iPhone and Android! General Information Please provide this summary of care documentation to your next provider. Patient Signature:  ____________________________________________________________ Date:  ____________________________________________________________  
  
Nori Curlin Provider Signature:  ____________________________________________________________ Date:  ____________________________________________________________

## 2017-05-14 NOTE — IP AVS SNAPSHOT
Current Discharge Medication List  
  
START taking these medications Dose & Instructions Dispensing Information Comments Morning Noon Evening Bedtime  
 fluconazole 100 mg tablet Commonly known as:  DIFLUCAN Your last dose was: Your next dose is:    
   
   
 Dose:  100 mg Take 1 Tab by mouth daily for 2 days. FDA advises cautious prescribing of oral fluconazole in pregnancy. Quantity:  2 Tab Refills:  0 CONTINUE these medications which have NOT CHANGED Dose & Instructions Dispensing Information Comments Morning Noon Evening Bedtime  
 amLODIPine 5 mg tablet Commonly known as:  Rigo Sylvester Your last dose was: Your next dose is:    
   
   
 Dose:  5 mg Take 5 mg by mouth daily. Refills:  0  
     
   
   
   
  
 aspirin delayed-release 81 mg tablet Your last dose was: Your next dose is:    
   
   
 Dose:  81 mg Take 81 mg by mouth daily. Refills:  0  
     
   
   
   
  
 atorvastatin 40 mg tablet Commonly known as:  LIPITOR Your last dose was: Your next dose is: TAKE 1 TABLET BY MOUTH AT BEDTIME Quantity:  90 Tab Refills:  1 B COMPLEX 1 tablet Generic drug:  b complex vitamins Your last dose was: Your next dose is:    
   
   
 Dose:  1 Tab Take 1 Tab by mouth daily. Refills:  0  
     
   
   
   
  
 bisacodyl 10 mg suppository Commonly known as:  DULCOLAX Your last dose was: Your next dose is:    
   
   
 Dose:  10 mg Insert 10 mg into rectum daily as needed (constipation). Refills:  0  
     
   
   
   
  
 butalbital-acetaminophen-caffeine -40 mg per tablet Commonly known as:  Costella Jeans Your last dose was: Your next dose is:    
   
   
 Dose:  1 Tab Take 1 Tab by mouth every four (4) hours as needed for Headache. Refills:  0 colesevelam 625 mg tablet Commonly known as:  [facility] Your last dose was: Your next dose is:    
   
   
 Dose:  1250 mg Take 2 Tabs by mouth two (2) times daily (with meals). Quantity:  360 Tab Refills:  1  
     
   
   
   
  
 cycloSPORINE 0.05 % ophthalmic emulsion Commonly known as:  RESTASIS Your last dose was: Your next dose is:    
   
   
 Dose:  1 Drop Administer 1 Drop to both eyes two (2) times a day. Refills:  0  
     
   
   
   
  
 KAREN-Q PO Your last dose was: Your next dose is:    
   
   
 Dose:  1 Tab Take 1 Tab by mouth two (2) times a day. Refills:  0  
     
   
   
   
  
 gabapentin 300 mg capsule Commonly known as:  NEURONTIN Your last dose was: Your next dose is:    
   
   
 Dose:  300 mg Take 300 mg by mouth two (2) times a day. Refills:  0  
     
   
   
   
  
 hydrocortisone 1 % lotion Commonly known as:  ALA-ONEIL Your last dose was: Your next dose is:    
   
   
 Apply  to affected area two (2) times daily as needed (rash). use thin layer Refills:  0  
     
   
   
   
  
 insulin regular 100 unit/mL injection Commonly known as:  Toya Fam HUMULIN R Your last dose was: Your next dose is:    
   
   
 by SubCUTAneous route Before breakfast, lunch, dinner and at bedtime. 151-200 = 2 units 201-250= 4  251-300=6 301-350= 8 351-400=10 >400 = 12 Refills:  0  
     
   
   
   
  
 ketoconazole 2 % topical cream  
Commonly known as:  NIZORAL Your last dose was: Your next dose is:    
   
   
 Apply  to affected area daily. Indications: TINEA CORPORIS Refills:  0 LEVAQUIN 500 mg tablet Generic drug:  levoFLOXacin Your last dose was: Your next dose is:    
   
   
 Dose:  500 mg Take 500 mg by mouth daily. Indications: BACTERIAL URINARY TRACT INFECTION Refills:  0 lisinopril 20 mg tablet Commonly known as:  Carmelo Layne Your last dose was: Your next dose is:    
   
   
 Dose:  20 mg Take 20 mg by mouth daily. Refills:  0  
     
   
   
   
  
 metFORMIN 500 mg tablet Commonly known as:  GLUCOPHAGE Your last dose was: Your next dose is:    
   
   
 Dose:  500 mg Take 500 mg by mouth daily (with breakfast). Refills:  0  
     
   
   
   
  
 metoprolol tartrate 25 mg tablet Commonly known as:  LOPRESSOR Your last dose was: Your next dose is:    
   
   
 Dose:  12.5 mg Take 12.5 mg by mouth two (2) times a day. Refills:  0 MIRALAX 17 gram packet Generic drug:  polyethylene glycol Your last dose was: Your next dose is:    
   
   
 Dose:  17 g Take 17 g by mouth daily as needed (constipation). Refills:  0 NORCO 5-325 mg per tablet Generic drug:  HYDROcodone-acetaminophen Your last dose was: Your next dose is:    
   
   
 Dose:  1 Tab Take 1 Tab by mouth every four (4) hours as needed (moderate pain). Refills:  0  
     
   
   
   
  
 ondansetron 4 mg disintegrating tablet Commonly known as:  ZOFRAN ODT Your last dose was: Your next dose is:    
   
   
 Dose:  4 mg Take 4 mg by mouth every six (6) hours as needed for Nausea. Refills:  0  
     
   
   
   
  
 OTHER(NON-FORMULARY) Your last dose was: Your next dose is:    
   
   
 Dose:  1 Cap Take 1 Cap by mouth daily. Maxitears Dry eye formula Refills:  0  
     
   
   
   
  
 oxyCODONE IR 10 mg Tab immediate release tablet Commonly known as:  Lonnie Norwood Your last dose was: Your next dose is:    
   
   
 Dose:  10 mg Take 10 mg by mouth every four (4) hours as needed (severe pain). Refills:  0  
     
   
   
   
  
 pantoprazole 40 mg tablet Commonly known as:  PROTONIX Your last dose was: Your next dose is:    
   
   
 Dose:  40 mg Take 1 Tab by mouth two (2) times a day for 60 days. Quantity:  60 Tab Refills:  1 HOGAN MILK OF MAGNESIA 400 mg/5 mL suspension Generic drug:  magnesium hydroxide Your last dose was: Your next dose is:    
   
   
 Dose:  30 mL Take 30 mL by mouth daily as needed for Constipation. Refills:  0 Senna 8.6 mg tablet Generic drug:  senna Your last dose was: Your next dose is:    
   
   
 Dose:  2 Tab Take 2 Tabs by mouth nightly as needed for Constipation. Refills:  0 SENNA PLUS 8.6-50 mg per tablet Generic drug:  senna-docusate Your last dose was: Your next dose is:    
   
   
 Dose:  2 Tab Take 2 Tabs by mouth nightly. Refills:  0  
     
   
   
   
  
 sodium chloride 5 % ophthalmic solution Commonly known as:  ENEIDA-5 Your last dose was: Your next dose is:    
   
   
 Dose:  1 Drop Administer 1 Drop to both eyes three (3) times daily. Refills:  0 SUMAtriptan 50 mg tablet Commonly known as:  IMITREX Your last dose was: Your next dose is:    
   
   
 Dose:  50 mg Take 50 mg by mouth daily as needed for Migraine. Refills:  0  
     
   
   
   
  
 timolol 0.25 % ophthalmic solution Commonly known as:  Gertrudis Force Your last dose was: Your next dose is:    
   
   
 Dose:  1 Drop Administer 1 Drop to both eyes two (2) times a day. Refills:  0  
     
   
   
   
  
 TYLENOL EXTRA STRENGTH 500 mg tablet Generic drug:  acetaminophen Your last dose was: Your next dose is:    
   
   
 Dose:  500 mg Take 500 mg by mouth every four (4) hours as needed (mild pain). Refills:  0 STOP taking these medications   
 amoxicillin 500 mg Tab  
   
  
 clarithromycin 500 mg tablet Commonly known as:  Vladimir Archuleta Where to Get Your Medications Information on where to get these meds will be given to you by the nurse or doctor. ! Ask your nurse or doctor about these medications  
  fluconazole 100 mg tablet

## 2017-05-15 PROBLEM — E86.0 DEHYDRATION: Status: ACTIVE | Noted: 2017-01-01

## 2017-05-15 PROBLEM — R41.82 ALTERED MENTAL STATUS: Status: ACTIVE | Noted: 2017-01-01

## 2017-05-15 NOTE — PROGRESS NOTES
Hospitalist Progress Note  Barbra Rico MD  Office: [phone]  Cell: [phone]      Date of Service:  5/15/2017  NAME:  Dearrosa elena Bell YOB: 1950  MRN:  [id number]      Admission Summary:   66-year-old white male with past   medical history of SVT, type 2 diabetes mellitus, hypertension, stroke,   nephrolithiasis, presented to the emergency department from   [address] [place] [address] via EMS with a reported low blood   pressure.  The patient is a limited historian  recently had been   hospitalized on 2017 to 2017 with perforated viscus and   perforated duodenal ulcer and underwent exploratory laparotomy with   repair of the ulcer and right ureteral stent with noted kidney stones and   hydronephrosis, according to the ER records    Interval history / Subjective:     Admitted this am     Assessment & Plan:     UTI  -On Ceftriaxone  -Follow cultures    Probable AMS  -Probably multifactorial including metabolic encephalopathy from dehydration, UTI, hypotension and probable undiagnosed dementia  -I doubt that the patient is very far from his baseline    Dehydration  -On IV fluids  -Monitor    SKYLER  Sec to above  -Continue IV fluids    Shock hypotensive  -much improved    DM type 2  -On SSI  -Monitor    Air in left hepatic ducts  -seen on CT  -Will consult surgery    NPO till cleared by Surgery    Code status: FULL CODE  DVT prophylaxis: scd    Plan: Follow surgery, continue IV fluids    Care Plan discussed with: Patient/Family  Disposition: TBD     Hospital Problems  Date Reviewed: 5/15/2017          Codes Class Noted POA    Dehydration ICD-10-CM: E86.0  ICD-9-CM: 276.51  5/15/2017 Unknown        * (Principal)Altered mental status ICD-10-CM: R41.82  ICD-9-CM: 780.97  5/15/2017 Unknown        UTI (urinary tract infection) ICD-10-CM: N39.0  ICD-9-CM: 599.0  3/3/2017 Unknown                Review of Systems:   A comprehensive review of systems was negative except for that written in the HPI. Vital Signs:    Last 24hrs VS reviewed since prior progress note. Most recent are:  Visit Vitals    /56    Pulse 73    Temp 97.9 °F (36.6 °C)    Resp 23    Ht 5' 8\" (1.727 m)    Wt 94.3 kg (208 lb)    SpO2 100%    BMI 31.63 kg/m2       No intake or output data in the 24 hours ending 05/15/17 1651     Physical Examination:             Constitutional:  No acute distress, cooperative, pleasant    ENT:  Oral mucous moist, oropharynx benign. Neck supple,    Resp:  CTA bilaterally. No wheezing/rhonchi/rales. No accessory muscle use   CV:  Regular rhythm, normal rate, no murmurs, gallops, rubs    GI:  Soft, non distended, non tender. normoactive bowel sounds, no hepatosplenomegaly     Musculoskeletal:  No edema, warm, 2+ pulses throughout    Neurologic:  Moves all extremities. AAOx3, CN II-XII reviewed     Skin:  Good turgor, no rashes or ulcers       Data Review:    Review and/or order of clinical lab test      Labs:     Recent Labs      05/15/17   0441  05/14/17   2344   WBC  7.7  9.3   HGB  10.1*  10.8*   HCT  31.8*  34.4*   PLT  302  356     Recent Labs      05/15/17   0441  05/14/17   2344   NA  138  135*   K  4.2  4.6   CL  106  102   CO2  24  25   BUN  32*  38*   CREA  2.00*  2.83*   GLU  93  99   CA  8.7  9.2   MG  1.3*   --    PHOS  3.2   --      Recent Labs      05/14/17   2344   SGOT  14*   ALT  23   AP  88   TBILI  0.3   TP  8.2   ALB  2.9*   GLOB  5.3*     No results for input(s): INR, PTP, APTT in the last 72 hours. No lab exists for component: INREXT   No results for input(s): FE, TIBC, PSAT, FERR in the last 72 hours. No results found for: FOL, RBCF   No results for input(s): PH, PCO2, PO2 in the last 72 hours.   Recent Labs      05/15/17   0441  05/14/17   2344   CPK  36*   --    TROIQ   --   <0.04     Lab Results   Component Value Date/Time    Cholesterol, total 249 05/15/2017 04:41 AM    HDL Cholesterol 30 05/15/2017 04:41 AM    LDL, calculated 182.8 05/15/2017 04:41 AM    Triglyceride 181 05/15/2017 04:41 AM    CHOL/HDL Ratio 8.3 05/15/2017 04:41 AM     Lab Results   Component Value Date/Time    Glucose (POC) 95 05/15/2017 04:07 PM    Glucose (POC) 78 05/15/2017 03:27 PM    Glucose (POC) 86 05/15/2017 12:11 PM    Glucose (POC) 127 05/03/2017 11:55 AM    Glucose (POC) 136 05/03/2017 05:57 AM     Lab Results   Component Value Date/Time    Color DARK YELLOW 05/15/2017 12:41 AM    Appearance TURBID 05/15/2017 12:41 AM    Specific gravity 1.022 05/15/2017 12:41 AM    Specific gravity 1.025 03/01/2017 05:24 PM    pH (UA) 5.5 05/15/2017 12:41 AM    Protein 100 05/15/2017 12:41 AM    Glucose NEGATIVE  05/15/2017 12:41 AM    Ketone TRACE 05/15/2017 12:41 AM    Bilirubin NEGATIVE  05/15/2017 12:41 AM    Urobilinogen 0.2 05/15/2017 12:41 AM    Nitrites NEGATIVE  05/15/2017 12:41 AM    Leukocyte Esterase LARGE 05/15/2017 12:41 AM    Epithelial cells FEW 05/15/2017 12:41 AM    Bacteria NEGATIVE  05/15/2017 12:41 AM    WBC >100 05/15/2017 12:41 AM    RBC >100 05/15/2017 12:41 AM         Medications Reviewed:     Current Facility-Administered Medications   Medication Dose Route Frequency    sodium chloride (NS) flush 5-10 mL  5-10 mL IntraVENous Q8H    sodium chloride (NS) flush 5-10 mL  5-10 mL IntraVENous PRN    sodium chloride (NS) flush 5-10 mL  5-10 mL IntraVENous Q8H    sodium chloride (NS) flush 5-10 mL  5-10 mL IntraVENous PRN    0.9% sodium chloride infusion  125 mL/hr IntraVENous CONTINUOUS    [START ON 5/16/2017] cefTRIAXone (ROCEPHIN) 1 g in 0.9% sodium chloride (MBP/ADV) 50 mL  1 g IntraVENous Q24H    glucose chewable tablet 16 g  4 Tab Oral PRN    dextrose (D50W) injection syrg 12.5-25 g  12.5-25 g IntraVENous PRN    glucagon (GLUCAGEN) injection 1 mg  1 mg IntraMUSCular PRN    insulin lispro (HUMALOG) injection   SubCUTAneous Q6H     Current Outpatient Prescriptions   Medication Sig    cycloSPORINE (RESTASIS) 0.05 % ophthalmic emulsion Administer 1 Drop to both eyes two (2) times a day.  bisacodyl (DULCOLAX) 10 mg suppository Insert 10 mg into rectum daily as needed (constipation).  L. ACIDOPHILUS/STREPT/LA P-ASPEN (KAREN-Q PO) Take 1 Tab by mouth two (2) times a day.  amLODIPine (NORVASC) 5 mg tablet Take 5 mg by mouth daily.  butalbital-acetaminophen-caffeine (FIORICET, ESGIC) -40 mg per tablet Take 1 Tab by mouth every four (4) hours as needed for Headache.  HYDROcodone-acetaminophen (NORCO) 5-325 mg per tablet Take 1 Tab by mouth every four (4) hours as needed (moderate pain).  acetaminophen (TYLENOL EXTRA STRENGTH) 500 mg tablet Take 500 mg by mouth every four (4) hours as needed (mild pain).  b complex vitamins (B COMPLEX 1) tablet Take 1 Tab by mouth daily.  SUMAtriptan (IMITREX) 50 mg tablet Take 50 mg by mouth daily as needed for Migraine.  sodium chloride (ENEIDA-5) 5 % ophthalmic solution Administer 1 Drop to both eyes three (3) times daily.  senna (SENNA) 8.6 mg tablet Take 2 Tabs by mouth nightly as needed for Constipation.  polyethylene glycol (MIRALAX) 17 gram packet Take 17 g by mouth daily as needed (constipation).  oxyCODONE IR (ROXICODONE) 10 mg tab immediate release tablet Take 10 mg by mouth every four (4) hours as needed (severe pain).  ondansetron (ZOFRAN ODT) 4 mg disintegrating tablet Take 4 mg by mouth every six (6) hours as needed for Nausea.  metFORMIN (GLUCOPHAGE) 500 mg tablet Take 500 mg by mouth daily (with breakfast).  magnesium hydroxide (HOGAN MILK OF MAGNESIA) 400 mg/5 mL suspension Take 30 mL by mouth daily as needed for Constipation.  lisinopril (PRINIVIL, ZESTRIL) 20 mg tablet Take 20 mg by mouth daily.  insulin regular (NOVOLIN R, HUMULIN R) 100 unit/mL injection by SubCUTAneous route Before breakfast, lunch, dinner and at bedtime.  151-200 = 2 units  201-250= 4   251-300=6  301-350= 8  351-400=10  >400 = 12    gabapentin (NEURONTIN) 300 mg capsule Take 300 mg by mouth two (2) times a day.  senna-docusate (SENNA PLUS) 8.6-50 mg per tablet Take 2 Tabs by mouth nightly.  metoprolol tartrate (LOPRESSOR) 25 mg tablet Take 12.5 mg by mouth two (2) times a day.  OTHER,NON-FORMULARY, Take 1 Cap by mouth daily. Maxitears Dry eye formula    pantoprazole (PROTONIX) 40 mg tablet Take 1 Tab by mouth two (2) times a day for 60 days.  atorvastatin (LIPITOR) 40 mg tablet TAKE 1 TABLET BY MOUTH AT BEDTIME    colesevelam (WELCHOL) 625 mg tablet Take 2 Tabs by mouth two (2) times daily (with meals).  aspirin delayed-release 81 mg tablet Take 81 mg by mouth daily.  timolol (BETIMOL) 0.25 % ophthalmic solution Administer 1 Drop to both eyes two (2) times a day.  ketoconazole (NIZORAL) 2 % topical cream Apply  to affected area daily. Indications: TINEA CORPORIS    hydrocortisone (ALA-ONEIL) 1 % lotion Apply  to affected area two (2) times daily as needed (rash). use thin layer     levoFLOXacin (LEVAQUIN) 500 mg tablet Take 500 mg by mouth daily.  Indications: BACTERIAL URINARY TRACT INFECTION     ______________________________________________________________________  EXPECTED LENGTH OF STAY: 2d 16h  ACTUAL LENGTH OF STAY:          Neymar Jiménez MD

## 2017-05-15 NOTE — H&P
[address] [facility] [place] [address]   HISTORY AND PHYSICAL       Name:  Cindy Medina   MR#:  [id number]   :  1950   Account #:  [de-identified]        Date of Adm:  2017       CHIEF COMPLAINT: The patient does not provide. HISTORY OF PRESENT ILLNESS: A 70-year-old white male with past   medical history of SVT, type 2 diabetes mellitus, hypertension, stroke,   nephrolithiasis, presented to the emergency department from   [address] [place] [address] via EMS with a reported low blood   pressure. The patient is a limited historian. Majority of history was   obtained from review of ED and medical reports. Per the collective   reports, the nursing home staff noted the patient had low blood   pressure at this time with blood pressures in the 70s/50s. According to   reports, the patient's family thought that the patient was confused with   altered mental status. The patient was complaining of cluster   headaches over the past 7 days. He reportedly also has been   nauseated and had episodes of vomiting, decreased appetite,   according to the ER report. The patient most recently had been   hospitalized on 2017 to 2017 with perforated viscus and   perforated duodenal ulcer and underwent exploratory laparotomy with   repair of the ulcer and right ureteral stent with noted kidney stones and   hydronephrosis, according to the ER records. He then discharged to   skilled nursing facility (SNF). On arrival in emergency department   tonight, initial recorded vital signs were blood pressure 111/61, heart   rate 92, respiratory rate 20, O2 saturation 92% on room air. The   patient's labs shows elevated BUN of 38, creatinine 2.83 compared to   last creatinine of 1.10 on 2017. CT of the head without contrast   showed no acute intracranial abnormalities with old left frontal infarct   (unchanged).  Chest x-ray, PA and lateral, showed elevated right   hemidiaphragm with no acute abnormalities. Per the ED, she was   given Rocephin. The patient was noted to have a UTI and was started   on Rocephin 1 gram IV for antibiotic coverage. The patient is now seen   for admission to the hospitalist service to continue evaluation and   treatments. The patient is not complaining of   dizziness, lightheadedness, focal weakness, numbness, paresthesias,   slurred speech, facial droop, abdominal pain, current nausea, current   diarrhea, melena, dysuria, hematuria, calf pain, swelling, edema. PAST MEDICAL HISTORY:   1. Type 2 diabetes mellitus. 2. Hypertension. 3. Hyperlipidemia. 4. Nephrolithiasis. 5. Perforated duodenal bulb ulcer. 6. Recurrent UTIs. 7. Stroke. 8. SVT. 9. Benign neoplasm of colon-small cecal polyp. PAST SURGICAL HISTORY:   1. Endoscopy with finding of a small cecal tubular adenoma, which was   removed. 2. Tonsillectomy. 3. Right ureteral stent placement. 4. Exploratory laparotomy with repair of perforated duodenal ulcer. MEDICATIONS:   1. Aspirin 81 mg p.o. daily. 2. Atorvastatin 40 mg p.o. at bedtime. 3. Welchol 625 mg 2 tablets p.o. b.i.d.   4. Diltiazem  mg 1 capsule p.o. daily. 5. Hydrocortisone 1% lotion applied to affected areas b.i.d.   6. Ketoconazole 2% topical cream applied to affected areas daily. 7. Metoprolol tartrate 25 mg p.o. b.i.d.   8. Pantoprazole 40 mg p.o. b.i.d.   9. Timolol 0.25% ophthalmic solution 1-2 drops to both eyes b.i.d. ALLERGIES: BACTRIM. SOCIAL HISTORY: Reportedly quit smoking cigarettes, reportedly quit   alcohol, no reports of illicit drugs. FAMILY HISTORY: Hypertension in mother, father, sister. Heart   disease, brother, mother. Stroke, maternal grandfather, paternal   grandfather. Diabetes, father. REVIEW OF SYSTEMS: Eleven systems reviewed, pertinent positives   as HPI, otherwise negative.     PHYSICAL EXAMINATION   VITAL SIGNS: Temperature 98.2 degrees Fahrenheit, blood pressure 129/43, heart rate of 81, respiratory rate of 20, O2 saturations 100% on   2 liters oxygen via nasal cannula. Recorded weight 208 pounds (94.3   kg), recorded height of 5 feet 8 inches tall. GENERAL: Ill-appearing male in no acute respiratory distress. PSYCHIATRIC: The patient is awake, alert, oriented x2 to person and   place only, otherwise confused. NEUROLOGIC: GCS of 14 (E4 V4 M6 best response), spontaneous   eye opening occasionally, inappropriate verbal response and follows   some commands. Generalized weakness. Sensation is grossly intact   without slurred speech, facial droop. HEENT: Normocephalic, atraumatic. PERRLA. EOMs intact. Sclerae   are anicteric, conjunctivae clear. Nares are patent. Oropharynx is   clear. Tongue is midline, nonedematous. NECK: Supple, without lymphadenopathy, JVD, carotid bruits,   thyromegaly. LYMPH: Negative for cervical, supraclavicular adenopathy. LUNGS: Clear to auscultation bilaterally. CARDIOVASCULAR: Heart is regular rate and rhythm, normal S1, S2,   no murmurs, rubs or gallops. GI: Abdomen soft, nontender, nondistended, normoactive bowel   sounds. No rebound, guarding or rigidity. No auscultated abdominal   bruits. No pulsatile mass. BACK: No CVA tenderness. No step-off deformity. MUSCULOSKELETAL: No acute palpable deformity. Negative for calf   tenderness. VASCULAR: 2+ radial, 1+ dorsalis pedis pulses, without cyanosis. Positive for clubbing. There is 2+ pitting edema, bilateral lower   extremities. SKIN: Warm and dry. LABORATORY DATA: I reviewed as follows: Sodium of 135,   potassium of 4.6, chloride 102, CO2 25, BUN of 38, creatinine 2.83,   glucose 99, anion gap of 8. Calcium is 9.2. GFR 22, total bilirubin 0.3,   total protein 8.2, albumin is 2.9, ALT of 23, AST of 14, alkaline   phosphatase 88. Lactic acid 1.4, troponin I less than 0.04. WBC of 9.3,   hemoglobin 10.8, hematocrit 34.4, platelets are 839. Neutrophils are   59%. Urinalysis: Leukocyte esterase large, nitrites negative,   urobilinogen 0.2, bilirubin negative, blood large, ketones trace, glucose   negative, protein 100, pH of 5.5, specific gravity 1.022, WBC greater   than 100, RBCs greater than 100, bacteria negative. CT of the head without contrast reveals no acute intracranial   abnormalities with old left frontal infarct unchanged. CT of abdomen   and pelvis without contrast revealed gallstones with a tiny bit of air in   the left intrahepatic ducts, with right renal calculi with hydronephrosis,   hydroureter, a right ureteral stent in satisfactory position. Atherosclerosis of abdominal aorta without aneurysm. Scoliosis and   lumbar spondylosis. A 12-lead EKG: Normal sinus rhythm, 82 beats per minute. Chest x-ray, PA and lateral, was also reviewed and noted in HPI. IMPRESSION AND PLAN: A 49-year-old male with noted past medical   history, now admitted with altered mental status, urinary tract infection   and dehydration. 1. Altered mental status, maybe multifactorial. The patient could have   underlying dementia. Concern at this time for noted infection, has   urinary tract infection, which will be treated for the same. Also has   dehydration. Continue with IV fluids for hydration. 2. Urinary tract infection. Plan as noted above. Continue Rocephin 1   gram IV q.24 hours. 3. Dehydration with acute kidney disease. Order IV fluid hydration   resuscitation. Place on strict I's and O's, daily weights. Repeat the   renal panel in a.m.   4. Shock and hypotension, currently resolved. Continue with vital sign   monitoring. 5. General debility. Place on fall precautions. 6. Type 2 diabetes mellitus. Placed on Humalog insulin correctional   coverage, scheduled Accu-Cheks. 7. Peripheral edema. Keep legs elevated while at rest. Continue with   plan of care, blood glucose checks and check hemoglobin A1c level. 8. Anemia. Repeat CBC.    9. Headache, currently resolved. 10. Venous thromboembolism prophylaxis. Sequential compression   devices, bilateral lower extremities. DENISE Hodgson Officer, MD FERR[facility] / LEO   D:  05/15/2017   08:34   T:  05/15/2017   10:10   Job #:  [id number]

## 2017-05-15 NOTE — ED NOTES
Spoke with Dr Slick Clifton to update him on pt's elevated heart rate, EKG results and that the pt's heart rate was now back in to 80's.

## 2017-05-15 NOTE — ROUTINE PROCESS
TRANSFER - OUT REPORT:    Verbal report given to Kalli RN(name) on Rolf Clark  being transferred to [facility](unit) for routine progression of care       Report consisted of patients Situation, Background, Assessment and   Recommendations(SBAR). Information from the following report(s) SBAR and Kardex was reviewed with the receiving nurse. Lines:   Peripheral IV 05/14/17 Left Hand (Active)   Site Assessment Clean, dry, & intact 5/14/2017 11:19 PM   Phlebitis Assessment 0 5/14/2017 11:19 PM   Infiltration Assessment 0 5/14/2017 11:19 PM   Dressing Status Clean, dry, & intact 5/14/2017 11:19 PM   Dressing Type Transparent 5/14/2017 11:19 PM       Peripheral IV 05/15/17 Left Arm (Active)   Site Assessment Clean, dry, & intact 5/15/2017 12:13 AM   Phlebitis Assessment 0 5/15/2017 12:13 AM   Infiltration Assessment 0 5/15/2017 12:13 AM   Dressing Status Clean, dry, & intact 5/15/2017 12:13 AM   Dressing Type Transparent 5/15/2017 12:13 AM       Peripheral IV 05/15/17 Right Arm (Active)   Site Assessment Clean, dry, & intact 5/15/2017 12:13 AM   Phlebitis Assessment 0 5/15/2017 12:13 AM   Infiltration Assessment 0 5/15/2017 12:13 AM   Dressing Status Clean, dry, & intact 5/15/2017 12:13 AM   Dressing Type Transparent 5/15/2017 12:13 AM        Opportunity for questions and clarification was provided.       Patient transported with:   [employer]

## 2017-05-15 NOTE — ED NOTES
Pt A&O, respirations unlabored, not vomiting, skin warm and dry. No obvious change in mentation. Still has some repeated statements.

## 2017-05-15 NOTE — ED NOTES
Pt incontinent of a large amount of urine. Pt cleaned, linens changed and pt repositioned. Pt's heart rate back down to 80's.

## 2017-05-15 NOTE — PROGRESS NOTES
CM received a call from Mauricio Martino with [facility] [phone] stating that if patient is discharged by 5/17/17 he can return to their facility without being reassessed. Mony Kendrick RN CRM.

## 2017-05-15 NOTE — ED PROVIDER NOTES
HPI Comments: 79 y.o. male with past medical history significant for SVT, DM, HTN, Stroke, Nephrolithiasis, Perforated ulcer who presents from [facility] via EMS accompanied by relatives for evaluation of low blood pressure. Per relatives, nursing facility reported low blood pressure today (\"70/50s\"). Pt states he has felt fine throughout the day but family notes he has been confused this evening. Family also notes pt c/o cluster headaches x 7 days. Pt denies current headache. He has been nauseated with a couple episodes of vomiting and decreased appetite x 2-3 days. Pt is return home from [facility] Saturday (6 days from now). Pt denies fever, chills, chest pain, SOB, change in bowel movement or urinary symptoms. There are no other acute medical concerns at this time. Full history, physical exam, and ROS unable to be obtained due to:  confusion. Chart review: Pt admtited 4/17/17-5/3/17 for perforated viscous, perforated duodenal bulb ulcer. Exploratory laparotomy performed with repair of ulcer. Right ureteral stent also placed for kidney stone/hydronephrosis. Pt discharged to SNF on Protonix, amoxiicillin and Biaxin. PCP: Dagoberto Whiteside MD    Note written by Sofya Odom, as dictated by Chevy Mensah MD 12:20 AM    The history is provided by the patient and a relative. No  was used.         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    small cecal polyp    DM (diabetes mellitus) ([address] 12/14/2009    Hypercholesterolemia     Hyperlipidemia 12/14/2009    Hypertension 12/14/2009    Nephrolithiasis 12/14/2009    Nephrolithiasis 12/14/2009    Perforated duodenal bulb ulcer ([address] 4/18/2017    Recurrent UTI 12/14/2009    Stroke ([address]     SVT (supraventricular tachycardia) ([address] 4/19/2017       Past Surgical History:   Procedure Laterality Date    COLORECTAL SCRN; HI RISK IND  1/15/2014         ENDOSCOPY, COLON, DIAGNOSTIC  9/2/2008 small cecal tubular adenoma removed    HX HEENT      tonsillectomy    HX UROLOGICAL      for kidney stones         Family History:   Problem Relation Age of Onset    Hypertension Mother     Elevated Lipids Mother     Heart Disease Mother     Diabetes Father     Hypertension Father    [facility] Elevated Lipids Sister     Hypertension Sister     Heart Disease Brother     Stroke Maternal Grandfather     Stroke Paternal Grandfather        Social History     Social History    Marital status: SINGLE     Spouse name: N/A    Number of children: N/A    Years of education: N/A     Occupational History    Not on file. Social History Main Topics    Smoking status: Former Smoker    Smokeless tobacco: Not on file    Alcohol use No    Drug use: No    Sexual activity: Not Currently     Birth control/ protection: Abstinence     Other Topics Concern    Not on file     Social History Narrative         ALLERGIES: Bactrim [sulfamethoxazole-trimethoprim]    Review of Systems   Reason unable to perform ROS: Confusion.        Vitals:    05/14/17 2345   BP: 111/61   Pulse: 92   Resp: 22   Temp: 98.2 °F (36.8 °C)   SpO2: 92%   Weight: 94.3 kg (208 lb)   Height: 5' 8\" (1.727 m)            Physical Exam   Const:  No acute distress, well developed, well nourished  Head:  Atraumatic, normocephalic  Eyes:  PERRL, conjunctiva normal, no scleral icterus  Neck:  Supple, trachea midline  Cardiovascular:  RRR, no murmurs, no gallops, no rubs  Resp:  No resp distress, no increased work of breathing, no wheezes, no rhonchi, no rales,  Abd:  Soft, non-tender, non-distended, no rebound, no guarding, no CVA tenderness  :  Deferred  MSK:  No pedal edema, normal ROM  Neuro: Awake and Alert, no cranial nerve defect  Skin:  Warm, dry, intact  Psych: normal mood and affect, behavior is normal.  Note written by Sofya Rushing, as dictated by Estefania Buitrago MD 12:21 AM     MDM  Number of Diagnoses or Management Options  Acute cystitis without hematuria:   Acute renal failure, unspecified acute renal failure type [facility]):      Amount and/or Complexity of Data Reviewed  Clinical lab tests: ordered and reviewed  Tests in the radiology section of CPT®: ordered and reviewed  Review and summarize past medical records: yes    Patient Progress  Patient progress: stable    ED Course     Pt. Presents to the ER with hypotension (resolved in the ER). Pt. Found to have renal failure and a likely UTI. I have covered him with abx. Pt. To be evaluated for admission by the hospitalist.      CONSULT NOTE:  1:49 AM Brandon Connolly MD spoke with Dr. Thai Vu, Consult for Hospitalist.  Discussed available diagnostic tests and clinical findings. Dr. Thai Vu will see and admit.      Procedures

## 2017-05-15 NOTE — CONSULTS
NEPHROLOGY CONSULT NOTE     Patient: Dunia Vargas MRN: [id number]  PCP: Giovanni Kelsey MD   :     1950  Age:   79 y.o. Sex:  male      Referring physician: Norberto Marrufo MD  Reason for consultation: 79 y.o. male with Altered mental status  Dehydration  UTI (urinary tract infection) complicated by SKYLER   Admission Date: 2017 11:09 PM  LOS: 0 days      ASSESSMENT and PLAN :   SKYLER:  - likely 2/2 volume depletion vs ATN from hypotension/sepsis  - hydro appears to be a chronic issue and a stent is in place  - cont IVF for now and hold BP meds  - urine eos  - daily labs    Hypotension:  - ? Volume depletion vs sepsis  - UA + for infection, cultures sent  - abx per primary team  - BP recovering, no pressors needed so far    Hypovolemia:  - cont IVF as ordered    Anemia:  - hgb stable    Nephrolithiasis:  - right-sided stent with chronic hydronephrosis  - strict I/Os  - will reimage if Cr does not continue to improve    UTI:  - on ceftriaxone  - cultures pending         Active Problems / Assessment AAActive  :   Principal Problem:    Altered mental status (5/15/2017)    Active Problems:    UTI (urinary tract infection) (3/3/2017)      Dehydration (5/15/2017)         Subjective:   HPI: Dunia Vargas is a 79 y.o.  male who has been admitted from [address] [place] [address] for hypotension with BP in the 70s/50s and confusion. He also had developed SKYLER with a Cr of 2.8. His baseline Cr appears to be around 1.2. He has a hx of HTN, DM2, nephrolithiasis, prior SKYLER, followed by us in March and April. His prior SKYLER was 2/2 bactrim/acei/NSAID/thiazide combination. He was in the hospital at that time for a perforated duodenal ulcer. He was given IV NS in the ER and his Cr has improved to 2. He is urinating he states, but this has not been recorded. His BP has recovered and is stable.   His CT scan of his A/P on 5/15 showed right renal calculi with hydronephrosis and hydroureter and right ureteral stent in satisfactory position. He appears more alert, no cp, sob, n/v/d reported at this time. He is getting IVF and rocephin for a presumed UTI. Past Medical Hx:   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    small cecal polyp    DM (di[facility] mellitus) [facility] 12/14/2009    Hypercholesterolemia     Hyperlipidemia 12/14/2009    Hypertension 12/14/2009    Nephrolithiasis 12/14/2009    Nephrolithiasis 12/14/2009    Perforated duodenal bulb ulcer ([facility] 4/18/2017    Recurrent UTI 12/14/2009    Stroke ([facility]     SVT (supraventricular tachycardia) ([facility] 4/19/2017        Past Surgical Hx:     Past Surgical History:   Procedure Laterality Date    COLORECTAL SCRN; HI RISK IND  1/15/2014         ENDOSCOPY, COLON, DIAGNOSTIC  9/2/2008    small cecal tubular adenoma removed    HX HEENT      tonsillectomy    HX UROLOGICAL      for kidney stones       Medications:  Prior to Admission medications    Medication Sig Start Date End Date Taking? Authorizing Provider   pantoprazole (PROTONIX) 40 mg tablet Take 1 Tab by mouth two (2) times a day for 60 days. 5/3/17 7/2/17  Chris Dubois MD   metoprolol tartrate (LOPRESSOR) 50 mg tablet Take 1 Tab by mouth every twelve (12) hours. 5/3/17   Natividad Hamman, NP   dilTIAZem CD (CARDIZEM CD) 120 mg ER capsule Take 1 Cap by mouth daily. 5/4/17   Natividad Hamman, NP   metoprolol tartrate (LOPRESSOR) 25 mg tablet Take 25 mg by mouth two (2) times a day. Indications: inappropriate sinus tachycardia    Historical Provider   levoFLOXacin (LEVAQUIN) 500 mg tablet Take 500 mg by mouth daily. Indications: BACTERIAL URINARY TRACT INFECTION    Historical Provider   atorvastatin (LIPITOR) 40 mg tablet TAKE 1 TABLET BY MOUTH AT BEDTIME 7/11/16   Indu Layne MD   [facility]) 625 mg tablet Take 2 Tabs by mouth two (2) times daily (with meals). 6/13/16   Elijah Sung MD   aspirin delayed-release 81 mg tablet Take 81 mg by mouth daily. Historical Provider   timolol (BETIMOL) 0.25 % ophthalmic solution Administer 1-2 Drops to both eyes two (2) times a day. use in affected eye(s)    Historical Provider   ketoconazole (NIZORAL) 2 % topical cream Apply  to affected area daily. Historical Provider   hydrocortisone (ALA-ONEIL) 1 % lotion Apply  to affected area two (2) times a day. use thin layer     Historical Provider       Allergies   Allergen Reactions    Bactrim [Sulfamethoxazole-Trimethoprim] Nausea and Vomiting       Social Hx:  reports that he has quit smoking. He does not have any smokeless tobacco history on file. He reports that he does not drink alcohol or use illicit drugs. Family History   Problem Relation Age of Onset    Hypertension Mother     Elevated Lipids Mother     Heart Disease Mother     Diabetes Father     Hypertension Father    [place] Elevated Lipids Sister     Hypertension Sister     Heart Disease Brother     Stroke Maternal Grandfather     Stroke Paternal Grandfather        Review of Systems:  A twelve point review of system was performed today. Pertinent positives and negatives are mentioned in the HPI. The reminder of the ROS is negative and noncontributory. Objective:    Vitals:    Vitals:    05/15/17 0700 05/15/17 0730 05/15/17 0750 05/15/17 0800   BP: 129/55 125/53  128/65   Pulse: 74 84 70 (!) 133   Resp: 13 25 14 21   Temp:  97.9 °F (36.6 °C)     SpO2: 99%  100% 99%   Weight:       Height:         I&O's:     Visit Vitals    /65    Pulse (!) 133    Temp 97.9 °F (36.6 °C)    Resp 21    Ht 5' 8\" (1.727 m)    Wt 94.3 kg (208 lb)    SpO2 99%    BMI 31.63 kg/m2       Physical Exam:  General:Alert, No distress  HEENT: Eyes are PERRL. Conjunctiva without pallor ,erythema. The sclerae without icterus.  .   Neck:Supple,no mass palpable  Lungs : Clears to auscultation Bilaterally, Normal respiratory effort  CVS: RRR, S1 S2 normal, No rub, trace LE edema  Abdomen: Soft, Non tender, No hepatosplenomegaly, bowel sounds present  Extremities: No cyanosis, No clubbing  Skin: No rash or lesions. Neurologic: non focal, AAO x 3  Psych: normal affect    Laboratory Results:    Lab Results   Component Value Date    BUN 32 (H) 05/15/2017     05/15/2017    K 4.2 05/15/2017     05/15/2017    CO2 24 05/15/2017       Lab Results   Component Value Date    BUN 32 (H) 05/15/2017    BUN 38 (H) 05/14/2017    BUN 30 (H) 04/29/2017    BUN 34 (H) 04/28/2017    BUN 34 (H) 04/27/2017    K 4.2 05/15/2017    K 4.6 05/14/2017    K 4.2 04/29/2017    K 4.3 04/28/2017    K 4.1 04/27/2017       Lab Results   Component Value Date    WBC 7.7 05/15/2017    RBC 3.76 (L) 05/15/2017    HGB 10.1 (L) 05/15/2017    HCT 31.8 (L) 05/15/2017    MCV 84.6 05/15/2017    MCH 26.9 05/15/2017    RDW 15.1 (H) 05/15/2017     05/15/2017       Lab Results   Component Value Date    PHOS 3.2 05/15/2017       Urine dipstick:   Lab Results   Component Value Date/Time    Color DARK YELLOW 05/15/2017 12:41 AM    Appearance TURBID 05/15/2017 12:41 AM    Specific gravity 1.022 05/15/2017 12:41 AM    Specific gravity 1.025 03/01/2017 05:24 PM    pH (UA) 5.5 05/15/2017 12:41 AM    Protein 100 05/15/2017 12:41 AM    Glucose NEGATIVE  05/15/2017 12:41 AM    Ketone TRACE 05/15/2017 12:41 AM    Bilirubin NEGATIVE  05/15/2017 12:41 AM    Urobilinogen 0.2 05/15/2017 12:41 AM    Nitrites NEGATIVE  05/15/2017 12:41 AM    Leukocyte Esterase LARGE 05/15/2017 12:41 AM    Epithelial cells FEW 05/15/2017 12:41 AM    Bacteria NEGATIVE  05/15/2017 12:41 AM    WBC >100 05/15/2017 12:41 AM    RBC >100 05/15/2017 12:41 AM             Thank you for allowing us to participate in the care of this patient. We will follow patient.  Please dont hesitate to call with any questions    Alex Torres MD  5/15/2017    [address] [place] [address]

## 2017-05-15 NOTE — ED NOTES
ASSESSMENT: Pt alert, oriented to self, place, and most of situation. Follows commands. Respirations spontaneous and unlabored, lung sounds CTA bilat. Not vomiting. Skin warm and dry. Wearing brief and states he did have had been using a condom catheter at [facility]. abd obese, soft, non-tender, bowel sounds active, has a couple incision sites that do not have drainage or redness.

## 2017-05-15 NOTE — ED TRIAGE NOTES
Pt seems to have poor recall when answering questions. States his previous stroke was in 1979 and that it left him with right sided weakness, though he can still move that arm. States he was admitted to the hospital and then [facility] recently due toheadaches.

## 2017-05-15 NOTE — PROGRESS NOTES
Admission Medication Reconciliation:    Information obtained from: medication list from [facility]    Significant PMH/Disease States:   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    small cecal polyp    DM (diabetes mellitus) ([facility] 12/14/2009    Hypercholesterolemia     Hyperlipidemia 12/14/2009    Hypertension 12/14/2009    Nephrolithiasis 12/14/2009    Nephrolithiasis 12/14/2009    Perforated duodenal bulb ulcer ([facility] 4/18/2017    Recurrent UTI 12/14/2009    Stroke ([facility]     SVT (supraventricular tachycardia) ([facility] 4/19/2017       Chief Complaint for this Admission:    Chief Complaint   Patient presents with    Hypotension       Allergies:  Bactrim [sulfamethoxazole-trimethoprim]    Prior to Admission Medications:   Prior to Admission Medications   Prescriptions Last Dose Informant Patient Reported? Taking? HYDROcodone-acetaminophen (NORCO) 5-325 mg per tablet   Yes Yes   Sig: Take 1 Tab by mouth every four (4) hours as needed (moderate pain). L. ACIDOPHILUS/STREPT/LA P-ASPEN (KAREN-Q PO)   Yes Yes   Sig: Take 1 Tab by mouth two (2) times a day. OTHER,NON-FORMULARY,   Yes Yes   Sig: Take 1 Cap by mouth daily. Maxitears Dry eye formula   SUMAtriptan (IMITREX) 50 mg tablet   Yes Yes   Sig: Take 50 mg by mouth daily as needed for Migraine. acetaminophen (TYLENOL EXTRA STRENGTH) 500 mg tablet   Yes Yes   Sig: Take 500 mg by mouth every four (4) hours as needed (mild pain). amLODIPine (NORVASC) 5 mg tablet   Yes Yes   Sig: Take 5 mg by mouth daily. amoxicillin 500 mg tab   No No   Sig: Take 1,000 mg by mouth two (2) times a day for 10 days. aspirin delayed-release 81 mg tablet   Yes Yes   Sig: Take 81 mg by mouth daily. atorvastatin (LIPITOR) 40 mg tablet   No Yes   Sig: TAKE 1 TABLET BY MOUTH AT BEDTIME   b complex vitamins (B COMPLEX 1) tablet   Yes Yes   Sig: Take 1 Tab by mouth daily.    bisacodyl (DULCOLAX) 10 mg suppository   Yes Yes   Sig: Insert 10 mg into rectum daily as needed (constipation). butalbital-acetaminophen-caffeine (FIORICET, ESGIC) -40 mg per tablet   Yes Yes   Sig: Take 1 Tab by mouth every four (4) hours as needed for Headache. clarithromycin (BIAXIN) 500 mg tablet   No No   Sig: Take 1 Tab by mouth two (2) times a day for 10 days. colesevelam (WELCHOL) 625 mg tablet   No Yes   Sig: Take 2 Tabs by mouth two (2) times daily (with meals). cycloSPORINE (RESTASIS) 0.05 % ophthalmic emulsion   Yes Yes   Sig: Administer 1 Drop to both eyes two (2) times a day.   gabapentin (NEURONTIN) 300 mg capsule   Yes Yes   Sig: Take 300 mg by mouth two (2) times a day. hydrocortisone (ALA-ONEIL) 1 % lotion   Yes Yes   Sig: Apply  to affected area two (2) times daily as needed (rash). use thin layer    insulin regular (NOVOLIN R, HUMULIN R) 100 unit/mL injection   Yes Yes   Sig: by SubCUTAneous route Before breakfast, lunch, dinner and at bedtime. 151-200 = 2 units  201-250= 4   251-300=6  301-350= 8  351-400=10  >400 = 12   ketoconazole (NIZORAL) 2 % topical cream   Yes Yes   Sig: Apply  to affected area daily. Indications: TINEA CORPORIS   levoFLOXacin (LEVAQUIN) 500 mg tablet   Yes No   Sig: Take 500 mg by mouth daily. Indications: BACTERIAL URINARY TRACT INFECTION   lisinopril (PRINIVIL, ZESTRIL) 20 mg tablet   Yes Yes   Sig: Take 20 mg by mouth daily. magnesium hydroxide (HOGAN MILK OF MAGNESIA) 400 mg/5 mL suspension   Yes Yes   Sig: Take 30 mL by mouth daily as needed for Constipation. metFORMIN (GLUCOPHAGE) 500 mg tablet   Yes Yes   Sig: Take 500 mg by mouth daily (with breakfast). metoprolol tartrate (LOPRESSOR) 25 mg tablet   Yes Yes   Sig: Take 12.5 mg by mouth two (2) times a day. ondansetron (ZOFRAN ODT) 4 mg disintegrating tablet   Yes Yes   Sig: Take 4 mg by mouth every six (6) hours as needed for Nausea.    oxyCODONE IR (ROXICODONE) 10 mg tab immediate release tablet   Yes Yes   Sig: Take 10 mg by mouth every four (4) hours as needed (severe pain). pantoprazole (PROTONIX) 40 mg tablet   No Yes   Sig: Take 1 Tab by mouth two (2) times a day for 60 days. polyethylene glycol (MIRALAX) 17 gram packet   Yes Yes   Sig: Take 17 g by mouth daily as needed (constipation). senna (SENNA) 8.6 mg tablet   Yes Yes   Sig: Take 2 Tabs by mouth nightly as needed for Constipation. senna-docusate (SENNA PLUS) 8.6-50 mg per tablet   Yes Yes   Sig: Take 2 Tabs by mouth nightly.   sodium chloride (ENEIDA-5) 5 % ophthalmic solution   Yes Yes   Sig: Administer 1 Drop to both eyes three (3) times daily. timolol (BETIMOL) 0.25 % ophthalmic solution   Yes Yes   Sig: Administer 1 Drop to both eyes two (2) times a day.       Facility-Administered Medications: None         Comments/Recommendations:   Patient has been on amoxicillin and clarithromycin which appears to be completed

## 2017-05-15 NOTE — ED TRIAGE NOTES
Pt brought from [facility] by EMS for hypotension. EMS states it was 90s/50s and pt had ha x1 wk. Pt alert and oriented x3 on arrival, states year is 1917 but agrees it is 2017 and states month is may.

## 2017-05-15 NOTE — ED NOTES
Pt alert, no obvious change in mentation, is verbal, respirations unlabored, lung sounds CTA bilat, not vomiting, skin warm and dry. No apparent distress. While resting with eyes closed, sats 88% but back up to 95% when awoken. Placed on 2L O2 NC. Verbalizes understanding of plan for admission.

## 2017-05-15 NOTE — CONSULTS
General Surgery ER Consultation    Admit Date: 5/14/2017  Reason for Consultation: abd pain    HPI:  Megan Chouhdury is a 79 y.o. male who presents to the ED w/ c/o h/a and dizziness. He was found to be hypotensive. He had an episode of vomiting a few days ago but no c/o abd pain, n/v otherwise. He has hx of Intra-abdominal perforated viscus w/ perforated massive duodenal bulb ulcer. He had Exploratory laparotomy, repair of   massive duodenal bulb ulcer, with biopsy, and Tesfaye Dayhoff patch at [facility] by Dr Jaren Melo in April. He also had a ureteral stent placed at that time as well. Today his CT shows gallstones with tiny bit of air in the left intrahepatic ducts. While in ED he has no c/o abd pain.         Patient Active Problem List    Diagnosis Date Noted    Dehydration 05/15/2017    Altered mental status 05/15/2017    SVT (supraventricular tachycardia) (HCC) 04/19/2017    Perforated duodenal bulb ulcer ([address] 04/18/2017    Hyponatremia 04/18/2017    Perforated viscus 04/17/2017    Hydroureter on right 04/17/2017    History of stroke 03/03/2017    UTI (urinary tract infection) 03/03/2017    Acute renal failure (ARF) ([address] 03/01/2017    Hyperkalemia 03/01/2017    Hypertension 12/14/2009    Hyperlipidemia 12/14/2009    DM (diabetes mellitus) ([address] 12/14/2009    H/O 12/14/2009    Recurrent UTI 12/14/2009    Nephrolithiasis 12/14/2009     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    small cecal polyp    DM (diabetes mellitus) ([address] 12/14/2009    Hypercholesterolemia     Hyperlipidemia 12/14/2009    Hypertension 12/14/2009    Nephrolithiasis 12/14/2009    Nephrolithiasis 12/14/2009    Perforated duodenal bulb ulcer ([address] 4/18/2017    Recurrent UTI 12/14/2009    Stroke ([address]     SVT (supraventricular tachycardia) ([address] 4/19/2017      Past Surgical History:   Procedure Laterality Date    COLORECTAL SCRN; HI RISK IND  1/15/2014         ENDOSCOPY, COLON, DIAGNOSTIC 9/2/2008    small cecal tubular adenoma removed    HX HEENT      tonsillectomy    HX UROLOGICAL      for kidney stones      Social History   Substance Use Topics    Smoking status: Former Smoker    Smokeless tobacco: Not on file    Alcohol use No      Family History   Problem Relation Age of Onset    Hypertension Mother     Elevated Lipids Mother     Heart Disease Mother     Diabetes Father     Hypertension Father     Elevated Lipids Sister     Hypertension Sister     Heart Disease Brother     Stroke Maternal Grandfather     Stroke Paternal Grandfather       Prior to Admission medications    Medication Sig Start Date End Date Taking? Authorizing Provider   cycloSPORINE (RESTASIS) 0.05 % ophthalmic emulsion Administer 1 Drop to both eyes two (2) times a day. Yes Historical Provider   bisacodyl (DULCOLAX) 10 mg suppository Insert 10 mg into rectum daily as needed (constipation). Yes Historical Provider   L. ACIDOPHILUS/STREPT/LA P-ASPEN (KAREN-Q PO) Take 1 Tab by mouth two (2) times a day. Yes Historical Provider   amLODIPine (NORVASC) 5 mg tablet Take 5 mg by mouth daily. Yes Historical Provider   butalbital-acetaminophen-caffeine (FIORICET, ESGIC) -40 mg per tablet Take 1 Tab by mouth every four (4) hours as needed for Headache. Yes Historical Provider   HYDROcodone-acetaminophen (NORCO) 5-325 mg per tablet Take 1 Tab by mouth every four (4) hours as needed (moderate pain). Yes Historical Provider   acetaminophen (TYLENOL EXTRA STRENGTH) 500 mg tablet Take 500 mg by mouth every four (4) hours as needed (mild pain). Yes Historical Provider   b complex vitamins (B COMPLEX 1) tablet Take 1 Tab by mouth daily. Yes Historical Provider   SUMAtriptan (IMITREX) 50 mg tablet Take 50 mg by mouth daily as needed for Migraine. Yes Historical Provider   sodium chloride (ENEIDA-5) 5 % ophthalmic solution Administer 1 Drop to both eyes three (3) times daily.    Yes Historical Provider   senna (SENNA) 8.6 mg tablet Take 2 Tabs by mouth nightly as needed for Constipation. Yes Historical Provider   polyethylene glycol (MIRALAX) 17 gram packet Take 17 g by mouth daily as needed (constipation). Yes Historical Provider   oxyCODONE IR (ROXICODONE) 10 mg tab immediate release tablet Take 10 mg by mouth every four (4) hours as needed (severe pain). Yes Historical Provider   ondansetron (ZOFRAN ODT) 4 mg disintegrating tablet Take 4 mg by mouth every six (6) hours as needed for Nausea. Yes Historical Provider   metFORMIN (GLUCOPHAGE) 500 mg tablet Take 500 mg by mouth daily (with breakfast). Yes Historical Provider   magnesium hydroxide (HOGAN MILK OF MAGNESIA) 400 mg/5 mL suspension Take 30 mL by mouth daily as needed for Constipation. Yes Historical Provider   lisinopril (PRINIVIL, ZESTRIL) 20 mg tablet Take 20 mg by mouth daily. Yes Historical Provider   insulin regular (NOVOLIN R, HUMULIN R) 100 unit/mL injection by SubCUTAneous route Before breakfast, lunch, dinner and at bedtime. 151-200 = 2 units  201-250= 4   251-300=6  301-350= 8  351-400=10  >400 = 12   Yes Historical Provider   gabapentin (NEURONTIN) 300 mg capsule Take 300 mg by mouth two (2) times a day. Yes Historical Provider   senna-docusate (SENNA PLUS) 8.6-50 mg per tablet Take 2 Tabs by mouth nightly. Yes Historical Provider   metoprolol tartrate (LOPRESSOR) 25 mg tablet Take 12.5 mg by mouth two (2) times a day. Yes Historical Provider   OTHER,NON-FORMULARY, Take 1 Cap by mouth daily. Maxitears Dry eye formula   Yes Historical Provider   pantoprazole (PROTONIX) 40 mg tablet Take 1 Tab by mouth two (2) times a day for 60 days. 5/3/17 7/2/17 Yes Amaris Bajwa MD   atorvastatin (LIPITOR) 40 mg tablet TAKE 1 TABLET BY MOUTH AT BEDTIME 7/11/16  Yes Reeves Hamman, MD   [facility]) 625 mg tablet Take 2 Tabs by mouth two (2) times daily (with meals).  6/13/16  Yes Charissa Levine MD   aspirin delayed-release 81 mg tablet Take 81 mg by mouth daily. Yes Historical Provider   timolol (BETIMOL) 0.25 % ophthalmic solution Administer 1 Drop to both eyes two (2) times a day. Yes Historical Provider   ketoconazole (NIZORAL) 2 % topical cream Apply  to affected area daily. Indications: TINEA CORPORIS   Yes Historical Provider   hydrocortisone (ALA-ONEIL) 1 % lotion Apply  to affected area two (2) times daily as needed (rash). use thin layer    Yes Historical Provider   levoFLOXacin (LEVAQUIN) 500 mg tablet Take 500 mg by mouth daily. Indications: BACTERIAL URINARY TRACT INFECTION    Historical Provider     Allergies   Allergen Reactions    Bactrim [Sulfamethoxazole-Trimethoprim] Nausea and Vomiting          Subjective:     Review of Systems:    A comprehensive review of systems was negative except for that written in the History of Present Illness. Objective:     Blood pressure 127/62, pulse 75, temperature 97.9 °F (36.6 °C), resp. rate 16, height 5' 8\" (1.727 m), weight 208 lb (94.3 kg), SpO2 100 %.   Recent Results (from the past 24 hour(s))   EKG, 12 LEAD, INITIAL    Collection Time: 05/14/17 11:25 PM   Result Value Ref Range    Ventricular Rate 82 BPM    Atrial Rate 82 BPM    P-R Interval 204 ms    QRS Duration 74 ms    Q-T Interval 372 ms    QTC Calculation (Bezet) 434 ms    Calculated P Axis 21 degrees    Calculated R Axis -27 degrees    Calculated T Axis 19 degrees    Diagnosis       Normal sinus rhythm  When compared with ECG of 17-APR-2017 21:20,  Criteria for Anterolateral infarct are no longer present  No significant change was found  Confirmed by Nick Gonzalez M.D., Praful Bautista ([id number]) on 5/15/2017 9:10:32 AM     CBC WITH AUTOMATED DIFF    Collection Time: 05/14/17 11:44 PM   Result Value Ref Range    WBC 9.3 4.1 - 11.1 K/uL    RBC 4.00 (L) 4.10 - 5.70 M/uL    HGB 10.8 (L) 12.1 - 17.0 g/dL    HCT 34.4 (L) 36.6 - 50.3 %    MCV 86.0 80.0 - 99.0 FL    MCH 27.0 26.0 - 34.0 PG    MCHC 31.4 30.0 - 36.5 g/dL    RDW 15.0 (H) 11.5 - 14.5 %    PLATELET 026 731 - 921 K/uL    NEUTROPHILS 59 32 - 75 %    LYMPHOCYTES 27 12 - 49 %    MONOCYTES 10 5 - 13 %    EOSINOPHILS 4 0 - 7 %    BASOPHILS 0 0 - 1 %    ABS. NEUTROPHILS 5.5 1.8 - 8.0 K/UL    ABS. LYMPHOCYTES 2.5 0.8 - 3.5 K/UL    ABS. MONOCYTES 0.9 0.0 - 1.0 K/UL    ABS. EOSINOPHILS 0.4 0.0 - 0.4 K/UL    ABS. BASOPHILS 0.0 0.0 - 0.1 K/UL    DF SMEAR SCANNED      RBC COMMENTS ANISOCYTOSIS  1+       METABOLIC PANEL, COMPREHENSIVE    Collection Time: 05/14/17 11:44 PM   Result Value Ref Range    Sodium 135 (L) 136 - 145 mmol/L    Potassium 4.6 3.5 - 5.1 mmol/L    Chloride 102 97 - 108 mmol/L    CO2 25 21 - 32 mmol/L    Anion gap 8 5 - 15 mmol/L    Glucose 99 65 - 100 mg/dL    BUN 38 (H) 6 - 20 MG/DL    Creatinine 2.83 (H) 0.70 - 1.30 MG/DL    BUN/Creatinine ratio 13 12 - 20      GFR est AA 27 (L) >60 ml/min/1.73m2    GFR est non-AA 22 (L) >60 ml/min/1.73m2    Calcium 9.2 8.5 - 10.1 MG/DL    Bilirubin, total 0.3 0.2 - 1.0 MG/DL    ALT (SGPT) 23 12 - 78 U/L    AST (SGOT) 14 (L) 15 - 37 U/L    Alk.  phosphatase 88 45 - 117 U/L    Protein, total 8.2 6.4 - 8.2 g/dL    Albumin 2.9 (L) 3.5 - 5.0 g/dL    Globulin 5.3 (H) 2.0 - 4.0 g/dL    A-G Ratio 0.5 (L) 1.1 - 2.2     TROPONIN I    Collection Time: 05/14/17 11:44 PM   Result Value Ref Range    Troponin-I, Qt. <0.04 <0.05 ng/mL   LACTIC ACID, PLASMA    Collection Time: 05/14/17 11:47 PM   Result Value Ref Range    Lactic acid 1.4 0.4 - 2.0 MMOL/L   URINALYSIS W/MICROSCOPIC    Collection Time: 05/15/17 12:41 AM   Result Value Ref Range    Color DARK YELLOW      Appearance TURBID (A) CLEAR      Specific gravity 1.022 1.003 - 1.030      pH (UA) 5.5 5.0 - 8.0      Protein 100 (A) NEG mg/dL    Glucose NEGATIVE  NEG mg/dL    Ketone TRACE (A) NEG mg/dL    Bilirubin NEGATIVE  NEG      Blood LARGE (A) NEG      Urobilinogen 0.2 0.2 - 1.0 EU/dL    Nitrites NEGATIVE  NEG      Leukocyte Esterase LARGE (A) NEG      WBC >100 (H) 0 - 4 /hpf    RBC >100 (H) 0 - 5 /hpf Epithelial cells FEW FEW /lpf    Bacteria NEGATIVE  NEG /hpf   LACTIC ACID, PLASMA    Collection Time: 05/15/17  4:41 AM   Result Value Ref Range    Lactic acid 0.6 0.4 - 2.0 MMOL/L   CK    Collection Time: 05/15/17  4:41 AM   Result Value Ref Range    CK 36 (L) 39 - 052 U/L   METABOLIC PANEL, BASIC    Collection Time: 05/15/17  4:41 AM   Result Value Ref Range    Sodium 138 136 - 145 mmol/L    Potassium 4.2 3.5 - 5.1 mmol/L    Chloride 106 97 - 108 mmol/L    CO2 24 21 - 32 mmol/L    Anion gap 8 5 - 15 mmol/L    Glucose 93 65 - 100 mg/dL    BUN 32 (H) 6 - 20 MG/DL    Creatinine 2.00 (H) 0.70 - 1.30 MG/DL    BUN/Creatinine ratio 16 12 - 20      GFR est AA 41 (L) >60 ml/min/1.73m2    GFR est non-AA 33 (L) >60 ml/min/1.73m2    Calcium 8.7 8.5 - 10.1 MG/DL   LIPID PANEL    Collection Time: 05/15/17  4:41 AM   Result Value Ref Range    LIPID PROFILE          Cholesterol, total 249 (H) <200 MG/DL    Triglyceride 181 (H) <150 MG/DL    HDL Cholesterol 30 MG/DL    LDL, calculated 182.8 (H) 0 - 100 MG/DL    VLDL, calculated 36.2 MG/DL    CHOL/HDL Ratio 8.3 (H) 0 - 5.0     TSH 3RD GENERATION    Collection Time: 05/15/17  4:41 AM   Result Value Ref Range    TSH 1.06 0.36 - 3.74 uIU/mL   AMMONIA    Collection Time: 05/15/17  4:41 AM   Result Value Ref Range    Ammonia <10 <32 UMOL/L   MAGNESIUM    Collection Time: 05/15/17  4:41 AM   Result Value Ref Range    Magnesium 1.3 (L) 1.6 - 2.4 mg/dL   PHOSPHORUS    Collection Time: 05/15/17  4:41 AM   Result Value Ref Range    Phosphorus 3.2 2.6 - 4.7 MG/DL   CBC WITH AUTOMATED DIFF    Collection Time: 05/15/17  4:41 AM   Result Value Ref Range    WBC 7.7 4.1 - 11.1 K/uL    RBC 3.76 (L) 4.10 - 5.70 M/uL    HGB 10.1 (L) 12.1 - 17.0 g/dL    HCT 31.8 (L) 36.6 - 50.3 %    MCV 84.6 80.0 - 99.0 FL    MCH 26.9 26.0 - 34.0 PG    MCHC 31.8 30.0 - 36.5 g/dL    RDW 15.1 (H) 11.5 - 14.5 %    PLATELET 254 565 - 236 K/uL    NEUTROPHILS 60 32 - 75 %    LYMPHOCYTES 23 12 - 49 %    MONOCYTES 12 5 - 13 %    EOSINOPHILS 4 0 - 7 %    BASOPHILS 1 0 - 1 %    ABS. NEUTROPHILS 4.7 1.8 - 8.0 K/UL    ABS. LYMPHOCYTES 1.8 0.8 - 3.5 K/UL    ABS. MONOCYTES 0.9 0.0 - 1.0 K/UL    ABS. EOSINOPHILS 0.3 0.0 - 0.4 K/UL    ABS. BASOPHILS 0.0 0.0 - 0.1 K/UL   HEP B SURFACE AB    Collection Time: 05/15/17  4:41 AM   Result Value Ref Range    Hepatitis B surface Ab <3.10 mIU/mL    Hep B surface Ab Interp. NONREACTIVE     HEPATITIS C AB    Collection Time: 05/15/17  4:41 AM   Result Value Ref Range    Hep C  virus Ab Interp. NONREACTIVE NR      Hep C  virus Ab comment Method used is Siemens Advia StudioSnapsaur     HEMOGLOBIN A1C WITH EAG    Collection Time: 05/15/17  4:41 AM   Result Value Ref Range    Hemoglobin A1c 6.9 (H) 4.2 - 6.3 %    Est. average glucose 151 mg/dL   EKG, 12 LEAD, INITIAL    Collection Time: 05/15/17  8:12 AM   Result Value Ref Range    Ventricular Rate 138 BPM    Atrial Rate 138 BPM    P-R Interval 280 ms    QRS Duration 70 ms    Q-T Interval 162 ms    QTC Calculation (Bezet) 245 ms    Calculated P Axis 97 degrees    Calculated R Axis -24 degrees    Calculated T Axis -174 degrees    Diagnosis       Sinus tachycardia with 1st degree AV block  Inferior infarct , age undetermined  When compared with ECG of 14-MAY-2017 23:25,  MANUAL COMPARISON REQUIRED, DATA IS UNCONFIRMED  Confirmed by Pablo Alejandra MD, Екатерина Hall ([id number]) on 5/15/2017 11:48:42 AM     GLUCOSE, POC    Collection Time: 05/15/17 12:11 PM   Result Value Ref Range    Glucose (POC) 86 65 - 100 mg/dL    Performed by Kim Puri      _____________________  Physical Exam:     General:  Alert, cooperative, no distress, appears stated age. Eyes:   Sclera clear. Throat: Lips, mucosa, and tongue normal.   Neck: Supple, symmetrical, trachea midline. Lungs:   Clear to auscultation bilaterally. Heart:  Regular rate and rhythm. Abdomen:   Soft, non-tender. Bowel sounds normal. No masses,  No organomegaly.    Extremities: Extremities normal, atraumatic, no cyanosis or edema.   Skin: Skin color, texture, turgor normal. No rashes or lesions. Assessment:   Principal Problem:    Altered mental status (5/15/2017)    Active Problems:    UTI (urinary tract infection) (3/3/2017)      Dehydration (5/15/2017)            Plan:     Admit to medicine for dehydration  No need for surgical intervention at this time  Dr Ramone Walter to see    Total time spent with patient: 20 minutes. Signed By: Alexandria Atkinson NP     May 15, 2017      I have independently examined the patient and have reviewed the chart. I agree with the above plan. No complaints of abdominal pain currently. CT shows small amount of left sided intrahepatic pneumobilia. This is likely left from his recent perforated duodenal ulcer and operation. No evidence of gastric dilation and the patient states he was able to eat and drink after his surgery suggesting no obstruction or functional issue related to his operation. If he has not had one, he should have an EGD done at some point after his operation to reassess the ulcer for healing and perform biopsy if indicated. No current need for surgical intervention.   Agree with treatment plan per primary team.      Chloe Solis MD  5/15/2017  6:05 PM

## 2017-05-16 PROBLEM — E86.0 DEHYDRATION: Status: RESOLVED | Noted: 2017-01-01 | Resolved: 2017-01-01

## 2017-05-16 PROBLEM — R41.82 ALTERED MENTAL STATUS: Status: RESOLVED | Noted: 2017-01-01 | Resolved: 2017-01-01

## 2017-05-16 PROBLEM — N39.0 UTI (URINARY TRACT INFECTION): Status: RESOLVED | Noted: 2017-01-01 | Resolved: 2017-01-01

## 2017-05-16 NOTE — PROGRESS NOTES
Transport called at 5:30pm, stated they had  scheduled for tomorrow 5/17/17 at 4:30pm listed. Advised that patient needed to be discharged and transported to [facility] today 5-16-17. Transport stated they were  patient between 6pm-7pm today 5/16/17.

## 2017-05-16 NOTE — PROGRESS NOTES
I have reviewed discharge instructions with the patient and son. The patient and son verbalized understanding. PIV lines removed prior to discharge. Patient's diallo was removed prior to discharge. EMTALA, AVS, KARDEX, MAR and transport form given to transport. Copy of EMTALA and signed AVS placed in patient's chart. Transport escorted patient out of the facility on a stretcher. Vital signs are all WNL. Report called and given to patient's nurse at [facility].

## 2017-05-16 NOTE — PROGRESS NOTES
CM was advised that patient is discharged today with plan to return to [facility] to resume inpatient rehab. up. CM spoke with Ernie Gandara, [address],  who said that patient is accepted,bed is available today and new insurance authorization will not be needed with return today. Receiving physician is Francesca Slade MD/ Nurse Report to be called to [phone]. CM faxed Discharge Summary/ Discharge Instructions(AVS) to the facility. CM sent referral via [employer] for BLS transport to [facility], and referral was accepted for 4:30PM . CM completed PCS and placed it on chart. CM completed CM portion of Emtala. CM gave update to staff nurse.

## 2017-05-16 NOTE — DIABETES MGMT
DTC Progress Note    Recommendations/ Comments: Chart reviewed due to hypoglycemia. Pt with a blood sugar of 78 mg/dl at 1527 yesterday. Please have nurse document po intake. Note patient has a blood sugar of 523 mg/dl but was 124 mg/dl on immediate recheck. DTC to continue to follow. Chart reviewed on Rolf Clark. Patient is a 79 y.o. male with known diabetes on metformin 500 mg at breakfast and sliding scale at Regular insulin at home. A1c:   Lab Results   Component Value Date/Time    Hemoglobin A1c 6.9 05/15/2017 04:41 AM    Hemoglobin A1c 6.6 03/04/2017 04:58 AM    Hemoglobin A1c, External 6.5 12/02/2015       Recent Glucose Results:   Lab Results   Component Value Date/Time    GLU 88 05/16/2017 02:31 AM    GLUCPOC 124 (H) 05/16/2017 12:03 PM    GLUCPOC 523 (H) 05/16/2017 12:00 PM    GLUCPOC 89 05/16/2017 06:42 AM        Lab Results   Component Value Date/Time    Creatinine 0.96 05/16/2017 02:31 AM     Estimated Creatinine Clearance: 84.2 mL/min (based on Cr of 0.96). Active Orders   Diet    DIET CARDIAC Mechanical Soft; 1 NECTAR; No Conc. Sweets        PO intake: No data found. Current hospital DM medication: correction scale Humalog, normal sensitivity. Will continue to follow as needed.     Thank you  Maida Jean RD, CDE

## 2017-05-16 NOTE — DISCHARGE INSTRUCTIONS
Discharge SNF/Rehab Instructions/LTAC       PATIENT ID: Dunia Vargas  MRN: [id number]   YOB: 1950    DATE OF ADMISSION: 5/14/2017 11:09 PM    DATE OF DISCHARGE: 5/16/2017    PRIMARY CARE PROVIDER: Giovanni Kelsey MD       ATTENDING PHYSICIAN: Renita Jimenez MD  DISCHARGING PROVIDER: Renita Jimenez MD     To contact this individual call [phone] and ask the  to page. If unavailable ask to be transferred the Adult Hospitalist Department. CONSULTATIONS: IP CONSULT TO HOSPITALIST  IP CONSULT TO NEPHROLOGY  IP CONSULT TO GENERAL SURGERY    PROCEDURES/SURGERIES: * No surgery found *    [address] COURSE:   71-year-old white male with past medical history of SVT, type 2 diabetes mellitus, hypertension, stroke,   nephrolithiasis, presented to the emergency department from [facility] via EMS with a reported low blood pressure. The patient is a limited historian recently had been   hospitalized on 04/17/2017 to 05/02/2017 with perforated viscus and perforated duodenal ulcer and underwent exploratory laparotomy with repair of the ulcer and right ureteral stent with noted kidney stones and   hydronephrosis, according to the ER records        Assessment & Plan:      UTI POA  -d/c Ceftriaxone  -per c/s start diflucal candida > 100k 2 more days oral completed course before     Probable AMS  -Probably multifactorial including metabolic encephalopathy from dehydration, UTI, hypotension and probable undiagnosed dementia  -I doubt that the patient is very far from his baseline     Dehydration  -On IV fluids  -resolved     SKYLER  Sec to above  -Continue IV fluids resolved     Shock hypotensive  -much improved     DM type 2  -On SSI  -Monitor     Air in left hepatic ducts  -CT shows small amount of left sided intrahepatic pneumobilia.  This is likely left from his recent perforated duodenal ulcer and operation.            PENDING TEST RESULTS:   At the time of discharge the following test results are still pending:     FOLLOW UP APPOINTMENTS:    Follow-up Information     Follow up With Details Comments Contact Info    Dary Melara MD   [address]  [phone]             ADDITIONAL CARE RECOMMENDATIONS:     DIET: Cardiac Diet    TUBE FEEDING INSTRUCTIONS:     OXYGEN / BiPAP SETTINGS:     ACTIVITY: Activity as tolerated    WOUND CARE:     EQUIPMENT needed:       DISCHARGE MEDICATIONS:   See Medication Reconciliation Form      NOTIFY YOUR PHYSICIAN FOR ANY OF THE FOLLOWING:   Fever over 101 degrees for 24 hours. Chest pain, shortness of breath, fever, chills, nausea, vomiting, diarrhea, change in mentation, falling, weakness, bleeding. Severe pain or pain not relieved by medications. Or, any other signs or symptoms that you may have questions about.     DISPOSITION:    Home With:   OT  PT  HH  RN      x SNF/Inpatient Rehab/LTAC    Independent/assisted living    Hospice    Other:       PATIENT CONDITION AT DISCHARGE:     Functional status    Poor    x Deconditioned     Independent      Cognition     Lucid    x Forgetful     Dementia      Catheters/lines (plus indication)   x [facility]aya     PICC     PEG     None      Code status   x  Full code     DNR      PHYSICAL EXAMINATION AT DISCHARGE:   Refer to Progress Note      CHRONIC MEDICAL DIAGNOSES:  Problem List as of 5/16/2017  Date Reviewed: 5/16/2017          Codes Class Noted - Resolved    SVT (supraventricular tachycardia) ([address] ICD-10-CM: I47.1  ICD-9-CM: 427.89  4/19/2017 - Present    Overview Signed 4/19/2017  7:55 PM by Piedad Perez MD     Approximately 220 BPM on tele 4/19/17             Perforated duodenal bulb ulcer ([address] ICD-10-CM: K26.5  ICD-9-CM: 532.50  4/18/2017 - Present        Hyponatremia ICD-10-CM: E87.1  ICD-9-CM: 276.1  4/18/2017 - Present        Perforated viscus ICD-10-CM: R19.8  ICD-9-CM: 799.89  4/17/2017 - Present        Hydroureter on right ICD-10-CM: N13.4  ICD-9-CM: 593.5  4/17/2017 - Present        History of stroke ICD-10-CM: Z86.73  ICD-9-CM: V12.54  3/3/2017 - Present        Acute renal failure (ARF) (HCC) ICD-10-CM: N17.9  ICD-9-CM: 584.9  3/1/2017 - Present        Hyperkalemia ICD-10-CM: E87.5  ICD-9-CM: 276.7  3/1/2017 - Present        Hypertension ICD-10-CM: I10  ICD-9-CM: 401.9  12/14/2009 - Present        Hyperlipidemia ICD-10-CM: E78.5  ICD-9-CM: 272.4  12/14/2009 - Present        DM (diabetes mellitus) ([facility] ICD-10-CM: E11.9  ICD-9-CM: 250.00  12/14/2009 - Present        H/O ICD-10-CM: Z86.73  ICD-9-CM: V12.54  12/14/2009 - Present        Recurrent UTI ICD-10-CM: N39.0  ICD-9-CM: 599.0  12/14/2009 - Present    Overview Signed 12/14/2009 10:42 AM by Liana Gold     Followed by Dr.  Duncan               Nephrolithiasis ICD-10-CM: N20.0  ICD-9-CM: 592.0  12/14/2009 - Present    Overview Signed 12/14/2009 10:42 AM by Liana Gold     Followed by Dr. Hanh Fieldds: Dehydration ICD-10-CM: E86.0  ICD-9-CM: 276.51  5/15/2017 - 5/16/2017        * (Principal)RESOLVED: Altered mental status ICD-10-CM: R41.82  ICD-9-CM: 780.97  5/15/2017 - 5/16/2017        RESOLVED: SIRS (systemic inflammatory response syndrome) ([facility] ICD-10-CM: R65.10  ICD-9-CM: 995.90  4/22/2017 - 4/22/2017        RESOLVED: UTI (urinary tract infection) ICD-10-CM: N39.0  ICD-9-CM: 599.0  3/3/2017 - 5/16/2017                CDMP Checked:   Yes x     PROBLEM LIST Updated:  Yes x         Signed:   Kelvin Dutton MD  5/16/2017  3:20 PM

## 2017-05-16 NOTE — PROGRESS NOTES
Nephrology Progress Note  Lauren Bell  Date of Admission : 5/14/2017    CC: Follow up for SKYLER       Assessment and Plan     SKYLER:  - likely 2/2 volume depletion vs ATN from hypotension/sepsis  - Cr stable  - d/c IVF and monitor     Hypotension:  - resolved     Hypovolemia:  - d/c IVF as above     Anemia:  - hgb stable     Nephrolithiasis:  - right-sided stent with chronic hydronephrosis  - diallo in place      Will sign off case. Please call us back with any questions. Interval History:  Seen and examined. Feeling better. Cr at baseline. No cp, sob, n/v/d reported. Current Medications: all current  Medications have been eviewed in EPIC  Review of Systems: A comprehensive review of systems was negative. Objective:  Vitals:    Vitals:    05/16/17 0700 05/16/17 0800 05/16/17 0845 05/16/17 1100   BP: 110/64   140/64   Pulse: 73   84   Resp: 19   20   Temp: 98 °F (36.7 °C)   98.2 °F (36.8 °C)   SpO2: 93% 96%  97%   Weight:   96.7 kg (213 lb 1.6 oz)    Height:   5' 8\" (1.727 m)      Intake and Output:     05/14 1901 - 05/16 0700  In: -   Out: 1500 [Urine:1500]    Physical Examination:  General: NAD,Conversant   Neck:  Supple, no mass  Resp:  Lungs CTA B/L, no wheezing , normal respiratory effort  CV:  RRR,  no murmur or rub, no LE edema  GI:  Soft, NT, + Bowel sounds, no hepatosplenomegaly  Neurologic:  Non focal  Psych:             AAO x 3 appropriate affect   Skin:  No Rash  :  Diallo in place    []    High complexity decision making was performed  []    Patient is at high-risk of decompensation with multiple organ involvement    Lab Data Personally Reviewed: I have reviewed all the pertinent labs, microbiology data and radiology studies during assessment.     Recent Labs      05/16/17   0231  05/15/17   0441  05/14/17   2344   NA  140  138  135*   K  4.4  4.2  4.6   CL  109*  106  102   CO2  26  24  25   GLU  88  93  99   BUN  18  32*  38*   CREA  0.96  2.00*  2.83*   CA  8.6  8.7  9.2   MG   -- 1.3*   --    PHOS  3.2  3.2   --    ALB   --    --   2.9*   SGOT   --    --   14*   ALT   --    --   23     Recent Labs      05/16/17   0231  05/15/17   0441  05/14/17   2344   WBC  6.8  7.7  9.3   HGB  9.7*  10.1*  10.8*   HCT  30.4*  31.8*  34.4*   PLT  280  302  356     No results found for: SDES  Lab Results   Component Value Date/Time    Culture result: NO GROWTH 1 DAY 05/14/2017 11:45 PM    Culture result: CORBY ALBICANS 04/21/2017 11:55 AM    Culture result: NO GROWTH 6 DAYS 04/20/2017 06:37 AM     Recent Results (from the past 24 hour(s))   GLUCOSE, POC    Collection Time: 05/15/17 12:11 PM   Result Value Ref Range    Glucose (POC) 86 65 - 100 mg/dL    Performed by Doug [id number], POC    Collection Time: 05/15/17  3:27 PM   Result Value Ref Range    Glucose (POC) 78 65 - 100 mg/dL    Performed by [employer], POC    Collection Time: 05/15/17  4:07 PM   Result Value Ref Range    Glucose (POC) 95 65 - 100 mg/dL    Performed by [employer], POC    Collection Time: 05/15/17  7:16 PM   Result Value Ref Range    Glucose (POC) 125 (H) 65 - 100 mg/dL    Performed by SABINA GALVIN    GLUCOSE, POC    Collection Time: 05/15/17  9:27 PM   Result Value Ref Range    Glucose (POC) 121 (H) 65 - 100 mg/dL    Performed by Chio Ochoa    CBC WITH AUTOMATED DIFF    Collection Time: 05/16/17  2:31 AM   Result Value Ref Range    WBC 6.8 4.1 - 11.1 K/uL    RBC 3.59 (L) 4.10 - 5.70 M/uL    HGB 9.7 (L) 12.1 - 17.0 g/dL    HCT 30.4 (L) 36.6 - 50.3 %    MCV 84.7 80.0 - 99.0 FL    MCH 27.0 26.0 - 34.0 PG    MCHC 31.9 30.0 - 36.5 g/dL    RDW 15.1 (H) 11.5 - 14.5 %    PLATELET 152 820 - 508 K/uL    NEUTROPHILS 54 32 - 75 %    LYMPHOCYTES 28 12 - 49 %    MONOCYTES 12 5 - 13 %    EOSINOPHILS 6 0 - 7 %    BASOPHILS 0 0 - 1 %    ABS. NEUTROPHILS 3.7 1.8 - 8.0 K/UL    ABS. LYMPHOCYTES 1.9 0.8 - 3.5 K/UL    ABS. MONOCYTES 0.8 0.0 - 1.0 K/UL    ABS.  EOSINOPHILS 0.4 0.0 - 0.4 K/UL ABS. BASOPHILS 0.0 0.0 - 0.1 K/UL   METABOLIC PANEL, BASIC    Collection Time: 05/16/17  2:31 AM   Result Value Ref Range    Sodium 140 136 - 145 mmol/L    Potassium 4.4 3.5 - 5.1 mmol/L    Chloride 109 (H) 97 - 108 mmol/L    CO2 26 21 - 32 mmol/L    Anion gap 5 5 - 15 mmol/L    Glucose 88 65 - 100 mg/dL    BUN 18 6 - 20 MG/DL    Creatinine 0.96 0.70 - 1.30 MG/DL    BUN/Creatinine ratio 19 12 - 20      GFR est AA >60 >60 ml/min/1.73m2    GFR est non-AA >60 >60 ml/min/1.73m2    Calcium 8.6 8.5 - 10.1 MG/DL   PHOSPHORUS    Collection Time: 05/16/17  2:31 AM   Result Value Ref Range    Phosphorus 3.2 2.6 - 4.7 MG/DL   GLUCOSE, POC    Collection Time: 05/16/17  6:42 AM   Result Value Ref Range    Glucose (POC) 89 65 - 100 mg/dL    Performed by Yehuda Ruiz MD  [address]  Phone - [phone]   Fax - [phone]  [email or web address]

## 2017-05-16 NOTE — PROGRESS NOTES
Bedside shift change report given to Jj Gabriel RN (oncoming nurse) by Laurel Paredes RN (offgoing nurse). Report included the following information SBAR, Kardex, ED Summary, Procedure Summary, Intake/Output, MAR, Accordion, Recent Results and Cardiac Rhythm NSR.

## 2017-05-16 NOTE — PROGRESS NOTES
Hospitalist Progress Note  Stella Tucker MD  Office: [phone]        Date of Service:  2017  NAME:  Emerson Mendiola  :  1950  MRN:  [id number]      Admission Summary:   61-year-old white male with past medical history of SVT, type 2 diabetes mellitus, hypertension, stroke,   nephrolithiasis, presented to the emergency department from [address] [place] [id number] via EMS with a reported low blood pressure.  The patient is a limited historian recently had been   hospitalized on 2017 to 2017 with perforated viscus and perforated duodenal ulcer and underwent exploratory laparotomy with repair of the ulcer and right ureteral stent with noted kidney stones and   hydronephrosis, according to the ER records    Interval history / Subjective:   C/S urine candida > 100k      Assessment & Plan:     UTI POA  -d/c Ceftriaxone  -per c/s start diflucal candida > 100k    Probable AMS  -Probably multifactorial including metabolic encephalopathy from dehydration, UTI, hypotension and probable undiagnosed dementia  -I doubt that the patient is very far from his baseline    Dehydration  -On IV fluids  -resolved    SKYLER  Sec to above  -Continue IV fluids resolved    Shock hypotensive  -much improved    DM type 2  -On SSI  -Monitor    Air in left hepatic ducts  -seen on CT  -Will consult surgery    NPO till cleared by Surgery    Code status: FULL CODE  DVT prophylaxis: scd    Plan: Follow surgery, continue IV fluids    Care Plan discussed with: Patient/Family  Disposition: TBD PT/OT eval readu for D/C     Hospital Problems  Date Reviewed: 5/15/2017          Codes Class Noted POA    Dehydration ICD-10-CM: E86.0  ICD-9-CM: 276.51  5/15/2017 Unknown        * (Principal)Altered mental status ICD-10-CM: R41.82  ICD-9-CM: 780.97  5/15/2017 Unknown        UTI (urinary tract infection) ICD-10-CM: N39.0  ICD-9-CM: 599.0  3/3/2017 Unknown Review of Systems:   A comprehensive review of systems was negative except for that written in the HPI. Vital Signs:    Last 24hrs VS reviewed since prior progress note. Most recent are:  Visit Vitals    /64 (BP 1 Location: Right arm, BP Patient Position: At rest)    Pulse 73    Temp 98 °F (36.7 °C)    Resp 19    Ht 5' 8\" (1.727 m)    Wt 96.7 kg (213 lb 1.6 oz)    SpO2 93%    BMI 32.4 kg/m2         Intake/Output Summary (Last 24 hours) at 05/16/17 0901  Last data filed at 05/16/17 0700   Gross per 24 hour   Intake                0 ml   Output             1500 ml   Net            -1500 ml        Physical Examination:             Constitutional:  No acute distress   ENT:  Oral mucous moist   Resp:  CTA bilaterally. CV:  Regular rhythm, normal rate, no murmurs, gallops, rubs    GI:  Soft, non distended, non tender. bs+_    Musculoskeletal:  No edema, warm, 2+ pulses throughout    Neurologic:  Moves all extremities. AAOx3, CN II-XII reviewed     Skin:  Good turgor, no rashes or ulcers       Data Review:    Review and/or order of clinical lab test      Labs:     Recent Labs      05/16/17   0231  05/15/17   0441   WBC  6.8  7.7   HGB  9.7*  10.1*   HCT  30.4*  31.8*   PLT  280  302     Recent Labs      05/16/17   0231  05/15/17   0441  05/14/17   2344   NA  140  138  135*   K  4.4  4.2  4.6   CL  109*  106  102   CO2  26  24  25   BUN  18  32*  38*   CREA  0.96  2.00*  2.83*   GLU  88  93  99   CA  8.6  8.7  9.2   MG   --   1.3*   --    PHOS  3.2  3.2   --      Recent Labs      05/14/17   2344   SGOT  14*   ALT  23   AP  88   TBILI  0.3   TP  8.2   ALB  2.9*   GLOB  5.3*     No results for input(s): INR, PTP, APTT in the last 72 hours. No lab exists for component: INREXT, INREXT   No results for input(s): FE, TIBC, PSAT, FERR in the last 72 hours. No results found for: FOL, RBCF   No results for input(s): PH, PCO2, PO2 in the last 72 hours.   Recent Labs      05/15/17   0441  05/14/17 2344   CPK  36*   --    TROIQ   --   <0.04     Lab Results   Component Value Date/Time    Cholesterol, total 249 05/15/2017 04:41 AM    HDL Cholesterol 30 05/15/2017 04:41 AM    LDL, calculated 182.8 05/15/2017 04:41 AM    Triglyceride 181 05/15/2017 04:41 AM    CHOL/HDL Ratio 8.3 05/15/2017 04:41 AM     Lab Results   Component Value Date/Time    Glucose (POC) 89 05/16/2017 06:42 AM    Glucose (POC) 121 05/15/2017 09:27 PM    Glucose (POC) 125 05/15/2017 07:16 PM    Glucose (POC) 95 05/15/2017 04:07 PM    Glucose (POC) 78 05/15/2017 03:27 PM     Lab Results   Component Value Date/Time    Color DARK YELLOW 05/15/2017 12:41 AM    Appearance TURBID 05/15/2017 12:41 AM    Specific gravity 1.022 05/15/2017 12:41 AM    Specific gravity 1.025 03/01/2017 05:24 PM    pH (UA) 5.5 05/15/2017 12:41 AM    Protein 100 05/15/2017 12:41 AM    Glucose NEGATIVE  05/15/2017 12:41 AM    Ketone TRACE 05/15/2017 12:41 AM    Bilirubin NEGATIVE  05/15/2017 12:41 AM    Urobilinogen 0.2 05/15/2017 12:41 AM    Nitrites NEGATIVE  05/15/2017 12:41 AM    Leukocyte Esterase LARGE 05/15/2017 12:41 AM    Epithelial cells FEW 05/15/2017 12:41 AM    Bacteria NEGATIVE  05/15/2017 12:41 AM    WBC >100 05/15/2017 12:41 AM    RBC >100 05/15/2017 12:41 AM         Medications Reviewed:     Current Facility-Administered Medications   Medication Dose Route Frequency    dextrose 10 % infusion 125-250 mL  125-250 mL IntraVENous PRN    fluconazole in 0.9% NaCl 100 mg IVPB  100 mg IntraVENous Q24H    sodium chloride (NS) flush 5-10 mL  5-10 mL IntraVENous Q8H    sodium chloride (NS) flush 5-10 mL  5-10 mL IntraVENous PRN    sodium chloride (NS) flush 5-10 mL  5-10 mL IntraVENous Q8H    sodium chloride (NS) flush 5-10 mL  5-10 mL IntraVENous PRN    0.9% sodium chloride infusion  125 mL/hr IntraVENous CONTINUOUS    glucose chewable tablet 16 g  4 Tab Oral PRN    glucagon (GLUCAGEN) injection 1 mg  1 mg IntraMUSCular PRN    insulin lispro (HUMALOG) injection   SubCUTAneous AC&HS     ______________________________________________________________________  EXPECTED LENGTH OF STAY: 2d 16h  ACTUAL LENGTH OF STAY:          1                 Keli Byrne MD

## 2017-05-16 NOTE — INTERDISCIPLINARY ROUNDS
IDR/SLIDR Summary          Patient: Alfred Frazier MRN: [id number]    Age: 79 y.o. YOB: 1950 Room/Bed: [facility]   Admit Diagnosis: Altered mental status  Dehydration  UTI (urinary tract infection)  Principal Diagnosis: Altered mental status   Goals: IV abx, safety, d/c planning  Readmission: YES  Quality Measure: Not applicable  VTE Prophylaxis: Mechanical  Influenza Vaccine screening completed? NO  Pneumococcal Vaccine screening completed? NO  Mobility needs: Yes   Nutrition plan:Yes  Consults:P.T, O.T. and Case Management    Financial concerns:No  Escalated to CM? NO  RRAT Score: 15   Interventions:{Intervention:96457}  Testing due for pt today?  YES  LOS: 1 days Expected length of stay > or = 2 days  Discharge plan: TBD   PCP: Rosaline Harper MD  Transportation needs: No    Days before discharge:two or more days before discharge   Discharge disposition: TBD    Signed:     Gaby Licea RN  5/16/2017  2:13 AM

## 2017-05-16 NOTE — PROGRESS NOTES
Bedside and Verbal shift change report given to Magdy Kinney RN (oncoming nurse) by Sammy Trejo RN (offgoing nurse). Report included the following information SBAR, Kardex, ED Summary, Intake/Output, MAR, Recent Results and Cardiac Rhythm NSR.

## 2017-05-16 NOTE — PROGRESS NOTES
Primary Nurse Ginny Rodriguez and Estrellita Marie RN performed a dual skin assessment on this patient. No impairment noted. Pako score is 17.

## 2017-05-16 NOTE — PROGRESS NOTES
CM reviewed chart and noted that this pt came in from [facility]. CM met with pt to discuss and he stated that he does want to go back to [facility] to complete his rehab there before going home. [facility]  Israel Brooks

## 2017-05-16 NOTE — PROGRESS NOTES
NUTRITION COMPLETE ASSESSMENT    RECOMMENDATIONS:   1. Consider SLP consult to evaluate swallow - on soft diet and thickened liquids at facility    2. Encourage fluid intake at meals   - should be consuming at least 1 cup of thickened liquid at each meal and 1 cup between meals    3. Weekly weights     Interventions/Plan:   Food/Nutrient Delivery:   (texture adjusted/preferences noted) Commercial supplement (Glucerna BID, Boost Pudding)          Assessment:   Reason for Assessment: [x]BPA/MST Referral (24-33# wt loss, poor appetite)    Diet: Cardiac (nectar thick liquids)  Supplements: none  Nutritionally Significant Medications: [x] Reviewed & Includes: cetriaxone, SSI, NS @ 125ml/hr  Meal Intake: No data found. Pre-Hospitalization:  Usual Appetite: Fair  Diet at Home:  soft diet, no mixed consistencies, no straw, NCS   Vitamins/Supplements: Yes (Glucerna BID)    Current Hospitalization:   Appetite: Fair  PO Ability: Independent Average po intake:   Average supplements intake:        Subjective:  Yeah those Glucerna are ok. .. Ugh no eggs, I don't like them. Yogurt is ok. Objective:  Pt admitted for AMS. PMHx: DM, HTN, stroke, nephrolithiasis, perforated ulcer. Recent admit earlier this month with perforated duodenal ulcer noted s/p repair. SKYLER 2/2 depletion, IVF infusing. UTI with abx rx. N/V and poor appetite for the past few days per ED notes. Facility records reviewed with pt on soft diet, no mixed consistencies, no straw, NCS with nectar thick liquids. Glucerna also ordered BID. Will adjust diet order to mechanical soft, NCS and will add Glucerna to provide 440kcal, 20g protein. Consider SLP eval to reassess swallow. 3% wt loss x 2 months noted. Wt Readings:   05/16/17 96.7 kg (213 lb 1.6 oz)   05/03/17 97.6 kg (215 lb 1.9 oz)   03/03/17 95.3 kg (210 lb)   03/01/17 99.8 kg (220 lb)   11/07/16 102.1 kg (225 lb)   01/07/16 111.7 kg (246 lb 3.2 oz)     Pt reports fair appetite at facility.  Likes Glucerna shakes and had been drinking well. With thickened liquids question how much pt has been drinking with dehydration on admit. Please encourage pt to drink at least 1 cup of thickened liquid at each meal and 1 cup between meals. Will follow for wt trends, PO/fluid intake. Estimated Nutrition Needs:   Kcals/day: 3123 Kcals/day (2050-2220kcal)  Protein: 106 g (106-125g (1.1-1.3g/kg))  Fluid: 2220 ml (1ml/kcal)  Based On: [address] [place] (x 1.2-1.3)  Weight Used: Actual wt (96.7kg)    Pt expected to meet estimated nutrient needs:  []   Yes     []  No [x] Unable to predict at this time  Nutrition Diagnosis:   1. Unintended weight loss related to inadequate oral intake 2/2 altered GI fx as evidenced by wt loss, perforated ulcer w/p repair    2. Swallowing difficulty related to dysphagia as evidenced by nectar thick liquids PTA, hx of CVA    Goals:     Consumption of at least 75% meals and 2 supplements /day in 5-7 days; wt maintenance     Monitoring & Evaluation:    - Liquid meal replacement, Total energy intake   - Weight/weight change    Previous Nutrition Goals Met:   N/A  Previous Recommendations:    N/A    Education & Discharge Needs:   [x] None Identified   [] Identified and addressed    [] Participated in care plan, discharge planning, and/or interdisciplinary rounds        Cultural, Sabianist and ethnic food preferences identified: None    Skin Integrity: [x]Intact  []Other  Edema: [x]None []Other  Last BM: PTA  Food Allergies: [x]None []Other  Diet Restrictions: Food Dislikes: Eggs  Cultural/Druze Preference(s): None     Anthropometrics:    Weight Loss Metrics 5/16/2017 5/3/2017 3/3/2017 3/1/2017 11/7/2016 1/7/2016 8/17/2015   Today's Wt 213 lb 1.6 oz 215 lb 1.9 oz 210 lb 220 lb 225 lb 246 lb 3.2 oz 247 lb   BMI 32.4 kg/m2 31.77 kg/m2 31.01 kg/m2 31.57 kg/m2 33.23 kg/m2 36.34 kg/m2 36.46 kg/m2      Weight Source: Bed  Height: 5' 8\" (172.7 cm),    Body mass index is 32.4 kg/(m^2).   IBW : 69.9 kg (154 lb), % IBW (Calculated): 138.38 %  Usual Body Weight: 102.1 kg (225 lb),      Labs:    Lab Results   Component Value Date/Time    Sodium 140 05/16/2017 02:31 AM    Potassium 4.4 05/16/2017 02:31 AM    Chloride 109 05/16/2017 02:31 AM    CO2 26 05/16/2017 02:31 AM    Glucose 88 05/16/2017 02:31 AM    BUN 18 05/16/2017 02:31 AM    Creatinine 0.96 05/16/2017 02:31 AM    Calcium 8.6 05/16/2017 02:31 AM    Magnesium 1.3 05/15/2017 04:41 AM    Phosphorus 3.2 05/16/2017 02:31 AM    Albumin 2.9 05/14/2017 11:44 PM     Lab Results   Component Value Date/Time    Hemoglobin A1c 6.9 05/15/2017 04:41 AM    Hemoglobin A1c (POC) 6.3 10/29/2013 05:11 PM    Hemoglobin A1c, External 6.5 12/02/2015     Alissa Lindsey RD

## 2017-05-16 NOTE — DISCHARGE SUMMARY
Discharge Summary       PATIENT ID: Chaka Mendiola  MRN: [id number]   YOB: 1950    DATE OF ADMISSION: 5/14/2017 11:09 PM    DATE OF DISCHARGE: 5/16/17   PRIMARY CARE PROVIDER: Ana Melendez MD     ATTENDING PHYSICIAN: Ruth Shay  DISCHARGING PROVIDER: Hubert Love MD    To contact this individual call [phone] and ask the  to page. If unavailable ask to be transferred the Adult Hospitalist Department. CONSULTATIONS: IP CONSULT TO HOSPITALIST  IP CONSULT TO NEPHROLOGY  IP CONSULT TO GENERAL SURGERY    PROCEDURES/SURGERIES: * No surgery found *    [address] COURSE:   66-year-old white male with past medical history of SVT, type 2 diabetes mellitus, hypertension, stroke,   nephrolithiasis, presented to the emergency department from [address] [place] [address] via EMS with a reported low blood pressure. The patient is a limited historian recently had been   hospitalized on 04/17/2017 to 05/02/2017 with perforated viscus and perforated duodenal ulcer and underwent exploratory laparotomy with repair of the ulcer and right ureteral stent with noted kidney stones and   hydronephrosis, according to the ER records        Assessment & Plan:      UTI POA  -d/c Ceftriaxone  -per c/s start diflucal candida > 100k 2 more days oral completed course before     Probable AMS  -Probably multifactorial including metabolic encephalopathy from dehydration, UTI, hypotension and probable undiagnosed dementia  -I doubt that the patient is very far from his baseline     Dehydration  -On IV fluids  -resolved     SKYLER  Sec to above  -Continue IV fluids resolved     Shock hypotensive  -much improved     DM type 2  -On SSI  -Monitor     Air in left hepatic ducts  -CT shows small amount of left sided intrahepatic pneumobilia.  This is likely left from his recent perforated duodenal ulcer and operation.                PENDING TEST RESULTS:   At the time of discharge the following test results are still pending:     FOLLOW UP APPOINTMENTS:    Follow-up Information     Follow up With Details Comments Alta Jerome MD   [address]  [phone]             ADDITIONAL CARE RECOMMENDATIONS:     DIET: Cardiac Diet    ACTIVITY: Activity as tolerated    WOUND CARE:     EQUIPMENT needed:       DISCHARGE MEDICATIONS:  Current Discharge Medication List      START taking these medications    Details   fluconazole (DIFLUCAN) 100 mg tablet Take 1 Tab by mouth daily for 2 days. FDA advises cautious prescribing of oral fluconazole in pregnancy. Qty: 2 Tab, Refills: 0         CONTINUE these medications which have NOT CHANGED    Details   cycloSPORINE (RESTASIS) 0.05 % ophthalmic emulsion Administer 1 Drop to both eyes two (2) times a day. bisacodyl (DULCOLAX) 10 mg suppository Insert 10 mg into rectum daily as needed (constipation). L. ACIDOPHILUS/STREPT/LA P-ASPEN (KAREN-Q PO) Take 1 Tab by mouth two (2) times a day. amLODIPine (NORVASC) 5 mg tablet Take 5 mg by mouth daily. butalbital-acetaminophen-caffeine (FIORICET, ESGIC) -40 mg per tablet Take 1 Tab by mouth every four (4) hours as needed for Headache. HYDROcodone-acetaminophen (NORCO) 5-325 mg per tablet Take 1 Tab by mouth every four (4) hours as needed (moderate pain). acetaminophen (TYLENOL EXTRA STRENGTH) 500 mg tablet Take 500 mg by mouth every four (4) hours as needed (mild pain). b complex vitamins (B COMPLEX 1) tablet Take 1 Tab by mouth daily. SUMAtriptan (IMITREX) 50 mg tablet Take 50 mg by mouth daily as needed for Migraine. sodium chloride (ENEIDA-5) 5 % ophthalmic solution Administer 1 Drop to both eyes three (3) times daily. senna (SENNA) 8.6 mg tablet Take 2 Tabs by mouth nightly as needed for Constipation. polyethylene glycol (MIRALAX) 17 gram packet Take 17 g by mouth daily as needed (constipation).       oxyCODONE IR (ROXICODONE) 10 mg tab immediate release tablet Take 10 mg by mouth every four (4) hours as needed (severe pain). ondansetron (ZOFRAN ODT) 4 mg disintegrating tablet Take 4 mg by mouth every six (6) hours as needed for Nausea. metFORMIN (GLUCOPHAGE) 500 mg tablet Take 500 mg by mouth daily (with breakfast). magnesium hydroxide (HOGAN MILK OF MAGNESIA) 400 mg/5 mL suspension Take 30 mL by mouth daily as needed for Constipation. lisinopril (PRINIVIL, ZESTRIL) 20 mg tablet Take 20 mg by mouth daily. insulin regular (NOVOLIN R, HUMULIN R) 100 unit/mL injection by SubCUTAneous route Before breakfast, lunch, dinner and at bedtime. 151-200 = 2 units  201-250= 4   251-300=6  301-350= 8  351-400=10  >400 = 12      gabapentin (NEURONTIN) 300 mg capsule Take 300 mg by mouth two (2) times a day. senna-docusate (SENNA PLUS) 8.6-50 mg per tablet Take 2 Tabs by mouth nightly. metoprolol tartrate (LOPRESSOR) 25 mg tablet Take 12.5 mg by mouth two (2) times a day. OTHER,NON-FORMULARY, Take 1 Cap by mouth daily. [employer] Dry eye formula      pantoprazole (PROTONIX) 40 mg tablet Take 1 Tab by mouth two (2) times a day for 60 days. Qty: 60 Tab, Refills: 1      atorvastatin (LIPITOR) 40 mg tablet TAKE 1 TABLET BY MOUTH AT BEDTIME  Qty: 90 Tab, Refills: 1      colesevelam (WELCHOL) 625 mg tablet Take 2 Tabs by mouth two (2) times daily (with meals). Qty: 360 Tab, Refills: 1      aspirin delayed-release 81 mg tablet Take 81 mg by mouth daily. timolol (BETIMOL) 0.25 % ophthalmic solution Administer 1 Drop to both eyes two (2) times a day.      ketoconazole (NIZORAL) 2 % topical cream Apply  to affected area daily. Indications: TINEA CORPORIS      hydrocortisone (ALA-ONEIL) 1 % lotion Apply  to affected area two (2) times daily as needed (rash). use thin layer       levoFLOXacin (LEVAQUIN) 500 mg tablet Take 500 mg by mouth daily.  Indications: BACTERIAL URINARY TRACT INFECTION         STOP taking these medications       clarithromycin (BIAXIN) 500 mg tablet Comments:   Reason for Stopping:         amoxicillin 500 mg tab Comments:   Reason for Stopping:                 NOTIFY YOUR PHYSICIAN FOR ANY OF THE FOLLOWING:   Fever over 101 degrees for 24 hours. Chest pain, shortness of breath, fever, chills, nausea, vomiting, diarrhea, change in mentation, falling, weakness, bleeding. Severe pain or pain not relieved by medications. Or, any other signs or symptoms that you may have questions about.     DISPOSITION:    Home With:   OT  PT  HH  RN      x Long term SNF/Inpatient Rehab    Independent/assisted living    Hospice    Other:       PATIENT CONDITION AT DISCHARGE:     Functional status    Poor    x Deconditioned     Independent      Cognition     Lucid    x Forgetful     Dementia      Catheters/lines (plus indication)   x Amaya     PICC     PEG     None      Code status   x  Full code     DNR      PHYSICAL EXAMINATION AT DISCHARGE:   Refer to Progress Note      CHRONIC MEDICAL DIAGNOSES:  Problem List as of 5/16/2017  Date Reviewed: 5/16/2017          Codes Class Noted - Resolved    SVT (supraventricular tachycardia) ([address] ICD-10-CM: I47.1  ICD-9-CM: 427.89  4/19/2017 - Present    Overview Signed 4/19/2017  7:55 PM by Raphael Atkins MD     Approximately 220 BPM on tele 4/19/17             Perforated duodenal bulb ulcer ([address] ICD-10-CM: K26.5  ICD-9-CM: 532.50  4/18/2017 - Present        Hyponatremia ICD-10-CM: E87.1  ICD-9-CM: 276.1  4/18/2017 - Present        Perforated viscus ICD-10-CM: R19.8  ICD-9-CM: 799.89  4/17/2017 - Present        Hydroureter on right ICD-10-CM: N13.4  ICD-9-CM: 593.5  4/17/2017 - Present        History of stroke ICD-10-CM: Z86.73  ICD-9-CM: V12.54  3/3/2017 - Present        Acute renal failure (ARF) (HCC) ICD-10-CM: N17.9  ICD-9-CM: 584.9  3/1/2017 - Present        Hyperkalemia ICD-10-CM: E87.5  ICD-9-CM: 276.7  3/1/2017 - Present        Hypertension ICD-10-CM: I10  ICD-9-CM: 401.9  12/14/2009 - Present        Hyperlipidemia ICD-10-CM: E78.5  ICD-9-CM: 272.4  12/14/2009 - Present        DM (diabetes mellitus) ([facility] ICD-10-CM: E11.9  ICD-9-CM: 250.00  12/14/2009 - Present        H/O ICD-10-CM: Z86.73  ICD-9-CM: V12.54  12/14/2009 - Present        Recurrent UTI ICD-10-CM: N39.0  ICD-9-CM: 599.0  12/14/2009 - Present    Overview Signed 12/14/2009 10:42 AM by Svitlana Robertson     Followed by Dr.  Duncan               Nephrolithiasis ICD-10-CM: N20.0  ICD-9-CM: 592.0  12/14/2009 - Present    Overview Signed 12/14/2009 10:42 AM by Svitlana Robertson     Followed by Dr. Sunni Rosenthal: Dehydration ICD-10-CM: E86.0  ICD-9-CM: 276.51  5/15/2017 - 5/16/2017        * (Principal)RESOLVED: Altered mental status ICD-10-CM: R41.82  ICD-9-CM: 780.97  5/15/2017 - 5/16/2017        RESOLVED: SIRS (systemic inflammatory response syndrome) ([facility] ICD-10-CM: R65.10  ICD-9-CM: 995.90  4/22/2017 - 4/22/2017        RESOLVED: UTI (urinary tract infection) ICD-10-CM: N39.0  ICD-9-CM: 599.0  3/3/2017 - 5/16/2017              Greater than 20  minutes were spent with the patient on counseling and coordination of care    Signed:   Natan Sampson MD  5/16/2017  3:24 PM

## 2017-05-26 NOTE — ED TRIAGE NOTES
Patient arrives by EMS for hematuria. Patient reports that he noticed blood in his urine this morning after coming home from rehab. Patient reports that he does not have any dysuria, has not had a recent cath. Patient reports that he had urinary stents that were placed 3 weeks ago. Denies fever, n/v. No distress noted.  Will continue to monitor

## 2017-05-26 NOTE — ED PROVIDER NOTES
HPI Comments: Lea Woodruff is a 79 y.o. male with PMHx significant for DM, HTN, and nephrolithiasis who presents via EMS to the ED with c/o hematuria that begin this morning. Per pt's relative, he had just been discharged from rehab when he began to have hematuria. Pt reports that he had the urge to urinate, but had difficulty doing so. Of note, pt recently had ureteral stents placed by Dr. Ami Stephenson on 4/21/17. Pt currently uses ASA 81 mg as needed. Pt reports that his bowel movements have been normal. Pt denies passing any gas/flatus from his urethra. Pt specifically denies any nausea, vomiting, diarrhea, or constipation. PCP: Jovanna Ibrahim MD   Urologist: Linda Manuel MD    Social hx: + Former Smoker, - EtOH, - Illicit Drugs    There are no other complaints, changes, or physical findings at this time. Written by Dottie Ruvalcaba ED Scribcody, as dictated by Governor DO Levar. The history is provided by the patient and a relative. No  was used.         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    small cecal polyp    DM (diabetes mellitus) ([address] 12/14/2009    Hypercholesterolemia     Hyperlipidemia 12/14/2009    Hypertension 12/14/2009    Nephrolithiasis 12/14/2009    Nephrolithiasis 12/14/2009    Perforated duodenal bulb ulcer ([address] 4/18/2017    Recurrent UTI 12/14/2009    Stroke ([address]     SVT (supraventricular tachycardia) ([address] 4/19/2017       Past Surgical History:   Procedure Laterality Date    COLORECTAL SCRN; HI RISK IND  1/15/2014         ENDOSCOPY, COLON, DIAGNOSTIC  9/2/2008    small cecal tubular adenoma removed    HX HEENT      tonsillectomy    HX UROLOGICAL      for kidney stones    HX UROLOGICAL      urinary stents placed 05/2017         Family History:   Problem Relation Age of Onset    Hypertension Mother     Elevated Lipids Mother     Heart Disease Mother     Diabetes Father     Hypertension Father    [facility] Elevated Lipids Sister     Hypertension Sister     Heart Disease Brother     Stroke Maternal Grandfather     Stroke Paternal Grandfather        Social History     Social History    Marital status: SINGLE     Spouse name: N/A    Number of children: N/A    Years of education: N/A     Occupational History    Not on file. Social History Main Topics    Smoking status: Former Smoker    Smokeless tobacco: Not on file    Alcohol use No    Drug use: No    Sexual activity: Not Currently     Birth control/ protection: Abstinence     Other Topics Concern    Not on file     Social History Narrative         ALLERGIES: Bactrim [sulfamethoxazole-trimethoprim]    Review of Systems   Constitutional: Negative. Negative for appetite change, chills, fatigue and fever. HENT: Negative. Negative for congestion, rhinorrhea, sinus pressure and sore throat. Eyes: Negative. Respiratory: Negative. Negative for cough, choking, chest tightness, shortness of breath and wheezing. Cardiovascular: Negative. Negative for chest pain, palpitations and leg swelling. Gastrointestinal: Negative for abdominal pain, constipation, diarrhea, nausea and vomiting. Endocrine: Negative. Genitourinary: Positive for difficulty urinating and hematuria. Negative for dysuria, flank pain and urgency. Musculoskeletal: Negative. Skin: Negative. Neurological: Negative. Negative for dizziness, speech difficulty, weakness, light-headedness, numbness and headaches. Psychiatric/Behavioral: Negative. All other systems reviewed and are negative.       Patient Vitals for the past 12 hrs:   Temp Pulse Resp BP SpO2   05/26/17 2130 - - - 127/57 96 %   05/26/17 2100 - - - 116/63 97 %   05/26/17 2045 - - - 123/62 99 %   05/26/17 1945 - - - 134/71 98 %   05/26/17 1915 - - - 102/63 97 %   05/26/17 1845 - - - 134/68 97 %   05/26/17 1830 - - - 94/60 96 %   05/26/17 1826 98.2 °F (36.8 °C) (!) 101 16 102/58 97 %            Physical Exam Constitutional: He is oriented to person, place, and time. He appears well-developed and well-nourished. No distress. HENT:   Head: Normocephalic and atraumatic. Mouth/Throat: Oropharynx is clear and moist. No oropharyngeal exudate. Eyes: Conjunctivae and EOM are normal. Pupils are equal, round, and reactive to light. Neck: Normal range of motion. Neck supple. No JVD present. No tracheal deviation present. Cardiovascular: Normal rate, regular rhythm, normal heart sounds and intact distal pulses. No murmur heard. Pulmonary/Chest: Effort normal and breath sounds normal. No stridor. No respiratory distress. He has no wheezes. He has no rales. He exhibits no tenderness. Abdominal: Soft. He exhibits no distension. There is no tenderness. There is no rebound and no guarding. No tenderness to the abdomen     Genitourinary: Penis normal.   Genitourinary Comments: Gross hematuria     Musculoskeletal: Normal range of motion. He exhibits no edema or tenderness. Neurological: He is alert and oriented to person, place, and time. No cranial nerve deficit. Right sided weakness from prior CVA, no changes   Skin: Skin is warm and dry. He is not diaphoretic. Psychiatric: He has a normal mood and affect. His behavior is normal.   Nursing note and vitals reviewed. MDM  Number of Diagnoses or Management Options  Hematuria:   Urinary tract infection with hematuria, site unspecified:   Diagnosis management comments: DDx: UTI, urinary obstruction, complication of urinary stent       Amount and/or Complexity of Data Reviewed  Clinical lab tests: ordered and reviewed  Tests in the radiology section of CPT®: ordered and reviewed  Obtain history from someone other than the patient: yes (Relative)  Review and summarize past medical records: yes    Patient Progress  Patient progress: stable    ED Course       Procedures  Progress note:  8:26 PM  Pt only had ~150 ml of urine when he was cathed.  Upon irrigation with 750 ml of NS, pt has light pink tinge water coming out. Written by ANMOL Jean Baptiste, as dictated by Alex Tavarez DO. Prior to discharge pt had increase blood in urine, catheter irrigated again due to increase in hematuria, cleared to pink tinged, diallo cath removed, likely UTI pt has had candida on multiple Cx but has not been treated, pt had been on Levaquin instructed to dc, Rx for Diflucan and Keflex, U Cx sent, pt instructed to return for inability to void or worsening hematuria with dizziness, SOA, chest pain or near syncope. LABORATORY TESTS:  Recent Results (from the past 12 hour(s))   URINALYSIS W/ REFLEX CULTURE    Collection Time: 05/26/17  6:49 PM   Result Value Ref Range    Color RED      Appearance BLOODY (A) CLEAR      Specific gravity 1.015 1.003 - 1.030      pH (UA) 7.0 5.0 - 8.0      Protein >300 (A) NEG mg/dL    Glucose NEGATIVE  NEG mg/dL    Ketone NEGATIVE  NEG mg/dL    Bilirubin NEGATIVE  NEG      Blood LARGE (A) NEG      Urobilinogen 0.2 0.2 - 1.0 EU/dL    Nitrites NEGATIVE  NEG      Leukocyte Esterase LARGE (A) NEG      WBC  0 - 4 /hpf    RBC >100 (H) 0 - 5 /hpf    Epithelial cells FEW FEW /lpf    Bacteria NEGATIVE  NEG /hpf    UA:UC IF INDICATED URINE CULTURE ORDERED (A) CNI     CBC WITH AUTOMATED DIFF    Collection Time: 05/26/17  7:24 PM   Result Value Ref Range    WBC 10.8 4.1 - 11.1 K/uL    RBC 4.06 (L) 4.10 - 5.70 M/uL    HGB 11.3 (L) 12.1 - 17.0 g/dL    HCT 34.1 (L) 36.6 - 50.3 %    MCV 84.0 80.0 - 99.0 FL    MCH 27.8 26.0 - 34.0 PG    MCHC 33.1 30.0 - 36.5 g/dL    RDW 15.4 (H) 11.5 - 14.5 %    PLATELET 935 319 - 218 K/uL    NEUTROPHILS 71 32 - 75 %    LYMPHOCYTES 18 12 - 49 %    MONOCYTES 8 5 - 13 %    EOSINOPHILS 3 0 - 7 %    BASOPHILS 0 0 - 1 %    ABS. NEUTROPHILS 7.6 1.8 - 8.0 K/UL    ABS. LYMPHOCYTES 2.0 0.8 - 3.5 K/UL    ABS. MONOCYTES 0.9 0.0 - 1.0 K/UL    ABS. EOSINOPHILS 0.3 0.0 - 0.4 K/UL    ABS.  BASOPHILS 0.0 0.0 - 0.1 K/UL   METABOLIC PANEL, COMPREHENSIVE    Collection Time: 05/26/17  7:24 PM   Result Value Ref Range    Sodium 137 136 - 145 mmol/L    Potassium 4.5 3.5 - 5.1 mmol/L    Chloride 100 97 - 108 mmol/L    CO2 31 21 - 32 mmol/L    Anion gap 6 5 - 15 mmol/L    Glucose 110 (H) 65 - 100 mg/dL    BUN 32 (H) 6 - 20 MG/DL    Creatinine 1.31 (H) 0.70 - 1.30 MG/DL    BUN/Creatinine ratio 24 (H) 12 - 20      GFR est AA >60 >60 ml/min/1.73m2    GFR est non-AA 55 (L) >60 ml/min/1.73m2    Calcium 9.5 8.5 - 10.1 MG/DL    Bilirubin, total 0.5 0.2 - 1.0 MG/DL    ALT (SGPT) 36 12 - 78 U/L    AST (SGOT) 21 15 - 37 U/L    Alk. phosphatase 88 45 - 117 U/L    Protein, total 8.8 (H) 6.4 - 8.2 g/dL    Albumin 3.3 (L) 3.5 - 5.0 g/dL    Globulin 5.5 (H) 2.0 - 4.0 g/dL    A-G Ratio 0.6 (L) 1.1 - 2.2         IMAGING RESULTS:  XR ABD (KUB)   Final Result   EXAM: KUB     INDICATION: ureter stent, hematuria     A single frontal view of the abdomen shows no significant change since CT  5/15/2017. Right ureteral stent remains in good position. There remain stones in  the lower pole of the right kidney, none are apparent in the ureter. Bowel gas  pattern is unremarkable. There are vascular calcifications.     IMPRESSION  IMPRESSION: No acute finding. MEDICATIONS GIVEN:  Medications   sodium chloride (NS) flush 5-10 mL (not administered)   sodium chloride (NS) flush 5-10 mL (not administered)   phenazopyridine (PYRIDIUM) tablet 200 mg (not administered)   cefTRIAXone (ROCEPHIN) 1 g in 0.9% sodium chloride (MBP/ADV) 50 mL (0 g IntraVENous IV Completed 5/26/17 2113)   fluconazole (DIFLUCAN) tablet 150 mg (150 mg Oral Given 5/26/17 2136)       IMPRESSION:  1. Hematuria    2. Urinary tract infection with hematuria, site unspecified        PLAN:  1. Current Discharge Medication List        2.    Follow-up Information     Follow up With Details Comments Alta Jerome MD   [address]  [place] [address] [id number] [phone]      Mary Harris MD   [address] [facility] [place]  [phone]          Return to ED if worse     DISCHARGE NOTE:  9:25 PM  The patient is ready for discharge. The patients signs, symptoms, diagnosis, and instructions for discharge have been discussed and the pt has conveyed their understanding. The patient is to follow up as recommended with Yann Finn MD or return to the ER should their symptoms worsen. Plan has been discussed and patient has conveyed their agreement. This note is prepared by Haim Redd, acting as Scribe for Roderick Rosales, [address] [place] [address]: The scribe's documentation has been prepared under my direction and personally reviewed by me in its entirety. I confirm that the note above accurately reflects all work, treatment, procedures, and medical decision making performed by me.

## 2017-05-26 NOTE — ED NOTES
Patient's brother (caretaker) informed this nurse that the three incisions on his stomach are from a surgery for a perforated ulcer three weeks ago.

## 2017-05-26 NOTE — ED NOTES
Assumed care of pt from Angela Saldivar RN. Pt resting comfortably with side rails up and call bell in reach. Pt aware of plan to have diallo placed.

## 2017-05-26 NOTE — ED NOTES
[address] [place] [address] informed that patient's blood pressures have been varying from 26'O to 082'O systolic and that patients HR is above 100 at most times.

## 2017-05-27 NOTE — DISCHARGE INSTRUCTIONS
Blood in the Urine: Care Instructions  Your Care Instructions  Blood in the urine, or hematuria, may make the urine look red, brown, or pink. There may be blood every time you urinate or just from time to time. You cannot always see blood in the urine, but it will show up in a urine test.  Blood in the urine may be serious. It should always be checked by a doctor. Your doctor may recommend more tests, including an X-ray, a CT scan, or a cystoscopy (which lets a doctor look inside the urethra and bladder). Blood in the urine can be a sign of another problem. Common causes are bladder infections and kidney stones. An injury to your groin or your genital area can also cause bleeding in the urinary tract. Very hard exercise--such as running a marathon--can cause blood in the urine. Blood in the urine can also be a sign of kidney disease or cancer in the bladder or kidney. Many cases of blood in the urine are caused by a harmless condition that runs in families. This is called benign familial hematuria. It does not need any treatment. Sometimes your urine may look red or brown even though it does not contain blood. For example, not getting enough fluids (dehydration), taking certain medicines, or having a liver problem can change the color of your urine. Eating foods such as beets, rhubarb, or blackberries or foods with red food coloring can make your urine look red or pink. Follow-up care is a key part of your treatment and safety. Be sure to make and go to all appointments, and call your doctor if you are having problems. It's also a good idea to know your test results and keep a list of the medicines you take. When should you call for help? Call your doctor now or seek immediate medical care if:  · You have symptoms of a urinary infection. For example:  ¨ You have pus in your urine. ¨ You have pain in your back just below your rib cage. This is called flank pain.   ¨ You have a fever, chills, or body aches.  ¨ It hurts to urinate. ¨ You have groin or belly pain. · You have more blood in your urine. Watch closely for changes in your health, and be sure to contact your doctor if:  · You have new urination problems. · You do not get better as expected. Where can you learn more? Go to http://bill-maria elena.info/. Enter J102 in the search box to learn more about \"Blood in the Urine: Care Instructions. \"  Current as of: August 12, 2016  Content Version: 11.2  © 8916-2564 [employer]. Care instructions adapted under license by [employer] (which disclaims liability or warranty for this information). If you have questions about a medical condition or this instruction, always ask your healthcare professional. [address] any warranty or liability for your use of this information. Urinary Tract Infections in Men: Care Instructions  Your Care Instructions    A urinary tract infection, or UTI, is a general term for an infection anywhere between the kidneys and the tip of the penis. UTIs can also be a result of a prostate problem. Most cause pain or burning when you urinate. Most UTIs are caused by bacteria and can be cured with antibiotics. It is important to complete your treatment so that the infection does not get worse. Follow-up care is a key part of your treatment and safety. Be sure to make and go to all appointments, and call your doctor if you are having problems. It's also a good idea to know your test results and keep a list of the medicines you take. How can you care for yourself at home? · Take your antibiotics as prescribed. Do not stop taking them just because you feel better. You need to take the full course of antibiotics. · Take your medicines exactly as prescribed. Your doctor may have prescribed a medicine, such as phenazopyridine (Pyridium), to help relieve pain when you urinate. This turns your urine orange.  You may stop taking it when your symptoms get better. But be sure to take all of your antibiotics, which treat the infection. · Drink extra water for the next day or two. This will help make the urine less concentrated and help wash out the bacteria causing the infection. (If you have kidney, heart, or liver disease and have to limit your fluids, talk with your doctor before you increase your fluid intake.)  · Avoid drinks that are carbonated or have caffeine. They can irritate the bladder. · Urinate often. Try to empty your bladder each time. · To relieve pain, take a hot bath or lay a heating pad (set on low) over your lower belly or genital area. Never go to sleep with a heating pad in place. To help prevent UTIs  · Drink plenty of fluids, enough so that your urine is light yellow or clear like water. If you have kidney, heart, or liver disease and have to limit fluids, talk with your doctor before you increase the amount of fluids you drink. · Urinate when you have the urge. Do not hold your urine for a long time. Urinate before you go to sleep. · Keep your penis clean. Catheter care  If you have a drainage tube (catheter) in place, the following steps will help you care for it. · Always wash your hands before and after touching your catheter. · Check the area around the urethra for inflammation or signs of infection. Signs of infection include irritated, swollen, red, or tender skin, or pus around the catheter. · Clean the area around the catheter with soap and water two times a day. Dry with a clean towel afterward. · Do not apply powder or lotion to the skin around the catheter. To empty the urine collection bag  · Wash your hands with soap and water. · Without touching the drain spout, remove the spout from its sleeve at the bottom of the collection bag. Open the valve on the spout. · Let the urine flow out of the bag and into the toilet or a container.  Do not let the tubing or drain spout touch anything. · After you empty the bag, clean the end of the drain spout with tissue and water. Close the valve and put the drain spout back into its sleeve at the bottom of the collection bag. · Wash your hands with soap and water. When should you call for help? Call your doctor now or seek immediate medical care if:  · Symptoms such as a fever, chills, nausea, or vomiting get worse or happen for the first time. · You have new pain in your back just below your rib cage. This is called flank pain. · There is new blood or pus in your urine. · You are not able to take or keep down your antibiotics. Watch closely for changes in your health, and be sure to contact your doctor if:  · You are not getting better after taking an antibiotic for 2 days. · Your symptoms go away but then come back. Where can you learn more? Go to http://bill-maria elena.info/. Enter D201 in the search box to learn more about \"Urinary Tract Infections in Men: Care Instructions. \"  Current as of: November 28, 2016  Content Version: 11.2  © 6882-1412 [employer]. Care instructions adapted under license by [employer] (which disclaims liability or warranty for this information). If you have questions about a medical condition or this instruction, always ask your healthcare professional. [place] [address] any warranty or liability for your use of this information.

## 2017-06-30 NOTE — MR AVS SNAPSHOT
Visit Information Date & Time Provider Department Dept. Phone Encounter #  
 6/30/2017  9:20 AM Cedric Zamora MD [facility] [place] [address] [id number] Your Appointments 7/14/2017 10:30 AM  
Follow Up with Dorothy Bass MD  
[facility] [address] [facility]) Appt Note: np; new to [facility], [facility]; stints/ref by Ying Laboy/ lidia forms 5-23-17 -lj; .; np; [facility], [facility]; stints/ref by Ying Laboy/ lidia forms 5-23-1  
 [address] [facility] [address]  
[phone] [address] [facility] [address] Upcoming Health Maintenance Date Due DTaP/Tdap/Td series (1 - Tdap) 1/15/1971 FOOT EXAM Q1 7/1/2014 Pneumococcal 65+ High/Highest Risk (1 of 2 - PCV13) 1/15/2015 EYE EXAM RETINAL OR DILATED Q1 6/24/2016 MICROALBUMIN Q1 7/8/2016 MEDICARE YEARLY EXAM 1/7/2017 GLAUCOMA SCREENING Q2Y 6/24/2017 INFLUENZA AGE 9 TO ADULT 8/1/2017 HEMOGLOBIN A1C Q6M 11/15/2017 LIPID PANEL Q1 5/15/2018 COLONOSCOPY 8/25/2020 Allergies as of 6/30/2017  Review Complete On: 6/30/2017 By: Cedric Zamora MD  
  
 Severity Noted Reaction Type Reactions Bactrim [Sulfamethoxazole-trimethoprim]  03/01/2017   Side Effect Nausea and Vomiting Current Immunizations  Never Reviewed Name Date Influenza Vaccine 10/19/2014, 10/29/2013  5:37 PM  
  
 Not reviewed this visit You Were Diagnosed With   
  
 Codes Comments Per[facility] du[facility] bulb [facility]    -  Primary ICD-10-CM: K26.5 ICD-9-CM: 532.50 Vitals Smoking Status Former Smoker Preferred Pharmacy Pharmacy Name Phone [facility] [place], [place] [phone] Your Updated Medication List  
  
   
 This list is accurate as of: 6/30/17 10:34 AM.  Always use your most recent med list. amLODIPine 5 mg tablet Commonly known as:  Lorna Staggers Take 5 mg by mouth daily. aspirin delayed-release 81 mg tablet Take 81 mg by mouth daily. atorvastatin 40 mg tablet Commonly known as:  LIPITOR  
TAKE 1 TABLET BY MOUTH AT BEDTIME B COMPLEX 1 tablet Generic drug:  b complex vitamins Take 1 Tab by mouth daily. bisacodyl 10 mg suppository Commonly known as:  DULCOLAX Insert 10 mg into rectum daily as needed (constipation). butalbital-acetaminophen-caffeine -40 mg per tablet Commonly known as:  Rhonda Aver Take 1 Tab by mouth every four (4) hours as needed for Headache. cephALEXin 500 mg capsule Commonly known as:  Saumya Crane Take 1 Cap by mouth three (3) times daily. colesevelam 625 mg tablet Commonly known as:  [facility] Take 2 Tabs by mouth two (2) times daily (with meals). cycloSPORINE 0.05 % ophthalmic emulsion Commonly known as:  RESTASIS Administer 1 Drop to both eyes two (2) times a day. KAREN-Q PO Take 1 Tab by mouth two (2) times a day.  
  
 gabapentin 300 mg capsule Commonly known as:  NEURONTIN Take 300 mg by mouth two (2) times a day. hydrocortisone 1 % lotion Commonly known as:  ALA-ONEIL Apply  to affected area two (2) times daily as needed (rash). use thin layer  
  
 insulin regular 100 unit/mL injection Commonly known as:  NOVOLIN R, HUMULIN R  
by SubCUTAneous route Before breakfast, lunch, dinner and at bedtime. 151-200 = 2 units 201-250= 4  251-300=6 301-350= 8 351-400=10 >400 = 12  
  
 ketoconazole 2 % topical cream  
Commonly known as:  NIZORAL Apply  to affected area daily. Indications: TINEA CORPORIS  
  
 lisinopril 20 mg tablet Commonly known as:  Frantz Deshpande Take 20 mg by mouth daily. metFORMIN 500 mg tablet Commonly known as:  GLUCOPHAGE  
 Take 500 mg by mouth daily (with breakfast). metoprolol tartrate 25 mg tablet Commonly known as:  LOPRESSOR Take 12.5 mg by mouth two (2) times a day. MIRALAX 17 gram packet Generic drug:  polyethylene glycol Take 17 g by mouth daily as needed (constipation). NORCO 5-325 mg per tablet Generic drug:  HYDROcodone-acetaminophen Take 1 Tab by mouth every four (4) hours as needed (moderate pain). ondansetron 4 mg disintegrating tablet Commonly known as:  ZOFRAN ODT Take 4 mg by mouth every six (6) hours as needed for Nausea. OTHER(NON-FORMULARY) Take 1 Cap by mouth daily. Maxitears Dry eye formula  
  
 oxyCODONE IR 10 mg Tab immediate release tablet Commonly known as:  Veronica Dears Take 10 mg by mouth every four (4) hours as needed (severe pain). pantoprazole 40 mg tablet Commonly known as:  PROTONIX Take 1 Tab by mouth two (2) times a day for 60 days. HOGAN MILK OF MAGNESIA 400 mg/5 mL suspension Generic drug:  magnesium hydroxide Take 30 mL by mouth daily as needed for Constipation. Senna 8.6 mg tablet Generic drug:  senna Take 2 Tabs by mouth nightly as needed for Constipation. SENNA PLUS 8.6-50 mg per tablet Generic drug:  senna-docusate Take 2 Tabs by mouth nightly.  
  
 sodium chloride 5 % ophthalmic solution Commonly known as:  ENEIDA-5 Administer 1 Drop to both eyes three (3) times daily. SUMAtriptan 50 mg tablet Commonly known as:  IMITREX Take 50 mg by mouth daily as needed for Migraine. timolol 0.25 % ophthalmic solution Commonly known as:  Kolleen Gowers Administer 1 Drop to both eyes two (2) times a day. TYLENOL EXTRA STRENGTH 500 mg tablet Generic drug:  acetaminophen Take 500 mg by mouth every four (4) hours as needed (mild pain). Introducing [facility]!    
 [facility] introduces [employer] patient portal. Now you can access parts of your medical record, email your doctor's office, and request medication refills online. 1. In your internet browser, go to [email or web address] 2. Click on the First Time User? Click Here link in the Sign In box. You will see the New Member Sign Up page. 3. Enter your Quincee Access Code exactly as it appears below. You will not need to use this code after youve completed the sign-up process. If you do not sign up before the expiration date, you must request a new code. · Quincee Access Code: [email or web address] Expires: 9/28/2017  9:27 AM 
 
4. Enter the last four digits of your Social Security Number (xxxx) and Date of Birth (mm/dd/yyyy) as indicated and click Submit. You will be taken to the next sign-up page. 5. Create a Quincee ID. This will be your Quincee login ID and cannot be changed, so think of one that is secure and easy to remember. 6. Create a Quincee password. You can change your password at any time. 7. Enter your Password Reset Question and Answer. This can be used at a later time if you forget your password. 8. Enter your e-mail address. You will receive e-mail notification when new information is available in [address] 9. Click Sign Up. You can now view and download portions of your medical record. 10. Click the Download Summary menu link to download a portable copy of your medical information. If you have questions, please visit the Frequently Asked Questions section of the Quincee website. Remember, Quincee is NOT to be used for urgent needs. For medical emergencies, dial 911. Now available from your iPhone and Android! Please provide this summary of care documentation to your next provider. Your primary care clinician is listed as Jovanna Ibrahim. If you have any questions after today's visit, please call [phone].

## 2017-06-30 NOTE — PROGRESS NOTES
SUBJECTIVE: Naomie Munoz is a 79 y.o. male is seen for a routine postop check s/p repair of perforated duodenal ulcer. Reports no problems with the wound or other issues. Activity, diet and bowels are normal. No pain. OBJECTIVE: Appears well. Wounds are well healed without complications or infection. ASSESSMENT: normal postoperative course, doing well. PLAN: Patient is instructed to f/u with Dr. Gene Davalos for EGD to confirm healing of duodenal ulcer. Continue on bid protonix until current script complete, f/u with PCP for ongoing medical therapy. Return PRN for any problems or concerns.

## 2017-07-06 PROBLEM — I20.0 UNSTABLE ANGINA (HCC): Status: ACTIVE | Noted: 2017-01-01

## 2017-07-06 NOTE — PROCEDURES
LM-normal  LAD-calcified, noermal   LCX-normal .   RCA-100% proximal, heavily calcified, collaterals from left.  v/s subacute . LV-55%. Right radial access. No complications. EBL-minimal.   Specimen-none. Reviewed with Dr. Gabby Ashton. PCI of RCA.

## 2017-07-06 NOTE — ED NOTES
Bedside shift change report given to Marcela RN (oncoming nurse) by Teresa Hanley RN (offgoing nurse). Report included the following information SBAR and ED Summary.

## 2017-07-06 NOTE — H&P
[address] [place] [address]  [phone]          CARDIOLOGY HISTORY AND PHYSICAL    Date of  Admission: 7/6/2017  5:03 AM     Admission type:Emergency     Subjective:      Conrad Simons is a 79 y.o. male admitted for Unstable angina ([address] No previous cardiac hx. Hx of Exploratory laparotomy, repair of massive duodenal bulb ulcer, with biopsy, and Yakov patch in April 2017. During that admission experienced SVT, seen by Dr. Turner Murillo, treated with BB. Echo normal.  This AM awoke suddenly with acute onset of mid-left sternal chest pain 5/10, nonradiating, constant. No assoc SOB, n/v, dizziness/lightheadedness, palpitations. Has received morphine and nitro paste with no relief. Daughter states that since his surgery in April has noticed dyspnea on exertion. ECG with no acute changes, troponin neg x 2. CP continuing 5/10. Cardiac risk factors: family history, dyslipidemia, diabetes mellitus, obesity, sedentary life style, male gender, hypertension.     Patient Active Problem List    Diagnosis Date Noted    Unstable angina ([address] 07/06/2017    SVT (supraventricular tachycardia) ([address] 04/19/2017    Perforated duodenal bulb ulcer ([address] 04/18/2017    Hyponatremia 04/18/2017    Perforated viscus 04/17/2017    Hydroureter on right 04/17/2017    History of stroke 03/03/2017    Acute renal failure (ARF) ([address] 03/01/2017    Hyperkalemia 03/01/2017    Hypertension 12/14/2009    Hyperlipidemia 12/14/2009    DM (diabetes mellitus) ([address] 12/14/2009    H/O 12/14/2009    Recurrent UTI 12/14/2009    Nephrolithiasis 12/14/2009      Yolanda Coats MD  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     SVT (supraventricular tachycardia) ([facility] 4/19/2017    Unstable angina ([facility] 7/6/2017      Social History     Social History    Marital status: SINGLE     Spouse name: N/A    Number of children: N/A    Years of education: N/A     Social History Main Topics    Smoking status: Former Smoker    Smokeless tobacco: None    Alcohol use No    Drug use: No    Sexual activity: Not Currently     Birth control/ protection: Abstinence     Other Topics Concern    None     Social History Narrative     Allergies   Allergen Reactions    Bactrim [Sulfamethoxazole-Trimethoprim] Nausea and Vomiting      Family History   Problem Relation Age of Onset    Hypertension Mother     Elevated Lipids Mother     Heart Disease Mother     Diabetes Father     Hypertension Father     Elevated Lipids Sister     Hypertension Sister     Heart Disease Brother     Stroke Maternal Grandfather     Stroke Paternal Grandfather       Current Facility-Administered Medications   Medication Dose Route Frequency    fentaNYL citrate (PF) injection 25-50 mcg  25-50 mcg IntraVENous Multiple    heparinized saline 2 units/mL infusion 1,000 Units  500 mL Irrigation ONCE    heparinized saline 2 units/mL infusion 1,000 Units  500 mL Irrigation ONCE    heparinized saline 2 units/mL infusion 1,000 Units  500 mL Irrigation ONCE    iopamidol (ISOVUE-370) 76 % injection 0-150 mL  0-150 mL IntraarTERial Multiple    iopamidol (ISOVUE-370) 76 % injection 0-100 mL  0-100 mL IntraarTERial Multiple    lidocaine (PF) (XYLOCAINE) 10 mg/mL (1 %) injection 1-30 mL  1-30 mL IntraDERMal Multiple    midazolam (VERSED) injection 0.5-2 mg  0.5-2 mg IntraVENous Multiple    nitroglycerin 100 mcg/ml compounded injection  0-10 mL IntraCORONary Multiple    heparin (porcine) 1,000 unit/mL injection 1,000-10,000 Units  1,000-10,000 Units IntraVENous Multiple    verapamil (ISOPTIN) 2.5 mg/mL injection 2.5 mg  2.5 mg IntraarTERial ONCE    verapamil (ISOPTIN) 2.5 mg/mL injection        midazolam (VERSED) 1 mg/mL injection        fentaNYL citrate (PF) 50 mcg/mL injection        lidocaine (PF) (XYLOCAINE) 10 mg/mL (1 %) injection        heparin (porcine) 1,000 unit/mL injection        iopamidol (ISOVUE-370) 76 % injection        iopamidol (ISOVUE-370) 76 % injection        heparinized saline 2 units/mL 1,000 unit/500 mL infusion        nitroglycerin 1 mg/10mL injection             Review of Symptoms:  Constitutional: negative  Eyes: negative  Ears, nose, mouth, throat, and face: negative  Respiratory: FLORES  Cardiovascular: negative  Gastrointestinal: post abd surgery no complaints  Genitourinary:stent placed 5/2017  Musculoskeletal:right sided weakness  Neurological: negative  Behvioral/Psych: negative  Endocrine: negative     Objective:   Physical Exam:  General Appearance:  slightly obese  male in no acute distress  Chest:   Clear  Cardiovascular:  Regular rate and rhythm, no murmur.   Abdomen:   Soft, non-tender, bowel sounds are active.   Extremities: no peripheral edema  Skin:  Warm and dry.     Visit Vitals    /66    Pulse 78    Temp 97.4 °F (36.3 °C)    Resp 16    Ht 6' 1\" (1.854 m)    Wt 104.3 kg (230 lb)    SpO2 91%    BMI 30.34 kg/m2       Cardiographics    Telemetry: SR   ECG: SR with no acute changes  Echocardiogram: 4/2017 EF normal, slight hypertrophy    Labs:  Recent Labs      07/06/17   0520   WBC  8.9   HGB  10.9*   HCT  32.1*   PLT  220     Recent Labs      07/06/17   0520   NA  136   K  4.1   CL  104   CO2  24   GLU  113*   BUN  20   CREA  1.08   CA  8.8   ALB  3.4*   TBILI  1.3*   SGOT  10*   ALT  13       Recent Labs      07/06/17   0853  07/06/17   0520   TROIQ  <0.04  <0.04          Assessment:       Active Problems:    Unstable angina ([address] (7/6/2017)         Plan:     [place]:   Patient continues with unrelieved CP and relatively new onset of FLORES, concerning with his multiple risk factors: CVA, HTN, DM, dyslipidemia  Recommend cardiac cath for ischemia evaluation  Dr. Deleon Brochure and I discussed the risks/benefits/alternatives of cardiac catheterization +/- PCI with the patient. Risks include (but are not limited to) bleeding, infection, cva/mi/tamponade/death. The patient understands and wishes to proceed. Continue on ASA, BB, statin. [facility]    7/6/2017         Agree with note as outlined by  NP. I confirm findings in history and physical exam. No additional findings noted. Agree with plan as outlined above.      Mercedez Hassan MD

## 2017-07-06 NOTE — IP AVS SNAPSHOT
Current Discharge Medication List  
  
START taking these medications Dose & Instructions Dispensing Information Comments Morning Noon Evening Bedtime  
 ticagrelor 90 mg tablet Commonly known as:  [employer] Your last dose was: Your next dose is:    
   
   
 Dose:  90 mg Take 1 Tab by mouth every twelve (12) hours every twelve (12) hours. Quantity:  60 Tab Refills:  11 CONTINUE these medications which have NOT CHANGED Dose & Instructions Dispensing Information Comments Morning Noon Evening Bedtime  
 aspirin delayed-release 81 mg tablet Your last dose was: Your next dose is:    
   
   
 Dose:  81 mg Take 81 mg by mouth daily. Refills:  0  
     
   
   
   
  
 atorvastatin 40 mg tablet Commonly known as:  LIPITOR Your last dose was: Your next dose is: TAKE 1 TABLET BY MOUTH AT BEDTIME Quantity:  90 Tab Refills:  1 B COMPLEX 1 tablet Generic drug:  b complex vitamins Your last dose was: Your next dose is:    
   
   
 Dose:  1 Tab Take 1 Tab by mouth daily. Refills:  0  
     
   
   
   
  
 colesevelam 625 mg tablet Commonly known as:  [facility] Your last dose was: Your next dose is:    
   
   
 Dose:  1250 mg Take 2 Tabs by mouth two (2) times daily (with meals). Quantity:  360 Tab Refills:  1  
     
   
   
   
  
 cycloSPORINE 0.05 % ophthalmic emulsion Commonly known as:  RESTASIS Your last dose was: Your next dose is:    
   
   
 Dose:  1 Drop Administer 1 Drop to both eyes two (2) times a day. Refills:  0  
     
   
   
   
  
 gabapentin 300 mg capsule Commonly known as:  NEURONTIN Your last dose was: Your next dose is:    
   
   
 Dose:  300 mg Take 1 Cap by mouth two (2) times a day. Quantity:  60 Cap Refills:  11  
     
   
   
   
  
 hydrocortisone 1 % lotion Commonly known as:  ALA-ONEIL Your last dose was: Your next dose is:    
   
   
 Apply  to affected area two (2) times daily as needed (rash). use thin layer Refills:  0  
     
   
   
   
  
 ketoconazole 2 % topical cream  
Commonly known as:  NIZORAL Your last dose was: Your next dose is:    
   
   
 Apply  to affected area daily. Indications: TINEA CORPORIS Refills:  0  
     
   
   
   
  
 lisinopril 20 mg tablet Commonly known as:  Tildon Riaz Your last dose was: Your next dose is:    
   
   
 Dose:  20 mg Take 20 mg by mouth daily. Refills:  0 MACROBID 100 mg capsule Generic drug:  nitrofurantoin (macrocrystal-monohydrate) Your last dose was: Your next dose is:    
   
   
 Dose:  100 mg Take 100 mg by mouth daily. Refills:  0  
     
   
   
   
  
 metFORMIN 500 mg tablet Commonly known as:  GLUCOPHAGE Your last dose was: Your next dose is:    
   
   
 Dose:  500 mg Take 500 mg by mouth daily (with breakfast). Refills:  0  
     
   
   
   
  
 metoprolol tartrate 25 mg tablet Commonly known as:  LOPRESSOR Your last dose was: Your next dose is:    
   
   
 Dose:  12.5 mg Take 12.5 mg by mouth two (2) times a day. Refills:  0  
     
   
   
   
  
 OTHER(NON-FORMULARY) Your last dose was: Your next dose is:    
   
   
 Dose:  1 Cap Take 1 Cap by mouth daily. Maxitears Dry eye formula Refills:  0 PROTONIX 40 mg tablet Generic drug:  pantoprazole Your last dose was: Your next dose is:    
   
   
 Dose:  40 mg Take 40 mg by mouth two (2) times a day. Refills:  0  
     
   
   
   
  
 sodium chloride 5 % ophthalmic solution Commonly known as:  ENEIDA-5 Your last dose was: Your next dose is:    
   
   
 Dose:  1 Drop Administer 1 Drop to both eyes three (3) times daily. Refills:  0  
     
   
   
   
  
 timolol 0.25 % ophthalmic solution Commonly known as:  Elijah Barker Your last dose was: Your next dose is:    
   
   
 Dose:  1 Drop Administer 1 Drop to both eyes two (2) times a day. Refills:  0  
     
   
   
   
  
 TYLENOL EXTRA STRENGTH 500 mg tablet Generic drug:  acetaminophen Your last dose was: Your next dose is:    
   
   
 Dose:  500 mg Take 500 mg by mouth every four (4) hours as needed (mild pain). Refills:  0 Where to Get Your Medications These medications were sent to [address] [place] [address] 111, [address] [facility] [address] Phone:  [phone]  
  gabapentin 300 mg capsule  
 ticagrelor 90 mg tablet

## 2017-07-06 NOTE — IP AVS SNAPSHOT
[address] [facility] 
[phone] Patient: Robert Matias MRN: [id number] ERM:7/28/6643 You are allergic to the following Allergen Reactions Bactrim (Sulfamethoxazole-Trimethoprim) Nausea and Vomiting Recent Documentation Height Weight BMI Smoking Status 1.854 m 104.3 kg 30.34 kg/m2 Former Smoker Emergency Contacts Name Discharge Info Relation Home Work Mobile Padmini Martinez  Other Relative [6] [phone] About your hospitalization You were admitted on:  July 6, 2017 You last received care in the:  [facility] You were discharged on:  July 10, 2017 Unit phone number:  [phone] Why you were hospitalized Your primary diagnosis was:  Unstable Angina (Hcc) Your diagnoses also included:  Hypertension, Hyperlipidemia, History Of Stroke, Dm (Diabetes Mellitus) (Hcc), S/P Coronary Artery Stent Placement, Svt (Supraventricular Tachycardia) (Hcc), Angina, Class Iii (Hcc), Debilitated, Abnormality Of Gait As Late Effect Of Cerebrovascular Accident (Cva) Providers Seen During Your Hospitalizations Provider Role Specialty Primary office phone Kemi Pagan MD Attending Provider Emergency Medicine [phone] Marry Padilla MD Attending Provider Cardiology [phone] Do Souza MD Attending Provider Cardiology [phone] Your Primary Care Physician (PCP) Primary Care Physician Office Phone Office Fax Everardo Freedman [facility] [phone] Follow-up Information Follow up With Details Comments Contact Info Do Souza MD On 7/14/2017 at 10:30AM at [facility] [address] 
[phone] [address] [facility]   [address] [place] [address] 
[phone] Consuelo Cueva MD   [address] [facility] [address] [address] 
[phone] Your Appointments Friday July 14, 2017 10:30 AM EDT Follow Up with Wilmer Michelle MD  
[address] [facility] [address] [facility]  
[phone] Current Discharge Medication List  
  
START taking these medications Dose & Instructions Dispensing Information Comments Morning Noon Evening Bedtime  
 ticagrelor 90 mg tablet Commonly known as:  [employer] Your last dose was: Your next dose is:    
   
   
 Dose:  90 mg Take 1 Tab by mouth every twelve (12) hours every twelve (12) hours. Quantity:  60 Tab Refills:  11 CONTINUE these medications which have NOT CHANGED Dose & Instructions Dispensing Information Comments Morning Noon Evening Bedtime  
 aspirin delayed-release 81 mg tablet Your last dose was: Your next dose is:    
   
   
 Dose:  81 mg Take 81 mg by mouth daily. Refills:  0  
     
   
   
   
  
 atorvastatin 40 mg tablet Commonly known as:  LIPITOR Your last dose was: Your next dose is: TAKE 1 TABLET BY MOUTH AT BEDTIME Quantity:  90 Tab Refills:  1 B COMPLEX 1 tablet Generic drug:  b complex vitamins Your last dose was: Your next dose is:    
   
   
 Dose:  1 Tab Take 1 Tab by mouth daily. Refills:  0  
     
   
   
   
  
 colesevelam 625 mg tablet Commonly known as:  [facility] Your last dose was: Your next dose is:    
   
   
 Dose:  1250 mg Take 2 Tabs by mouth two (2) times daily (with meals). Quantity:  360 Tab Refills:  1  
     
   
   
   
  
 cycloSPORINE 0.05 % ophthalmic emulsion Commonly known as:  RESTASIS Your last dose was: Your next dose is:    
   
   
 Dose:  1 Drop Administer 1 Drop to both eyes two (2) times a day. Refills:  0  
     
   
   
   
  
 gabapentin 300 mg capsule Commonly known as:  NEURONTIN Your last dose was: Your next dose is:    
   
   
 Dose:  300 mg Take 1 Cap by mouth two (2) times a day. Quantity:  60 Cap Refills:  11  
     
   
   
   
  
 hydrocortisone 1 % lotion Commonly known as:  ALA-ONEIL Your last dose was: Your next dose is:    
   
   
 Apply  to affected area two (2) times daily as needed (rash). use thin layer Refills:  0  
     
   
   
   
  
 ketoconazole 2 % topical cream  
Commonly known as:  NIZORAL Your last dose was: Your next dose is:    
   
   
 Apply  to affected area daily. Indications: TINEA CORPORIS Refills:  0  
     
   
   
   
  
 lisinopril 20 mg tablet Commonly known as:  Brijesh Boehringer Your last dose was: Your next dose is:    
   
   
 Dose:  20 mg Take 20 mg by mouth daily. Refills:  0 MACROBID 100 mg capsule Generic drug:  nitrofurantoin (macrocrystal-monohydrate) Your last dose was: Your next dose is:    
   
   
 Dose:  100 mg Take 100 mg by mouth daily. Refills:  0  
     
   
   
   
  
 metFORMIN 500 mg tablet Commonly known as:  GLUCOPHAGE Your last dose was: Your next dose is:    
   
   
 Dose:  500 mg Take 500 mg by mouth daily (with breakfast). Refills:  0  
     
   
   
   
  
 metoprolol tartrate 25 mg tablet Commonly known as:  LOPRESSOR Your last dose was: Your next dose is:    
   
   
 Dose:  12.5 mg Take 12.5 mg by mouth two (2) times a day. Refills:  0  
     
   
   
   
  
 OTHER(NON-FORMULARY) Your last dose was: Your next dose is:    
   
   
 Dose:  1 Cap Take 1 Cap by mouth daily. Shaunnaars Dry eye formula Refills:  0 PROTONIX 40 mg tablet Generic drug:  pantoprazole Your last dose was: Your next dose is:    
   
   
 Dose:  40 mg Take 40 mg by mouth two (2) times a day. Refills:  0  
     
   
   
   
  
 sodium chloride 5 % ophthalmic solution Commonly known as:  ENEIDA-5 Your last dose was: Your next dose is:    
   
   
 Dose:  1 Drop Administer 1 Drop to both eyes three (3) times daily. Refills:  0  
     
   
   
   
  
 timolol 0.25 % ophthalmic solution Commonly known as:  Caroline Stover Your last dose was: Your next dose is:    
   
   
 Dose:  1 Drop Administer 1 Drop to both eyes two (2) times a day. Refills:  0  
     
   
   
   
  
 TYLENOL EXTRA STRENGTH 500 mg tablet Generic drug:  acetaminophen Your last dose was: Your next dose is:    
   
   
 Dose:  500 mg Take 500 mg by mouth every four (4) hours as needed (mild pain). Refills:  0 Where to Get Your Medications These medications were sent to [facility] [address] [facility] [address], [address] [address] Phone:  [phone]  
  gabapentin 300 mg capsule  
 ticagrelor 90 mg tablet Discharge Instructions [address] [place] [address]  [phone] Patient ID: 
Leeanne Toth [id number] 
79 y.o. 
1950 Admit Date: 7/6/2017 Discharge Date: 7/7/2017 Admitting Physician: Jarad Dhillon MD  
 
Discharge Physician: Nick Hartman NP Admission Diagnoses:  
[facility] Discharge Diagnoses: Active Problems: Hypertension (12/14/2009) Hyperlipidemia (12/14/2009) DM (diabetes mellitus) ([facility] (12/14/2009) History of stroke (3/3/2017) SVT (supraventricular tachycardia) ([facility] (4/19/2017) Overview: Approximately 220 BPM on tele 4/19/17 Recurrent at 160 BPM on tele 7/7/17 Unstable angina ([facility] (7/6/2017) S/P coronary artery stent placement (7/7/2017) Overview: 7/6/17 PCI/MONICA  prox RCA Discharge Condition: Good Cardiology Procedures this Admission:  Left heart catheterization with PCI Disposition: home Reference discharge instructions provided by nursing for diet and activity. Signed: 
Arturo Fletchre NP 
7/7/2017 12:50 PM 
 
 
Radial Cardiac Catheterization/Angiography Discharge Instructions It is normal to feel tired the first couple days. Take it easy and follow the physicians instructions. CHECK THE CATHETER INSERTION SITE DAILY: 
 
Remove the wrist dressing 24 hours after the procedure. You may shower 24 hours after the procedure. Wash with soap and water and pat dry. Gentle cleaning of the site with soap and water is sufficient, cover with a dry clean dressing or bandage. Do not apply creams or powders to the area. No soaking the wrist for 3 days. Leave the puncture site open to air after 24 hours post-procedure. CALL THE PHYSICIANS:  
 
If the site becomes red, swollen or feels warm to the touch If there is bleeding or drainage or if there is unusual pain at the radial site. If there is any minor oozing, you may apply a band-aid and remove after 12 hours. If the bleeding continues, hold pressure with the middle finger against the puncture site and the thumb against the back of the wrist,call 911 to be transported to the hospital. 
DO NOT DRIVE YOURSELF, [employer]. ACTIVITY:  
For the first 24 hours do not manipulate the wrist. 
No lifting, pushing or pulling over 3-5 pounds with the affected wrist for 7 daysand no straining the insertion site. Do not life grocery bags or the garbage can, do not run the vacuum  or  for 7 days. Start with short walks as in the hospital and gradually increase as tolerated each day. It is recommended to walk 30 minutes 5-7 days per week. Follow your physicians instructions on activity. Avoid walking outside in extremes of heat or cold. Walk inside when it is cold and windy or hot and humid. Things to keep in mind: 
No driving for at least 24 hours, or as designated by your physician. Limit the number of times you go up and down the stairs Take rests and pace yourself with activity. Be careful and do not strain with bowel movements. MEDICATIONS: 
 
Take all medications as prescribed Call your physician if you have any questions Keep an updated list of your medications with you at all times and give a list to your physician and pharmacist 
 
SIGNS AND SYMPTOMS:  
Be cautious of symptoms of angina or recurrent symptoms such as chest discomfort, unusual shortness of breath or fatigue. These could be symptoms of restenosis, a new blockage or a heart attack. If your symptoms are relieved with rest it is still recommended that you notify your physician of recurrent chest pain or discomfort. For CHEST PAIN or symptoms of angina not relieved with rest:  If the discomfort is not relieved with rest, and you have been prescribed Nitroglycerin, take as directed (taken under the tongue, one at a time 5 minutes apart for a total of 3 doses). If the discomfort is not relieved after the 3rd nitroglycerin, call 911. If you have not been prescribed Nitroglycerin  and your chest discomfort is not relieved with rest, call 911. AFTER CARE:  
Follow up with your physician as instructed. Follow a heart healthy diet with proper portion control, daily stress management, daily exercise, blood pressure and cholesterol control , and smoking cessation. Discharge Orders None ACO Transitions of Care Introducing [facility] [employer] offers a voluntary care coordination program to provide high quality service and care to [facility] fee-for-service beneficiaries. [facility] was designed to help you enhance your health and well-being through the following services: ? Transitions of Care  support for individuals who are transitioning from one care setting to another (example: Hospital to home). ? Chronic and Complex Care Coordination  support for individuals and caregivers of those with serious or chronic illnesses or with more than one chronic (ongoing) condition and those who take a number of different medications. If you meet specific medical criteria, a [address] [place] [address] Rd may call you directly to coordinate your care with your primary care physician and your other care providers. For questions about the [facility] programs, please, contact your physicians office. For general questions or additional information about Accountable Care Organizations: 
Please visit www.medicare.gov/acos. html or call 1-800-MEDICARE (5-529.906.7941) TTY users should call 8-324.311.1601. [employer] Announcement We are excited to announce that we are making your provider's discharge notes available to you in [employer]so. You will see these notes when they are completed and signed by the physician that discharged you from your recent hospital stay. If you have any questions or concerns about any information you see in [employer]so, please call the Health Information Department where you were seen or reach out to your Primary Care Provider for more information about your plan of care. Introducing [facility]! [employer] introduces [employer] patient portal. Now you can access parts of your medical record, email your doctor's office, and request medication refills online. 1. In your internet browser, go to [email or web address] 2. Click on the First Time User? Click Here link in the Sign In box. You will see the New Member Sign Up page. 3. Enter your [employer] Access Code exactly as it appears below.  You will not need to use this code after youve completed the sign-up process. If you do not sign up before the expiration date, you must request a new code. · weartolook Access Code: [email or web address] Expires: 9/28/2017  9:27 AM 
 
4. Enter the last four digits of your Social Security Number (xxxx) and Date of Birth (mm/dd/yyyy) as indicated and click Submit. You will be taken to the next sign-up page. 5. Create a weartolook ID. This will be your weartolook login ID and cannot be changed, so think of one that is secure and easy to remember. 6. Create a weartolook password. You can change your password at any time. 7. Enter your Password Reset Question and Answer. This can be used at a later time if you forget your password. 8. Enter your e-mail address. You will receive e-mail notification when new information is available in [address] 9. Click Sign Up. You can now view and download portions of your medical record. 10. Click the Download Summary menu link to download a portable copy of your medical information. If you have questions, please visit the Frequently Asked Questions section of the weartolook website. Remember, weartolook is NOT to be used for urgent needs. For medical emergencies, dial 911. Now available from your iPhone and Android! General Information Please provide this summary of care documentation to your next provider. Patient Signature:  ____________________________________________________________ Date:  ____________________________________________________________  
  
Brent Ernandez Provider Signature:  ____________________________________________________________ Date:  ____________________________________________________________

## 2017-07-06 NOTE — ED PROVIDER NOTES
HPI Comments: oZya Walter is a 79 y.o. male with history significant for HTN, DM, and stroke presenting via EMS to ED [facility] ED with c/o sudden onset of chest pain. Per pt, 15 minutes prior to calling EMS, he visualized pain to the left substernal chest with a mild, 3/10 intensity and an associated sore sensation to the region. EMS reports picking the pt up with him being hypertensive with a systolic reading of 605 which decreased to 140 en route to the ED. EMS informs that the pt was tachycardic in the 102-108 range with a BG of 136. Per family, the pt is on 81 mg ASA daily. Pt denies any radiation of his pain. Pt and family specifically deny any nausea, vomiting, fevers, chills, abdominal pain, or SOB. PCP: Nixon Nettles MD    PMHx: hyperlipidemia  PSHx: endoscopy, colorectal  Social Hx: - EtOH; Former Smoker; - Illicit Drugs    There are no other changes, complaints or physical findings at this time. Written by ANMOL Huitron, as dictated by Leona Vanegas MD.     The history is provided by the patient and a relative.       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    small cecal polyp    DM (diabetes mellitus) ([address] 12/14/2009    Hypercholesterolemia     Hyperlipidemia 12/14/2009    Hypertension 12/14/2009    Nephrolithiasis 12/14/2009    Nephrolithiasis 12/14/2009    Perforated duodenal bulb ulcer ([address] 4/18/2017    Recurrent UTI 12/14/2009    Stroke ([address]     SVT (supraventricular tachycardia) ([address] 4/19/2017     Past Surgical History:   Procedure Laterality Date    COLORECTAL SCRN; HI RISK IND  1/15/2014         ENDOSCOPY, COLON, DIAGNOSTIC  9/2/2008    small cecal tubular adenoma removed    HX HEENT      tonsillectomy    HX UROLOGICAL      for kidney stones    HX UROLOGICAL      urinary stents placed 05/2017     Family History:   Problem Relation Age of Onset    Hypertension Mother     Elevated Lipids Mother     Heart Disease Mother    [facility] Diabetes Father     Hypertension Father    [de-identified] Elevated Lipids Sister     Hypertension Sister     Heart Disease Brother     Stroke Maternal Grandfather     Stroke Paternal Grandfather      Social History     Social History    Marital status: SINGLE     Spouse name: N/A    Number of children: N/A    Years of education: N/A     Occupational History    Not on file. Social History Main Topics    Smoking status: Former Smoker    Smokeless tobacco: Not on file    Alcohol use No    Drug use: No    Sexual activity: Not Currently     Birth control/ protection: Abstinence     Other Topics Concern    Not on file     Social History Narrative     ALLERGIES: Bactrim [sulfamethoxazole-trimethoprim]    Review of Systems   Constitutional: Negative for chills and fever. HENT: Negative. Negative for congestion, rhinorrhea, sneezing and sore throat. Eyes: Negative. Negative for redness and visual disturbance. Respiratory: Negative. Negative for cough, shortness of breath and wheezing. Cardiovascular: Positive for chest pain. Negative for leg swelling. Gastrointestinal: Negative. Negative for abdominal pain, diarrhea, nausea and vomiting. Genitourinary: Negative. Negative for difficulty urinating, discharge and frequency. Musculoskeletal: Negative. Negative for arthralgias, back pain, myalgias and neck stiffness. Skin: Negative. Negative for color change and rash. Neurological: Negative. Negative for dizziness, syncope, weakness, numbness and headaches. Hematological: Negative for adenopathy. Psychiatric/Behavioral: Negative. All other systems reviewed and are negative. Vitals:    07/06/17 0915 07/06/17 0930 07/06/17 1030 07/06/17 1045   BP: 126/63 132/66 149/79 133/66   Pulse: 79 84 88 78   Resp: 19 18 22 16   Temp:       SpO2: 98% 100% 98% 91%   Weight:       Height:              Physical Exam   Constitutional: He is oriented to person, place, and time. HENT:   Head: Atraumatic. Eyes: EOM are normal.   Cardiovascular: Normal rate, regular rhythm, normal heart sounds and intact distal pulses. Exam reveals no gallop and no friction rub. No murmur heard. Pulmonary/Chest: Effort normal and breath sounds normal. No respiratory distress. He has no wheezes. He has no rales. He exhibits no tenderness. Abdominal: Soft. Bowel sounds are normal. He exhibits no distension and no mass. There is no tenderness. There is no rebound and no guarding. Vertical anterior abdominal wall incision from prior perforated ulcer   Musculoskeletal: Normal range of motion. He exhibits no edema or tenderness. R AFO   Neurological: He is alert and oriented to person, place, and time. At neurological baseline s/p stroke    Psychiatric: He has a normal mood and affect. Nursing note and vitals reviewed. MDM  Number of Diagnoses or Management Options  Diagnosis management comments: DDx: ACS, acute MI, unstable angina, PUD, hepatitis, gastritis, GERD, PNA       Amount and/or Complexity of Data Reviewed  Clinical lab tests: reviewed and ordered  Tests in the radiology section of CPT®: reviewed and ordered  Tests in the medicine section of CPT®: ordered and reviewed  Obtain history from someone other than the patient: yes (Family)  Review and summarize past medical records: yes  Discuss the patient with other providers: yes (cardiologist)  Independent visualization of images, tracings, or specimens: yes    Patient Progress  Patient progress: stable    ED Course     Procedures     EKG interpretation: (Preliminary) 0505  Rhythm: normal sinus rhythm; and regular . Rate (approx.): 100; Axis: normal; WA interval: normal; QRS interval: normal ; ST/T wave: normal;   This note is prepared by Cara Moreira acting as Scribe for Angela Santo MD    SIGN OUT:  6:36 AM  Patient's presentation, labs/imaging and plan of care was reviewed with Sandra Mejias MD as part of sign out.   They will follow up on repeat cardiac enzymes as part of the plan discussed with the patient. Brandi Ordonez MD's assistance in completion of this plan is greatly appreciated but it should be noted that I will be the provider of record for this patient. Riky Baig MD    This note is prepared by Kavon Escalona acting as Scribe for Rkiy Baig MD.    Riky Baig MD: This scribe's documentation has been prepared under my direction and personally reviewed by me in its entirety. I confirm that the note above accurately reflects all work, treatment procedures and medical decision makings performed by me.     8:50 AM  Pt reports pain did not improve with zofran and morphine. 9:52 AM  Pt reports pain is 5/10 after administering nitroglycerin and morphine. Will discuss with cardiology. CONSULT NOTE:   10:00 AM  Brandi Ordonez MD spoke with Dr. Betito Melvin,  Specialty: cardiology  Discussed pt's hx, disposition, and available diagnostic and imaging results. Reviewed care plans. Consultant agrees with plans as outlined. Dr. Betito Melvin will evaluate pt. Written by Rupal Barnes ED Scribe, as dictated by Brandi Ordonez MD.      11:45 AM  Dr. Betito Melvin is admitting pt to cardiac cath lab.     LABORATORY TESTS:  Recent Results (from the past 12 hour(s))   EKG, 12 LEAD, INITIAL    Collection Time: 07/06/17  5:05 AM   Result Value Ref Range    Ventricular Rate 100 BPM    Atrial Rate 100 BPM    P-R Interval 190 ms    QRS Duration 74 ms    Q-T Interval 336 ms    QTC Calculation (Bezet) 433 ms    Calculated P Axis 36 degrees    Calculated R Axis -28 degrees    Calculated T Axis 19 degrees    Diagnosis       Normal sinus rhythm  Anterior infarct , age undetermined  When compared with ECG of 15-MAY-2017 08:12,  NH interval has decreased  ST no longer depressed in Lateral leads  Nonspecific T wave abnormality no longer evident in Lateral leads     CBC WITH AUTOMATED DIFF    Collection Time: 07/06/17  5:20 AM   Result Value Ref Range    WBC 8.9 4.1 - 11.1 K/uL    RBC 3.77 (L) 4.10 - 5.70 M/uL    HGB 10.9 (L) 12.1 - 17.0 g/dL    HCT 32.1 (L) 36.6 - 50.3 %    MCV 85.1 80.0 - 99.0 FL    MCH 28.9 26.0 - 34.0 PG    MCHC 34.0 30.0 - 36.5 g/dL    RDW 16.1 (H) 11.5 - 14.5 %    PLATELET 667 140 - 222 K/uL    NEUTROPHILS 67 32 - 75 %    LYMPHOCYTES 18 12 - 49 %    MONOCYTES 11 5 - 13 %    EOSINOPHILS 4 0 - 7 %    BASOPHILS 0 0 - 1 %    ABS. NEUTROPHILS 5.9 1.8 - 8.0 K/UL    ABS. LYMPHOCYTES 1.6 0.8 - 3.5 K/UL    ABS. MONOCYTES 1.0 0.0 - 1.0 K/UL    ABS. EOSINOPHILS 0.3 0.0 - 0.4 K/UL    ABS. BASOPHILS 0.0 0.0 - 0.1 K/UL   METABOLIC PANEL, COMPREHENSIVE    Collection Time: 07/06/17  5:20 AM   Result Value Ref Range    Sodium 136 136 - 145 mmol/L    Potassium 4.1 3.5 - 5.1 mmol/L    Chloride 104 97 - 108 mmol/L    CO2 24 21 - 32 mmol/L    Anion gap 8 5 - 15 mmol/L    Glucose 113 (H) 65 - 100 mg/dL    BUN 20 6 - 20 MG/DL    Creatinine 1.08 0.70 - 1.30 MG/DL    BUN/Creatinine ratio 19 12 - 20      GFR est AA >60 >60 ml/min/1.73m2    GFR est non-AA >60 >60 ml/min/1.73m2    Calcium 8.8 8.5 - 10.1 MG/DL    Bilirubin, total 1.3 (H) 0.2 - 1.0 MG/DL    ALT (SGPT) 13 12 - 78 U/L    AST (SGOT) 10 (L) 15 - 37 U/L    Alk. phosphatase 72 45 - 117 U/L    Protein, total 8.1 6.4 - 8.2 g/dL    Albumin 3.4 (L) 3.5 - 5.0 g/dL    Globulin 4.7 (H) 2.0 - 4.0 g/dL    A-G Ratio 0.7 (L) 1.1 - 2.2     TROPONIN I    Collection Time: 07/06/17  5:20 AM   Result Value Ref Range    Troponin-I, Qt. <0.04 <0.05 ng/mL   CK W/ REFLX CKMB    Collection Time: 07/06/17  5:20 AM   Result Value Ref Range    CK 68 39 - 308 U/L   TROPONIN I    Collection Time: 07/06/17  8:53 AM   Result Value Ref Range    Troponin-I, Qt. <0.04 <0.05 ng/mL   CK W/ REFLX CKMB    Collection Time: 07/06/17  8:53 AM   Result Value Ref Range    CK 61 39 - 308 U/L       IMAGING RESULTS:    CXR Results  (Last 48 hours)               07/06/17 0610  XR CHEST PORT Final result    Impression:  IMPRESSION:       No acute thoracic disease. Narrative:  Clinical history: Chest pain,   INDICATION: Chest pain left-sided no radiation       COMPARISON: 5/15/2017       FINDINGS:    AP semiupright view of the chest is obtained . The cardiopericardial silhouette   is stable. There is no pleural effusion, pneumothorax or focal consolidation   present. Elevation of the right hemidiaphragm. Patient is on a cardiac monitor. MEDICATIONS GIVEN:  Medications   fentaNYL citrate (PF) injection 25-50 mcg (not administered)   heparinized saline 2 units/mL infusion 1,000 Units (not administered)   heparinized saline 2 units/mL infusion 1,000 Units (not administered)   heparinized saline 2 units/mL infusion 1,000 Units (not administered)   iopamidol (ISOVUE-370) 76 % injection 0-150 mL (not administered)   iopamidol (ISOVUE-370) 76 % injection 0-100 mL (not administered)   lidocaine (PF) (XYLOCAINE) 10 mg/mL (1 %) injection 1-30 mL (not administered)   midazolam (VERSED) injection 0.5-2 mg (not administered)   nitroglycerin 100 mcg/ml compounded injection (not administered)   heparin (porcine) 1,000 unit/mL injection 1,000-10,000 Units (not administered)   verapamil (ISOPTIN) 2.5 mg/mL injection 2.5 mg (not administered)   morphine injection 2 mg (2 mg IntraVENous Given 7/6/17 0801)   ondansetron (ZOFRAN) injection 4 mg (4 mg IntraVENous Given 7/6/17 0800)   nitroglycerin (NITROBID) 2 % ointment 0.5 Inch (0.5 Inches Topical Given 7/6/17 0906)   mylanta/viscous lidocaine (RAMESH)(GI COCKTAIL) (40 mL Oral Given 7/6/17 1046)       IMPRESSION:  No diagnosis found. PLAN:  1. Admit to cardiac cath lab    11:53 AM  Patient is being admitted to the hospital by Dr. Mason Cannon. The results of their tests and reasons for their admission have been discussed with them and/or available family. They convey agreement and understanding for the need to be admitted and for their admission diagnosis.   Consultation has been made with the inpatient physician specialist for hospitalization. This note is prepared by Felicitas Muhammad, acting as Scribe for Florence Olivares MD.    Florence Olivares MD: The scribe's documentation has been prepared under my direction and personally reviewed by me in its entirety. I confirm that the note above accurately reflects all work, treatment, procedures, and medical decision making performed by me.       \

## 2017-07-07 PROBLEM — I69.398 ABNORMALITY OF GAIT AS LATE EFFECT OF CEREBROVASCULAR ACCIDENT (CVA): Status: ACTIVE | Noted: 2017-01-01

## 2017-07-07 PROBLEM — R26.9 ABNORMALITY OF GAIT AS LATE EFFECT OF CEREBROVASCULAR ACCIDENT (CVA): Status: ACTIVE | Noted: 2017-01-01

## 2017-07-07 PROBLEM — Z95.5 S/P CORONARY ARTERY STENT PLACEMENT: Status: ACTIVE | Noted: 2017-01-01

## 2017-07-07 PROBLEM — I20.9 ANGINA, CLASS III (HCC): Status: ACTIVE | Noted: 2017-01-01

## 2017-07-07 PROBLEM — R53.81 DEBILITATED: Status: ACTIVE | Noted: 2017-01-01

## 2017-07-07 NOTE — PROGRESS NOTES
According to David Swanson NP the discharge has been held and the family are requesting snf before going home,that he is too deconditioned to go home. CM spoke with the family and informed them that snf was not an option with observation status. The son stated that he has been to [facility] before and would like a referral sent to see if he could go back. CM informed NP that another therapy would need to see him prior to referral and she ordered OT. A referral was sent to [facility] pending OT note which will be sent once done.   Talya Etienne RN #[id number]

## 2017-07-07 NOTE — PROGRESS NOTES
CM sent orders to resume home health to [facility] and updated information via [employer].   Juan Alberto Deshpande RN #[id number]

## 2017-07-07 NOTE — CARDIO/PULMONARY
Cardiopulmonary Rehab Nursing Entry:    Chart reviewed and pt visited. Pt 78 yo admitted with [place], s/p PCI/MONICA of  of the RCA. PMHx of HTN, hyperlipidemia, DM, CVA, EF 60%, former tobacco use. Printed material given and discussed re: heart healthy habits, the cardiac diet, medication management, what to expect following coronary angioplasty, and post cardiac catheterization instructions. Discussed post catheterization restrictions, including:no manipulating the wrist for 24 hours, no lifting for 7 days, no soaking the wrist for 3 days . Also discussed what to do if bleeding or bruising at the cath insertion site is observed. Reviewed the cardiac diet (low NA/fat/CHOL), the importance of medication compliance, monitoring for any unusual signs & symptoms and when to call the doctor. Discussed the benefits of Cardiac Rehab and enrollment in the program. Pt s/p CVA, has a home exercise routine and family to assist him as needed. He is not a good candidate for out-patient Cardiac Rehab. Smoking history was assessed. Former smoker. The pt verbalized understanding.

## 2017-07-07 NOTE — PROGRESS NOTES
The patient and brother Rojas Rangel received [facility] and Observation letters, questions answered and the brother Rojas Rangel signed them ( patient is unable and brother his medical decision maker). Services with [facility] will resume after discharge. Care Management Interventions  PCP Verified by CM:  (does not remember)  Mode of Transport at Discharge:  Other (see comment) (family by car)  Transition of Care Consult (CM Consult): Home Health ([facility])  [facility] - INPATIENT: No  Reason Outside [place]: Patient already serviced by other home care/hospice agency  Physical Therapy Consult: Yes (pending)  Current Support Network: [place] (lives with his sister and brother assists her)  Confirm Follow Up Transport: Family (by car)  Plan discussed with Pt/Family/Caregiver: Yes (home and resume home health)  Discharge Location  Discharge Placement: Home with home health

## 2017-07-07 NOTE — PROGRESS NOTES
Problem: Mobility Impaired (Adult and Pediatric)  Goal: *Acute Goals and Plan of Care (Insert Text)  Physical Therapy Goals  Initiated 7/7/2017  1. Patient will move from supine to sit and sit to supine , scoot up and down and roll side to side in bed with modified independence within 7 day(s). 2. Patient will perform sit to stand with modified independence within 7 day(s). 3. Patient will ambulate with modified independence for 50 feet with the least restrictive device within 7 day(s). PHYSICAL THERAPY EVALUATION  Patient: Franklyn Ferreira (38 y.o. male)  Date: 7/7/2017  Primary Diagnosis: Unstable angina [facility])        Precautions:   Fall      ASSESSMENT :  Based on the objective data described below, the patient presents with overall decreased activity tolerance and functional mobility performance, requiring increased assist compared to pt's baseline per brother's report today of how pt was performing mobility in the home environment. Pt with mild SOB, stable O2 sats. With SOB resolved once seated rest break occurred. Recommending SNF upon discharge due to pt will require increased assist compared to baseline at this time that his caregivers cannot safely perform. Anticipate return to baseline with short SNF stay prior to home. Continue to follow. Patient will benefit from skilled intervention to address the above impairments.   Patients rehabilitation potential is considered to be Good  Factors which may influence rehabilitation potential include:   [X]         None noted  [ ]         Mental ability/status  [ ]         Medical condition  [ ]         Home/family situation and support systems  [ ]         Safety awareness  [ ]         Pain tolerance/management  [ ]         Other:        PLAN :  Recommendations and Planned Interventions:  [X]           Bed Mobility Training             [ ]    Neuromuscular Re-Education  [X]           Transfer Training                   [ ]    Orthotic/Prosthetic Training  [X]           Gait Training                         [ ]    Modalities  [X]           Therapeutic Exercises           [ ]    Edema Management/Control  [X]           Therapeutic Activities            [X]    Patient and Family Training/Education  [ ]           Other (comment):     Frequency/Duration: Patient will be followed by physical therapy  5 times a week to address goals. Discharge Recommendations: Cyril Fleming (short stay recommended for safety)  Further Equipment Recommendations for Discharge: tbd       SUBJECTIVE:   Patient stated Israel Gimenez will try.       OBJECTIVE DATA SUMMARY:   HISTORY:    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     small cecal polyp    DM (diabetes mellitus) ([address] 12/14/2009    Hypercholesterolemia      Hyperlipidemia 12/14/2009    Hypertension 12/14/2009    Nephrolithiasis 12/14/2009    Nephrolithiasis 12/14/2009    Perforated duodenal bulb ulcer ([address] 4/18/2017    Recurrent UTI 12/14/2009    S/P coronary artery stent placement 7/7/2017 7/6/17 PCI/MONICA  prox RCA    Stroke ([address]      SVT (supraventricular tachycardia) ([address] 4/19/2017    Unstable angina ([facility] 7/6/2017     Past Surgical History:   Procedure Laterality Date    COLORECTAL SCRN; HI RISK IND   1/15/2014          ENDOSCOPY, COLON, DIAGNOSTIC   9/2/2008     small cecal tubular adenoma removed    HX HEENT         tonsillectomy    HX UROLOGICAL         for kidney stones    HX UROLOGICAL         urinary stents placed 05/2017     Prior Level of Function/Home Situation: lives with brother and sister (alternating homes) per brother present ; pt was able to paxton his own brace, perform ADL's with intermittent assist for higher level tasks, such as showering per brother ; use of RW for short in home distances ; cuing required for safety      Personal factors and/or comorbidities impacting plan of care:      Home Situation  Home Environment: Private residence  Wheelchair Ramp: Yes  One/Two Story Residence: One story  Living Alone: No  Support Systems: Family member(s)  Patient Expects to be Discharged to[de-identified] Private residence  Current DME Used/Available at Home: Marissa Swenson, rashad, Transfer bench, Walker, Wheelchair     EXAMINATION/PRESENTATION/DECISION MAKING:   Critical Behavior:  Neurologic State: Alert  Orientation Level: Oriented to person  Cognition: Impaired decision making, Follows commands  Safety/Judgement: Decreased awareness of need for safety, Lack of insight into deficits  Hearing: Auditory  Auditory Impairment: None  Skin:  Intact  Edema: NA  Range Of Motion:  AROM: Generally decreased, functional                       Strength:    Strength: Generally decreased, functional           Functional Mobility:  Bed Mobility:     Supine to Sit: Contact guard assistance; Additional time (HOB raised, bed-rail use)     Scooting: Stand-by asssistance  Transfers:  Sit to Stand: Maximum assistance (from lower height bed surface )  Stand to Sit: Minimum assistance                       Balance:   Sitting: Intact  Standing: Impaired  Standing - Static: Constant support;Good (at RW)  Standing - Dynamic : Fair (RW)      Ambulation/Gait Training:  Distance (ft): 15 Feet (ft)  Assistive Device: Gait belt;Walker, rolling;Brace/Splint (R AFO)  Ambulation - Level of Assistance: Minimal assistance; Additional time     Gait Description (WDL): Exceptions to WDL  Gait Abnormalities: Decreased step clearance        Base of Support: Widened     Speed/Florina: Pace decreased (<100 feet/min); Slow;Shuffled  Step Length: Left shortened;Right shortened              Functional Measure:  Tinetti test:      Sitting Balance: 1  Arises: 0  Attempts to Rise: 0  Immediate Standing Balance: 1  Standing Balance: 1  Nudged: 2  Eyes Closed: 0  Turn 360 Degrees - Continuous/Discontinuous: 0  Turn 360 Degrees - Steady/Unsteady: 0  Sitting Down: 1  Balance Score: 6  Indication of Gait: 0  R Step Length/Height: 0  L Step Length/Height: 0  R Foot Clearance: 1  L Foot Clearance: 1  Step Symmetry: 1  Step Continuity: 0  Path: 1  Trunk: 0  Walking Time: 0  Gait Score: 4  Total Score: 10         Tinetti Test and G-code impairment scale:  Percentage of Impairment CH     0%    CI     1-19% CJ     20-39% CK     40-59% CL     60-79% CM     80-99% CN      100%   Tinetti  Score 0-28 28 23-27 17-22 12-16 6-11 1-5 0          Tinetti Tool Score Risk of Falls  <19 = High Fall Risk  19-24 = Moderate Fall Risk  25-28 = Low Fall Risk  Tinetti ME. Performance-Oriented Assessment of Mobility Problems in Elderly Patients. [address]; [id number]. (Scoring Description: PT Bulletin Feb. 10, 1993)     Older adults: Neptali Bhatt et al, 2009; n = 1000 [facility] elderly evaluated with ABC, QUITA, ADL, and IADL)  · Mean QUITA score for males aged 69-68 years = 26.21(3.40)  · Mean QUITA score for females age 69-68 years = 25.16(4.30)  · Mean QUITA score for males over 80 years = 23.29(6.02)  · Mean QUITA score for females over 80 years = 17.20(8.32)         G codes: In compliance with CMSs Claims Based Outcome Reporting, the following G-code set was chosen for this patient based on their primary functional limitation being treated: The outcome measure chosen to determine the severity of the functional limitation was the Tinetti with a score of 10/28 which was correlated with the impairment scale.       · Mobility - Walking and Moving Around:               - CURRENT STATUS:    CL - 60%-79% impaired, limited or restricted               - GOAL STATUS:           CJ - 20%-39% impaired, limited or restricted               - D/C STATUS:                       ---------------To be determined---------------      Physical Therapy Evaluation Charge Determination   History Examination Presentation Decision-Making   HIGH Complexity :3+ comorbidities / personal factors will impact the outcome/ POC  LOW Complexity : 1-2 Standardized tests and measures addressing body structure, function, activity limitation and / or participation in recreation  LOW Complexity : Stable, uncomplicated  Other outcome measures Tinetti  LOW       Based on the above components, the patient evaluation is determined to be of the following complexity level: LOW      Pain:  Pain Scale 1: Numeric (0 - 10)  Pain Intensity 1: 0              Activity Tolerance:   Fair, SOB with exertion:  Please refer to the flowsheet for vital signs taken during this treatment. After treatment:   [X]         Patient left in no apparent distress sitting up in chair  [ ]         Patient left in no apparent distress in bed  [X]         Call bell left within reach  [X]         Nursing notified  [X]         Caregiver present  [ ]         Bed alarm activated      COMMUNICATION/EDUCATION:   The patients plan of care was discussed with: Registered Nurse.  [X]         Fall prevention education was provided and the patient/caregiver indicated understanding. [X]         Patient/family have participated as able in goal setting and plan of care. [X]         Patient/family agree to work toward stated goals and plan of care. [ ]         Patient understands intent and goals of therapy, but is neutral about his/her participation. [ ]         Patient is unable to participate in goal setting and plan of care.      Thank you for this referral.  Norman Schofield, PT, DPT   Time Calculation: 30 mins

## 2017-07-07 NOTE — PROGRESS NOTES
[address] [address]  [phone]      Cardiology Progress Note      7/7/2017 2:52 PM    Admit Date: 7/6/2017    Admit Diagnosis:   Unstable angina ([facility] Angina, class III ([facility]    Subjective:     Мария Clark is s/p PCI/MONICA to  RCA. Patient c/w c/o chest discomfort that has never subsided since it started yesterday early morning. CP has lessened. Treated with toradol and patient states much improved. The patient is very debilitated, unable to stand from chair/bed without 2 person assist.  Ambulating on walker with 2 person assist.  Patient has been living in his own home with his brother and sister assisting, and home health PT/OT. Brother states that his brother was able to stand with assistance of walker and move around prior to this admission. Patient and brother concerned that he will not be able to care for self and is at high risk for fall. PT has worked with patient and recommending inpt or SNF rehab.       Visit Vitals    /51    Pulse 88    Temp 98.2 °F (36.8 °C)    Resp 20    Ht 6' 1\" (1.854 m)    Wt 230 lb (104.3 kg)    SpO2 98%    BMI 30.34 kg/m2       Current Facility-Administered Medications   Medication Dose Route Frequency    [START ON 7/8/2017] lisinopril (PRINIVIL, ZESTRIL) tablet 10 mg  10 mg Oral DAILY    lidocaine (XYLOCAINE) 10 mg/mL (1 %) injection        ticagrelor (BRILINTA) 90 mg tablet        aspirin (ASPIRIN) 325 mg tablet        acetaminophen (TYLENOL) tablet 500 mg  500 mg Oral Q4H PRN    amLODIPine (NORVASC) tablet 5 mg  5 mg Oral DAILY    aspirin delayed-release tablet 81 mg  81 mg Oral DAILY    atorvastatin (LIPITOR) tablet 40 mg  40 mg Oral QHS    colesevelam (WELCHOL) tablet 1,250 mg  1,250 mg Oral BID WITH MEALS    gabapentin (NEURONTIN) capsule 300 mg  300 mg Oral BID    HYDROcodone-acetaminophen (NORCO) 5-325 mg per tablet 1 Tab  1 Tab Oral Q4H PRN    insulin regular (NOVOLIN R, HUMULIN R) injection SubCUTAneous AC&HS    metoprolol tartrate (LOPRESSOR) tablet 12.5 mg  12.5 mg Oral BID    timolol (TIMOPTIC) 0.25% ophthalmic solution  1 Drop Both Eyes BID    sodium chloride (NS) flush 5-10 mL  5-10 mL IntraVENous Q8H    sodium chloride (NS) flush 5-10 mL  5-10 mL IntraVENous PRN    acetaminophen (TYLENOL) tablet 650 mg  650 mg Oral Q4H PRN    naloxone (NARCAN) injection 0.4 mg  0.4 mg IntraVENous PRN    ticagrelor (BRILINTA) tablet 90 mg  90 mg Oral Q12H    cycloSPORINE 0.05 % drop 1 Drop  1 Drop Both Eyes BID       Objective:      Physical Exam:  General Appearance:  elderly appearing  male in no acute distress  Chest:   Clear  Cardiovascular:  Regular rate and rhythm, no murmur.   Abdomen:   Soft, non-tender, bowel sounds are active.   Extremities: right radial site D/I, no hematoma; right LE with brace; right leg weakness  Skin:  Warm and dry.     Data Review:   Recent Labs      07/06/17   0520   WBC  8.9   HGB  10.9*   HCT  32.1*   PLT  220     Recent Labs      07/07/17   0257  07/06/17   0520   NA  137  136   K  4.3  4.1   CL  103  104   CO2  28  24   GLU  94  113*   BUN  15  20   CREA  0.93  1.08   CA  8.5  8.8   ALB   --   3.4*   TBILI   --   1.3*   SGOT   --   10*   ALT   --   13       Recent Labs      07/06/17   0853  07/06/17   0520   TROIQ  <0.04  <0.04       No intake or output data in the 24 hours ending 07/07/17 1617     Telemetry: SR    Assessment:     Principal Problem:    Unstable angina (HCC) (7/6/2017)    Active Problems:    Hypertension (12/14/2009)      Hyperlipidemia (12/14/2009)      DM (diabetes mellitus) ([facility] (12/14/2009)      History of stroke (3/3/2017)      [facility]ricular tachy[facility]) [facility] (4/19/2017)      Overview: Approximately 220 BPM on tele 4/19/17      Recurrent at 160 BPM on tele 7/7/17      S/P coronary artery stent placement (7/7/2017)      Overview: 7/6/17 PCI/MONICA  prox RCA      Angina, class III ([address] (7/7/2017)      Debilitated (7/7/2017) Abnormality of gait as late effect of cerebrovascular accident (CVA) (7/7/2017)        Plan:     [place]:   S/p PCI/MONICA to  RCA. Plan for 2nd phase PCI to mid RCA in 4 weeks  Started on Brilinta 90mg BID x 1 year,continue on ASA 81mg daily, BB and statin. Debilitation:  Post CVA right sided weakness and recent abdominal surgery debilitation  PT/OT following - [address] for inpt rehab- currently unable to safely get into or out of a chair- discussed with - ? [facility] candidate? If not a candidate will place in SNF for rehab - this will require 3 night inpt stay. Patient seen and examined with nurse practitioner Yola Rosas. I agree with the assessment and plan as noted.

## 2017-07-07 NOTE — DISCHARGE INSTRUCTIONS
[address] [place], [place] [id number]        Patient ID:  Leeanne Toth  [id number]  63 y.o.  1950    Admit Date: 7/6/2017    Discharge Date: 7/7/2017     Admitting Physician: Jarad Dhillon MD     Discharge Physician: Nick Hartman NP    Admission Diagnoses:   Unstable angina [facility])    Discharge Diagnoses: Active Problems:    Hypertension (12/14/2009)      Hyperlipidemia (12/14/2009)      DM (diabetes mellitus) ([facility] (12/14/2009)      History of stroke (3/3/2017)      SVT (supraventricular tachycardia) ([facility] (4/19/2017)      Overview: Approximately 220 BPM on tele 4/19/17      Recurrent at 160 BPM on tele 7/7/17      Unstable angina ([facility] (7/6/2017)      S/P coronary artery stent placement (7/7/2017)      Overview: 7/6/17 PCI/MONICA  prox RCA        Discharge Condition: Good    Cardiology Procedures this Admission:  Left heart catheterization with PCI    Disposition: home    Reference discharge instructions provided by nursing for diet and activity. Signed:  Nick Hartman NP  7/7/2017  12:50 PM      Radial Cardiac Catheterization/Angiography Discharge Instructions    It is normal to feel tired the first couple days. Take it easy and follow the physicians instructions. CHECK THE CATHETER INSERTION SITE DAILY:    Remove the wrist dressing 24 hours after the procedure. You may shower 24 hours after the procedure. Wash with soap and water and pat dry. Gentle cleaning of the site with soap and water is sufficient, cover with a dry clean dressing or bandage. Do not apply creams or powders to the area. No soaking the wrist for 3 days. Leave the puncture site open to air after 24 hours post-procedure. CALL THE PHYSICIANS:     If the site becomes red, swollen or feels warm to the touch  If there is bleeding or drainage or if there is unusual pain at the radial site.      If there is any minor oozing, you may apply a band-aid and remove after 12 hours. If the bleeding continues, hold pressure with the middle finger against the puncture site and the thumb against the back of the wrist,call 911 to be transported to the hospital.  DO NOT DRIVE YOURSELF, [address] ACTIVITY:   For the first 24 hours do not manipulate the wrist.  No lifting, pushing or pulling over 3-5 pounds with the affected wrist for 7 daysand no straining the insertion site. Do not life grocery bags or the garbage can, do not run the vacuum  or  for 7 days. Start with short walks as in the hospital and gradually increase as tolerated each day. It is recommended to walk 30 minutes 5-7 days per week. Follow your physicians instructions on activity. Avoid walking outside in extremes of heat or cold. Walk inside when it is cold and windy or hot and humid. Things to keep in mind:  No driving for at least 24 hours, or as designated by your physician. Limit the number of times you go up and down the stairs  Take rests and pace yourself with activity. Be careful and do not strain with bowel movements. MEDICATIONS:    Take all medications as prescribed  Call your physician if you have any questions  Keep an updated list of your medications with you at all times and give a list to your physician and pharmacist    SIGNS AND SYMPTOMS:   Be cautious of symptoms of angina or recurrent symptoms such as chest discomfort, unusual shortness of breath or fatigue. These could be symptoms of restenosis, a new blockage or a heart attack. If your symptoms are relieved with rest it is still recommended that you notify your physician of recurrent chest pain or discomfort. For CHEST PAIN or symptoms of angina not relieved with rest:  If the discomfort is not relieved with rest, and you have been prescribed Nitroglycerin, take as directed (taken under the tongue, one at a time 5 minutes apart for a total of 3 doses).  If the discomfort is not relieved after the 3rd nitroglycerin, call 911. If you have not been prescribed Nitroglycerin  and your chest discomfort is not relieved with rest, call 911. AFTER CARE:   Follow up with your physician as instructed. Follow a heart healthy diet with proper portion control, daily stress management, daily exercise, blood pressure and cholesterol control , and smoking cessation.

## 2017-07-07 NOTE — PROGRESS NOTES
CM was asked if the patient could go to SNF rather than home with home health. The patient is under observation status with Medicare A and B so per Medicare regulations the patient does not qualify for snf at this time.  Clare Hurst RN #[id number]

## 2017-07-08 NOTE — PROGRESS NOTES
7/8/2017 8:52 AM    Admit Date: 7/6/2017    Admit Diagnosis: Un[facility] angina [facility] Angina, class III (HCC)    Subjective:     Rolf Clark   denies chest pain, chest pressure/discomfort, dyspnea, palpitations, irregular heart beats, near-syncope.     Visit Vitals    /56 (BP 1 Location: Left arm, BP Patient Position: At rest)    Pulse 85    Temp 98.1 °F (36.7 °C)    Resp 20    Ht 6' 1\" (1.854 m)    Wt 230 lb (104.3 kg)    SpO2 94%    BMI 30.34 kg/m2     Current Facility-Administered Medications   Medication Dose Route Frequency    lisinopril (PRINIVIL, ZESTRIL) tablet 10 mg  10 mg Oral DAILY    lidocaine (XYLOCAINE) 10 mg/mL (1 %) injection        ticagrelor (BRILINTA) 90 mg tablet        aspirin (ASPIRIN) 325 mg tablet        acetaminophen (TYLENOL) tablet 500 mg  500 mg Oral Q4H PRN    amLODIPine (NORVASC) tablet 5 mg  5 mg Oral DAILY    aspirin delayed-release tablet 81 mg  81 mg Oral DAILY    atorvastatin (LIPITOR) tablet 40 mg  40 mg Oral QHS    colesevelam (WELCHOL) tablet 1,250 mg  1,250 mg Oral BID WITH MEALS    gabapentin (NEURONTIN) capsule 300 mg  300 mg Oral BID    HYDROcodone-acetaminophen (NORCO) 5-325 mg per tablet 1 Tab  1 Tab Oral Q4H PRN    insulin regular (NOVOLIN R, HUMULIN R) injection   SubCUTAneous AC&HS    metoprolol tartrate (LOPRESSOR) tablet 12.5 mg  12.5 mg Oral BID    timolol (TIMOPTIC) 0.25% ophthalmic solution  1 Drop Both Eyes BID    sodium chloride (NS) flush 5-10 mL  5-10 mL IntraVENous Q8H    sodium chloride (NS) flush 5-10 mL  5-10 mL IntraVENous PRN    acetaminophen (TYLENOL) tablet 650 mg  650 mg Oral Q4H PRN    naloxone (NARCAN) injection 0.4 mg  0.4 mg IntraVENous PRN    ticagrelor (BRILINTA) tablet 90 mg  90 mg Oral Q12H    cycloSPORINE 0.05 % drop 1 Drop  1 Drop Both Eyes BID         Objective:      Visit Vitals    /56 (BP 1 Location: Left arm, BP Patient Position: At rest)    Pulse 85    Temp 98.1 °F (36.7 °C)    Resp 20    Ht 6' 1\" (1.854 m)    Wt 230 lb (104.3 kg)    SpO2 94%    BMI 30.34 kg/m2       Physical Exam:  Abdomen: soft, non-tender. Bowel sounds normal.   Extremities: no cyanosis or edema  Heart: regular rate and rhythm, S1, S2 normal, no murmur, click, rub or gallop  Lungs: clear to auscultation bilaterally  Pulses: 2+     Data Review:   Labs:    Recent Results (from the past 24 hour(s))   GLUCOSE, POC    Collection Time: 07/07/17 12:04 PM   Result Value Ref Range    Glucose (POC) 150 (H) 65 - 100 mg/dL    Performed by Marcia Arias    GLUCOSE, POC    Collection Time: 07/07/17  5:24 PM   Result Value Ref Range    Glucose (POC) 132 (H) 65 - 100 mg/dL    Performed by Marcia Arias    GLUCOSE, POC    Collection Time: 07/07/17  9:52 PM   Result Value Ref Range    Glucose (POC) 107 (H) 65 - 100 mg/dL    Performed by José Quevedo    GLUCOSE, POC    Collection Time: 07/08/17  7:29 AM   Result Value Ref Range    Glucose (POC) 122 (H) 65 - 100 mg/dL    Performed by Marcia Arias        Telemetry: normal sinus rhythm      Assessment:     Principal Problem:    Unstable angina ([address] (7/6/2017)    Active Problems:    Hypertension (12/14/2009)      Hyperlipidemia (12/14/2009)      DM (diabetes mellitus) ([address] (12/14/2009)      History of stroke (3/3/2017)      SVT (supraventricular tachycardia) ([facility] (4/19/2017)      Overview: Approximately 220 BPM on tele 4/19/17      Recurrent at 160 BPM on tele 7/7/17      S/P coronary artery stent placement (7/7/2017)      Overview: 7/6/17 PCI/MONICA  prox RCA      Angina, class III ([address] (7/7/2017)      Debilitated (7/7/2017)      Abnormality of gait as late effect of cerebrovascular accident (CVA) (7/7/2017)        Plan:     CAD: S/p PCI/MONICA to  RCA. Plan for 2nd phase PCI to mid RCA in 4 weeks  Continue current meds. Placement.

## 2017-07-08 NOTE — PROGRESS NOTES
Problem: Mobility Impaired (Adult and Pediatric)  Goal: *Acute Goals and Plan of Care (Insert Text)  Physical Therapy Goals  Initiated 7/7/2017  1. Patient will move from supine to sit and sit to supine , scoot up and down and roll side to side in bed with modified independence within 7 day(s). 2. Patient will perform sit to stand with modified independence within 7 day(s). 3. Patient will ambulate with modified independence for 50 feet with the least restrictive device within 7 day(s). PHYSICAL THERAPY TREATMENT  Patient: Franklyn Ferreira (83 y.o. male)  Date: 7/8/2017  Diagnosis: Unstable angina (HCC)  Angina, class III ([address] Unstable angina ([address]       Precautions: Fall      ASSESSMENT:  Pt received supine in bed and agreeable to PT intervention. Pt cleared by nursing for mobility and seen along with OT for skilled management of patient. RN reporting that pt has ambulated in hallways multiple times with nursing assist since prior PT session. Pt with overall poor quality of movement during therapy session, requiring constant instruction, assist, and cueing. Pt transferred to EOB with mod A x 1, HOB elevated, and use of bed rails. Pt performed sit<>stand transfers with mod-max A x 2, requiring VCs to shift weight forward and push up with UEs to achieve standing. Pt with extreme difficulty progressing LEs forward, demonstrating shuffling gait with nearly no forward progress. Pt performed bed>chair transfer with mod-max A x 2 using RW using R AFO. Pt with poor eccentric control during stand>sit transfers, requiring additional assist and VCs to safely transfer into chair. Pt stood additional time and ambulated 20' total in room with mod A x 2 using RW. Pt demonstrates posterior and L-lateral lean during all standing activities with forward flexed trunk. Pt provided with manual cueing to R glut to facilitate hip extension during gait as pt with crouched stance, however visual and verbal cueing not effective.  Pt also needed min-mod A for RW management during gait. Pt stating his gait is not close to his baseline at this time. VSS throughout and pt was assisted back into bedside chair following therapy session with all needs met and RN aware. Recommend patient discharge to rehab, SNF vs inpatient, pending progress in acute PT. Pt appears to be significant fall risk. Progression toward goals:  [ ]    Improving appropriately and progressing toward goals  [X]    Improving slowly and progressing toward goals  [ ]    Not making progress toward goals and plan of care will be adjusted       PLAN:  Patient continues to benefit from skilled intervention to address the above impairments. Continue treatment per established plan of care. Discharge Recommendations:  Rehab  Further Equipment Recommendations for Discharge:  TBD by rehab       SUBJECTIVE:   Patient stated Amarilis Locks walked earlier and it was better than this.       OBJECTIVE DATA SUMMARY:   Critical Behavior:  Neurologic State: Alert  Orientation Level: Disoriented to time, Oriented to person, Oriented to place, Oriented to situation  Cognition: Appropriate decision making, Follows commands  Safety/Judgement: Decreased awareness of need for safety, Lack of insight into deficits  Functional Mobility Training:  Bed Mobility:  Rolling: Moderate assistance  Supine to Sit: Moderate assistance     Scooting: Moderate assistance        Transfers:  Sit to Stand: Moderate assistance;Maximum assistance;Assist x2  Stand to Sit: Maximum assistance;Assist x2 (poor eccentric control)                             Balance:  Sitting: Impaired  Sitting - Static: Fair (occasional)  Sitting - Dynamic: Fair (occasional)  Standing: Impaired; With support  Standing - Static: Fair;Constant support  Standing - Dynamic : Poor  Ambulation/Gait Training:  Distance (ft): 20 Feet (ft)  Assistive Device: Gait belt;Brace/Splint; Walker, rolling (R AFO)  Ambulation - Level of Assistance:  Moderate assistance;Assist x2;Adaptive equipment; Additional time        Gait Abnormalities: Decreased step clearance;Shuffling gait        Base of Support: Widened;Center of gravity altered     Speed/Florina: Pace decreased (<100 feet/min); Shuffled  Step Length: Left shortened;Right shortened  Neuro Re-Education:  Pt provided with manual cueing to R glut to facilitate hip extension during gait training. Pain:  Pain Scale 1: Numeric (0 - 10)  Pain Intensity 1: 0              Activity Tolerance:   Fair - VSS;  bpm during gait at the highest; pt with SOB during activity  Please refer to the flowsheet for vital signs taken during this treatment.   After treatment:   [X]    Patient left in no apparent distress sitting up in chair  [ ]    Patient left in no apparent distress in bed  [X]    Call bell left within reach  [X]    Nursing notified  [ ]    Caregiver present  [ ]    Bed alarm activated      COMMUNICATION/COLLABORATION:   The patients plan of care was discussed with: Physical Therapist and Registered Nurse     Nirav Singh, PT, DPT   Time Calculation: 22 mins

## 2017-07-09 NOTE — PROGRESS NOTES
Pt had a run of SVT. Pt was leaning over in chair looking for something and then return to upright position andSVT resolved. Pt was asymptomatic.

## 2017-07-10 NOTE — PROGRESS NOTES
Problem: Self Care Deficits Care Plan (Adult)  Goal: *Acute Goals and Plan of Care (Insert Text)  Occupational Therapy Goals  Initiated 7/10/2017  1. Patient will perform grooming with supervision/set-up within 7 day(s). 2. Patient will perform bathing with moderate assistance within 7 day(s). 3. Patient will perform lower body dressing with moderate assistance within 7 day(s). 4. Patient will perform toilet transfers with moderate assistance within 7 day(s). 5. Patient will perform all aspects of toileting with minimal assistance/contact guard assist within 7 day(s). 6. Patient will participate in upper extremity therapeutic exercise/activities with minimal assistance/contact guard assist for 5 minutes within 7 day(s). 7. Patient will utilize energy conservation techniques during functional activities with verbal cues within 7 day(s). OCCUPATIONAL THERAPY EVALUATION  Patient: Sandy Camejo (21 y.o. male)  Date: 7/10/2017  Primary Diagnosis: Unstable angina (HCC)  Angina, class III ([facility]        Precautions:   Fall      ASSESSMENT :  Based on the objective data described below, the patient presents with generalized weaknesses, decreased endurance, strength, functional mobility, and ADLs. Pt was living with family prior and was reporting that he was able to bathe and dress with no assist.  Pt was mod assist of 1 for supine to sit and then was able to sit on EOB with no assist.  He was mod assist of 2 for standing and max for scooting to the EOB. Pt attempted to paxton his shoes and his brace on left leg and was max assist.  Pt reports that he was independent with all dressing prior. Pt was mod assist of 2 for walking in the room and pt is walking bent over and needs max facilitation on glutes on the left side. Pt was left in chair with call bell within reach and nursing notified and all VSS. Recommend that pt have further therapy at discharged at in pt.      Patient will benefit from skilled intervention to address the above impairments. Patients rehabilitation potential is considered to be Good  Factors which may influence rehabilitation potential include:   [X]             None noted  [ ]             Mental ability/status  [ ]             Medical condition  [ ]             Home/family situation and support systems  [ ]             Safety awareness  [ ]             Pain tolerance/management  [ ]             Other:        PLAN :  Recommendations and Planned Interventions:  [X]               Self Care Training                  [ ]        Therapeutic Activities  [X]               Functional Mobility Training    [ ]        Cognitive Retraining  [X]               Therapeutic Exercises           [X]        Endurance Activities  [ ]               Balance Training                   [ ]        Neuromuscular Re-Education  [ ]               Visual/Perceptual Training     [X]   Home Safety Training  [X]               Patient Education                 [X]        Family Training/Education  [ ]               Other (comment):     Frequency/Duration: Patient will be followed by occupational therapy 5 times a week to address goals. Discharge Recommendations: Inpatient Rehab  Further Equipment Recommendations for Discharge: tbd       SUBJECTIVE:   Patient stated Tracy Gleason was able to bathe myself at home and no one helped me. 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     small cecal polyp    DM (diabetes mellitus) ([facility] 12/14/2009    Hypercholesterolemia      Hyperlipidemia 12/14/2009    Hypertension 12/14/2009    Nephrolithiasis 12/14/2009    Nephrolithiasis 12/14/2009    Perforated duodenal bulb ulcer ([address] 4/18/2017    Recurrent UTI 12/14/2009    S/P coronary artery stent placement 7/7/2017 7/6/17 PCI/MONICA  prox RCA    Stroke ([address]      SVT (supraventricular tachycardia) ([address] 4/19/2017    Unstable angina ([address] 7/6/2017     Past Surgical History:   Procedure Laterality Date    COLORECTAL SCRN; HI RISK IND   1/15/2014          ENDOSCOPY, COLON, DIAGNOSTIC   9/2/2008     small cecal tubular adenoma removed    HX HEENT         tonsillectomy    HX UROLOGICAL         for kidney stones    HX UROLOGICAL         urinary stents placed 05/2017        Prior Level of Function/Home Situation: pt lives with family and reports that he was independent  In ADLs. Expanded or extensive additional review of patient history:      Home Situation  Home Environment: Private residence  Wheelchair Ramp: Yes  One/Two Story Residence: One story  Living Alone: No  Support Systems: Family member(s)  Patient Expects to be Discharged to[de-identified] Private residence  Current DME Used/Available at Home: Arletha Foot, straight, Transfer bench, Elveria Gissel, Wheelchair  [X]  Right hand dominant             [ ]  Left hand dominant     EXAMINATION OF PERFORMANCE DEFICITS:  Cognitive/Behavioral Status:  Neurologic State: Alert  Orientation Level: Oriented to person;Oriented to place;Oriented to situation  Cognition: Follows commands  Perception: Appears intact  Perseveration: No perseveration noted  Safety/Judgement: Fall prevention     Skin: in good health     Edema: none noted     Hearing: Auditory  Auditory Impairment: None     Vision/Perceptual:            Appears intact                          Range of Motion:     AROM: Generally decreased, functional (more involved on right )  PROM: Generally decreased, functional (more involved on right)                       Strength:     Strength: Generally decreased, functional                 Coordination:  Coordination: Generally decreased, functional (worse on right)  Fine Motor Skills-Upper: Left Intact; Right Impaired    Gross Motor Skills-Upper: Left Intact; Right Impaired     Tone & Sensation:     Tone: Normal (increased tone on right but functional)  Sensation: Intact                       Balance:  Sitting: Impaired  Sitting - Static: Fair (occasional)  Sitting - Dynamic: Fair (occasional)  Standing: Impaired; Without support  Standing - Static: Fair;Constant support  Standing - Dynamic : Poor     Functional Mobility and Transfers for ADLs:  Bed Mobility:  Rolling: Minimum assistance; Moderate assistance  Supine to Sit: Minimum assistance; Moderate assistance  Scooting: Maximum assistance     Transfers:  Sit to Stand: Moderate assistance;Assist x2  Stand to Sit: Moderate assistance;Assist x2  Bed to Chair: Moderate assistance;Assist x2     ADL Assessment:  Feeding: Setup     Oral Facial Hygiene/Grooming: Setup     Bathing: Maximum assistance     Upper Body Dressing: Maximum assistance     Lower Body Dressing: Maximum assistance          Barthel Index:      Bathin  Bladder: 10  Bowels: 10  Groomin  Dressin  Feedin  Mobility: 5  Stairs: 0  Toilet Use: 0  Transfer (Bed to Chair and Back): 5  Total: 40         Barthel and G-code impairment scale:  Percentage of impairment CH  0% CI  1-19% CJ  20-39% CK  40-59% CL  60-79% CM  80-99% CN  100%   Barthel Score 0-100 100 99-80 79-60 59-40 20-39 1-19    0   Barthel Score 0-20 20 17-19 13-16 9-12 5-8 1-4 0      The Barthel ADL Index: Guidelines  1. The index should be used as a record of what a patient does, not as a record of what a patient could do. 2. The main aim is to establish degree of independence from any help, physical or verbal, however minor and for whatever reason. 3. The need for supervision renders the patient not independent. 4. A patient's performance should be established using the best available evidence. Asking the patient, friends/relatives and nurses are the usual sources, but direct observation and common sense are also important. However direct testing is not needed. 5. Usually the patient's performance over the preceding 24-48 hours is important, but occasionally longer periods will be relevant.   6. Middle categories imply that the patient supplies over 50 per cent of the effort. 7. Use of aids to be independent is allowed. Kay Willett., Barthel, D.W. (9751). Functional evaluation: the Barthel Index. [address] (14)2. ARRON Blood, Genaro Renteria., Esau Dumont., [place], [facility] (1999). Measuring the change indisability after inpatient rehabilitation; comparison of the responsiveness of the Barthel Index and Functional [place] Measure. Journal of Neurology, Neurosurgery, and Psychiatry, 66(4), 950-360. KEISHA Hussein, CHRISTOPHER Pettit, & Esequiel Reyes M.A. (2004.) Assessment of post-stroke quality of life in cost-effectiveness studies: The usefulness of the Barthel Index and the EuroQoL-5D. Quality of Life Research, 13, 187-32            Cognitive Retraining  Safety/Judgement: Fall prevention        Functional Measure:        G codes: In compliance with CMSs Claims Based Outcome Reporting, the following G-code set was chosen for this patient based on their primary functional limitation being treated: The outcome measure chosen to determine the severity of the functional limitation was the Barthel with a score of 40/100 which was correlated with the impairment scale. · Self Care:               - CURRENT STATUS:    CK - 40%-59% impaired, limited or restricted               - GOAL STATUS:           CJ - 20%-39% impaired, limited or restricted               - D/C STATUS:                       ---------------To be determined---------------      Occupational Therapy Evaluation Charge Determination   History Examination Decision-Making   MEDIUM Complexity : Expanded review of history including physical, cognitive and psychosocial  history  MEDIUM Complexity : 3-5 performance deficits relating to physical, cognitive , or psychosocial skils that result in activity limitations and / or participation restrictions MEDIUM Complexity : Patient may present with comorbidities that affect occupational performnce.  Miniml to moderate modification of tasks or assistance (eg, physical or verbal ) with assesment(s) is necessary to enable patient to complete evaluation       Based on the above components, the patient evaluation is determined to be of the following complexity level: MEDIUM  Pain:  Pain Scale 1: Numeric (0 - 10)  Pain Intensity 1: 0              Activity Tolerance:   vss  Please refer to the flowsheet for vital signs taken during this treatment. After treatment:   [X] Patient left in no apparent distress sitting up in chair  [ ] Patient left in no apparent distress in bed  [X] Call bell left within reach  [X] Nursing notified  [ ] Caregiver present  [ ] Bed alarm activated      COMMUNICATION/EDUCATION:   The patients plan of care was discussed with: Physical Therapist and Registered Nurse.  [X] Home safety education was provided and the patient/caregiver indicated understanding. [X] Patient/family have participated as able in goal setting and plan of care. [X] Patient/family agree to work toward stated goals and plan of care. [ ] Patient understands intent and goals of therapy, but is neutral about his/her participation. [ ] Patient is unable to participate in goal setting and plan of care. This patients plan of care is appropriate for delegation to [facility].      Thank you for this referral.  Patsy Dillard OT  Time Calculation: 21 mins

## 2017-07-10 NOTE — PROGRESS NOTES
The patient had a brief episode of 's bpm when he was hunched over in bed attempting for a bowel movement. The patient was assisted to the bathroom, after reaching the bathroom his heart rate slowed down to 120's bpm. He was back to his baseline of NSR after he was back in bed. The patient was asymptomatic during this episode.

## 2017-07-10 NOTE — PROGRESS NOTES
Discharge instructions reviewed with patient and family. Rx reviewed with patient and family. Pt transported to front entrance via wheelchair, escorted by RN. Pt transferred safely to vehicle.

## 2017-07-10 NOTE — PROGRESS NOTES
10:20 am addendum  I spoke with Fahad Givens () with [address] [place] [address], who is currently reviewing the medicals and hopes to have a decision soon regarding acceptance vs denial admission for inpatient rehab services. Gabi Cedeño, BSW  [phone]    Medical updates sent via [facility] to [facility]. OT eval still pending, this will be required for [facility] to complete their evaluation for admission to inpatient rehab.     Gabi Cedeño, [address] [place] [address] [phone]

## 2017-07-10 NOTE — CDMP QUERY
Dr. Frida Danielle :  Please clarify if this patient is being treated/managed for:    =>right sided Hemiplegia in setting of Post CVA right sided weakness   =>Other Explanation of clinical findings  =>Unable to Determine (no explanation of clinical findings)    The medical record reflects the following clinical findings, treatment, and risk factors:    Risk Factors: 79 you male adm for [place], recent abdominal surgery debilitation  Clinical Indicators: pt has residual weakness from CVA  Treatment: PT/OT, [address] for inpt rehab- currently unable to safely get into or out of a chair- discussed with - ? Sheltering arms candidate? Eunice Loose Eunice Loose Eunice Loose If not a candidate will place in SNF for rehab - this will require 3 night inpt stay. Please clarify and document your clinical opinion in the progress notes and discharge summary including the definitive and/or presumptive diagnosis, (suspected or probable), related to the above clinical findings. Please include clinical findings supporting your diagnosis. Thank Alhaji Moe  [address]; QKI;[id number]

## 2017-07-10 NOTE — PROGRESS NOTES
[facility] has denied pt stated diagnosis did not qualify for inpt rehab services. I met with pt and spoke with his sister, Jessie Mcdonald via phone re: SNF options. Jessie Mcdonald spoke with her other brother and they are agreeable to a very short stay in a SNF. Jessie Mcdonald stated she and her brother have been staying with pt and they can continue to do this if needed. They are only interested in one of these 3 area SNFs:    [facility]    Referral sent to above SNF's. Waiting on acceptance vs denial.    Pt aware and in agreement.     Yareli Garcia, [facility] [phone]

## 2017-07-10 NOTE — DISCHARGE SUMMARY
[id number] [phone]     Cardiology Discharge Summary     Patient ID:  Laura Ayala  [id number]  77 y.o.  1950    Admit Date: 7/6/2017    Discharge Date: 7/10/2017     Admitting Physician: Micaela Nash MD     Discharge Physician:    Leeanne Ness.  Brenda Antonio NP    Admission Diagnoses:   Unstable angina ([address]  Angina, class III [facility])    Discharge Diagnoses:   Principal Problem:    Unstable angina ([address] (7/6/2017)    Active Problems:    Hypertension (12/14/2009)      Hyperlipidemia (12/14/2009)      DM (diabetes mellitus) ([address] (12/14/2009)      History of stroke (3/3/2017)      SVT (supraventricular tachycardia) ([address] (4/19/2017)      Overview: Approximately 220 BPM on tele 4/19/17      Recurrent at 160 BPM on tele 7/7/17      S/P coronary artery stent placement (7/7/2017)      Overview: 7/6/17 PCI/MONICA  prox RCA      Angina, class III ([address] (7/7/2017)      Debilitated (7/7/2017)      Abnormality of gait as late effect of cerebrovascular accident (CVA) (7/7/2017)        [address]    Cardiology Procedures this Admission:  Left heart catheterization with PCI    Patient Active Problem List    Diagnosis Date Noted    S/P coronary artery stent placement 07/07/2017    Angina, class III ([address] 07/07/2017    Debilitated 07/07/2017    Abnormality of gait as late effect of cerebrovascular accident (CVA) 07/07/2017    Unstable angina ([address] 07/06/2017    SVT (supraventricular tachycardia) ([address] 04/19/2017    Perforated duodenal bulb ulcer ([address] 04/18/2017    Hyponatremia 04/18/2017    Perforated viscus 04/17/2017    Hydroureter on right 04/17/2017    History of stroke 03/03/2017    Acute renal failure (ARF) ([address] 03/01/2017    Hyperkalemia 03/01/2017    Hypertension 12/14/2009    Hyperlipidemia 12/14/2009    DM (diabetes mellitus) ([facility] 12/14/2009    H/O 12/14/2009    Recurrent UTI 12/14/2009    Nephrolithiasis 12/14/2009 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    small cecal polyp    DM (diabetes mellitus) ([facility] 12/14/2009    Hypercholesterolemia     Hyperlipidemia 12/14/2009    Hypertension 12/14/2009    Nephrolithiasis 12/14/2009    Nephrolithiasis 12/14/2009    Perforated duodenal bulb ulcer ([facility] 4/18/2017    Recurrent UTI 12/14/2009    S/P coronary artery stent placement 7/7/2017 7/6/17 PCI/MONICA  prox RCA    Stroke ([facility]     SVT (supraventricular tachycardia) ([facility] 4/19/2017    Unstable angina ([address] [address] 7/6/2017      Social History     Social History    Marital status: SINGLE     Spouse name: N/A    Number of children: N/A    Years of education: N/A     Social History Main Topics    Smoking status: Former Smoker    Smokeless tobacco: None    Alcohol use No    Drug use: No    Sexual activity: Not Currently     Birth control/ protection: Abstinence     Other Topics Concern    None     Social History Narrative     Allergies   Allergen Reactions    Bactrim [Sulfamethoxazole-Trimethoprim] Nausea and Vomiting      Family History   Problem Relation Age of Onset    Hypertension Mother     Elevated Lipids Mother     Heart Disease Mother     Diabetes Father     Hypertension Father     Elevated Lipids Sister     Hypertension Sister     Heart Disease Brother     Stroke Maternal Grandfather     Stroke [facility] Course: (per admission note) Rudolph Stevens is a 79 y.o. male admitted for Unstable angina ([facility] No previous cardiac hx. Hx of Exploratory laparotomy, repair of massive duodenal bulb ulcer, with biopsy, and Yakov patch in April 2017. During that admission experienced SVT, seen by Dr. Mickie Eckert, treated with BB. Echo normal.  This AM awoke suddenly with acute onset of mid-left sternal chest pain 5/10, nonradiating, constant. No assoc SOB, n/v, dizziness/lightheadedness, palpitations.   Has received morphine and nitro paste with no relief. Daughter states that since his surgery in April has noticed dyspnea on exertion. ECG with no acute changes, troponin neg x 2. CP continuing 5/10. Cardiac risk factors: family history, dyslipidemia, diabetes mellitus, obesity, sedentary life style, male gender, hypertension.     [place]:   S/p PCI/MONICA to  RCA. Plan for consideration 2nd phase PCI to mid RCA in 4 weeks  Started on Brilinta 90mg BID x 1 year,continue on ASA 81mg daily, BB and statin.      Debilitation:  Post CVA (old) right sided weakness and recent abdominal surgery (April 2017) debilitation  Currently unable to safely get into or out of a chair, please see recommendations from PT/OT below  PT/OT following - [facility] for inpt rehab not a candidate. Discharge to [facility] for 1-2 weeks. PT Assessment:  Based on the objective data described below, the patient presents with overall decreased activity tolerance and functional mobility performance, requiring increased assist compared to pt's baseline per brother's report today of how pt was performing mobility in the home environment. Pt with mild SOB, stable O2 sats. With SOB resolved once seated rest break occurred. Recommending SNF upon discharge due to pt will require increased assist compared to baseline at this time that his caregivers cannot safely perform. Anticipate return to baseline with short SNF stay prior to home. Continue to follow. OT Assessment:  Based on the objective data described below, the patient presents with generalized weaknesses, decreased endurance, strength, functional mobility, and ADLs. Pt was living with family prior and was reporting that he was able to bathe and dress with no assist.  Pt was mod assist of 1 for supine to sit and then was able to sit on EOB with no assist.  He was mod assist of 2 for standing and max for scooting to the EOB.  Pt attempted to paxton his shoes and his brace on left leg and was max assist.  Pt reports that he was independent with all dressing prior. Pt was mod assist of 2 for walking in the room and pt is walking bent over and needs max facilitation on glutes on the left side. Pt was left in chair with call bell within reach and nursing notified and all VSS. Recommend that pt have further therapy at discharged at in pt. Consults: Rehabilitation Medicine and OT/PT, Case managemend    Visit Vitals    /65 (BP 1 Location: Left arm, BP Patient Position: At rest)    Pulse 84    Temp 98.1 °F (36.7 °C)    Resp 15    Ht 6' 1\" (1.854 m)    Wt 104.3 kg (230 lb)    SpO2 95%    BMI 30.34 kg/m2       Physical Exam  General:  Elderly appearing  male in no acute distress  Abdomen: soft, non-tender. Bowel sounds normal.   Extremities: extremities normal, no edema, pulses   Heart: regular rate and rhythm, no murmur, S3/JVD  Lungs: clear to auscultation bilaterally  Neck: supple, symmetrical, trachea midline,   Neurologic:right sided weakness    Labs:   Results for [employer] (MRN [id number]) as of 7/10/2017 15:42   7/6/2017 05:20   WBC 8.9   RBC 3.77 (L)   HGB 10.9 (L)   HCT 32.1 (L)   MCV 85.1   MCH 28.9   MCHC 34.0   RDW 16.1 (H)   PLATELET 614   NEUTROPHILS 67   LYMPHOCYTES 18   MONOCYTES 11   EOSINOPHILS 4   BASOPHILS 0   ABS. NEUTROPHILS 5.9   ABS. LYMPHOCYTES 1.6   ABS. MONOCYTES 1.0   ABS. EOSINOPHILS 0.3   ABS. BASOPHILS 0.0     Results for [employer] (MRN [id number]) as of 7/10/2017 15:42   7/7/2017 02:57   Sodium 137   Potassium 4.3   Chloride 103   CO2 28   Anion gap 6   Glucose 94   BUN 15   Creatinine 0.93   BUN/Creatinine ratio 16   Calcium 8.5   GFR est non-AA >60   GFR est AA >60     EKG: SR with no acute changes     Disposition: SNF, [facility]    Patient Instructions:   Current Discharge Medication List      START taking these medications    Details   ticagrelor (BRILINTA) 90 mg tablet Take 1 Tab by mouth every twelve (12) hours every twelve (12) hours.   Qty: 60 Tab, Refills: 11 CONTINUE these medications which have CHANGED    Details   gabapentin (NEURONTIN) 300 mg capsule Take 1 Cap by mouth two (2) times a day. Qty: 60 Cap, Refills: 11         CONTINUE these medications which have NOT CHANGED    Details   nitrofurantoin, macrocrystal-monohydrate, (MACROBID) 100 mg capsule Take 100 mg by mouth daily. pantoprazole (PROTONIX) 40 mg tablet Take 40 mg by mouth two (2) times a day. cycloSPORINE (RESTASIS) 0.05 % ophthalmic emulsion Administer 1 Drop to both eyes two (2) times a day. acetaminophen (TYLENOL EXTRA STRENGTH) 500 mg tablet Take 500 mg by mouth every four (4) hours as needed (mild pain). b complex vitamins (B COMPLEX 1) tablet Take 1 Tab by mouth daily. sodium chloride (ENEIDA-5) 5 % ophthalmic solution Administer 1 Drop to both eyes three (3) times daily. metFORMIN (GLUCOPHAGE) 500 mg tablet Take 500 mg by mouth daily (with breakfast). lisinopril (PRINIVIL, ZESTRIL) 20 mg tablet Take 20 mg by mouth daily. metoprolol tartrate (LOPRESSOR) 25 mg tablet Take 12.5 mg by mouth two (2) times a day. OTHER,NON-FORMULARY, Take 1 Cap by mouth daily. Maxitears Dry eye formula      atorvastatin (LIPITOR) 40 mg tablet TAKE 1 TABLET BY MOUTH AT BEDTIME  Qty: 90 Tab, Refills: 1      colesevelam (WELCHOL) 625 mg tablet Take 2 Tabs by mouth two (2) times daily (with meals). Qty: 360 Tab, Refills: 1      aspirin delayed-release 81 mg tablet Take 81 mg by mouth daily. timolol (BETIMOL) 0.25 % ophthalmic solution Administer 1 Drop to both eyes two (2) times a day.      ketoconazole (NIZORAL) 2 % topical cream Apply  to affected area daily. Indications: TINEA CORPORIS      hydrocortisone (ALA-ONEIL) 1 % lotion Apply  to affected area two (2) times daily as needed (rash).  use thin layer          STOP taking these medications       amLODIPine (NORVASC) 5 mg tablet Comments:   Reason for Stopping:         HYDROcodone-acetaminophen (NORCO) 5-325 mg per tablet Comments:   Reason for Stopping:         insulin regular (NOVOLIN R, HUMULIN R) 100 unit/mL injection Comments:   Reason for Stopping:               Referenced discharge instructions provided by nursing for diet and activity. Follow up with Dr. Keila Liu July 14, 2017      Signed:  Jad Montero NP  7/10/2017  2:59 PM    Patient seen and examined with nurse practitioner Sanna Grubbs. I agree with the assessment and plan as noted. >30 minutes spent preparing discharge, arranging for transfer to skilled nursing facility, going over medications and instructions with patient.     Leidy Paulino MD

## 2017-07-10 NOTE — PROGRESS NOTES
[facility] did not have a bed to offer per Amber Sanford in Admissions. [address] has not returned either of my calls. [facility] has accepted and has a bed to offer today. I spoke with pt and sister, Iker Holman; they have accepted bed at [facility]. Miley Hawk will be here to transport. Nursing made aware. Nursing will have to call report to [phone] and send d/c instructions and most recent MAR with pt. Care Management Interventions  PCP Verified by CM:  (does not remember)  Mode of Transport at Discharge:  Other (see comment) (family will transport)  Transition of Care Consult (CM Consult): SNF ([facility])  [address] No  Reason Outside [place]: Patient already serviced by other home care/hospice agency  Partner SNF: Yes  Physical Therapy Consult: Yes (pending)  Current Support Network: [place] (lives with his sister and brother assists her)  Confirm Follow Up Transport: Family (by car)  Plan discussed with Pt/Family/Caregiver: Yes  Freedom of Choice Offered: Yes  Discharge Location  Discharge Placement:  ([facility])    Agnes Miner, [address]

## 2017-07-12 NOTE — PROGRESS NOTES
Community Care Team Documentation for Patient in [facility]  Initial Follow Up       Patient was admitted to [facility] from 7/6/17 to 7/10/17. Patient was discharged to [facility], [facility], on 7/10/17 (date). See previous [place] Care Team documentation under Patient Outreach. Hospital Discharge diagnosis:  Unstable angina, s/p Left heart cath. RRAT score:  20    Advance Medical Directive on file in EMR? no     Total Hospitalizations/ED visits last 6 months? IP - 4; ED - 1    PCP : Dixie Phillip MD    PCP follow up appointment:  SNF to schedule    Per Ross Barrow at [facility], Patient of Dr. Fawn Kirk, Cardiologist; Ciara Melgoza Nurse Navigator. Has f/u appt with Dr. Fawn Kirk on 7/14 at 10:30 AM.  PT/OT continues:  Walking 20 ft CG with FWW. Min assist for transfers. Mod-max assist for self care. Barriers:  Low activity tolerance and elevated blood pressure with activity. Lives with sister and brother assists her with care.   Used [facility] in the past.    SNF Attending:  Dr. Luigi Pereira    Anticipated discharge date from SNF:  Undetermined    SNF discharge plan:  Undetermined

## 2017-07-19 NOTE — PROGRESS NOTES
Community Care Team Documentation for Patient in [facility]  Subsequent Follow up     Patient remains at [facility] ([facility]). See previous [facility] Team notes. RRAT score: 20    PCP : Terell Guzman MD    Per Meena Tam at [facility], Cardiology follow up 7/14 cancelled by pt's family. SNF to discuss rescheduling appointment. Pt continues to progress with therapy. Supervision with transfers and bed mobility. Mod assist with bathing and dressing. Min assist with toileting. Ambulating contact guard assist 100ft with RW. Started Abx for UTI. End Abx 7/24.  Tentative discharge date 8/2 home with sister and family assist.   PATTI CastilloW

## 2017-07-26 NOTE — PROGRESS NOTES
Community Care Team Documentation for Patient in [facility]  Subsequent Follow up     Patient remains at [address] ([facility]). See previous [facility] Team notes. RRAT score: 20    PCP : Noelle Ricardo MD    PCP follow up appointment:  SNF to schedule    Per Jaquan Islas at [facility], Family decided to discharge patient early. PT/OT:  Mod I with front wheel walker and transfers. Setup for bathing. Mod assist with shoes. Min Assist with pants. Independent with upper body dressing. Sister to schedule PCP f/u appt. Cardiology f/u appt with Dr. Lee De moved to 8/24 by family. DC 7/26 home with sister and [facility]. Brother also assists with care. Discharge date from SNF:  7/26/17    SNF discharge plan:  Home with sister and [facility]. Medications were not reconciled and general patient assessment was not completed during this skilled nursing facility outreach.      Hao Hooks, MSN, RN, ACNS-BC, [facility]  Nurse Navigator, [employer] [phone]

## 2017-07-30 NOTE — ED PROVIDER NOTES
HPI Comments: Leeanne Toth, 79 y.o. male w/ hx of DM, HTN, Nephrolithiasis, SVT, and CVA presents via EMS w/ wife to [facility] ED with cc of hematuria since 9:30pm last night. Pt reports associated penile pain, testicular pain, testicular swelling, testicular redness, and groin pruritus since onset of symptoms. Pt describes the large amounts of blood in urine as \"bright red with some clots. \" Pt endorses some episodes of similar symptoms in the past, but denies any follow up with urology for prior exacerbation of symptoms. Wife notes that pt exhibits some baseline incontinence and notes that pt is currently on Brilinta secondary to hx of renal and coronary stent placement. Pt denies leg swelling, back pain, abdominal pain, nausea, vomiting, lightheadedness, or dizziness. PCP: Mo Urrutia MD  Cardiology: Leopoldo Ferguson, MD   Urology: Dr. Trenton Katz history significant for: - Tobacco (former), - EtOH, - Illicit Drug Use    There are no other complaints, changes, or physical findings at this time. The history is provided by the patient.         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    small cecal polyp    DM (diabetes mellitus) ([address] 12/14/2009    Hypercholesterolemia     Hyperlipidemia 12/14/2009    Hypertension 12/14/2009    Nephrolithiasis 12/14/2009    Nephrolithiasis 12/14/2009    Perforated duodenal bulb ulcer ([address] 4/18/2017    Recurrent UTI 12/14/2009    S/P coronary artery stent placement 7/7/2017 7/6/17 PCI/MONICA  prox RCA    Stroke ([address]     SVT (supraventricular tachycardia) ([address] 4/19/2017    Unstable angina ([address] 7/6/2017       Past Surgical History:   Procedure Laterality Date    COLORECTAL SCRN; HI RISK IND  1/15/2014         ENDOSCOPY, COLON, DIAGNOSTIC  9/2/2008    small cecal tubular adenoma removed    HX HEENT      tonsillectomy    HX UROLOGICAL      for kidney stones    HX UROLOGICAL      urinary stents placed 05/2017 Family History:   Problem Relation Age of Onset    Hypertension Mother     Elevated Lipids Mother     Heart Disease Mother     Diabetes Father     Hypertension Father    [facility] Elevated Lipids Sister     Hypertension Sister     Heart Disease Brother     Stroke Maternal Grandfather     Stroke Paternal Grandfather        Social History     Social History    Marital status: SINGLE     Spouse name: N/A    Number of children: N/A    Years of education: N/A     Occupational History    Not on file. Social History Main Topics    Smoking status: Former Smoker    Smokeless tobacco: Not on file    Alcohol use No    Drug use: No    Sexual activity: Not Currently     Birth control/ protection: Abstinence     Other Topics Concern    Not on file     Social History Narrative         ALLERGIES: Bactrim [sulfamethoxazole-trimethoprim]    Review of Systems   Constitutional: Negative for chills, fatigue and fever. HENT: Negative for congestion and rhinorrhea. Eyes: Negative for visual disturbance. Respiratory: Negative for cough, shortness of breath and wheezing. Cardiovascular: Negative for chest pain, palpitations and leg swelling. Gastrointestinal: Negative for abdominal distention, abdominal pain, constipation, diarrhea, nausea and vomiting. Endocrine: Negative. Genitourinary: Positive for hematuria, penile pain, scrotal swelling and testicular pain. Negative for difficulty urinating and dysuria.        + testicular swelling, + groin itchiness, +testicular redness   Musculoskeletal: Negative for back pain. Skin: Positive for color change. Negative for rash. Neurological: Negative for dizziness, weakness and light-headedness. Psychiatric/Behavioral: Negative for suicidal ideas.      Patient Vitals for the past 12 hrs:   Temp Pulse Resp BP SpO2   07/30/17 0300 - - - 159/83 99 %   07/30/17 0234 - - - 162/84 98 %   07/29/17 2331 98.1 °F (36.7 °C) 99 18 158/82 100 %            Physical Exam   Constitutional: He is oriented to person, place, and time. He appears well-developed and well-nourished. No distress. HENT:   Head: Normocephalic and atraumatic. Mouth/Throat: Oropharynx is clear and moist.   Eyes: Conjunctivae and EOM are normal.   Neck: Neck supple. No JVD present. No tracheal deviation present. Cardiovascular: Normal rate, regular rhythm and intact distal pulses. Exam reveals no gallop and no friction rub. No murmur heard. Pulmonary/Chest: Effort normal and breath sounds normal. No stridor. No respiratory distress. He has no wheezes. Abdominal: Soft. Bowel sounds are normal. He exhibits no distension and no mass. There is no tenderness. There is no guarding. Genitourinary: Circumcised. Genitourinary Comments: Gross blood from the urethral meatus, no scrotal edema or tenderness   Musculoskeletal: Normal range of motion. He exhibits no edema or tenderness. No deformity   Lymphadenopathy:        Right: No inguinal adenopathy present. Left: No inguinal adenopathy present. Neurological: He is alert and oriented to person, place, and time. He has normal strength. No focal deficits   Skin: Skin is warm, dry and intact. Yeast infection on BL medial thighs   Psychiatric: He has a normal mood and affect. His behavior is normal. Judgment and thought content normal.   Nursing note and vitals reviewed. MDM  Number of Diagnoses or Management Options  Hematuria:   Diagnosis management comments: Pt with a hx of hematuria, now with worsening hematuria. Pt with no urinary retention at this time. Pt is on Brilinta. Will check labs to eval for SKYLER, anemia, UTI. Will place 3 way diallo catheter and irrigate for clots.         Amount and/or Complexity of Data Reviewed  Clinical lab tests: ordered and reviewed  Review and summarize past medical records: yes    Patient Progress  Patient progress: stable    ED Course       Procedures    PROGRESS NOTE:   6:12 AM  Pt has been re-examined by Kennedy Urrutia DO. Still having some hematuria, but is clearer than prior. He has a 3 way catheter in place. Updated lab results w/ family. Plan for discharge home w/ antibiotics and will call Dr. Genesis Davies tomorrow for follow up. LABORATORY TESTS:  Recent Results (from the past 12 hour(s))   URINALYSIS W/ REFLEX CULTURE    Collection Time: 07/30/17  2:51 AM   Result Value Ref Range    Color RED      Appearance OPAQUE (A) CLEAR      Specific gravity 1.020 1.003 - 1.030      pH (UA) 7.0 5.0 - 8.0      Protein >300 (A) NEG mg/dL    Glucose NEGATIVE  NEG mg/dL    Ketone TRACE (A) NEG mg/dL    Blood LARGE (A) NEG      Urobilinogen 1.0 0.2 - 1.0 EU/dL    Nitrites POSITIVE (A) NEG      Leukocyte Esterase LARGE (A) NEG      WBC >100 (H) 0 - 4 /hpf    RBC >100 (H) 0 - 5 /hpf    Epithelial cells FEW FEW /lpf    Bacteria 4+ (A) NEG /hpf    UA:UC IF INDICATED URINE CULTURE ORDERED (A) CNI      Yeast w/hyphae PRESENT (A) NEG     METABOLIC PANEL, COMPREHENSIVE    Collection Time: 07/30/17  2:51 AM   Result Value Ref Range    Sodium 135 (L) 136 - 145 mmol/L    Potassium 4.2 3.5 - 5.1 mmol/L    Chloride 104 97 - 108 mmol/L    CO2 22 21 - 32 mmol/L    Anion gap 9 5 - 15 mmol/L    Glucose 130 (H) 65 - 100 mg/dL    BUN 19 6 - 20 MG/DL    Creatinine 1.30 0.70 - 1.30 MG/DL    BUN/Creatinine ratio 15 12 - 20      GFR est AA >60 >60 ml/min/1.73m2    GFR est non-AA 55 (L) >60 ml/min/1.73m2    Calcium 8.4 (L) 8.5 - 10.1 MG/DL    Bilirubin, total 0.7 0.2 - 1.0 MG/DL    ALT (SGPT) 14 12 - 78 U/L    AST (SGOT) 16 15 - 37 U/L    Alk.  phosphatase 75 45 - 117 U/L    Protein, total 8.4 (H) 6.4 - 8.2 g/dL    Albumin 3.2 (L) 3.5 - 5.0 g/dL    Globulin 5.2 (H) 2.0 - 4.0 g/dL    A-G Ratio 0.6 (L) 1.1 - 2.2     CBC WITH AUTOMATED DIFF    Collection Time: 07/30/17  2:51 AM   Result Value Ref Range    WBC 12.1 (H) 4.1 - 11.1 K/uL    RBC 3.60 (L) 4.10 - 5.70 M/uL    HGB 10.4 (L) 12.1 - 17.0 g/dL    HCT 31.1 (L) 36.6 - 50.3 %    MCV 86.4 80.0 - 99.0 FL    MCH 28.9 26.0 - 34.0 PG    MCHC 33.4 30.0 - 36.5 g/dL    RDW 15.4 (H) 11.5 - 14.5 %    PLATELET 206 488 - 620 K/uL    NEUTROPHILS 73 32 - 75 %    LYMPHOCYTES 14 12 - 49 %    MONOCYTES 9 5 - 13 %    EOSINOPHILS 4 0 - 7 %    BASOPHILS 0 0 - 1 %    ABS. NEUTROPHILS 8.7 (H) 1.8 - 8.0 K/UL    ABS. LYMPHOCYTES 1.7 0.8 - 3.5 K/UL    ABS. MONOCYTES 1.1 (H) 0.0 - 1.0 K/UL    ABS. EOSINOPHILS 0.5 (H) 0.0 - 0.4 K/UL    ABS. BASOPHILS 0.0 0.0 - 0.1 K/UL   PROTHROMBIN TIME + INR    Collection Time: 07/30/17  2:51 AM   Result Value Ref Range    INR 1.1 0.9 - 1.1      Prothrombin time 11.0 9.0 - 11.1 sec   PTT    Collection Time: 07/30/17  2:51 AM   Result Value Ref Range    aPTT 30.5 22.1 - 32.5 sec    aPTT, therapeutic range     58.0 - 77.0 SECS   BILIRUBIN, CONFIRM    Collection Time: 07/30/17  2:51 AM   Result Value Ref Range    Bilirubin UA, confirm QUANTITY NOT SUFFICIENT TO CONFIRM (A) NEG       MEDICATIONS GIVEN:  Medications   cefTRIAXone (ROCEPHIN) 1 g in 0.9% sodium chloride (MBP/ADV) 50 mL (1 g IntraVENous New Bag 7/30/17 0541)       IMPRESSION:  1. Hematuria        PLAN:  1. Current Discharge Medication List      START taking these medications    Details   cephALEXin (KEFLEX) 500 mg capsule Take 1 Cap by mouth four (4) times daily for 7 days. Qty: 28 Cap, Refills: 0           2. Follow-up Information     Follow up With Details Comments Contact Info    Christina Astudillo MD Schedule an appointment as soon as possible for a visit in 1 day  [address] [place], [place] [address]e  [address] [facility]  [address]      Arbutus Schaumann, MD Schedule an appointment as soon as possible for a visit  [address]  [address]  [phone]      [facility] EMERGENCY DEPT  As needed, If symptoms worsen [address]  [address] [facility]  [phone]        Return to ED if worse     Discharge Note:  6:23 AM  The pt is ready for discharge.  The pt's signs, symptoms, diagnosis, and discharge instructions have been discussed and pt has conveyed their understanding. The pt is to follow up as recommended or return to ER should their symptoms worsen. Plan has been discussed and pt is in agreement. This note is prepared by [employer], acting as a Scribe for Robbie Newman DO. Robbie Newman DO: The scribe's documentation has been prepared under my direction and personally reviewed by me in its entirety. I confirm that the notes above accurately reflects all work, treatment, procedures, and medical decision making performed by me.

## 2017-07-30 NOTE — ED NOTES
Pts bladder irrigated with 3way diallo. Pt tolerated procedure well. 3,000ml of NS free flowed by gravity into pts bladder, pts output total is 3500ml of dark red transparent fluid.

## 2017-07-30 NOTE — ED NOTES
Pt presents ambulatory to ED with c/o hematuria that started tonight around 930pm patient reports having a history of blood in his urine but it has gotten worse today patient denies any troubles urinating. Pt denies pain. A/Ox4. Pt reports right sided weakness from previous stroke. Cardiac monitor x3. Call bell in reach.

## 2017-07-30 NOTE — DISCHARGE INSTRUCTIONS

## 2017-07-30 NOTE — ED NOTES
Dr Osiris Hassan reviewed discharge instructions with the patient. The patient verbalized understanding. All questions and concerns were addressed. The patient helped to car via wheelchair and is discharged ambulatory in the care of family members with instructions and prescriptions in hand. Pt is alert and oriented x 4. Respirations are clear and unlabored.

## 2017-08-02 NOTE — PROGRESS NOTES
Community Care Team Documentation for Patient in [facility]  Subsequent Follow up     Patient remains at [facility] ([facility]). See previous [facility] Team notes. PCP : Dixie Phillip MD    Per Ross B[facility] at [facility] [place] 7/26 home with sister and [facility]. Medications were not reconciled and general patient assessment was not completed during this skilled nursing facility outreach.      PATTI TorresW

## 2017-08-24 NOTE — PROGRESS NOTES
Chief Complaint   Patient presents with    New Patient     Pt discharge from [address] [place] [address] 5/26/2017/stents.  C/O some SOB with activity

## 2017-08-24 NOTE — PROGRESS NOTES
NAME:  Farnklyn Ferreira   :   1950   MRN:   [id number]   PCP:  Noelle Ricardo MD           Subjective: The patient is a 79y.o. year old male  who returns for a routine follow-up from prox RCA stenting. Since the last visit, patient reports no change in exercise tolerance, chest pain, edema, medication intolerance, palpitations, PND/orthopnea wheezing, sputum, syncope, dizziness or light headedness. Is home from SNF. Completing final PT session tomorrow. Admits to some FLORES.      Patient Active Problem List   Diagnosis Code    Hypertension I10    Hyperlipidemia E78.5    DM (diabetes mellitus) ([facility] E11.9    H/O Z86.73    Recurrent UTI N39.0    Nephrolithiasis N20.0    Acute renal failure (ARF) ([facility] N17.9    Hyperkalemia E87.5    History of stroke Z86.73    Perforated viscus R19.8    Hydroureter on right N13.4    Perforated duodenal bulb ulcer ([facility]) K26.5    Hyponatremia E87.1    SVT (supraventricular tachycardia) ([facility]) I47.1    Unstable angina ([facility]) I20.0    S/P coronary artery stent placement Z95.5    Angina, class III ([facility]) I20.9    Debilitated R53.81    Abnormality of gait as late effect of cerebrovascular accident (CVA) I69.398, R26.9       Past Medical History:   Diagnosis Date    Benign neoplasm of colon 2008    small cecal polyp    DM (diabetes mellitus) ([address] 2009    Hypercholesterolemia     Hyperlipidemia 2009    Hypertension 2009    Nephrolithiasis 2009    Nephrolithiasis 2009    Perforated duodenal bulb ulcer ([address] 2017    Recurrent UTI 2009    S/P coronary artery stent placement 2017 PCI/MONICA  prox RCA    Stroke ([address]     SVT (supraventricular tachycardia) ([address] 2017    Unstable angina ([address] 2017       Social History   Substance Use Topics    Smoking status: Former Smoker    Smokeless tobacco: Not on file    Alcohol use No      Family History   Problem Relation Age of Onset    Hypertension Mother     Elevated Lipids Mother     Heart Disease Mother     Diabetes Father     Hypertension Father    [facility] Elevated Lipids Sister     Hypertension Sister     Heart Disease Brother     Stroke Maternal Grandfather     Stroke Paternal Grandfather         Review of Systems  Constitutional: Negative for fever, chills, and diaphoresis. Respiratory: Negative for cough, hemoptysis, sputum production, Reports shortness of breath  Cardiovascular: Negative for chest pain, palpitations, orthopnea, claudication, leg swelling and PND. Gastrointestinal: Negative for heartburn, nausea, vomiting, blood in stool and melena. Genitourinary: Negative for dysuria and flank pain. Musculoskeletal: Negative for joint pain and back pain. Skin: Negative for rash. Neurological: Negative for focal weakness, seizures, loss of consciousness, weakness and headaches. Endo/Heme/Allergies: Does not bruise/bleed easily. Psychiatric/Behavioral: Negative for memory loss. The patient does not have insomnia. Objective:       Vitals:    08/24/17 1024 08/24/17 1039   BP: 120/58 110/60   Pulse: 80    Resp: 16    SpO2: 99%    Weight: 207 lb (93.9 kg)    Height: 5' 8\" (1.727 m)     Body mass index is 31.47 kg/(m^2). General PE    Gen: NAD, seated in a wheelchair. Mental Status - Alert. General Appearance - Not in acute distress. Neck - no JVD     Chest and Lung Exam     Inspection: Accessory muscles - No use of accessory muscles in breathing. Auscultation:   Breath sounds: - Normal.     Cardiovascular   Inspection: Jugular vein - Bilateral - Inspection Normal.   Palpation/Percussion:   Apical Impulse: - Normal.   Auscultation: Rhythm - Regular. Heart Sounds - S1 WNL and S2 WNL. No S3 or S4. Murmurs & Other Heart Sounds: Auscultation of the heart reveals - No Murmurs. Peripheral Vascular   Upper Extremity: Inspection - Bilateral - No Cyanotic nailbeds or Digital clubbing.    Lower Extremity: Palpation: Edema - Bilateral - No edema. Neuro: A&O times 3, CN and motor grossly WNL      Data Review:     EKG -  Sinus  Rhythm   Diffuse low voltage.    -Old inferior infarct  -Poor R-wave progression -may be secondary to pulmonary disease       Cardiac cath -  --  A successful drug-eluting stent with balloon angioplasty was performed  on the 100 % lesion in the proximal RCA. Following intervention there was  an excellent angiographic appearance with a 0 % residual stenosis. --  A  Synergy RX [id number] stent at a maximum inflation pressure of 14 [address]. --  RECOMMENDATIONS:  --  The  in the proximal RCA was successfully crossed and stented. The   in the distal RCA could not be crossed. Will revisit in 4-6 weeks. LABS-   Lab Results   Component Value Date/Time    Cholesterol, total 249 05/15/2017 04:41 AM    HDL Cholesterol 30 05/15/2017 04:41 AM    LDL, calculated 182.8 05/15/2017 04:41 AM    VLDL, calculated 36.2 05/15/2017 04:41 AM    Triglyceride 181 05/15/2017 04:41 AM    CHOL/HDL Ratio 8.3 05/15/2017 04:41 AM         Allergies reviewed  Allergies   Allergen Reactions    Bactrim [Sulfamethoxazole-Trimethoprim] Nausea and Vomiting       Medications reviewed  Current Outpatient Prescriptions   Medication Sig    gabapentin (NEURONTIN) 300 mg capsule Take 1 Cap by mouth two (2) times a day.  ticagrelor (BRILINTA) 90 mg tablet Take 1 Tab by mouth every twelve (12) hours every twelve (12) hours.  nitrofurantoin, macrocrystal-monohydrate, (MACROBID) 100 mg capsule Take 100 mg by mouth daily.  pantoprazole (PROTONIX) 40 mg tablet Take 40 mg by mouth two (2) times a day.  cycloSPORINE (RESTASIS) 0.05 % ophthalmic emulsion Administer 1 Drop to both eyes two (2) times a day.  acetaminophen (TYLENOL EXTRA STRENGTH) 500 mg tablet Take 500 mg by mouth every four (4) hours as needed (mild pain).  b complex vitamins (B COMPLEX 1) tablet Take 1 Tab by mouth daily.     sodium chloride (ENEIDA-5) 5 % ophthalmic solution Administer 1 Drop to both eyes three (3) times daily.  metFORMIN (GLUCOPHAGE) 500 mg tablet Take 500 mg by mouth daily (with breakfast).  lisinopril (PRINIVIL, ZESTRIL) 20 mg tablet Take 20 mg by mouth daily.  metoprolol tartrate (LOPRESSOR) 25 mg tablet Take 12.5 mg by mouth two (2) times a day.  OTHER,NON-FORMULARY, Take 1 Cap by mouth daily. Maxitears Dry eye formula    atorvastatin (LIPITOR) 40 mg tablet TAKE 1 TABLET BY MOUTH AT BEDTIME    colesevelam (WELCHOL) 625 mg tablet Take 2 Tabs by mouth two (2) times daily (with meals).  aspirin delayed-release 81 mg tablet Take 81 mg by mouth daily.  timolol (BETIMOL) 0.25 % ophthalmic solution Administer 1 Drop to both eyes two (2) times a day.  ketoconazole (NIZORAL) 2 % topical cream Apply  to affected area daily. Indications: TINEA CORPORIS    hydrocortisone (ALA-ONEIL) 1 % lotion Apply  to affected area two (2) times daily as needed (rash). use thin layer      No current facility-administered medications for this visit. Assessment:       ICD-10-CM ICD-9-CM    1. Hyperlipidemia, unspecified hyperlipidemia type E78.5 272.4 AMB POC EKG ROUTINE W/ 12 LEADS, INTER & REP      MRI CARDIAC [facility] ORTHOPEDIC SPECIALTY [facility] W WO CONT   2. SVT (supraventricular tachycardia) (HCC) I47.1 427.89 MRI CARDIAC [facility] W WO CONT   3. Type 2 diabetes mellitus without complication, unspecified long term insulin use status E11.9 250.00 MRI CARDIAC [facility] W WO CONT   4. Essential hypertension I10 401.9 MRI CARDIAC [facility] W WO CONT   5. S/P coronary artery stent placement Z95.5 V45.82 MRI CARDIAC [facility] W WO CONT   6. Debilitated R53.81 799.3 MRI CARDIAC [facility] W WO CONT        Orders Placed This Encounter    MRI CARDIAC [facility] ORTHOPEDIC SPECIALTY [place] [facility] W WO CONT     Standing Status:   Future     Standing Expiration Date:   9/24/2018     Order Specific Question:   Is Patient Allergic to Contrast Dye?      Answer:   Unknown     Order Specific Question: Reason for Exam     Answer:   assess viability of distal RCA territory     Order Specific Question:   Stat POC Creatinine as needed for Radiology Policy     Answer: Yes    AMB POC EKG ROUTINE W/ 12 LEADS, INTER & REP     Order Specific Question:   Reason for Exam:     Answer:   routine           Plan:     Patient presents for follow up. CAD: S/p PCI/MONICA to  RCA. Some FLORES. Inferior Q waves noted. Check cardiac MRI for viability. If significant viability in the distal RCA territory, he will f/u with Dr. King Mendez to consider reattempt  RCA. On Brilinta 90mg BID x 1 year,continue on ASA 81mg daily, BB and statin. Reports lipids not at goal with Dr. Azalia Melendez- call for results. If confirmed, increase lipitor. PCSK9 inhibitor would be a last resort. Debilitation:  Post CVA right sided weakness and recent abdominal surgery debilitation. Completing PT at home. Follow up in 6 months, sooner PRN with Dr. Patti Hickman if distal RCA viable.      Carmen Reyes MD

## 2017-08-24 NOTE — MR AVS SNAPSHOT
Visit Information Date & Time Provider Department Dept. Phone Encounter #  
 8/24/2017 10:30 AM Elizabeth El, [address] [place] [address] [facility] [phone] [id number] Upcoming Health Maintenance Date Due DTaP/Tdap/Td series (1 - Tdap) 1/15/1971 FOOT EXAM Q1 7/1/2014 Pneumococcal 65+ High/Highest Risk (1 of 2 - PCV13) 1/15/2015 EYE EXAM RETINAL OR DILATED Q1 6/24/2016 MICROALBUMIN Q1 7/8/2016 MEDICARE YEARLY EXAM 1/7/2017 GLAUCOMA SCREENING Q2Y 6/24/2017 INFLUENZA AGE 9 TO ADULT 8/1/2017 HEMOGLOBIN A1C Q6M 11/15/2017 LIPID PANEL Q1 5/15/2018 COLONOSCOPY 8/25/2020 Allergies as of 8/24/2017  Review Complete On: 8/24/2017 By: Elizabeth El MD  
  
 Severity Noted Reaction Type Reactions Bactrim [Sulfamethoxazole-trimethoprim]  03/01/2017   Side Effect Nausea and Vomiting Current Immunizations  Never Reviewed Name Date Influenza Vaccine 10/19/2014, 10/29/2013  5:37 PM  
  
 Not reviewed this visit You Were Diagnosed With   
  
 Codes Comments Hyperlipidemia, unspecified hyperlipidemia type    -  Primary ICD-10-CM: E78.5 ICD-9-CM: 272.4 SVT (supraventricular tachycardia) (HCC)     ICD-10-CM: I47.1 ICD-9-CM: 427.89 Type 2 diabetes mellitus without complication, unspecified long term insulin use status (HCC)     ICD-10-CM: E11.9 ICD-9-CM: 250.00 Essential hypertension     ICD-10-CM: I10 
ICD-9-CM: 401.9 S/P coronary artery stent placement     ICD-10-CM: Z95.5 ICD-9-CM: V45.82 Debilitated     ICD-10-CM: R53.81 ICD-9-CM: 799.3 Vitals BP Pulse Resp Height(growth percentile) Weight(growth percentile) SpO2  
 110/60 (BP 1 Location: Right arm, BP Patient Position: Sitting) 80 16 5' 8\" (1.727 m) 207 lb (93.9 kg) 99% BMI Smoking Status 31.47 kg/m2 Former Smoker Vitals History BMI and BSA Data  Body Mass Index Body Surface Area  
 31.47 kg/m 2 2.12 m 2  
  
  
 Preferred Pharmacy Pharmacy Name Phone [id number]- [place], [place] [phone] Your Updated Medication List  
  
   
This list is accurate as of: 8/24/17 11:18 AM.  Always use your most recent med list.  
  
  
  
  
 aspirin delayed-release 81 mg tablet Take 81 mg by mouth daily. atorvastatin 40 mg tablet Commonly known as:  LIPITOR  
TAKE 1 TABLET BY MOUTH AT BEDTIME B COMPLEX 1 tablet Generic drug:  b complex vitamins Take 1 Tab by mouth daily. colesevelam 625 mg tablet Commonly known as:  HIGH [facility] Take 2 Tabs by mouth two (2) times daily (with meals). cycloSPORINE 0.05 % ophthalmic emulsion Commonly known as:  RESTASIS Administer 1 Drop to both eyes two (2) times a day.  
  
 gabapentin 300 mg capsule Commonly known as:  NEURONTIN Take 1 Cap by mouth two (2) times a day. hydrocortisone 1 % lotion Commonly known as:  ALA-ONEIL Apply  to affected area two (2) times daily as needed (rash). use thin layer  
  
 ketoconazole 2 % topical cream  
Commonly known as:  NIZORAL Apply  to affected area daily. Indications: TINEA CORPORIS  
  
 lisinopril 20 mg tablet Commonly known as:  Velora Donte Take 20 mg by mouth daily. MACROBID 100 mg capsule Generic drug:  nitrofurantoin (macrocrystal-monohydrate) Take 100 mg by mouth daily. metFORMIN 500 mg tablet Commonly known as:  GLUCOPHAGE Take 500 mg by mouth daily (with breakfast). metoprolol tartrate 25 mg tablet Commonly known as:  LOPRESSOR Take 12.5 mg by mouth two (2) times a day. OTHER(NON-FORMULARY) Take 1 Cap by mouth daily. Maxitears Dry eye formula PROTONIX 40 mg tablet Generic drug:  pantoprazole Take 40 mg by mouth two (2) times a day. sodium chloride 5 % ophthalmic solution Commonly known as:  ENEIDA-5 Administer 1 Drop to both eyes three (3) times daily. ticagrelor 90 mg tablet Commonly known as:  [employer] Take 1 Tab by mouth every twelve (12) hours every twelve (12) hours. timolol 0.25 % ophthalmic solution Commonly known as:  Lori Ku Administer 1 Drop to both eyes two (2) times a day. TYLENOL EXTRA STRENGTH 500 mg tablet Generic drug:  acetaminophen Take 500 mg by mouth every four (4) hours as needed (mild pain). We Performed the Following AMB POC EKG ROUTINE W/ 12 LEADS, INTER & REP [03139 CPT(R)] Introducing [facility]! Dante Madan introduces [employer] patient portal. Now you can access parts of your medical record, email your doctor's office, and request medication refills online. 1. In your internet browser, go to [email or web address] 2. Click on the First Time User? Click Here link in the Sign In box. You will see the New Member Sign Up page. 3. Enter your [employer] Access Code exactly as it appears below. You will not need to use this code after youve completed the sign-up process. If you do not sign up before the expiration date, you must request a new code. · [employer] Access Code: [email or web address] Expires: 9/28/2017  9:27 AM 
 
4. Enter the last four digits of your Social Security Number (xxxx) and Date of Birth (mm/dd/yyyy) as indicated and click Submit. You will be taken to the next sign-up page. 5. Create a [employer] ID. This will be your [employer] login ID and cannot be changed, so think of one that is secure and easy to remember. 6. Create a [employer] password. You can change your password at any time. 7. Enter your Password Reset Question and Answer. This can be used at a later time if you forget your password. 8. Enter your e-mail address. You will receive e-mail notification when new information is available in [address] 9. Click Sign Up. You can now view and download portions of your medical record.  
10. Click the Download Summary menu link to download a portable copy of your medical information. If you have questions, please visit the Frequently Asked Questions section of the [employer] website. Remember, [employer] is NOT to be used for urgent needs. For medical emergencies, dial 911. Now available from your iPhone and Android! Please provide this summary of care documentation to your next provider. Your primary care clinician is listed as Alayne January. If you have any questions after today's visit, please call [phone].

## 2017-09-06 NOTE — TELEPHONE ENCOUNTER
Please advise cardiac MRI shows that entire heart muscle looks healthy, no scar tissue. If any symptoms of chest discomfort or shortness of breath, he may benefit from a second attempt at fixing the right coronary artery. I recommend follow-up with Dr. Milton Frye to discuss as he does work on chronic total occlusions.

## 2017-09-06 NOTE — TELEPHONE ENCOUNTER
Sister Wallace Jaramillo calling on [facility]  [phone] requesting results of recent MRI please advise thanks

## 2017-09-07 NOTE — TELEPHONE ENCOUNTER
Spoke with José Antonio Galdamez)  Verified patient with 2 patient identifier  Informed per Dr Haynes Child cardiac MRI shows that entire heart muscle looks healthy, no scar tissue. If any symptoms of chest discomfort or shortness of breath, he may benefit from a second attempt at fixing the right coronary artery. I recommend follow-up with Dr. Brian Siddiqi to discuss as he does work on chronic total occlusions. Jett Lew verbalized understanding, will schedule appointment with Brian Siddiqi says patient  has had some SOB.

## 2017-10-06 NOTE — TELEPHONE ENCOUNTER
Please call Oseas Hobbs, on HIPAA, she is asking if today's appointment must be rescheduled or if the other procedure Dr. Karina Howe mentioned doing can be scheduled once patient has been d/c'd from [facility]. Thank you!

## 2017-10-06 NOTE — TELEPHONE ENCOUNTER
Spoke to patient's sister, Brayan Santoro on HIPAA using 2 patient identifiers. Pt's sister would like to know if pt needs to proceed for  surgery or continue with [facility] medicine and if it would be ok for urology to proceed with stents for kidney. She stated that all these things [facility] which got cancelled by our office. Please advise.

## 2017-10-09 NOTE — TELEPHONE ENCOUNTER
[facility] on HIPAA using 2 patient identifiers. Per Joel Wallis NP, discussed with Ms. Vivian Brower the following:  He must stay on [address] for 1 year until Aug 2018 and should see Dr. Mammie Felty to further discuss  procedure. She agreed. Per Dr. Fede Razo, he may have his renal stents if they can do it on [facility]. Ms. Vivian Brower verbalized understanding of all things discussed. Will have  call for an appt.

## 2017-10-09 NOTE — PROGRESS NOTES
10/9/2017 - AFD    Have attempted contact with [facility] No information available. Last OV with Dr. aCmilo Clements was 6/3/16. Message left on home phone requesting return call.

## 2017-10-21 NOTE — PROGRESS NOTES
TRANSFER - IN REPORT:    Verbal report received from [place], RN(name) on Rolf Clark  being received from ED (unit) for routine progression of care      Report consisted of patients Situation, Background, Assessment and   Recommendations(SBAR). Information from the following report(s) SBAR was reviewed with the receiving nurse. Opportunity for questions and clarification was provided. Assessment completed upon patients arrival to unit and care assumed.

## 2017-10-21 NOTE — H&P
Hospitalist Admission Note    NAME: Nick De   :  1950   MRN:  [id number]     Date/Time:  10/21/2017 4:18 PM    Patient PCP: Angela Levy MD  ________________________________________________________________________    Given the patient's current clinical presentation, I have a high level of concern for decompensation if discharged from the ED. Complex decision making was performed which includes reviewing the patient's available past medical records, laboratory results, and Xray films. I have also directly communicated my plan and discussed this case with the involved ED physician. My assessment of this patient's clinical condition and my plan of care is as follows:    Assessment / Plan:  Acute blood loss anemia, possible all urological source  Hematuria, acute and chronic  -urology to see and evaluate  -no UA sent therefore cannot assess if the hematuria is due to UTI or pathologic changes. Have asked RN to do straight cath if unable to urinate on own.  -will order stat renal US and bladder scan to ensure bladder has emptied  -have ordered stat UA and will send culture. In past has shown infection mixed picture    Acute renal failure  -hold acei  -hold metformin  -have to consider that this could be due to clots in the bladder.  Pending is bladder scan and US renal to ensure no hydro that would require urology to see this evening emergently for stent placement  -if needed, diallo could be placed but would prefer Urology be notified to discuss fi they wish 3-way and bladder washing to occur    Hypomagnesemia  -replace aggressively to >2 IV  -repeat value to include potassium and phos in AM    Elevated troponin  Elevated heart rate  -per cardiology - this is not a new problem  -ED has made aware of the bumped troponin  -cycle enzymes  -repeat ECG should trop continue to rise  -suspect there is an element of renal failure driving the process but will follow  -to be admitted to tele      I have personally reviewed the radiographs, laboratory data in Epic and decisions and statements above are based partially on this personal interpretation. Code Status: Full Code  DVT Prophylaxis: SCD's  GI Prophylaxis: PPI          Subjective:   CHIEF COMPLAINT: \"i could feel my heart racing\"    HISTORY OF PRESENT ILLNESS:     Salvatore Kurtz is a 79 y.o.  male with known history as listed below presents to ED with complaint noted above. Available records were reviewed at the time of H&P. Patient notes recent dc from [facility] 2 wks ago and was there for hematuria. He has prolonged history with [facility] s/p multiple stents last in 4/2017 and comment as to a \"? Narrow ureter\". He had what sounds like cystoscopy - no source of bleed and no intervention per patient report. Sent to [facility] for further work up and evaluation. There he had initial remittance of bleeding but it returned 1wk ago. No BRB in urine, only dark clots. He notes it has been consistent. No LH/Dizzy with standing. Was able to participate in therapy. He notes no f/c/n/v/d. No change to appetite, no change to bowels. No hematuria/hemoptysis. Today he contacted staff for sensation of palpitations. 's by EMS. With vagal maneuver, no drugs, patient converted back to sinus rhythm. Has remained in this rhythm at ED at [facility]. He ntoes hx of sinus tachycardia followed by Dr Piper Lew. In ED work up showed anemia. Guaiac neg. UA has not been done yet. No US renal or bladder scan. I have requested these. Given the anemia, ARF we were asked to admit for work up and evaluation of the above problems.      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    small cecal polyp    DM (diabetes mellitus) ([facility] [address] 12/14/2009    Hypercholesterolemia     Hyperlipidemia 12/14/2009    Hypertension 12/14/2009    Nephrolithiasis 12/14/2009    Nephrolithiasis 12/14/2009    Perforated duodenal bulb ulcer ([facility] [address] 4/18/2017    Recurrent UTI 12/14/2009    S/P coronary artery stent placement 7/7/2017 7/6/17 PCI/MONICA  prox RCA    Stroke [facility])     SVT (supraventricular tachycardia) ([facility] [address] 4/19/2017    Unstable angina ([facility] [address] 7/6/2017      Past Surgical History:   Procedure Laterality Date    COLORECTAL SCRN; HI RISK IND  1/15/2014         ENDOSCOPY, COLON, DIAGNOSTIC  9/2/2008    small cecal tubular adenoma removed    HX HEENT      tonsillectomy    HX UROLOGICAL      for kidney stones    HX UROLOGICAL      urinary stents placed 05/2017     Social History   Substance Use Topics    Smoking status: Former Smoker    Smokeless tobacco: Not on file    Alcohol use No      Family History   Problem Relation Age of Onset    Hypertension Mother     Elevated Lipids Mother     Heart Disease Mother     Diabetes Father     Hypertension Father     Elevated Lipids Sister     Hypertension Sister     Heart Disease Brother     Stroke Maternal Grandfather     Stroke Paternal Grandfather        Allergies   Allergen Reactions    Bactrim [Sulfamethoxazole-Trimethoprim] Nausea and Vomiting        Prior to Admission medications    Medication Sig Start Date End Date Taking? Authorizing Provider   gabapentin (NEURONTIN) 300 mg capsule Take 1 Cap by mouth two (2) times a day. 7/7/17   Bhavana Mercado NP   ticagrelor (BRILINTA) 90 mg tablet Take 1 Tab by mouth every twelve (12) hours every twelve (12) hours. 7/7/17   Bhavana Mercado NP   nitrofurantoin, macrocrystal-monohydrate, (MACROBID) 100 mg capsule Take 100 mg by mouth daily. Historical Provider   pantoprazole (PROTONIX) 40 mg tablet Take 40 mg by mouth two (2) times a day. Historical Provider   cycloSPORINE (RESTASIS) 0.05 % ophthalmic emulsion Administer 1 Drop to both eyes two (2) times a day. Historical Provider   acetaminophen (TYLENOL EXTRA STRENGTH) 500 mg tablet Take 500 mg by mouth every four (4) hours as needed (mild pain).     Historical Provider   b complex vitamins (B COMPLEX 1) tablet Take 1 Tab by mouth daily. Historical Provider   sodium chloride (ENEIDA-5) 5 % ophthalmic solution Administer 1 Drop to both eyes three (3) times daily. Historical Provider   metFORMIN (GLUCOPHAGE) 500 mg tablet Take 500 mg by mouth daily (with breakfast). Historical Provider   lisinopril (PRINIVIL, ZESTRIL) 20 mg tablet Take 20 mg by mouth daily. Historical Provider   metoprolol tartrate (LOPRESSOR) 25 mg tablet Take 12.5 mg by mouth two (2) times a day. Historical Provider   OTHER,NON-FORMULARY, Take 1 Cap by mouth daily. Maxitears Dry eye formula    Historical Provider   atorvastatin (LIPITOR) 40 mg tablet TAKE 1 TABLET BY MOUTH AT BEDTIME 7/11/16   Henrry Hickman MD   [facility]) 625 mg tablet Take 2 Tabs by mouth two (2) times daily (with meals). 6/13/16   Janay Lo MD   aspirin delayed-release 81 mg tablet Take 81 mg by mouth daily. Historical Provider   timolol (BETIMOL) 0.25 % ophthalmic solution Administer 1 Drop to both eyes two (2) times a day. Historical Provider   ketoconazole (NIZORAL) 2 % topical cream Apply  to affected area daily. Indications: TINEA CORPORIS    Historical Provider   hydrocortisone (ALA-ONEIL) 1 % lotion Apply  to affected area two (2) times daily as needed (rash).  use thin layer     Historical Provider     REVIEW OF SYSTEMS:  See HPI for details  General: negative for fever, chills, sweats, weakness, weight loss  Eyes: negative for blurred vision, eye pain, loss of vision, diplopia  Ear Nose and Throat: negative for rhinorrhea, pharyngitis, otalgia, tinnitus, speech or swallowing difficulties  Respiratory:  negative for pleuritic pain, cough, sputum production, wheezing, SOB, FLORES  Cardiology:  negative for chest pain, +palpitations now gone, NO orthopnea, PND, edema, syncope   Gastrointestinal: negative for abdominal pain, N/V, dysphagia, change in bowel habits, bleeding  Genitourinary: negative for frequency, urgency, dysuria, +hematuria, NO incontinence  Muskuloskeletal : negative for arthralgia, myalgia  Hematology: negative for easy bruising, bleeding, lymphadenopathy  Dermatological: negative for rash, ulceration, mole change, new lesion  Endocrine: negative for hot flashes or polydipsia  Neurological: negative for headache, dizziness, confusion, focal weakness, paresthesia, memory loss, gait disturbance  Psychological: negative for anxiety, depression, agitation    Objective:   VITALS:    Visit Vitals    /56    Pulse 86    Temp 97.9 °F (36.6 °C)    Ht 5' 9\" (1.753 m)    Wt 94.3 kg (208 lb)    SpO2 100%    BMI 30.72 kg/m2     PHYSICAL EXAM:     GENERAL:    WD y   WN y   Cachectic    Thin    Obese y   Disheveled    Ill Appearing Critically    Ill Appearing Chronically y   Acute Distress n   Other      HEENT:    NC/AT/EOMI y   PERRLA y   Conjunctivae Pink    Conjunctivae Pale y   Moist Mucosa    Dry Mucosa y   Hearing intact to voice y   Other      NECK:    Supple y   Masses n   Thyroid Tender n   Other                   RESPIRATORY:    CTA bilaterally WITHOUT wheezing/rhonchi/rales or crackles y   Wheezing    Rhonchi    Crackles    Use of accessory muscles n   Other      CARDIAC:    regular rate and rhythm No murmurs/rubs/gallops    Murmur 1/6   Rubs n   Gallops n   Rate Regular/Irregular reg   Carotid Bruit Left/Right n   Lower Extremity Edema n   JVP  n   Other Normal capillary refill     ABDOMEN:    Soft non distended non tender +bowel sounds no HSM y   Rigid    Tenderness    Hepatomegaly    Splenomegaly    Distended    Increased girth due to habitus y   Normal/Hyper/Hypo Active Bowel Sounds nml   Other      SKIN / MUSCULOSKELETAL:    Rashes n   Ecchymosis n   Ulcers    Tight to palpitation    Turgor Good/Poor g   Cyanosis/Clubbing n   Amputation(s) n   Other      NEUROLOGY:    cranial nerves II-XII grossly intact y   Cranial Nerve Deficit    Facial Droop n   Slurred Speech n   Aphasia    Strength Normal    Weakness Hemiplegic on right. Brace on right leg   Meningismus/Kernig's Sign/ Brudzinsky n   Follows Commands y   Other      PSYCHIATRIC:    AAOx3 in no acute distress y   Insight Poor    Insight Good y   Alert and Oriented to Person     Alert and Oriented to Place    Alert and Oriented toTime    Depressed    Anxious n   Agitated n   Lethargic n   Stuporous n   Sedated    Other    _______________________________________________________________________  Care Plan discussed with:    Comments   Patient x Discussed with patient in room. POC outlined and Questions answered (22   Family      RN x    Care Manager                    Consultant:  wendy COREA MD 5   _______________________________________________________________________  Recommended Disposition:   Home with Family    HH/PT/OT/RN    SNF/LTC y   [de-identified]    ________________________________________________________________________  TOTAL TIME:  48 Minutes    Critical Care Provided     Minutes non procedure based      Comments   >50% of visit spent in counseling and coordination of care x Chart review  Discussion with patient and/or family and questions answered     ________________________________________________________________________  Signed: Zhane Goodman MD    This note will not be viewable in [address] Procedures: see electronic medical records for all procedures/Xrays and details which were not copied into this note but were reviewed prior to creation of Plan.     LAB DATA REVIEWED:    Recent Results (from the past 24 hour(s))   EKG, 12 LEAD, INITIAL    Collection Time: 10/21/17 11:59 AM   Result Value Ref Range    Ventricular Rate 105 BPM    Atrial Rate 105 BPM    P-R Interval 180 ms    QRS Duration 74 ms    Q-T Interval 330 ms    QTC Calculation (Bezet) 436 ms    Calculated P Axis 30 degrees    Calculated R Axis -27 degrees    Calculated T Axis 15 degrees    Diagnosis       Sinus tachycardia  Minimal voltage criteria for LVH, may be normal variant  Cannot rule out Anterior infarct (cited on or before 17-APR-2017)  When compared with ECG of 06-JUL-2017 14:33,  Borderline criteria for Inferior infarct are no longer present     CBC WITH AUTOMATED DIFF    Collection Time: 10/21/17 12:55 PM   Result Value Ref Range    WBC 10.4 4.1 - 11.1 K/uL    RBC 2.93 (L) 4.10 - 5.70 M/uL    HGB 7.9 (L) 12.1 - 17.0 g/dL    HCT 25.2 (L) 36.6 - 50.3 %    MCV 86.0 80.0 - 99.0 FL    MCH 27.0 26.0 - 34.0 PG    MCHC 31.3 30.0 - 36.5 g/dL    RDW 14.6 (H) 11.5 - 14.5 %    PLATELET 768 503 - 523 K/uL    NEUTROPHILS 79 (H) 32 - 75 %    LYMPHOCYTES 11 (L) 12 - 49 %    MONOCYTES 9 5 - 13 %    EOSINOPHILS 1 0 - 7 %    BASOPHILS 0 0 - 1 %    ABS. NEUTROPHILS 8.3 (H) 1.8 - 8.0 K/UL    ABS. LYMPHOCYTES 1.1 0.8 - 3.5 K/UL    ABS. MONOCYTES 0.9 0.0 - 1.0 K/UL    ABS. EOSINOPHILS 0.1 0.0 - 0.4 K/UL    ABS. BASOPHILS 0.0 0.0 - 0.1 K/UL   TROPONIN I    Collection Time: 10/21/17 12:55 PM   Result Value Ref Range    Troponin-I, Qt. 1.12 (H) <4.51 ng/mL   METABOLIC PANEL, COMPREHENSIVE    Collection Time: 10/21/17 12:55 PM   Result Value Ref Range    Sodium 135 (L) 136 - 145 mmol/L    Potassium 4.5 3.5 - 5.1 mmol/L    Chloride 104 97 - 108 mmol/L    CO2 23 21 - 32 mmol/L    Anion gap 8 5 - 15 mmol/L    Glucose 115 (H) 65 - 100 mg/dL    BUN 21 (H) 6 - 20 MG/DL    Creatinine 1.83 (H) 0.70 - 1.30 MG/DL    BUN/Creatinine ratio 11 (L) 12 - 20      GFR est AA 45 (L) >60 ml/min/1.73m2    GFR est non-AA 37 (L) >60 ml/min/1.73m2    Calcium 8.3 (L) 8.5 - 10.1 MG/DL    Bilirubin, total 0.5 0.2 - 1.0 MG/DL    ALT (SGPT) 16 12 - 78 U/L    AST (SGOT) 13 (L) 15 - 37 U/L    Alk.  phosphatase 71 45 - 117 U/L    Protein, total 8.5 (H) 6.4 - 8.2 g/dL    Albumin 2.6 (L) 3.5 - 5.0 g/dL    Globulin 5.9 (H) 2.0 - 4.0 g/dL    A-G Ratio 0.4 (L) 1.1 - 2.2     MAGNESIUM    Collection Time: 10/21/17 12:55 PM   Result Value Ref Range    Magnesium 1.3 (L) 1.6 - 2.4 mg/dL   CK W/ CKMB & INDEX    Collection Time: 10/21/17 12:55 PM   Result Value Ref Range    CK 89 39 - 308 U/L    CK - MB 6.0 (H) <3.6 NG/ML    CK-MB Index 6.7 (H) 0 - 2.5

## 2017-10-21 NOTE — ED PROVIDER NOTES
[address]  EMERGENCY DEPARTMENT HISTORY AND PHYSICAL EXAM       Date of Service: 10/21/2017   Patient Name: Shiloh Shetty   YOB: 1950  Medical Record Number: [id number]    History of Presenting Illness     Chief Complaint   Patient presents with    Other     Converted SVT        History Provided By:  patient and EMS    Additional History:   Shiloh Shetty is a 79 y.o. male with PMhx significant for SVT, HLD, DM, and HTN who presents via EMS to the ED with cc of converted SVT x PTA. Pt's vitals were taken by a home health staff member where he was found to be tachycardic in the 180s. EMS did vagal maneuver, and pt converted back to a normal rhythm without medication therapy. On arrival to ED, pt states that he feels fine. Pt notes that he has episodes of SVT about every 6 weeks. Pt does have regular cardiology follow-up and reports compliance with medications. Pt specifically denies chest pain before or after the episode. Pt denies SOB, nausea, vomiting, fever, chills, or urinary symptoms. Social Hx: (-) tobacco (former), (-) EtOH,  (-) Illicit Drugs    There are no other complaints, changes or physical findings at this time.     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    small cecal polyp    DM (diabetes mellitus) ([address] 12/14/2009    Hypercholesterolemia     Hyperlipidemia 12/14/2009    Hypertension 12/14/2009    Nephrolithiasis 12/14/2009    Nephrolithiasis 12/14/2009    Perforated duodenal bulb ulcer ([address] 4/18/2017    Recurrent UTI 12/14/2009    S/P coronary artery stent placement 7/7/2017 7/6/17 PCI/MONICA  prox RCA    Stroke ([address]     SVT (supraventricular tachycardia) ([address] 4/19/2017    Unstable angina ([address] 7/6/2017        Past Surgical History:   Past Surgical History: Procedure Laterality Date    COLORECTAL SCRN; HI RISK IND  1/15/2014         ENDOSCOPY, COLON, DIAGNOSTIC  9/2/2008    small cecal tubular adenoma removed    HX HEENT      tonsillectomy    HX UROLOGICAL      for kidney stones    HX UROLOGICAL      urinary stents placed 05/2017        Family History:   Family History   Problem Relation Age of Onset    Hypertension Mother     Elevated Lipids Mother     Heart Disease Mother     Diabetes Father     Hypertension Father     Elevated Lipids Sister     Hypertension Sister     Heart Disease Brother     Stroke Maternal Grandfather     Stroke Paternal Grandfather         Social History:   Social History   Substance Use Topics    Smoking status: Former Smoker    Smokeless tobacco: Not on file    Alcohol use No        Allergies: Allergies   Allergen Reactions    Bactrim [Sulfamethoxazole-Trimethoprim] Nausea and Vomiting         Review of Systems   Review of Systems   Constitutional: Negative for fatigue and fever. HENT: Positive for drooling. Eyes: Negative. Respiratory: Negative for shortness of breath and wheezing. Cardiovascular: Negative for chest pain and leg swelling. Gastrointestinal: Negative for blood in stool, constipation, diarrhea, nausea and vomiting. Endocrine: Negative. Genitourinary: Negative for difficulty urinating and dysuria. Musculoskeletal: Negative. Skin: Negative for rash. Allergic/Immunologic: Negative. Neurological: Negative for weakness and numbness. Hematological: Negative. Psychiatric/Behavioral: Negative. Physical Exam  Physical Exam   Constitutional: He is oriented to person, place, and time. He appears well-developed and well-nourished. No distress. HENT:   Head: Normocephalic and atraumatic. Mouth/Throat: Oropharynx is clear and moist.   Eyes: Conjunctivae and EOM are normal.   Neck: Neck supple. No JVD present. No tracheal deviation present.    Cardiovascular: Regular rhythm and intact distal pulses. Tachycardia present. Exam reveals no gallop and no friction rub. No murmur heard. Pulmonary/Chest: Effort normal and breath sounds normal. No stridor. No respiratory distress. He has no wheezes. Abdominal: Soft. Bowel sounds are normal. He exhibits no distension and no mass. There is no tenderness. There is no guarding. Musculoskeletal: Normal range of motion. He exhibits no edema or tenderness. Brace on RLE  No deformity   Neurological: He is alert and oriented to person, place, and time. He has normal strength. No focal deficits   Skin: Skin is warm, dry and intact. No rash noted. Psychiatric: He has a normal mood and affect. His behavior is normal. Judgment and thought content normal.   Nursing note and vitals reviewed. Medical Decision Making   I am the first provider for this patient. I reviewed the vital signs, available nursing notes, past medical history, past surgical history, family history and social history. Old Medical Records: Old medical records. Previous electrocardiograms. Nursing notes. Ambulance run sheet. Provider Notes:   Pt with a hx of SVT, now presenting with a run of SVT that resolved after vagal maneuvers. Pt now asymptomatic, denies any complaints. Will check labs, cardiac enzymes, ekg, CXR to eval for dehydration, electrolyte abnormality, acs, pulmonary edema. ED Course:  2:20 PM   Initial assessment performed. The patients presenting problems have been discussed, and they are in agreement with the care plan formulated and outlined with them. I have encouraged them to ask questions as they arise throughout their visit. Progress Notes:   2:28 PM  Pt continues to deny chest pain. Pt does report having black stools and blood in stools recently, and has a doctor's appointment scheduled on 10/24 for evaluation. 2:34 PM  Spoke with lab regarding Hemoccult testing.  Lab states that they cannot run the test because there is not enough stool on the card. CONSULT NOTE:   2:40 PM  Kim Ford DO spoke with Marla Fontenot MD  Specialty: Cardiology  Discussed pt's hx, disposition, and available diagnostic and imaging results. Reviewed care plans. Consultant agrees with plans as outlined. Recommends admission to hospitalist services with cardiology consult inpatient. Written by Monica Robles, ED Scribe, as dictated by Kim Ford DO.    HOSPITALIST CONSULT NOTE:   3:06 PM  Kim Ford DO spoke with Neeta Pagan MD,   Specialty: Hospitalist  Discussed pt's hx, disposition, and available diagnostic and imaging results. Reviewed care plans. Consultant will evaluate pt for admission. Written by Monica Robles ED Scribe, as dictated by Kim Ford DO. Procedures:   Procedure Note - Rectal Exam:   2:20 PM  Performed by: Kim Ford DO  Chaperoned by: Simi Smith MD  Rectal exam performed. Clear mucous-like stool was collected. Stool was collected and sent to the lab for Hemoccult testing. Other findings: No masses   The procedure took 1-15 minutes, and pt tolerated well.     Diagnostic Study Results     Labs -      Recent Results (from the past 12 hour(s))   EKG, 12 LEAD, INITIAL    Collection Time: 10/21/17 11:59 AM   Result Value Ref Range    Ventricular Rate 105 BPM    Atrial Rate 105 BPM    P-R Interval 180 ms    QRS Duration 74 ms    Q-T Interval 330 ms    QTC Calculation (Bezet) 436 ms    Calculated P Axis 30 degrees    Calculated R Axis -27 degrees    Calculated T Axis 15 degrees    Diagnosis       Sinus tachycardia  Minimal voltage criteria for LVH, may be normal variant  Cannot rule out Anterior infarct (cited on or before 17-APR-2017)  When compared with ECG of 06-JUL-2017 14:33,  Borderline criteria for Inferior infarct are no longer present     CBC WITH AUTOMATED DIFF    Collection Time: 10/21/17 12:55 PM   Result Value Ref Range    WBC 10.4 4.1 - 11.1 K/uL    RBC 2.93 (L) 4.10 - 5.70 M/uL    HGB 7.9 (L) 12.1 - 17.0 g/dL    HCT 25.2 (L) 36.6 - 50.3 %    MCV 86.0 80.0 - 99.0 FL    MCH 27.0 26.0 - 34.0 PG    MCHC 31.3 30.0 - 36.5 g/dL    RDW 14.6 (H) 11.5 - 14.5 %    PLATELET 580 930 - 959 K/uL    NEUTROPHILS 79 (H) 32 - 75 %    LYMPHOCYTES 11 (L) 12 - 49 %    MONOCYTES 9 5 - 13 %    EOSINOPHILS 1 0 - 7 %    BASOPHILS 0 0 - 1 %    ABS. NEUTROPHILS 8.3 (H) 1.8 - 8.0 K/UL    ABS. LYMPHOCYTES 1.1 0.8 - 3.5 K/UL    ABS. MONOCYTES 0.9 0.0 - 1.0 K/UL    ABS. EOSINOPHILS 0.1 0.0 - 0.4 K/UL    ABS. BASOPHILS 0.0 0.0 - 0.1 K/UL   TROPONIN I    Collection Time: 10/21/17 12:55 PM   Result Value Ref Range    Troponin-I, Qt. 1.12 (H) <5.43 ng/mL   METABOLIC PANEL, COMPREHENSIVE    Collection Time: 10/21/17 12:55 PM   Result Value Ref Range    Sodium 135 (L) 136 - 145 mmol/L    Potassium 4.5 3.5 - 5.1 mmol/L    Chloride 104 97 - 108 mmol/L    CO2 23 21 - 32 mmol/L    Anion gap 8 5 - 15 mmol/L    Glucose 115 (H) 65 - 100 mg/dL    BUN 21 (H) 6 - 20 MG/DL    Creatinine 1.83 (H) 0.70 - 1.30 MG/DL    BUN/Creatinine ratio 11 (L) 12 - 20      GFR est AA 45 (L) >60 ml/min/1.73m2    GFR est non-AA 37 (L) >60 ml/min/1.73m2    Calcium 8.3 (L) 8.5 - 10.1 MG/DL    Bilirubin, total 0.5 0.2 - 1.0 MG/DL    ALT (SGPT) 16 12 - 78 U/L    AST (SGOT) 13 (L) 15 - 37 U/L    Alk.  phosphatase 71 45 - 117 U/L    Protein, total 8.5 (H) 6.4 - 8.2 g/dL    Albumin 2.6 (L) 3.5 - 5.0 g/dL    Globulin 5.9 (H) 2.0 - 4.0 g/dL    A-G Ratio 0.4 (L) 1.1 - 2.2     MAGNESIUM    Collection Time: 10/21/17 12:55 PM   Result Value Ref Range    Magnesium 1.3 (L) 1.6 - 2.4 mg/dL   CK W/ CKMB & INDEX    Collection Time: 10/21/17 12:55 PM   Result Value Ref Range    CK 89 39 - 308 U/L    CK - MB 6.0 (H) <3.6 NG/ML    CK-MB Index 6.7 (H) 0 - 2.5         Radiologic Studies -  The following have been ordered and reviewed:  XR CHEST PORT   Final Result      US RETROPERITONEUM LTD    (Results Pending)     CT Results  (Last 48 hours)    None CXR Results  (Last 48 hours)    None          Vital Signs-Reviewed the patient's vital signs. Patient Vitals for the past 12 hrs:   Temp Pulse BP SpO2   10/21/17 1545 - 86 109/56 100 %   10/21/17 1515 - 84 108/62 99 %   10/21/17 1500 - 85 113/56 99 %   10/21/17 1445 - 88 116/63 98 %   10/21/17 1430 - 89 114/59 100 %   10/21/17 1415 - 88 113/59 100 %   10/21/17 1400 - 96 120/60 98 %   10/21/17 1345 - 94 110/57 100 %   10/21/17 1330 - 94 110/57 100 %   10/21/17 1315 - 96 110/61 100 %   10/21/17 1230 - (!) 102 111/61 100 %   10/21/17 1215 - (!) 102 101/61 100 %   10/21/17 1205 97.9 °F (36.6 °C) (!) 104 105/65 100 %   10/21/17 1200 - - 105/65 99 %   10/21/17 1159 - - 111/61 -       Medications Given in the ED:  Medications   magnesium sulfate 2 g/50 ml IVPB (premix or compounded) (0 g IntraVENous IV Completed 10/21/17 1631)       Pulse Oximetry Analysis - Normal 100% on room air     EKG interpretation: (Preliminary) 11:59 AM  Rhythm: sinus tachycardia; and regular . Rate (approx.): 105; Axis: left axis deviation; OK interval: normal; QRS interval: normal; ST/T wave: no ST wave changes  Written by Bharat Diego ED Scribe, as dictated by Klarissa Martino DO    Diagnosis   Clinical Impression:   1. NSTEMI (non-ST elevated myocardial infarction) ([address]    2. SVT (supraventricular tachycardia) ([address]    3. Anemia, unspecified type        Plan:  1: Admit to hospitalist.  Disposition Note:    Admission Note:  2:47 PM  Patient is being admitted to the hospital by Dr. Melinda Estrada. The results of their tests and reasons for their admission have been discussed with them and/or available family. They convey agreement and understanding for the need to be admitted and for their admission diagnosis. Written by ANMOL Nolandibe, as dictated by Klarissa Martino DO.  _______________________________   Attestations:      This note is prepared by Bharat Diego, acting as Scribe for Klarissa Martino .    Susan Meade DO: The scribe's documentation has been prepared under my direction and personally reviewed by me in its entirety.  I confirm that the note above accurately reflects all work, treatment, procedures, and medical decision making performed by me.   _______________________________

## 2017-10-21 NOTE — ED NOTES
Pt arrives via EMS from SNF after having SVT. Per EMS, pt was having SVT on the monitor and as soon as they were going to medicate the pt, the pt converted back to NSR on his own. Pt reports having these episodes in the past. Pt reports not seeing a cardiologist regularly.

## 2017-10-21 NOTE — ED NOTES
Pt is lying quietly on the stretcher in no apparent distress. Pt is alert and oriented x 4. Respirations are even and unlabored. No needs are expressed at this time.

## 2017-10-21 NOTE — PROGRESS NOTES
Hospitalist  Called Urology Dr Abdiel Rizvi given the moderate to severe hydronephrosis and he advised a stat noncontrast CT abdomen to further identify the level of the hydronephrosis. Will further see if bladder full +/- clot to determine if diallo needed or not. Based on these results will determine to OR or not. Verónica Dhillon MD    ADDENDUM  -discussed results with urology - will hold on diallo for now - patient is urinating post fluid challenge. If unable to void overnight then diallo will be needed.  Will likely require OR tomorrow for stenting.  -re HGB - may need blood transfusion wiley Dhillon MD

## 2017-10-21 NOTE — ED NOTES
RN was notified by the lab that they could not process the stool sample. Dr Ophelia Guerrero was notified and RN was asked if RN could talk to the lab and see if they could run the test. RN went back to the lab and was told that the sample could not be processed because there was no stool sample on the card. Dr. Ophelia Guerrero notified.

## 2017-10-22 PROBLEM — I21.4 NSTEMI (NON-ST ELEVATED MYOCARDIAL INFARCTION) (HCC): Status: ACTIVE | Noted: 2017-01-01

## 2017-10-22 NOTE — PROGRESS NOTES
Hospitalist Progress Note    NAME: Emma Mon   :  1950   MRN:  [id number]       Interim Hospital Summary: 79 y.o. male whom presented on 10/21/2017 with      Assessment / Plan:        Acute blood loss anemia, possible all urological source  Hematuria, acute and chronic  -urology to see and evaluate  transfuse one unit      Acute renal failure cr down to  1.43 from 1.83  -hold acei  -hold metformin  Urology following    per notes discussed results with urology - will hold on diallo for now - patient is urinating post fluid challenge. If unable to void overnight then diallo will be needed. Will likely require OR tomorrow for stenting. Hypomagnesemia  -replace aggressively to >2 IV  M3.3 k 4.4     Elevated troponin   Elevated heart rate  -per cardiology - this is not a new problem   --cycle enzymes trending down      Code Status: Full Code  DVT Prophylaxis: SCD's  GI Prophylaxis: PPI           Subjective:     Chief Complaint / Reason for Physician Visit still passing blodo per urine, no pain. Discussed with RN events overnight. Review of Systems:  Symptom Y/N Comments  Symptom Y/N Comments   Fever/Chills    Chest Pain     Poor Appetite    Edema     Cough    Abdominal Pain     Sputum    Joint Pain     SOB/FLORES    Pruritis/Rash     Nausea/vomit    Tolerating PT/OT     Diarrhea    Tolerating Diet     Constipation    Other       Could NOT obtain due to:      Objective:     VITALS:   Last 24hrs VS reviewed since prior progress note.  Most recent are:  Patient Vitals for the past 24 hrs:   Temp Pulse Resp BP SpO2   10/22/17 0041 97.7 °F (36.5 °C) 79 18 118/52 100 %   10/21/17 2111 97.5 °F (36.4 °C) 82 18 129/60 100 %   10/21/17 1839 98.2 °F (36.8 °C) 86 18 140/62 100 %   10/21/17 1545 - 86 - 109/56 100 %   10/21/17 1515 - 84 - 108/62 99 %   10/21/17 1500 - 85 - 113/56 99 %   10/21/17 1445 - 88 - 116/63 98 %   10/21/17 1430 - 89 - 114/59 100 %   10/21/17 1415 - 88 - 113/59 100 %   10/21/17 1400 - 96 - 120/60 98 %   10/21/17 1345 - 94 - 110/57 100 %   10/21/17 1330 - 94 - 110/57 100 %   10/21/17 1315 - 96 - 110/61 100 %   10/21/17 1230 - (!) 102 - 111/61 100 %   10/21/17 1215 - (!) 102 - 101/61 100 %   10/21/17 1205 97.9 °F (36.6 °C) (!) 104 - 105/65 100 %   10/21/17 1200 - - - 105/65 99 %   10/21/17 1159 - - - 111/61 -       Intake/Output Summary (Last 24 hours) at 10/22/17 4411  Last data filed at 10/22/17 0403   Gross per 24 hour   Intake           978.75 ml   Output                0 ml   Net           978.75 ml        PHYSICAL EXAM:  Constitutional: He is oriented to person, place, and time. He appears well-developed and well-nourished. No distress. Bit pale  HENT:   Head: Normocephalic and atraumatic. Neck: Neck supple. No JVD present. No tracheal deviation present. Cardiovascular: Regular rhythm and intact distal pulses. .  Exam reveals no gallop and no friction rub. No murmur heard. Pulmonary/Chest: Effort normal and breath sounds normal.   Abdominal: Soft. Bowel sounds are normal. He exhibits no distension and no mass. There is no tenderness. There is no guarding. Musculoskeletal: Normal range of motion. He exhibits no edema or tenderness. Brace on RLE No deformity   Neurological: He is alert and oriented to person, place, and time. He has normal strength. No focal deficits   Skin: Skin is warm, dry and intact. No rash noted. Psychiatric: He has a normal mood and affect.  His behavior is normal. Judgment and thought content normal.         Reviewed most current lab test results and cultures  YES  Reviewed most current radiology test results   YES  Review and summation of old records today    NO  Reviewed patient's current orders and MAR    YES  PMH/SH reviewed - no change compared to H&P  ________________________________________________________________________  Care Plan discussed with:    Comments   Patient x    Family  x    RN x    Care Manager     Consultant Multidiciplinary team rounds were held today with , nursing, pharmacist and clinical coordinator. Patient's plan of care was discussed; medications were reviewed and discharge planning was addressed. ________________________________________________________________________  Total NON critical care TIME:  30   Minutes    Total CRITICAL CARE TIME Spent:   Minutes non procedure based      Comments   >50% of visit spent in counseling and coordination of care x    ________________________________________________________________________  Rigo Ponce MD     Procedures: see electronic medical records for all procedures/Xrays and details which were not copied into this note but were reviewed prior to creation of Plan. LABS:  I reviewed today's most current labs and imaging studies.   Pertinent labs include:  Recent Labs      10/22/17   0426  10/21/17   1255   WBC  10.0  10.4   HGB  7.3*  7.9*   HCT  22.8*  25.2*   PLT  391  358     Recent Labs      10/22/17   0426  10/21/17   1255   NA  134*  135*   K  4.4  4.5   CL  105  104   CO2  23  23   GLU  114*  115*   BUN  19  21*   CREA  1.43*  1.83*   CA  8.3*  8.3*   MG  3.2*  1.3*   PHOS  3.5   --    ALB  2.4*  2.6*   TBILI  0.8  0.5   SGOT  23  13*   ALT  14  16       Signed: Rigo Ponce MD

## 2017-10-22 NOTE — PROGRESS NOTES
discussed with nurse-pt passing clots in urine- - vss, getting blood transfusion -  Urology paged- will likely need irrigation-  Will await urology input  May  require OR for stenting.  -re HGB - after transfusion, may need another unit,. Low threshold to tx to the unit.

## 2017-10-22 NOTE — PROGRESS NOTES
Bedside shift change report given to Sharmila Lassiter RN (oncoming nurse) by Lenore Munoz RN (offgoing nurse). Report included the following information SBAR.     1800 Came into Pt's room to take vitals and Pt was passing blood clots with urine. Bloody urine has continued leaky. Urologist on call was paged [phone] urology STAT.

## 2017-10-22 NOTE — CONSULTS
17 OFFICE NOTE  Gabby Chaudhari is a 79year old male who presents today for \"recent UTI with blood and catheter placed\". He returns for follow-up. The patient is followed for a long history of stones. About 25 years ago bilateral large staghorn with bilateral percutaneous procedures. Had been doing well and was on periodic daily Bactrim versus Macrobid. We had seen him while hospitalized in April. That hospitalization was for perforated duodenal bulb ulcer. He had [place] at that time. Dr. Kendrick Larvcody and perform cystoscopy and right retrograde and insertion of right stent. There was a narrowing of the distal right ureter found at that time. He is on  Brilinta. Had a recent cardiac stent. Apparently he is due for another cardiac stent procedure in the near future. He was in the emergency room 2 days ago. That was with hematuria. Amaya catheter was placed. Urine is clearing. Those records are reviewed. Urine culture from theresignificant growth. He is on Keflex. PAST MEDICAL HISTORY:    Allergies: No known allergies. DENIES: Latex, Shellfish, X-Ray Dye, Iodine.      Medications: CEPHALEXIN 500 MG ORAL CAPS (CEPHALEXIN) 1 po qid x 5 days  PROTONIX 40 MG ORAL TBEC (PANTOPRAZOLE SODIUM) 2x a day  METFORMIN  MG ORAL TABS (METFORMIN HCL) 1x a day  GABAPENTIN 300 MG ORAL CAPS (GABAPENTIN) 2x a day  BRILINTA 90 MG ORAL TABS (TICAGRELOR) 2x a day  RESTASIS 0.05 % OPHTH EMUL (CYCLOSPORINE) 1drop each eye 2x day  OXYBUTYNIN CHLORIDE ER 10 MG ORAL XR24H-TAB (OXYBUTYNIN CHLORIDE) 1x day  SODIUM CHLORIDE (HYPERTONIC) 5 % OPHTH OINT (SODIUM CHLORIDE (HYPERTONIC)) 1 drop each eye 3x day  METOPROLOL TARTRATE 25 MG ORAL TABS (METOPROLOL TARTRATE) 2x day  MACROBID 100 MG  CAPS (NITROFURANTOIN MONOHYD MACRO) Si po qd for suppression  * FIBER CHOICE 1 daily  MULTIVITAMINS TABS (MULTIPLE VITAMIN) 1 tablet daily  HYDROCORTISONE 1 % OINT (HYDROCORTISONE) as needed  NIZORAL 2 % SHAM (KETOCONAZOLE) once a day  LIPITOR 40 MG TABS (ATORVASTATIN CALCIUM) 1 tablet daily  WELCHOL  TABS (COLESEVELAM HCL TABS) 2 tabs a day  TIMOLOL MALEATE 0.25 % SOLN (TIMOLOL MALEATE) 1drop each eye 2x day  ASPIRIN 81 MG TBEC (ASPIRIN) 1 tablet daily  LISINOPRIL 10 MG TABS (LISINOPRIL) 1x day    Problems: 599.71 GROSS HEMATURIA (ICD-599.71) (ZOX35-Q90.0)  591 Hydronephrosis (ICD-591) (NVA17-V88.30)  788. 41 Frequency, urinary (ICD-788.41) (CWZ65-L53.0)  789.01 RUQ PAIN (ICD-789.01) (ONS82-L55.11)  788.21 SYMPTOM, INCOMPLETE BLADDER EMPTYING (ICD-788.21) (ASP35-W95.14)  600.00 BPH W/O URINARY OBSTRUCTION (ICD-600.00) (DKO18-M92.0)  599.0 URINARY TRACT INFECTION (ICD-599.0) (OKD37-H76.0)  592.0 CALCULUS, KIDNEY (ICD-592.0) (JNZ42-T53.0)    Illnesses: Diabetes, High Blood Pressure, Stroke/Seizure, and Kidney Problems. DENIES: Heart Disease, Pacemaker/Defibrillator, Lung Disease, Bowel Problems, Bleeding Problems, HIV, Hepatitis, or Cancer. Surgeries: Kidney Stone Surgery. Family History: Prostate Cancer. DENIES: Kidney cancer, Kidney disease, Kidney stones. Social History: Unemployed. . Smoking status: Former Smoker. Does not drink alcohol. System Review: Admits to: Dry Eyes, Shortness of Breath, Involuntary Urine Loss, Lower Extremity Weakness, Difficulty Walking, Easy Bleeding, and Rash. DENIES: Unexplained Weight Loss, Dry Mouth, Leg Swelling, Constipation, Dry Skin, Psychiatric Problems, Impaired Sex Drive. URINALYSIS  Urine Dip not done  Urine Micro not done    PSA HISTORY  1.06 ng/ml on 03/21/2017  1.01 ng/ml on 03/15/2016  1.66 ng/ml on 01/13/2015    IMPRESSION:    1. 788.41 FREQUENCY - URINARY (ICD-788.41) - Unchanged    2. 600.00 BPH W/O URINARY OBSTRUCTION (ICD-600.00) - Unchanged    3. 592.0 CALCULUS - KIDNEY (ICD-592.0) - Unchanged    4. 591 HYDRONEPHROSIS (ICD-591) - New    5. 599.71 GROSS HEMATURIA (ICD-599.71) - New    PLAN: He denies any voiding issues prior to the hematuria.  Suspect blood thinner is related to that. His KUB shows right stent in good position without significant encrustation. We have gone ahead and remove his Amaya catheter. His family will call back after scheduling upcoming cardiology procedures. Soon after that when cardiology status is optimal we can plan cystoscopy under anesthesia with right stent removal right retrograde possible ureteroscopy to evaluate prior hydronephrosis etiology. He plans to resume his daily Macrobid which was very helpful for years related to maintenance dosing. Recent ER creatinine was 1.3.    cc: Romulo Zaidi,     10/9/17 OP NOTE      PATIENT:    Rosibel Mcnamara                     MR:         [id number]                            ACCT:      [de-identified]  :        [birth year]                              LOCATION:  [facility]      ADMIT DATE: 10/05/17                              REPORT STATUS: Signed     ATTENDING PHYSICIAN: Soha Siu MD                                          OPERATIVE REPORT                         DATE OF SERVICE:  10/09/2017     SURGEON:  Jesus Sorenson MD ([facility])      PREOPERATIVE DIAGNOSES:  1. Hematuria. 2. Right renal stone. 3. Hydronephrosis. POSTOPERATIVE DIAGNOSES:  1. Hematuria. 2. Right renal stone. 3. Hydronephrosis. PROCEDURES:  1. Cystoscopy. 2. Evacuation of clots. 3. Removal of right ureteral stent. 4. Bilateral retrograde pyelogram.  5. Bladder biopsy and fulguration. ANESTHESIA:  General.     EBL:  Minimal.     COMPLICATION:  None. SPECIMEN:  Bladder biopsy. IMPLANTS:  Fresh Amaya catheter. INDICATIONS:  The patient is a 70-year-old male. He has had right ureteral stent for possible  hydro or ureteral narrowing for about 5 months. Recently developed hematuria   and possible clot retention and was admitted. Urine cultures have grown only      Patient:  Rosibel Cons                         Account Number:  [de-identified]                                                             yeast.  He is on Zosyn. Aspirin and other blood thinners temporarily halted. He has had a 3-way Amaya catheter in. His initial CT indicated severe right   hydro and moderate left hydro as well as right-sided stone. Urine has cleared   on 3-way drip. Creatinine is 1.1. Hemoglobin 8.1 that I believe is after 1   transfusion. T-max 99.8. DESCRIPTION OF PROCEDURE:  The patient was taken to the operating room, given general anesthesia, placed in  a dorsal lithotomy position, prepped and draped in a standard fashion. A 21   scope was inserted. Urethra normal.  Prostatic urethra is moderately  obstructing with sightly high bladder neck and fragile prostate tissue. Bladder  was inspected. He has diffuse inflammation. A few small clots were irrigated   free. No specific active bleeding was noted. His right ureteral stent was   removed with a grasper under fluoroscopy without difficulty. Both ureteral   orifices were wide-bore. An [place] tip was used to perform bilateral retrograde   pyelograms and this revealed moderate ureteral dilation to the bladder. Prompt   flow up and down without evidence of obstruction. He did have some inflamed   tissue around the bladder. Biopsy of this was obtained from the right lateral   wall. Suspect this was fully irritation. [place] was used to cauterize that and  has some sightly fragile bladder neck or prostate tissue. At the complication   of the case, decision was made not to replace any stents. Hemostasis was   complete. A fresh 22-Serbian 3-way Amaya catheter was placed with 10 cc in the   balloon. This was secured to gravity drainage. The third port was plugged. Clear drip. He was reversed from anesthesia and taken to Recovery Room in   stable condition. For now, we will still ask that aspirin and another blood   thinners be held if possible. We will start Diflucan for 3 days given recent   positive yeast in urine, start Flomax and finasteride.   We can restart 3-way   drip if needed, but try to hold on that. Plan a possible void trial soon if   urine remains clear.

## 2017-10-22 NOTE — CONSULTS
[address] [facility] [address] [facility]     Date of  Admission: 10/21/2017 11:54 AM     Admission type:Emergency    Consult for:  Consult by: Subjective:     Paula Santacruz is a 79 y.o. male admitted for Acute renal failure (ARF) ([address] Patient presented to ER yesterday with SVT @ 180 bpm and was converted with adenosine. Trop bumped to 4.5. Known  RCA which we have been planning to re-attempt, but have been delayed by other medical problems.   No CP today      Patient Active Problem List    Diagnosis Date Noted    NSTEMI (non-ST elevated myocardial infarction) ([address] 10/22/2017    S/P coronary artery stent placement 07/07/2017    Angina, class III ([address] 07/07/2017    Debilitated 07/07/2017    Abnormality of gait as late effect of cerebrovascular accident (CVA) 07/07/2017    Unstable angina ([address] 07/06/2017    SVT (supraventricular tachycardia) ([address] 04/19/2017    Perforated duodenal bulb ulcer ([address] 04/18/2017    Hyponatremia 04/18/2017    Perforated viscus 04/17/2017    Hydroureter on right 04/17/2017    History of stroke 03/03/2017    Acute renal failure (ARF) ([address] 03/01/2017    Hyperkalemia 03/01/2017    Hypertension 12/14/2009    Hyperlipidemia 12/14/2009    DM (diabetes mellitus) ([address] 12/14/2009    H/O 12/14/2009    Recurrent UTI 12/14/2009    Nephrolithiasis 12/14/2009      Marylee Register, MD  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    small cecal polyp    DM (diabetes mellitus) ([address] 12/14/2009    Hypercholesterolemia     Hyperlipidemia 12/14/2009    Hypertension 12/14/2009    Nephrolithiasis 12/14/2009    Nephrolithiasis 12/14/2009    Perforated duodenal bulb ulcer ([address] 4/18/2017    Recurrent UTI 12/14/2009    S/P coronary artery stent placement 7/7/2017 7/6/17 PCI/MONICA  prox RCA    Stroke (HCC)     SVT (supraventricular tachycardia) ([address] 4/19/2017    Unstable angina ([facility] 7/6/2017      Social History     Social History    Marital status: SINGLE     Spouse name: N/A    Number of children: N/A    Years of education: N/A     Social History Main Topics    Smoking status: Former Smoker    Smokeless tobacco: Not on file    Alcohol use No    Drug use: No    Sexual activity: Not Currently     Birth control/ protection: Abstinence     Other Topics Concern    Not on file     Social History Narrative     Allergies   Allergen Reactions    Bactrim [Sulfamethoxazole-Trimethoprim] Nausea and Vomiting      Family History   Problem Relation Age of Onset    Hypertension Mother     Elevated Lipids Mother     Heart Disease Mother     Diabetes Father     Hypertension Father     Elevated Lipids Sister     Hypertension Sister     Heart Disease Brother     Stroke Maternal Grandfather     Stroke Paternal Grandfather       Current Facility-Administered Medications   Medication Dose Route Frequency    0.9% sodium chloride infusion 250 mL  250 mL IntraVENous PRN    metoprolol tartrate (LOPRESSOR) tablet 25 mg  25 mg Oral Q12H    aspirin delayed-release tablet 81 mg  81 mg Oral DAILY    atorvastatin (LIPITOR) tablet 40 mg  40 mg Oral QHS    colesevelam (WELCHOL) tablet 1,250 mg  1,250 mg Oral BID WITH MEALS    cycloSPORINE (RESTASIS) 0.05 % ophthalmic emulsion 1 Drop  1 Drop Both Eyes BID    gabapentin (NEURONTIN) capsule 300 mg  300 mg Oral Q12H    pantoprazole (PROTONIX) tablet 40 mg  40 mg Oral BID    ticagrelor (BRILINTA) tablet 90 mg  90 mg Oral Q12H    timolol (TIMOPTIC) 0.25% ophthalmic solution  1 Drop Both Eyes BID    acetaminophen (TYLENOL) tablet 650 mg  650 mg Oral Q4H PRN    ondansetron (ZOFRAN) injection 4 mg  4 mg IntraVENous Q4H PRN    labetalol (NORMODYNE;TRANDATE) injection 10 mg  10 mg IntraVENous Q2H PRN    polyethylene glycol (MIRALAX) packet 17 g  17 g Oral DAILY    0.9% sodium chloride infusion  75 mL/hr IntraVENous CONTINUOUS    insulin lispro (HUMALOG) injection   SubCUTAneous AC&HS    glucose chewable tablet 16 g  4 Tab Oral PRN    dextrose (D50W) injection syrg 12.5-25 g  12.5-25 g IntraVENous PRN    glucagon (GLUCAGEN) injection 1 mg  1 mg IntraMUSCular PRN    sodium chloride (NS) flush 5-10 mL  5-10 mL IntraVENous Q8H    sodium chloride (NS) flush 5-10 mL  5-10 mL IntraVENous PRN        Review of Symptoms:   Constitutional: negative  Eyes: negative   Ears, nose, mouth, throat, and face: negative  Respiratory: negative   Cardiovascular: negative   Gastrointestinal: negative  Genitourinary:negative   Musculoskeletal:negative   Neurological: negative   Endocrine: negative          Objective:      Visit Vitals    /50 (BP 1 Location: Left arm, BP Patient Position: At rest)    Pulse 71    Temp 97.7 °F (36.5 °C)    Resp 18    Ht 5' 9\" (1.753 m)    Wt 208 lb (94.3 kg)    SpO2 100%    BMI 30.72 kg/m2       Physical:   General:   Heart: RRR, no m/S3/JVD, no carotid bruits   Lungs: clear   Abdomen: Soft, +BS, NTND   Extremities: LE susan +DP/PT, no edema   Neurologic: Grossly normal    Data Review:   Recent Labs      10/22/17   0426  10/21/17   1255   WBC  10.0  10.4   HGB  7.3*  7.9*   HCT  22.8*  25.2*   PLT  391  358     Recent Labs      10/22/17   0426  10/21/17   1255   NA  134*  135*   K  4.4  4.5   CL  105  104   CO2  23  23   GLU  114*  115*   BUN  19  21*   CREA  1.43*  1.83*   CA  8.3*  8.3*   MG  3.2*  1.3*   PHOS  3.5   --    ALB  2.4*  2.6*   TBILI  0.8  0.5   SGOT  23  13*   ALT  14  16       Recent Labs      10/22/17   0426  10/21/17   2047  10/21/17   1255   TROIQ  2.50*  4.54*  1.12*   CPK  87  117  89   CKMB  10.1*  14.5*  6.0*         Intake/Output Summary (Last 24 hours) at 10/22/17 1454  Last data filed at 10/22/17 0403   Gross per 24 hour   Intake           978.75 ml   Output                0 ml   Net           978.75 ml        Cardiographics    Telemetry:   ECG:   Echocardiogram: CXRAY:       Assessment:       Active Problems:    Acute renal failure (ARF) ([address] (3/1/2017)      NSTEMI (non-ST elevated myocardial infarction) ([address] (10/22/2017)         Plan:     SVT @ 180 bpm +  RCA = NSTEMI.   re-attempt pending resolution of anemia and ARF.   In the meantime will increase his BB    Isabella Melton MD

## 2017-10-22 NOTE — PROGRESS NOTES
Bedside and Verbal shift change report given to  (oncoming nurse) by  Roopa engel). Report included the following information Kardex. SHIFT SUMMARY: Primary Nurse Ally Bazzi RN and Fely Cabello RN performed a dual skin assessment on this patient Impairment noted- see wound doc flow sheet  Pako score is 16      10/21 2000 Unable to complete med rec the patient says \"my sister takes care of all my meds \"  I will instruct pt to have sister to bring med list in am ,and I will pass this info in bedside report     2100 Pt found to be incontinent of large  Amt urine with hematuria and small clots  . Was able to send a ua &and c&s . [address] NURSING NOTE   Admission Date 10/21/2017   Admission Diagnosis Acute renal failure (ARF) ([address]   Consults IP CONSULT TO CARDIOLOGY  IP CONSULT TO UROLOGY      Cardiac Monitoring [x] Yes [] No      Purposeful Hourly Rounding [x] Yes    Jasbir Score Total Score: 3   Jasbir score 3 or > [x] Bed Alarm [] Avasys [] 1:1 sitter [] Patient refused (Place signed refusal form in chart)   Pako Score Pako Score: 21   Pako score 14 or < [] PMT consult [] Wound Care consult    []  Specialty bed  [] Nutrition consult      Influenza Vaccine Received Flu Vaccine for Current Season (usually Sept-March): Yes           Oxygen needs?  [x] Room air Oxygen @  []1L    [x]2L    []3L   []4L    []5L   []6L  For comfort and low HGB    Use home O2? [] Yes [] No  Perform O2 challenge test using  smartphrase (.Homeoxygen)      Last bowel movement Last Bowel Movement Date: 10/21/17      Urinary Catheter             LDAs               Peripheral IV 10/21/17 Right Antecubital (Active)   Site Assessment Clean, dry, & intact 10/21/2017 12:21 PM   Phlebitis Assessment 0 10/21/2017 12:21 PM   Infiltration Assessment 0 10/21/2017 12:21 PM   Dressing Status Clean, dry, & intact 10/21/2017 12:21 PM   Dressing Type Tape 10/21/2017 12:21 PM   Hub Color/Line Status Pink;Flushed 10/21/2017 12:21 PM   Action Taken Blood drawn 10/21/2017 12:21 PM                         Readmission Risk Assessment Tool Score Medium Risk            20       Total Score        3 Has Seen PCP in Last 6 Months (Yes=3, No=0)    9 IP Visits Last 12 Months (1-3=4, 4=9, >4=11)    5 Pt. Coverage (Medicare=5 , Medicaid, or Self-Pay=4)    3 Charlson Comorbidity Score (Age + Comorbid Conditions)        Criteria that do not apply:    . Living with Significant Other. Assisted Living. LTAC. SNF.  or   Rehab    Patient Length of Stay (>5 days = 3)       Expected Length of Stay - - -   Actual Length of Stay 1

## 2017-10-22 NOTE — CONSULTS
[address]   [address] [facility]       Name:  Juanita Marion   MR#:  [id number]   :  1950   Account #:  [de-identified]    Date of Consultation:  10/22/2017   Date of Adm:  10/21/2017       REASON FOR CONSULTATION: Hydronephrosis and hematuria. HISTORY OF PRESENT ILLNESS: He is a 71-year-old On license of [facility]   American male patient of Dr. Kathia Gomez, well known to him and others,   with a long-standing history of chronic right hydronephrosis greater   than left, years ago had bilateral staghorn calculi, treated with   percutaneous procedures, there was a question of some right ureteral   stenosis, for which he was treated with a stent for about 5 months. He   was last seen in the office 2017. On 10/09/2017, Dr. Catherine Robertson did a   cystoscopy, with evacuation of clots, removal of right ureteral stents   and bilateral retrograde pyelograms, demonstrating hydronephrosis   down to the bladder, which was thick-walled, with adequate upper tract   emptying of contrast; therefore, hydronephrosis thought to be   secondary to thick-walled bladder. Bladder biopsy was negative. He is   now readmitted with symptomatic anemia of 7.3 and mainly elevated   heart rate, has elevated troponins, and is being worked up by   Cardiology. He has evidence of acute renal insufficiency, with a   creatinine of 1.43, BUN of 19 and a GFR of 60, above his baseline   from July. He has a low magnesium as well, and a urine culture is   pending to rule out UTI. Ultrasound and CT, both of which I have   reviewed, demonstrates right greater than left hydronephrosis, with   chronic changes and loss of parenchyma, especially on the right, and   some residual right lower pole stones, also incidentally noted a   thickened bladder and gallstone. He has had some hematuria, but is   voiding without difficulty, some incontinence, which is his baseline, and   no GI bleeding.  He is being worked up for the anemia. PAST MEDICAL HISTORY: Includes the above, plus diabetes,   hypertension, perforated ulcer, cardiac stents for coronary artery   disease, stroke, ST and T unstable angina and tonsillectomy. MEDICATIONS: On the chart and were reviewed. ALLERGIES: ALLERGIC TO BACTRIM. SOCIAL HISTORY: Ex-smoker, nondrinker. FAMILY HISTORY: Hypertension, diabetes, heart disease and strokes. REVIEW OF SYMPTOMS: Per HPI. PHYSICAL EXAMINATION   GENERAL: He is somewhat pale but in no apparent distress. He is not   breathing fast at this time. HEENT: He is anicteric. NECK: Supple. ABDOMEN: No CVA or abdominal tenderness. GENITALIA: Normal, including penis, meatus, scrotum, testes and   cords. The diaper has some bloody spotting. RECTAL Deferred to previous examinations. EXTREMITIES: Demonstrate no edema. NEUROLOGIC: Grossly nonfocal, in bed. PSYCHIATRIC: Normal. He is pleasant, and in a relatively good mood. LABORATORY AND IMAGING: As above. IMPRESSION: Chronic right greater than left hydronephrosis, with   renal atrophy and residual stone fragments and hematuria, rule out   urinary tract infection. I doubt the degree of hematuria I am seeing is   enough to cause his severe anemia, although this could be   multifactorial.    RECOMMENDATIONS: I agree with IV hydration, cardiac workup and   antibiotics pending cultures. I would avoid a Amaya catheter and   ureteral stents if at all possible. I will have Dr. Janneth Gauthier, who knows him   well, follow up in a day or 2 to see if he has any other   recommendations, and to monitor him with you.         Dinesh Jimenez MD      [facility]   D:  10/22/2017   11:41   T:  10/22/2017   14:02   Job #:  [id number]

## 2017-10-23 PROBLEM — R31.9 HEMATURIA: Status: ACTIVE | Noted: 2017-01-01

## 2017-10-23 PROBLEM — D64.9 ANEMIA: Status: ACTIVE | Noted: 2017-01-01

## 2017-10-23 NOTE — PROGRESS NOTES
UROLOGY    Temp to 102.5  CBI on slow drip--clear with some tan sediment  No diallo pain  HGB  7.6  Transfusing  Cr   1.4  WBC  10.0    Urine C&S no sig growth 24 hrs  On Maxipime and vanc    [place] [facility] [place] with removal of right ureteral stent, bilat retrogrades with dilated ureters but no ureteral obstruction,  Bladder biopsy of inflamed tissue(benign). Was voiding at time of DC from [facility] with clear urine and low post void residuals    Readmitted with SVT and  Troponin changes. Was previously on flomax and finasteride--now not listed. Will continue CBI, manually irrigate if needed. Restart flomax and finasteride. Try to avoid anticoagulants and antiplatelets if possible. Void trial when off CBI and clear. We will follow.

## 2017-10-23 NOTE — PROGRESS NOTES
Physical therapy note:   Orders received, chart reviewed. Spoke with RN who reports patient not appropriate for PT evaluation at this time. Patient currently receiving PRBC due to hgb 7.6 and is going to transfer to the unit. Will follow up tomorrow if appropriate for PT evaluation. Radha Rawls, PT, DPT

## 2017-10-23 NOTE — PROGRESS NOTES
Initial Nutrition Assessment:    INTERVENTIONS/RECOMMENDATIONS:   · Meals/Snacks: General/healthful diet: Continue CCD. · Supplements: Commercial supplement: Order Glucerna (chocolate) BID. ASSESSMENT:   Chart reviewed, medically noted for acute renal failure, NSTEMI. PMH listed below. Visited patient's room, patient resting in bed with family member present. Patient lethargic, responded to some questions; family member answered questions regarding nutritional history. Patient has very poor appetite, consumed 0% of breakfast tray. Informed patient and family member of importance of consuming protein sources for wound healing. Explained room service and CCD to family member, encouraged ordering meals for patient. Family member reports patient has consumed Ensure supplements in the past. Will order Glucerna with breakfast and lunch for protein-calorie supplementation. Will monitor PO/ONS intake. 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    small cecal polyp    DM (diabetes mellitus) ([address] 12/14/2009    Hypercholesterolemia     Hyperlipidemia 12/14/2009    Hypertension 12/14/2009    Nephrolithiasis 12/14/2009    Nephrolithiasis 12/14/2009    Perforated duodenal bulb ulcer ([address] 4/18/2017    Recurrent UTI 12/14/2009    S/P coronary artery stent placement 7/7/2017 7/6/17 PCI/MONICA  prox RCA    Stroke ([address]     SVT (supraventricular tachycardia) ([address] 4/19/2017    Unstable angina ([facility] 7/6/2017       Diet Order: Consistent carb  % Eaten:  No data found.     Pertinent Medications: [x]Reviewed []Other: IVF, welchol, humalog, miralax  Pertinent Labs: [x]Reviewed []Other: -135-200, Cr 1.43, K 4.4, Phos 3.5  Last BM: 10/21   [x]Active     []Hyperactive  []Hypoactive       [] Absent BS  Skin:    [] Intact   [] Incision  [x] Breakdown  [] Other:    Anthropometrics:   Height: 5' 9\" (175.3 cm) Weight: 94.3 kg (207 lb 14.3 oz)   IBW (%IBW):   ( ) UBW (%UBW):   (  %) Last Weight Metrics:  Weight Loss Metrics 10/23/2017 8/24/2017 7/30/2017 7/29/2017 7/6/2017 5/26/2017 5/16/2017   Today's Wt 207 lb 14.3 oz 207 lb - 207 lb 230 lb 210 lb 213 lb 1.6 oz   BMI 30.7 kg/m2 31.47 kg/m2 31.47 kg/m2 - 30.34 kg/m2 31.01 kg/m2 32.4 kg/m2       BMI: Body mass index is 30.7 kg/(m^2). This BMI is indicative of:   []Underweight    []Normal    []Overweight    [x] Obesity   [] Extreme Obesity (BMI>40)     Estimated Nutrition Needs (Based on):   2219 Kcals/day (BMR 1707x1. 3(AF)) , 94 g (-113 (1.0-1.2 g/kg bw)) Protein  Carbohydrate: At Least 130 g/day  Fluids: 2200 mL/day (1ml/kcal)    NUTRITION DIAGNOSES:   Problem:  Increased nutrient needs (protein)      Etiology: related to wound healing     Signs/Symptoms: as evidenced by unstageable pressure ulcer      NUTRITION INTERVENTIONS:  Meals/Snacks: General/healthful diet   Supplements: Commercial supplement              GOAL:   Consume >50% of meals/ONS in 3-5 days    LEARNING NEEDS (Diet, Food/Nutrient-Drug Interaction):    [x] None Identified   [] Identified and Education Provided/Documented   [] Identified and Pt declined/was not appropriate     Cultureal, Restorationism, OR Ethnic Dietary Needs:    [x] None Identified   [] Identified and Addressed     [x] Interdisciplinary Care Plan Reviewed/Documented    [x] Discharge Planning:   Consistent-carbohydrate diet for blood glucose management. MONITORING /EVALUATION:      Food/Nutrient Intake Outcomes:  Total energy intake, Liquid meal replacement  Physical Signs/Symptoms Outcomes: Weight/weight change, Electrolyte and renal profile, Glucose profile    NUTRITION RISK:    [] High              [x] Moderate           []  Low  []  Minimal/Uncompromised    PT SEEN FOR:    []  MD Consult: []Calorie Count      []Diabetic Diet Education        []Diet Education     []Electrolyte Management     []General Nutrition Management and Supplements     []Management of Tube Feeding     []TPN Recommendations    [x]  RN Referral:  []MST score >=2     []Enteral/Parenteral Nutrition PTA     []Pregnant: Gestational DM or Multigestation     [x]Pressure Ulcer/Wound Care needs        []  Low BMI  []  DTR Referral       Bina Sanz V  Dietetic Intern

## 2017-10-23 NOTE — PROGRESS NOTES
Called for clots. Many irrigated out thru [address] until cleared. [address] placed. PLAN:  Being transfused. DC aspirin. Start NS CBI. Manually irrigate prn. Presume bleeding from chronically inflamed bladder-has had complete workup already. NPO after MN except meds in case. ..

## 2017-10-23 NOTE — PROGRESS NOTES
Israel Gonzalez is a 79 yr old male who has been admitted due to acute blood loss anemia, hematuria, acute renal failure, hypomagnesemia and elevated troponin. Upon interview patient stated he just recently had surgery and transferred to [facility] for rehab. Patient has only been at the facility for 1 week prior to admission. Patient plans to return to [facility] at discharge for rehab.      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     small cecal polyp   DM (diabetes mellitus) ([facility] 12/14/2009   Hypercholesterolemia     Hyperlipidemia 12/14/2009   Hypertension 12/14/2009   Nephrolithiasis 12/14/2009   Nephrolithiasis 12/14/2009   Perforated duodenal bulb ulcer ([facility] 4/18/2017   Recurrent UTI 12/14/2009   S/P coronary artery stent placement 7/7/2017 7/6/17 PCI/MONICA  prox RCA   Stroke ([facility])     SVT (supraventricular tachycardia) ([facility] 4/19/2017   Unstable angina         Care Management Interventions  Transition of Care Consult (CM Consult): SNF ([facility])  Partner SNF: Yes  Lillie Signup: No  Discharge Durable Medical Equipment: No  Physical Therapy Consult: Yes  Occupational Therapy Consult: No  Speech Therapy Consult: No  Current Support Network: Relative's Home (Lives with sister and brother)  Confirm Follow Up Transport: Family  Plan discussed with Pt/Family/Caregiver: Yes  Freedom of Choice Offered: Yes     [phone]

## 2017-10-23 NOTE — CDMP QUERY
CMS considers documentation to be conflicting in nature when one condition is diagnosed as two different conditions by the same or different physicians. Please clarify the patient's condition if possible:    ? NSTEMI poa in the setting of trop 4.54, 10/22 cardio pn req increase BB, cardiology cx, tele, cycle enzymes  ? Other Explanation (please specify)  ? Unable to Determine    The medical record reflects the following risk factors, clinical indicators, and treatment    Risk Factors:  67m adm w/ SVT  Clinical Indicators: 10/22 cardio 'SVT @ 180 bpm +  RCA = NSTEMI.   re-attempt pending resolution of anemia and ARF. In the meantime will increase his BB'   Treatment: increase BB, cardiology cx, tele, cycle enzymes    Please clarify and document your clinical opinion in the progress notes and discharge summary including the definitive and/or presumptive diagnosis, (suspected or probable), related to the above clinical findings. Please include clinical findings supporting your diagnosis.   Thanks,  Genaro Olsen RN/CDMP

## 2017-10-23 NOTE — WOUND CARE
Wound care consult unable to be completed today secondary to patients transfer to CCU and currently under going Vascular Access under sterile conditions.   Laney Martins RN, CWON, zone ph# [phone]

## 2017-10-23 NOTE — PROGRESS NOTES
Hospitalist Progress Note    NAME: Celia Stone   :  1950   MRN:  [id number]       Interim Hospital Summary: 79 y.o. male whom presented on 10/21/2017 with      Assessment / Plan:    Sepsis - tachy/temp 102 -start sepsis protocol/  Vanco/cefepime  Transfer pt to icu  Call family/urology    Acute blood loss anemia, possible all urological source  Hematuria, acute and chronic  -urology  Following   transfuse one unit kepp h>9        Acute renal failure cr now 2.53  -hold acei  -hold metformin  Urology following    per notes discussed results with urology - will hold on diallo for now - patient is urinating post fluid challenge. If unable to void overnight then diallo will be needed. Will likely require OR tomorrow for stenting.           Hypomagnesemia  -replace aggressively to >2 IV  M3.3 k 4.4      Elevated troponin /NSTEMI  Elevated heart rate  -per cardiology - this is not a new problem -per crad Stent in place for just over 3 months, continue brilinta, BB, statin  --cycle enzymes trending down       Code Status: Full Code  DVT Prophylaxis: SCD's  GI Prophylaxis: PPI             Subjective:     Chief Complaint / Reason for Physician Visit. Discussed with RN events overnight. Review of Systems:  Symptom Y/N Comments  Symptom Y/N Comments   Fever/Chills y   Chest Pain     Poor Appetite    Edema     Cough    Abdominal Pain     Sputum    Joint Pain     SOB/FLORES    Pruritis/Rash     Nausea/vomit    Tolerating PT/OT     Diarrhea    Tolerating Diet     Constipation    Other  Bleeding/urine     Could NOT obtain due to:      Objective:     VITALS:   Last 24hrs VS reviewed since prior progress note.  Most recent are:  Patient Vitals for the past 24 hrs:   Temp Pulse Resp BP SpO2   10/23/17 0155 - - - 92/46 -   10/23/17 0136 - - - (!) 89/53 -   10/23/17 0116 - - - 97/53 -   10/23/17 0108 97.3 °F (36.3 °C) (!) 118 20 (!) 80/49 100 %   10/22/17 2158 97.7 °F (36.5 °C) - 20 109/56 100 %   10/22/17 1800 98 °F (36.7 °C) 86 16 149/59 100 %   10/22/17 1728 97.9 °F (36.6 °C) 84 16 160/77 100 %   10/22/17 1652 98.2 °F (36.8 °C) 79 16 154/67 100 %   10/22/17 1635 97.9 °F (36.6 °C) 78 16 133/64 100 %   10/22/17 1619 97.8 °F (36.6 °C) 77 16 114/62 100 %   10/22/17 1517 97.7 °F (36.5 °C) 76 18 130/56 100 %   10/22/17 1109 97.7 °F (36.5 °C) 71 18 111/50 100 %   10/22/17 0728 97.7 °F (36.5 °C) 74 16 99/48 100 %       Intake/Output Summary (Last 24 hours) at 10/23/17 0705  Last data filed at 10/23/17 0529   Gross per 24 hour   Intake            19378 ml   Output             8725 ml   Net             5825 ml        PHYSICAL EXAM:  Constitutional: He is oriented to person, place, and time. He appears well-developed and well-nourished. No distress. Bit pale  HENT:   Head: Normocephalic and atraumatic. Neck: Neck supple. No JVD present. No tracheal deviation present. Cardiovascular: tachy rhythm and intact distal pulses.  .  Exam reveals no gallop and no friction rub.    No murmur heard. Pulmonary/Chest: Effort normal and breath sounds normal.   Abdominal: Soft. Bowel sounds are normal. He exhibits no distension and no mass. There is no tenderness. There is no guarding. Musculoskeletal: Normal range of motion. He exhibits no edema or tenderness. Brace on RLE No deformity   Neurological: He is alert and oriented to person, place, and time. He has normal strength. No focal deficits   Skin: Skin is warm, dry and intact. No rash noted. Amaya clearing blood  Psychiatric: He has a normal mood and affect.  His behavior is normal. Judgment and thought content normal.      Reviewed most current lab test results and cultures  YES  Reviewed most current radiology test results   YES  Review and summation of old records today    NO  Reviewed patient's current orders and MAR    YES  PMH/SH reviewed - no change compared to H&P  ________________________________________________________________________  Care Plan discussed with:    Comments Patient x    Family  x    RN x    Care Manager     Consultant                        Multidiciplinary team rounds were held today with , nursing, pharmacist and clinical coordinator. Patient's plan of care was discussed; medications were reviewed and discharge planning was addressed. ________________________________________________________________________  Total NON critical care TIME:  60    Minutes    Total CRITICAL CARE TIME Spent:   Minutes non procedure based      Comments   >50% of visit spent in counseling and coordination of care x    ________________________________________________________________________  Paul Dixon MD     Procedures: see electronic medical records for all procedures/Xrays and details which were not copied into this note but were reviewed prior to creation of Plan. LABS:  I reviewed today's most current labs and imaging studies.   Pertinent labs include:  Recent Labs      10/22/17   2212  10/22/17   0426  10/21/17   1255   WBC   --   10.0  10.4   HGB  7.6*  7.3*  7.9*   HCT  23.3*  22.8*  25.2*   PLT   --   391  358     Recent Labs      10/22/17   0426  10/21/17   1255   NA  134*  135*   K  4.4  4.5   CL  105  104   CO2  23  23   GLU  114*  115*   BUN  19  21*   CREA  1.43*  1.83*   CA  8.3*  8.3*   MG  3.2*  1.3*   PHOS  3.5   --    ALB  2.4*  2.6*   TBILI  0.8  0.5   SGOT  23  13*   ALT  14  16       Signed: Paul Dixon MD

## 2017-10-23 NOTE — CONSULTS
PULMONARY [facility]OC[facility] INTENSIVIST Consult Service Note  Pulmonary, Critical Care, and Sleep Medicine    Name: Hussein Sewell MRN: [id number]   : 1950 Hospital: [facility]   Date: 10/23/2017   Hospital Day: 3       Subjective/Interval History:   I have reviewed the flowsheet and previous days notes. Seen earlier today on rounds. Reviewed the evaluation and will assist in implementing plan as outlined by Eugenio Jimenez and team    IMPRESSION:   1. Hematuria on Brilinta  2. Anemia from acute blood loss  3. CAD: 2017 S/p PCI/MONICA to  RCA. Plan for 2nd phase PCI to mid RCA in 4 weeks   4. NSTEMI (non-ST elevated myocardial infarction) ([facility] [address] (10/22/2017)  5. SVT @ 180 bpm +  RCA = NSTEMI.   re-attempt pending resolution of anemia and ARF. 6. Acute renal failure (ARF) ([facility] [facility]  7. H/o nephrolithiasis  8. Multiple stents  9. hydroureter  10. Diabetes  11. H/o hypertension  12. H/o perforated ulcer  13. H/o stroke  14. Pt is critically ill and at high risk of end organ dysfunction and failure  15. Critical care time with pt exclusive of procedures    35     minutes      RECOMMENDATIONS/PLAN:   1. CCU  2. Culture  3. IV vanco and cefepime  4. Transfuse to keep Hgb > 9 given NSTEMI and heart disease  5. Pressors prn  6. Glycemic control  7. DVT, SUP prophylaxis  8. Patient requiring restraints due to threat of injury to self, interference with medical devices. 9. Will be available to assist in medical management while in the CCU pending disposition     Risk of deterioration: high   [x] High complexity decision making was performed  [x] See my orders for details  Tubes:   [place]  ICU disposition :Unchanged. Code: Full Code        Subjective/Initial History:   I have reviewed the flowsheet and previous days notes. I was asked by Daniella Barrow MD to see Hussein Sewell in consultation for a chief complaint of severe sepsis and hematuria. Yokasta Karimi is a 79 y.o.    male who was admitted due to acute blood loss anemia, hematuria, acute renal failure, hypomagnesemia and elevated troponin. He had surgery at [facility] two weeks ago and transferred to [facility] for rehab. Patient has only been at the facility for 1 week prior to admission. He has prolonged [facility] s/p multiple stents last in 4/2017 with staghorn calculi and right hydrooureter. The patient is followed for a long history of stones. About 25 years ago bilateral large staghorn with bilateral percutaneous procedures. Had been doing well and was on periodic daily Bactrim versus Macrobid. He was hospitalized in April 2017 for perforated duodenal bulb ulcer. He had [place] at that time. Dr. Juárez Downs and perform cystoscopy and right retrograde and insertion of right stent. There was a narrowing of the distal right ureter found at that time. There he had initial remittance of bleeding but it returned 1wk ago. No BRB in urine, only dark clots. He notes it has been consistent. No LH/Dizzy with standing. Was able to participate in therapy. He notes no f/c/n/v/d. No change to appetite, no change to bowels. No hematuria/hemoptysis. On day of admssion he contacted staff for sensation of palpitations. 's by EMS. With vagal maneuver, no drugs, patient converted back to sinus rhythm. Has remained in this rhythm at ED at [address] He has hx of sinus tachycardia followed by Dr Mayra Dyson. Pt has MONICA of RCA in September and Is on brilinta. In ED work up showed anemia. Guaiac neg. UA has not been done yet. No US renal or bladder scan. Had SVT @ 180 bpm and was converted with adenosine. Trop bumped to 4.5. Known  RCA which Cardiology was planning to re-attempt but now on hold. No CP today. Pt seen by Urology and CBI started. Pt developed fever, hypotension and worsening anemia. Poor IV access. Pt now on IV abx. Poor IV access. PICC to be placed.     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 He also has no past medical history of Abuse; Anemia NEC; Arthritis; Asthma; Autoimmune disease ([address] Cancer ([address] Chronic pain; Congestive heart failure, unspecified; Contact dermatitis and other eczema, due to unspecified cause; COPD; Depression; GERD (gastroesophageal reflux disease); Headache; Liver disease; Psychotic disorder; Seizures ([address] Thromboembolus ([address] Thyroid disease; or Trauma. PSH:   has a past surgical history that includes endoscopy, colon, diagnostic (9/2/2008); heent; colorectal scrn; hi risk ind (1/15/2014); urological; and urological.   FHX: family history includes Diabetes in his father; Elevated Lipids in his mother and sister; Heart Disease in his brother and mother; Hypertension in his father, mother, and sister; Stroke in his maternal grandfather and paternal grandfather. SHX:  reports that he has quit smoking. He does not have any smokeless tobacco history on file. He reports that he does not drink alcohol or use illicit drugs. ROS:A comprehensive review of systems was negative except for that written in the HPI.     MAR reviewed and pertinent medications noted or modified as needed    Allergies   Allergen Reactions    Bactrim [Sulfamethoxazole-Trimethoprim] Nausea and Vomiting        MEDS:   Current Facility-Administered Medications   Medication    morphine injection 2 mg    0.9% sodium chloride infusion 250 mL    sodium chloride (NS) flush 5-10 mL    cefepime (MAXIPIME) 2 g in 0.9% sodium chloride (MBP/ADV) 100 mL    vancomycin (VANCOCIN) 2,250 mg in 0.9% sodium chloride 500 mL IVPB    [START ON 10/24/2017] vancomycin (VANCOCIN) 1250 mg in  ml infusion    metoprolol tartrate (LOPRESSOR) tablet 25 mg    atorvastatin (LIPITOR) tablet 40 mg    colesevelam (WELCHOL) tablet 1,250 mg    cycloSPORINE (RESTASIS) 0.05 % ophthalmic emulsion 1 Drop    gabapentin (NEURONTIN) capsule 300 mg    pantoprazole (PROTONIX) tablet 40 mg    ticagrelor (BRILINTA) tablet 90 mg    timolol (TIMOPTIC) 0.25% ophthalmic solution    acetaminophen (TYLENOL) tablet 650 mg    ondansetron (ZOFRAN) injection 4 mg    labetalol (NORMODYNE;TRANDATE) injection 10 mg    polyethylene glycol (MIRALAX) packet 17 g    0.9% sodium chloride infusion    insulin lispro (HUMALOG) injection    glucose chewable tablet 16 g    dextrose (D50W) injection syrg 12.5-25 g    glucagon (GLUCAGEN) injection 1 mg    sodium chloride (NS) flush 5-10 mL    sodium chloride (NS) flush 5-10 mL        Telemetry:    sinus tachycardia    Objective:     Vital Signs: Intake/Output: Intake/Output:   Visit Vitals    /58    Pulse (!) 123    Temp (!) 101 °F (38.3 °C)    Resp 24    Ht 5' 9\" (1.753 m)    Wt 94.3 kg (207 lb 14.3 oz)    SpO2 98%    BMI 30.7 kg/m2         O2 Device: Room air  O2 Flow Rate (L/min): 3 l/min    Wt Readings from Last 4 Encounters:   10/23/17 94.3 kg (207 lb 14.3 oz)   17 93.9 kg (207 lb)   17 93.9 kg (207 lb)   17 104.3 kg (230 lb)       Temp (24hrs), Av °F (37.8 °C), Min:97.3 °F (36.3 °C), Max:102.7 °F (39.3 °C)     Intake/Output Summary (Last 24 hours) at 10/23/17 1351  Last data filed at 10/23/17 1252   Gross per 24 hour   Intake          84790.9 ml   Output            11860 ml   Net           5652.9 ml     Last shift:      10/23 07 - 10/23 1900  In: 3302.9   Out: 2700 [Urine:2700]  Last 3 shifts: 10/21 1901 - 10/23 0700  In: 58663.8 [P.O.:440; I.V.:538.8]  Out: 11704 [Urine:87497]     Hemodynamics:    CO:    CI:    CVP:    SVR:   PAP Systolic: PAP Diastolic:    PVR:    WA31:        Ventilator Settings:      Mode Rate TV Press PEEP FiO2 PIP Min. Vent                              Physical Exam:    General: in no respiratory distress and acyanotic, alert, oriented times 3 and severely ill   HEENT: NCAT   Neck, Lymph: No abnormally enlarged lymph nodes. Chest: normal   Lungs: normal air entry   Heart: Regular rate and rhythm or S1S2 present   Abdomen: soft, non-tender, without masses or organomegaly   :    Extremity: negative, clubbing;     Neuro: alert   Psych: oriented to time, place and person   Skin: Pale;   Pulses: Bilateral, Radial, 1+ Diffusely decreased peripheral pulses   Capillary refill: abnormal:  sluggish capillary refill, pale;    Data:   Interval lab and diagnostic data was reviewed. Interval radiology images were independently viewed and available reports were reviewed. All lab results for the last 24 hours reviewed. Labs:    Recent Labs      10/22/17   2212  10/22/17   0426  10/21/17   1255   WBC   --   10.0  10.4   HGB  7.6*  7.3*  7.9*   PLT   --   391  358     Recent Labs      10/22/17   0426  10/21/17   1255   NA  134*  135*   K  4.4  4.5   CL  105  104   CO2  23  23   GLU  114*  115*   BUN  19  21*   CREA  1.43*  1.83*   CA  8.3*  8.3*   MG  3.2*  1.3*   PHOS  3.5   --    ALB  2.4*  2.6*   SGOT  23  13*   ALT  14  16     No results for input(s): PH, PCO2, PO2, HCO3, FIO2 in the last 72 hours.   Recent Labs      10/22/17   0426  10/21/17   2047  10/21/17   1255   CPK  87  117  89   CKNDX  11.6*  12.4*  6.7*   TROIQ  2.50*  4.54*  1.12*     No results found for: BNPP, BNP   Lab Results   Component Value Date/Time    Culture result: NO GROWTH 1 DAY 10/22/2017 04:26 AM    Culture result: NO GROWTH 1 DAY 07/30/2017 02:51 AM    Culture result: ENTEROCOCCUS SPECIES 05/26/2017 06:49 PM     Lab Results   Component Value Date/Time    CK 87 10/22/2017 04:26 AM     10/21/2017 08:47 PM     Lab Results   Component Value Date/Time    Color YELLOW/STRAW 10/22/2017 04:26 AM    Appearance HAZY 10/22/2017 04:26 AM    Specific gravity 1.020 10/22/2017 04:26 AM    pH (UA) 7.0 10/22/2017 04:26 AM    Protein >300 10/22/2017 04:26 AM    Glucose NEGATIVE  10/22/2017 04:26 AM    Ketone NEGATIVE  10/22/2017 04:26 AM    Bilirubin NEGATIVE  10/22/2017 04:26 AM    Blood LARGE 10/22/2017 04:26 AM    Urobilinogen 0.2 10/22/2017 04:26 AM    Nitrites NEGATIVE  10/22/2017 04:26 AM    Leukocyte Esterase SMALL 10/22/2017 04:26 AM    WBC 5-10 10/22/2017 04:26 AM    RBC >100 10/22/2017 04:26 AM    Bacteria 1+ 10/22/2017 04:26 AM       No results found for: IRON, FE, TIBC, IBCT, PSAT, FERR  Lab Results   Component Value Date/Time    TSH 0.81 10/22/2017 04:26 AM      No results found for: TOXA1, RPR, HBCM, HBSAG, HAAB, HCAB1, HAAT, G6PD, CRYAC, HIVGT, HIVR, HIV1, HIV12, HIVPC, HIVRPI      Imaging:  I have personally reviewed the patients radiographs and have reviewed the reports:          Results from Hospital Encounter encounter on 10/21/17   XR CHEST PORT   Narrative EXAM:  XR CHEST PORT    INDICATION: Acute renal failure, non-ST elevated myocardial infarction. Evaluation for Sepsis    COMPARISON:  10/21/2017    FINDINGS: A portable AP radiograph of the chest was obtained at 1210 hours. The  patient is on a cardiac monitor. The lungs are clear. The cardiac and  mediastinal contours and pulmonary vascularity are normal.  The bones and soft  tissues are grossly within normal limits. Impression IMPRESSION: No acute cardiopulmonary process seen              Results from Hospital Encounter encounter on 10/21/17   CT ABD PELV WO CONT   Narrative EXAM:  CT ABD PELV WO CONT    INDICATION: per urology - US showed severe hydronephrosis - need to know level  and compare with all prior to determine acute or chronic. Further comment on  bladder and whether full and possible clot to determine OR or not.  Thank you    COMPARISON: CT of 5/15/2017, ultrasound of 10/21/2017    CONTRAST:  None. TECHNIQUE:   Thin axial images were obtained through the abdomen and pelvis. Coronal and  sagittal reconstructions were generated. Oral contrast was not administered. CT  dose reduction was achieved through use of a standardized protocol tailored for  this examination and automatic exposure control for dose modulation. The absence of intravenous contrast material reduces the sensitivity for  evaluation of the solid parenchymal organs of the abdomen. FINDINGS:   LUNG BASES: No acute airspace disease or pleural fluid. Bibasilar linear  scarring. Elevation of the right hemidiaphragm, chronic. INCIDENTALLY IMAGED HEART AND MEDIASTINUM: Coronary artery calcification and  stents. LIVER: No mass or biliary dilatation. GALLBLADDER: Cholelithiasis. SPLEEN: No mass. PANCREAS: No mass or ductal dilatation. ADRENALS: Unremarkable. KIDNEYS/URETERS:   1. Severe right hydroureteronephrosis and diffuse right renal cortical thinning. Several right lower pole renal calculi layered in the dependent portion of the  intrarenal collecting system, nonobstructing. Mild thickening of the wall of the  ureter. Diffuse dilatation of the ureter to the level of the urinary bladder,  with no ureteral calculi demonstrated. Right hydronephrosis is chronic but more  severe than on prior study when a right ureteral stent is present. 2. Moderate left hydroureteronephrosis with dilatation of the left ureter to the  level of the urinary bladder, without left renal or ureteral calculus. Left  hydronephrosis is new since previous study. 3. Bilateral renal vascular calcification. STOMACH: Unremarkable. SMALL BOWEL: No dilatation or wall thickening. COLON: No dilatation or wall thickening. APPENDIX: Unremarkable. PERITONEUM: No ascites or pneumoperitoneum. RETROPERITONEUM: No lymphadenopathy or aortic aneurysm. Aorta densely calcified. REPRODUCTIVE ORGANS: Mild prostatic enlargement.  Suspect prior TURP. URINARY BLADDER: Mildly distended. Diffusely thick-walled. No stones or clot  demonstrated in urinary bladder. BONES: Diffuse degenerative changes in the spine. ADDITIONAL COMMENTS: N/A         Impression IMPRESSION:  1. Chronic right hydronephrosis with right cortical thinning. Right  hydronephrosis more severe than in May, 2017, when a ureteral stent was present. Diffuse right ureteral dilatation without obstructing stone. Nonobstructing  right lower pole renal calculi. 2. Moderate left hydroureteronephrosis to the level of the urinary bladder, new  since May, 2017. 3. Urinary bladder mildly distended, with diffuse thickening of the wall. No  clot or stone demonstrated in the bladder. 4. Cholelithiasis. My assessment/plan was discussed with:  nursing    respiratory therapy Dr. oneil      I have provided   35    minutes of critical care time rendering care exclusive of any procedures. During this entire length of time I was immediately available to the patient.      The reason for providing this level of medical care was due to a critical illness that impaired one or more vital organ systems, such that there was a high probability of imminent or life threatening deterioration in the patient's condition. Pt's condition is unstable and unpredictable. This care involved high complexity decision making which includes reviewing the patient's past medical records, current laboratory results, medications that were immediately available to me and actual Xray films in order to assess, support vital system function, and to treat this degree of vital organ system failure, and to prevent further life threatening deterioration of the patients condition. Thank you for allowing us to participate in the care of this patient. We will be happy to follow along with you.     Melchor Phillips MD

## 2017-10-23 NOTE — PROGRESS NOTES
0030-MD notified due to pt increase in pain. Morphine 2mg q 4 hr PRN ordered. 108 AM-Pt MEWS score of 5 due to increase HR and low BP. BP increased on recheck. PT has hemoglobin of 7.6; charge nurse ok with low BP. Pt is asymptomatic. RN will continue to monitor. 400 AM-MEWS score of 3 due to increase HR. Pt is asymptomatic. BP continues to increase.

## 2017-10-23 NOTE — PROGRESS NOTES
Spiritual Care Assessment/Progress Notes    Hyun Hughes [id number]  [id number]    1950  79 y.o.  male    Patient Telephone Number: [phone] (home)   Shinto Affiliation: No Mandaeism   Language: English   Extended Emergency Contact Information  Primary Emergency Contact: Anuel Phone: [phone]  Mobile Phone: [phone]  Relation: Other Relative   Patient Active Problem List    Diagnosis Date Noted    NSTEMI (non-ST elevated myocardial infarction) ([facility] 10/22/2017    S/P coronary artery stent placement 07/07/2017    Angina, class III ([address] 07/07/2017    Debilitated 07/07/2017    Abnormality of gait as late effect of cerebrovascular accident (CVA) 07/07/2017    Unstable angina ([address] 07/06/2017    SVT (supraventricular tachycardia) ([facility] 04/19/2017    Perforated duodenal bulb ulcer ([facility] 04/18/2017    Hyponatremia 04/18/2017    Perforated viscus 04/17/2017    Hydroureter on right 04/17/2017    History of stroke 03/03/2017    Acute renal failure (ARF) ([address] 03/01/2017    Hyperkalemia 03/01/2017    Hypertension 12/14/2009    Hyperlipidemia 12/14/2009    DM (diabetes mellitus) ([facility] 12/14/2009    H/O 12/14/2009    Recurrent UTI 12/14/2009    Nephrolithiasis 12/14/2009        Date: 10/23/2017       Level of Shinto/Spiritual Activity:  []         Involved in marielle tradition/spiritual practice    []         Not involved in marielle tradition/spiritual practice  []         Spiritually oriented    []         Claims no spiritual orientation    []         seeking spiritual identity  []         Feels alienated from Latter-day practice/tradition  []         Feels angry about Latter-day practice/tradition  []         Spirituality/Latter-day tradition a resource for coping at this time.   [x]         Not able to assess due to medical condition    Services Provided Today per family:  []         crisis intervention    []         reading Scriptures  [x] spiritual assessment    []         prayer  [x]         empathic listening/emotional support  []         rites and rituals (cite in comments)  []         life review     []         Restoration support  []         theological development    []         advocacy  []         ethical dialog     []         blessing  []         bereavement support    [x]         support to family  []         anticipatory grief support   []         help with AMD  []         spiritual guidance    []         meditation      Spiritual Care Needs  []         Emotional Support  []         Spiritual/Moravian Care  []         Loss/Adjustment  []         Advocacy/Referral                /Ethics  [x]         No needs expressed at               this time  []         Other: (note in               comments)  Spiritual Care Plan  []         Follow up visits with               pt/family  []         Provide materials  []         Schedule sacraments  []         Contact Community               Clergy  [x]         Follow up as needed  []         Other: (note in               comments)     Comments: Initial visit with patient and sister in 2518. Introduced self and explained role of chaplains in the hospital. Patient was not able to engage in visit at this time but patient's sister was present at bedside. Sister shared that she, the patient, and their other brother are all that remains of their family. Provided active listening as she described the patient's difficult last year with multiple health stressors and obstacles. Sister is especially concerned with his recent decline in condition and is focused on receiving a medical update. Family has no specific spiritual needs at this time but stated their appreciation for  visit. Advised of  availability and assured of ongoing support as needed and desired. Chaplain Carrie Valle M.Div.    Paging Service [phone])

## 2017-10-23 NOTE — PROGRESS NOTES
PICC (Peripherally Inserted Central Catheter) line insertion  procedure note :     Procedure explained to patient along with risks and benefits and patient agreed to proceed. Informed consent obtained from Patient. Patient teaching completed. Timeout completed. Pre-procedure assessment done. Maximum sterile barrier precautions observed throughout procedure. Lidocaine 1%  3.0    ml sq given prior to cannulation. Cannulated Brachial  vein using ultrasound guidance and modified seldinger technique. Inserted 6  [place] triple  lumen PICC to Left arm using [employer] Tip Location System and  [address]. Pt has  sinus   rhythm. PICC tip location was confirmed by 3 CG tip positioning system, indicating tall P wave and no negative deflection before P wave which would indicate that the PICC tip is properly placed in the distal SVC or at the [place]. PICC tip location was  confirmed by 2 PICC nurses and 3CG printout placed on patient's chart. Blood return verified and flushed with 20 ml normal saline in each port. Sterile dressing applied with biopatch, statLock and occlusive dressing as per protocol. Curos caps applied to each port. Patient tolerated procedure well with minimal blood loss ( less than 5 ml.)   Patient education material provided. PICC procedure performed by  : Flor Gonzales RN. PICC nurse  Assisted by : Gildardo Rausch RN  PICC nurse  Reason for access :Poor vascular access  Complications related to insertion  : none  X-Ray : not applicable  Notified primary nurse Oly Ferraro RN, that  PICC line can be used.    Total Trimmed Length :  45  cm   External Length : 0  cm   PICC line site arm circumference:  30   cm   PICC catheter occupies  23 % of vein  Type of PICC: Bard Solo Power PICC   Ref # :    [id number]     Lot # :  [id number]   Expiration Date : 10/31/2018    Flor Gonzales [address], PICC Nurse, Vascular Access Team.

## 2017-10-23 NOTE — PROGRESS NOTES
No clot problems overnight. No manual irrigation needed. Urine almost clear on slow CBI. REC:  He can eat. Continue CBI.   Dr. Ashli Sheikh to see

## 2017-10-23 NOTE — PROGRESS NOTES
1011 Pre-transfusion vitals: T 102.7, HR 140s, /72. Pt was warm to touch, endorses chills. PO Tylenol given. 1030 Blood tx with no problem. Repeat vitals: T 102.5, , BP 91/47, 92/55. Dr Pablo Card paged for possible sepsis. Pt post 3way insertion with ongoing CBI. [address] Pablo Card in to see pt; orders noted including ICU tx. Urology also paged per attending. 2101 [facility] Urology RN, update given. 200 Pt enroute to CCU for closer monitoring.

## 2017-10-23 NOTE — PROGRESS NOTES
[address] [place] [address]  [phone]      Cardiology Progress Note      10/23/2017 1230 PM    Admit Date: 10/21/2017    Admit Diagnosis:   Acute renal failure (ARF) (HCC)    Subjective:     Regulo Fowler is a 79 y.o. male with PMH DM, HTN, CVA, SVT, CAD s/p stent who was admitted for SKYLER. Overnight events:  -patient transferred to ICU due to fevers of 102, tachycardia, and hypotension. -H/H low and patient receiving PRBCs  -Mr. Clark states he is feeling well today and feels no different than yesterday. He denies chest pain, SOB, palpitations.       Visit Vitals    BP 96/42    Pulse (!) 101    Temp 99.3 °F (37.4 °C)    Resp 22    Ht 5' 9\" (1.753 m)    Wt 94.3 kg (207 lb 14.3 oz)    SpO2 95%    BMI 30.7 kg/m2       Current Facility-Administered Medications   Medication Dose Route Frequency    morphine injection 2 mg  2 mg IntraVENous Q4H PRN    0.9% sodium chloride infusion 250 mL  250 mL IntraVENous PRN    sodium chloride (NS) flush 5-10 mL  5-10 mL IntraVENous PRN    cefepime (MAXIPIME) 2 g in 0.9% sodium chloride (MBP/ADV) 100 mL  2 g IntraVENous Q12H    vancomycin (VANCOCIN) 2,250 mg in 0.9% sodium chloride 500 mL IVPB  2,250 mg IntraVENous ONCE    [START ON 10/24/2017] vancomycin (VANCOCIN) 1250 mg in  ml infusion  1,250 mg IntraVENous Q18H    [START ON 10/24/2017] finasteride (PROSCAR) tablet 5 mg  5 mg Oral DAILY    [START ON 10/24/2017] tamsulosin (FLOMAX) capsule 0.4 mg  0.4 mg Oral DAILY    metoprolol tartrate (LOPRESSOR) tablet 25 mg  25 mg Oral Q12H    atorvastatin (LIPITOR) tablet 40 mg  40 mg Oral QHS    colesevelam (WELCHOL) tablet 1,250 mg  1,250 mg Oral BID WITH MEALS    cycloSPORINE (RESTASIS) 0.05 % ophthalmic emulsion 1 Drop  1 Drop Both Eyes BID    gabapentin (NEURONTIN) capsule 300 mg  300 mg Oral Q12H    pantoprazole (PROTONIX) tablet 40 mg  40 mg Oral BID    ticagrelor (BRILINTA) tablet 90 mg  90 mg Oral Q12H    timolol (TIMOPTIC) 0.25% ophthalmic solution  1 Drop Both Eyes BID    acetaminophen (TYLENOL) tablet 650 mg  650 mg Oral Q4H PRN    ondansetron (ZOFRAN) injection 4 mg  4 mg IntraVENous Q4H PRN    labetalol (NORMODYNE;TRANDATE) injection 10 mg  10 mg IntraVENous Q2H PRN    polyethylene glycol (MIRALAX) packet 17 g  17 g Oral DAILY    0.9% sodium chloride infusion  75 mL/hr IntraVENous CONTINUOUS    insulin lispro (HUMALOG) injection   SubCUTAneous AC&HS    glucose chewable tablet 16 g  4 Tab Oral PRN    dextrose (D50W) injection syrg 12.5-25 g  12.5-25 g IntraVENous PRN    glucagon (GLUCAGEN) injection 1 mg  1 mg IntraMUSCular PRN    sodium chloride (NS) flush 5-10 mL  5-10 mL IntraVENous Q8H    sodium chloride (NS) flush 5-10 mL  5-10 mL IntraVENous PRN       Objective:      Physical Exam:  General Appearance:  ill-appearing  male resting in bed in NAD   Chest:   diminished   Cardiovascular:  rapid rate and rhythm, no murmur.   Abdomen:   Soft, non-tender, bowel sounds are active.   Extremities: RLE cool;  Distal pulses weak; No edema   Skin:  Warm and dry except for RLE cool. Pale.       Data Review:   Recent Labs      10/23/17   1536  10/22/17   2212  10/22/17   0426  10/21/17   1255   WBC   --    --   10.0  10.4   HGB  6.2*  7.6*  7.3*  7.9*   HCT  18.6*  23.3*  22.8*  25.2*   PLT   --    --   391  358     Recent Labs      10/22/17   0426  10/21/17   1255   NA  134*  135*   K  4.4  4.5   CL  105  104   CO2  23  23   GLU  114*  115*   BUN  19  21*   CREA  1.43*  1.83*   CA  8.3*  8.3*   MG  3.2*  1.3*   PHOS  3.5   --    ALB  2.4*  2.6*   TBILI  0.8  0.5   SGOT  23  13*   ALT  14  16       Recent Labs      10/22/17   0426  10/21/17   2047  10/21/17   1255   TROIQ  2.50*  4.54*  1.12*   CPK  87  117  89   CKMB  10.1*  14.5*  6.0*         Intake/Output Summary (Last 24 hours) at 10/23/17 1624  Last data filed at 10/23/17 1500   Gross per 24 hour   Intake          93802.9 ml   Output            01199 ml Net           7252.9 ml        Telemetry: ST 130s  EKG: ST   Cxray: no acute process      Assessment:     Active Problems:    Acute renal failure (ARF) (HCC) (3/1/2017)      SVT (supraventricular tachycardia) ([facility] (4/19/2017)      Overview: Approximately 220 BPM on tele 4/19/17      Recurrent at 160 BPM on tele 7/7/17      NSTEMI (non-ST elevated myocardial infarction) ([facility] (10/22/2017)      Hematuria (10/23/2017)      Anemia (10/23/2017)        Plan:     NSTEMI:  Recent MONICA 07/17 in pRCA  with another  in distal RCA not opened, and SVT on arrival.  Peak troponin 4.54. Patient without cardiac complaints. Remains ST.    · Stent in place for just over 3 months, continue brilinta, BB, statin  · Will decrease BB and add hold parameters due to hypotension. · Plan for revisit of  at a later date      SVT: resolved. Patient now ST in setting of anemia and fevers. · Continue BB, but decreased dose due to hypotension  · Continue IVF and treatment of underlying process - per hospitalist and intensivist.       Anemia/hematuria:  H/H continues to be low despite transfusions. Patient on brilinta for recent stent as above. Patient with recent cysto with interventions. · Continue Brilinta for recent stent. · Urology following      SKYLER: creat down to 1.43 yesterday. No creat this morning. · ACEi on hold    Reese Cullen, ANAYELI  DNP, RN, [facility]    Patient seen and examined with nurse practitioner Reese Cullen. I agree with the assessment and plan as noted. Due to ongoing severe blood loss with hemoglobin now at 6, I will hold Brilinta tonight, reassess at 11 AM tomorrow whether we can restart. There is a small risk of stent thrombosis at just over 3 months of dual antiplatelet therapy. Fortunately it is less in this second generation Synergy stent with bioabsorbable polymer.

## 2017-10-23 NOTE — PROGRESS NOTES
TRANSFER - IN REPORT:    Verbal report received from Joi(name) on Rolf Clark  being received from Telemetry(unit) for routine progression of care      Report consisted of patients Situation, Background, Assessment and   Recommendations(SBAR). Information from the following report(s) Intake/Output, MAR, Recent Results and Cardiac Rhythm ST was reviewed with the receiving nurse. Opportunity for questions and clarification was provided. Assessment completed upon patients arrival to unit and care assumed. Report initially given to Tania Swartz from [facility], patient now in room. Report received. Primary Nurse Michael Iyer, RN and GUERLINE Willoughby, RN, RN performed a dual skin assessment on this patient Impairment noted- see wound doc flow sheet. aSacrum pink, but blanches.   Pako score is 14

## 2017-10-23 NOTE — PROGRESS NOTES
Pharmacy Automatic Renal Dosing Protocol - Antimicrobials    Indication for Antimicrobials: UTI    Current Regimen of Each Antimicrobial:  Cefepime 2 gm IV every 8 hours (Started 10/23/17; Day #1)  Vancomycin dosed by pharmacy (Started 10/23/17; Day #1)    Previous Antimicrobial Therapy:  None    Goal Level: VANCOMYCIN TROUGH GOAL RANGE  Vancomycin Trough: 10 - 15 mcg/mL    Significant Cultures:   10/22/17 Urine culture = Results pending    Paralysis, amputations, malnutrition: N/A    Labs:  Recent Labs      10/22/17   0426  10/21/17   1255   CREA  1.43*  1.83*   BUN  19  21*   WBC  10.0  10.4     Temp (24hrs), Av.8 °F (37.7 °C), Min:97.3 °F (36.3 °C), Max:102.7 °F (39.3 °C)    Creatinine Clearance (mL/min) or Dialysis: 50 mL/min (IBW)    Impression/Plan:   - Based on renal function and indication, cefepime dose adjusted to 2 gm IV every 12 hours. - Based on weight, renal function, and indication, vancomycin loading dose of 2250 mg IV once ordered and vancomycin maintenance dose of 1250 mg IV every 18 hours ordered. Pharmacy will follow daily and adjust medications as appropriate for renal function and/or serum levels.     Thank you,  Deb Marie, PHARMD

## 2017-10-23 NOTE — PROGRESS NOTES
TRANSFER - OUT REPORT:    Verbal report given to [facility], RN(name) on Rolf Clark  being transferred to CCU(unit) for change in patient condition(Possible Sepsis, hypotensive, Tachacardic, febrile))       Report consisted of patients Situation, Background, Assessment and   Recommendations(SBAR). Information from the following report(s) SBAR, Kardex, Intake/Output, MAR, Recent Results and Cardiac Rhythm ST was reviewed with the receiving nurse. Lines:   Peripheral IV 10/21/17 Right Antecubital (Active)   Site Assessment Clean, dry, & intact 10/23/2017  4:30 AM   Phlebitis Assessment 0 10/23/2017  4:30 AM   Infiltration Assessment 0 10/23/2017  4:30 AM   Dressing Status Clean, dry, & intact 10/23/2017  4:30 AM   Dressing Type Tape;Transparent 10/23/2017  4:30 AM   Hub Color/Line Status Pink;Flushed 10/23/2017  4:30 AM   Action Taken Blood drawn 10/21/2017 12:21 PM        Opportunity for questions and clarification was provided.       Patient transported with:   Monitor  Registered Nurse  Tech

## 2017-10-23 NOTE — PROGRESS NOTES
Assessment completed, unit of blood remains infusing. No changes noted from initial impression, oriented but slow to answer at times. Bladder irrigation going with pink to clear urine noted. Unable to get any blood peripherally. 26  Dr. Homer Kim in, discussed lack of IV sites and need for cultures and lactic acid levels. EKG completed. NP for [facility] here. 1250  Blood transfusion completed, fluid challenge started. Still unable to obtain blood for cultures. Due to on Brilinta, to get culture out of PICC line. 1630  Lab results shown to Dr. Homer Kim.  1750  First of 2 units of PRBC hung. Family in room and aware. Refused dinner, states doesn't feel well. Will re-check Hgb after 6 hours. 1920  BP down during report, bolus of saline started by Jane, Blood transfusion increased to 150cc/hr.

## 2017-10-24 NOTE — PROGRESS NOTES
UROLOGY    Discussed status with sister. No pain complaint when still. No change LLQ tenderness. Diallo is clear to light tea color. Suspect air in extraperitoneal space anterior to bladder LLQ is from manual clot irrigation and bladder rupture. Plan is to keep diallo. If draining well and clinical improvement then diallo for 10-14 days. If deteriorates then bilat perc tubes to divert. Hold Brilinta and aspirin as long as possible to avoid recurrent hematuria.

## 2017-10-24 NOTE — CONSULTS
[facility] Consult Note        NAME: Michael Maloney       :  1950       MRN:  [id number]     Date/Time: 10/24/2017    Risk of deterioration: high       Assessment:    Plan:  SKYLER/acidosis  Bladder Rupture  Anemia  SVT  Hx of recent DU repair (wood)  Hx of complicated urologic hx-multiple stenting procedures/stones/hydrouretere   Ct showing perf bladder- not bowel  CBI has been discontinued  Pt has been placed on a bicarb gtt for his skyler/acidosis/ albumin ordered / pressors prn  Last bmp slightly improved from earlier  No acute need for hd-but did discuss with pt prior to ct that he may need it-if so, he agreed. Critically ill and at further risk of deterioration  Couldn't distinguish actual uop with cbi-   Asked to see for ARF-pt follows with dr. Margaret Amezcua in the outpatient setting    Subjective:     Chief Complaint:  Abdominal pain     Review of Systems: pt with cbi, hematuria/clots-followed by urology, notes reviewed. Developed worsening pain with fever, abd distention    Objective:     VITALS:   Last 24hrs VS reviewed since prior progress note.  Most recent are:  Visit Vitals    BP 92/50    Pulse (!) 176    Temp 97.6 °F (36.4 °C)    Resp 23    Ht 5' 9\" (1.753 m)    Wt 92.8 kg (204 lb 9.4 oz)    SpO2 97%    BMI 30.21 kg/m2     SpO2 Readings from Last 6 Encounters:   10/24/17 97%   17 99%   17 98%   07/10/17 95%   17 96%   17 98%    O2 Flow Rate (L/min): 3 l/min     Intake/Output Summary (Last 24 hours) at 10/24/17 1229  Last data filed at 10/24/17 0700   Gross per 24 hour   Intake          9236.64 ml   Output             3050 ml   Net          6186.64 ml        Telemetry Reviewed    PHYSICAL EXAM:    General   WD chronically ill, toxic appearing wm  EENT  Nc, at, eomi  Respiratory   No rales, shallow resp  Cardiology  Tachy on exam  Abdominal  Firm, dist  Extremities  No edema              Lab Data Reviewed: (see below)    Medications Reviewed: (see below)    PMH/SH reviewed - no change compared to H&P  ___________________________________________________  __________________________________________________    Attending Physician: Sonido Alejandra MD     ____________________________________________________  MEDICATIONS:  Current Facility-Administered Medications   Medication Dose Route Frequency    insulin lispro (HUMALOG) injection   SubCUTAneous Q6H    metoprolol (LOPRESSOR) injection 5 mg  5 mg IntraVENous Q6H    pantoprazole (PROTONIX) 40 mg in sodium chloride 0.9 % 10 mL injection  40 mg IntraVENous Q12H    sodium bicarbonate (8.4%) 150 mEq in sterile water 1,000 mL infusion   IntraVENous CONTINUOUS    albumin human 25% (BUMINATE) solution 12.5 g  12.5 g IntraVENous Q6H    sodium bicarbonate 8.4 % (1 mEq/mL) injection        cefepime (MAXIPIME) 2 g in 0.9% sodium chloride (MBP/ADV) 100 mL  2 g IntraVENous Q24H    [START ON 10/25/2017] VANCOMYCIN RANDOM LEVEL  1 Each Other ONCE    VANCOMYCIN INFORMATION NOTE   Other DAILY    amiodarone (CORDARONE) 900 mg in dextrose 5% 250 mL infusion  1 mg/min IntraVENous CONTINUOUS    amiodarone (CORDARONE) 150 mg in dextrose 5% 100 mL bolus infusion  150 mg IntraVENous NOW    amiodarone (CORDARONE) 900 mg/250 ml D5W infusion  0.5 mg/min IntraVENous CONTINUOUS    morphine injection 2 mg  2 mg IntraVENous Q4H PRN    0.9% sodium chloride infusion 250 mL  250 mL IntraVENous PRN    sodium chloride (NS) flush 5-10 mL  5-10 mL IntraVENous PRN    0.9% sodium chloride infusion 250 mL  250 mL IntraVENous PRN    mupirocin (BACTROBAN) 2 % ointment   Both Nostrils Q12H    NOREPINephrine (LEVOPHED) 8,000 mcg in dextrose 5% 250 mL infusion  2-16 mcg/min IntraVENous TITRATE    cycloSPORINE (RESTASIS) 0.05 % ophthalmic emulsion 1 Drop  1 Drop Both Eyes BID    timolol (TIMOPTIC) 0.25% ophthalmic solution  1 Drop Both Eyes BID    acetaminophen (TYLENOL) tablet 650 mg  650 mg Oral Q4H PRN    ondansetron (ZOFRAN) injection 4 mg  4 mg IntraVENous Q4H PRN    labetalol (NORMODYNE;TRANDATE) injection 10 mg  10 mg IntraVENous Q2H PRN    glucose chewable tablet 16 g  4 Tab Oral PRN    dextrose (D50W) injection syrg 12.5-25 g  12.5-25 g IntraVENous PRN    glucagon (GLUCAGEN) injection 1 mg  1 mg IntraMUSCular PRN    sodium chloride (NS) flush 5-10 mL  5-10 mL IntraVENous Q8H    sodium chloride (NS) flush 5-10 mL  5-10 mL IntraVENous PRN        LABS:  Recent Labs      10/24/17   0530  10/23/17   2353  10/23/17   1536   WBC  15.7*   --   12.9*   HGB  9.0*  8.7*  6.1*  6.2*   HCT  26.7*  25.5*  18.8*  18.6*   PLT  263   --   306     Recent Labs      10/24/17   0953  10/24/17   0530  10/23/17   1536  10/22/17   0426  10/21/17   1255   NA  141  139  138  134*  135*   K  4.9  5.1  5.0  4.4  4.5   CL  113*  115*  113*  105  104   CO2  16*  15*  19*  23  23   BUN  32*  32*  26*  19  21*   CREA  3.15*  3.21*  2.58*  1.43*  1.83*   GLU  82  98  94  114*  115*   CA  7.7*  7.9*  7.2*  8.3*  8.3*   MG   --    --    --   3.2*  1.3*   PHOS   --    --    --   3.5   --      Recent Labs      10/22/17   0426  10/21/17   1255   SGOT  23  13*   ALT  14  16   AP  67  71   TBILI  0.8  0.5   TP  8.0  8.5*   ALB  2.4*  2.6*   GLOB  5.6*  5.9*     Recent Labs      10/24/17   0953   INR  1.4*   PTP  14.6*      No results for input(s): FE, TIBC, PSAT, FERR in the last 72 hours. No results for input(s): PH, PCO2, PO2 in the last 72 hours.   Recent Labs      10/22/17   0426  10/21/17   2047  10/21/17   1255   CPK  87  117  89   CKNDX  11.6*  12.4*  6.7*   TROIQ  2.50*  4.54*  1.12*     Lab Results   Component Value Date/Time    Glucose (POC) 88 10/24/2017 07:53 AM    Glucose (POC) 97 10/23/2017 11:36 PM    Glucose (POC) 92 10/23/2017 04:57 PM    Glucose (POC) 106 10/23/2017 12:22 PM    Glucose (POC) 125 10/23/2017 07:51 AM

## 2017-10-24 NOTE — PROGRESS NOTES
UROLOGY      Afeb  WBC  15.7  Hgb  9.0  Cr  3.2    Urine C&S  Neg  Blood C&S Pend  Diallo is clear on slow 3 way drip--difficult to calculate true urine output with CBI. No penis/scrotal swelling--diallo appears to be positioned appropriately. Try to continue to slow bladder irrigant. Flomax and finasteride restarting today.

## 2017-10-24 NOTE — PROGRESS NOTES
Interdisciplinary team rounds were held 10/24/2017  with the following team members:Care Management, Diabetes Treatment Specialist, Nursing, Nutrition, Pharmacy, Physician, Respiratory Therapy and Clinical Coordinator. Plan of care discussed. Goal: replenish electrolytes, CT of abdomen, adjust IV fluid and medications, continue to monitor and support. See MD orders and progress notes for further  interventions and desired outcomes.

## 2017-10-24 NOTE — CONSULTS
Surgery      CT reviewed with Radiologist and process appears to be related to the bladder, was not seen on recent CT several days ago, ? Process is extraperitoneal, ? Bladder fistula, hematoma, some air in the process. No evidence of bowel involvement. Will attempt to contact urology. Stop CBI until Urology sees CT and further evaluates.     Colin Mcneil MD, [facility]

## 2017-10-24 NOTE — PROGRESS NOTES
[facility] INTENSIVIST Consult Service Note  Pulmonary, Critical Care, and Sleep Medicine    Name: Ryan Paredes MRN: [id number]   : 1950 Hospital: [facility]   Date: 10/24/2017   Hospital Day: 4       Subjective/Interval History:   I have reviewed the flowsheet and previous days notes. Seen earlier today on rounds. Reviewed the evaluation and will assist in implementing plan as outlined by Abbe Catherine and team    10/24 severe abdominal pain overnight. Pt very stoic. No more bleeding? Hgb Stable. Creatinine higher. Peritoneal signs on exam. No fever. Called sister José Up to get more info and discuss management [phone]. CT scan ordered without contrast to look for free air. Multiple calls made to consultants as below. IMPRESSION:   1. Hematuria on Brilinta; has stopped with CBI but still with clot on Ct scan  2. Acute peritonitis- source- GI not likely given scan; collection outside bladder with air pockets outside left distal ureter  3. Anemia from acute blood loss  4. CAD: 2017 S/p PCI/MONICA to  RCA. Plan for 2nd phase PCI to mid RCA in 4 weeks   5. NSTEMI (non-ST elevated myocardial infarction) ([facility] [address] (10/22/2017)  6. SVT @ 180 bpm +  RCA = NSTEMI.   re-attempt pending resolution of anemia and ARF. 7. Acute renal failure (ARF) ([address]- worse  8. H/o nephrolithiasis  9. Multiple stents in past  10. hydroureter  11. Diabetes  12. H/o hypertension  13. H/o perforated ulcer- seen by Dr. Katie France and repaired by  2017  14. H/o stroke  15. Pt is critically ill and at high risk of end organ dysfunction and failure  16. Critical care time with pt exclusive of procedures    35     minutes      RECOMMENDATIONS/PLAN:   1. CCU  2. Agnieszka Hellen stopped for now until we know if any procedure might be needed  3. Ct abdomen no contrast- done and reviewed  4. General surgery consult for peritonitis- appreciate Dr. Grace Correa help  5.  Discussed with radiology and   6. Nephrology consult- followed by Dr. Lori Holt- spoke with Dr. Laura Hernandez  7. GI consulted but no sign of active PUD  8. IV vanco and cefepime  9. Transfuse prn to keep Hgb > 9 given NSTEMI and heart disease  10. Pressors prn  11. Glycemic control  12. DVT, SUP prophylaxis. 13. Will be available to assist in medical management while in the CCU pending disposition     Risk of deterioration: high   [x] High complexity decision making was performed  [x] See my orders for details  Tubes:   Amaya  ICU disposition :Unchanged. Code: Full Code        Subjective/Initial History:   I have reviewed the flowsheet and previous days notes. I was asked by Hector Renee MD to see Michelle Salazar in consultation for a chief complaint of severe sepsis and hematuria. Jocelin Diehl is a 79 y.o.  male who was admitted due to acute blood loss anemia, hematuria, acute renal failure, hypomagnesemia and elevated troponin. He had surgery at [facility] two weeks ago and transferred to [facility] for rehab. Patient has only been at the facility for 1 week prior to admission. He has prolonged history with [facility] s/p multiple stents last in 4/2017 with staghorn calculi and right hydrooureter. The patient is followed for a long history of stones. About 25 years ago bilateral large staghorn with bilateral percutaneous procedures. Had been doing well and was on periodic daily Bactrim versus Macrobid. He was hospitalized in April 2017 for perforated duodenal bulb ulcer. He had [place] at that time. Dr. Son Paci and perform cystoscopy and right retrograde and insertion of right stent. There was a narrowing of the distal right ureter found at that time. There he had initial remittance of bleeding but it returned 1wk ago. No BRB in urine, only dark clots. He notes it has been consistent. No LH/Dizzy with standing. Was able to participate in therapy. He notes no f/c/n/v/d.  No change to appetite, no change to bowels. No hematuria/hemoptysis. On day of admssion he contacted staff for sensation of palpitations. 's by EMS. With vagal maneuver, no drugs, patient converted back to sinus rhythm. Has remained in this rhythm at ED at [facility]. He has hx of sinus tachycardia followed by Dr Sarah Kilpatrick. Pt has MONICA of RCA in September and Is on brilinta. In ED work up showed anemia. Guaiac neg. UA has not been done yet. No US renal or bladder scan. Had SVT @ 180 bpm and was converted with adenosine. Trop bumped to 4.5. Known  RCA which Cardiology was planning to re-attempt but now on hold. No CP today. Pt seen by Urology and CBI started. Pt developed fever, hypotension and worsening anemia. Poor IV access. Pt now on IV abx. Poor IV access. PICC to be placed. 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 He also has no past medical history of Abuse; Anemia NEC; Arthritis; Asthma; Autoimmune disease ([address] Cancer ([address] Chronic pain; Congestive heart failure, unspecified; Contact dermatitis and other eczema, due to unspecified cause; COPD; Depression; GERD (gastroesophageal reflux disease); Headache; Liver disease; Psychotic disorder; Seizures ([address] Thromboembolus ([address] Thyroid disease; or Trauma. PSH:   has a past surgical history that includes endoscopy, colon, diagnostic (9/2/2008); heent; colorectal scrn; hi risk ind (1/15/2014); urological; and urological.   FHX: family history includes Diabetes in his father; Elevated Lipids in his mother and sister;  Heart Disease in his brother and mother; Hypertension in his father, mother, and sister; Stroke in his maternal grandfather and paternal grandfather. SHX:  reports that he has quit smoking. He does not have any smokeless tobacco history on file. He reports that he does not drink alcohol or use illicit drugs. ROS:A comprehensive review of systems was negative except for that written in the HPI.     MAR reviewed and pertinent medications noted or modified as needed    Allergies   Allergen Reactions    Bactrim [Sulfamethoxazole-Trimethoprim] Nausea and Vomiting        MEDS:   Current Facility-Administered Medications   Medication    insulin lispro (HUMALOG) injection    metoprolol (LOPRESSOR) injection 5 mg    pantoprazole (PROTONIX) 40 mg in sodium chloride 0.9 % 10 mL injection    sodium bicarbonate (8.4%) 150 mEq in sterile water 1,000 mL infusion    albumin human 25% (BUMINATE) solution 12.5 g    sodium bicarbonate 8.4 % (1 mEq/mL) injection    cefepime (MAXIPIME) 2 g in 0.9% sodium chloride (MBP/ADV) 100 mL    [START ON 10/25/2017] VANCOMYCIN RANDOM LEVEL    VANCOMYCIN INFORMATION NOTE    morphine injection 2 mg    0.9% sodium chloride infusion 250 mL    sodium chloride (NS) flush 5-10 mL    0.9% sodium chloride infusion 250 mL    mupirocin (BACTROBAN) 2 % ointment    NOREPINephrine (LEVOPHED) 8,000 mcg in dextrose 5% 250 mL infusion    cycloSPORINE (RESTASIS) 0.05 % ophthalmic emulsion 1 Drop    timolol (TIMOPTIC) 0.25% ophthalmic solution    acetaminophen (TYLENOL) tablet 650 mg    ondansetron (ZOFRAN) injection 4 mg    labetalol (NORMODYNE;TRANDATE) injection 10 mg    glucose chewable tablet 16 g    dextrose (D50W) injection syrg 12.5-25 g    glucagon (GLUCAGEN) injection 1 mg    sodium chloride (NS) flush 5-10 mL    sodium chloride (NS) flush 5-10 mL        Telemetry:    sinus tachycardia    Objective:     Vital Signs: Intake/Output: Intake/Output:   Visit Vitals    /56    Pulse (!) 114    Temp 97.6 °F (36.4 °C)    Resp 23    Ht 5' 9\" (1.753 m)    Wt 92.8 kg (204 lb 9.4 oz)    SpO2 97%    BMI 30.21 kg/m2         O2 Device: Room air  O2 Flow Rate (L/min): 3 l/min    Wt Readings from Last 4 Encounters:   10/23/17 92.8 kg (204 lb 9.4 oz)   17 93.9 kg (207 lb)   17 93.9 kg (207 lb)   17 104.3 kg (230 lb)       Temp (24hrs), Av.5 °F (37.5 °C), Min:97.6 °F (36.4 °C), Max:102.5 °F (39.2 °C)     Intake/Output Summary (Last 24 hours) at 10/24/17 1128  Last data filed at 10/24/17 0700   Gross per 24 hour   Intake          9431.24 ml   Output             3850 ml   Net          5581.24 ml     Last shift:         Last 3 shifts: 10/22 190 - 10/24 07  In: 53693.2 [P.O.:850; I.V.:4567.9]  Out: 12253 [Urine:14969]     Hemodynamics:    CO:    CI:    CVP: CVP (mmHg): 8 mmHg (10/24/17 1100)  SVR:   PAP Systolic:    PAP Diastolic:    PVR:    EI13:        Ventilator Settings:      Mode Rate TV Press PEEP FiO2 PIP Min. Vent                              Physical Exam:    General: WM on roomair in no respiratory distress and acyanotic, alert, oriented times 3 and severely ill   HEENT: NCAT   Neck, Lymph: No abnormally enlarged lymph nodes. Chest: normal   Lungs: normal air entry   Heart: Regular rate and rhythm or S1S2 present   Abdomen: SEVERE diffuse tenderness with rebound. : diallo   Extremity: negative, clubbing;     Neuro: alert   Psych: oriented to time, place and person   Skin:less Pale;    Pulses: Bilateral, Radial, 2+ Diffusely decreased peripheral pulses   Capillary refill: abnormal:  sluggish capillary refill, pale;    Data:   Interval lab and diagnostic data was reviewed. Interval radiology images were independently viewed and available reports were reviewed. All lab results for the last 24 hours reviewed.           Labs:    Recent Labs      10/24/17   0953  10/24/17   0530  10/23/17   2353  10/23/17   1536   10/22/17   9066   WBC   --   15.7*   --   12.9*   --   10.0 HGB   --   9.0*  8.7*  6.1*  6.2*   < >  7.3*   PLT   --   263   --   306   --   391   INR  1.4*   --    --    --    --    --     < > = values in this interval not displayed. Recent Labs      10/24/17   0953  10/24/17   0530  10/23/17   1536  10/22/17   0426  10/21/17   1255   NA  141  139  138  134*  135*   K  4.9  5.1  5.0  4.4  4.5   CL  113*  115*  113*  105  104   CO2  16*  15*  19*  23  23   GLU  82  98  94  114*  115*   BUN  32*  32*  26*  19  21*   CREA  3.15*  3.21*  2.58*  1.43*  1.83*   CA  7.7*  7.9*  7.2*  8.3*  8.3*   MG   --    --    --   3.2*  1.3*   PHOS   --    --    --   3.5   --    LAC   --    --   1.3   --    --    ALB   --    --    --   2.4*  2.6*   SGOT   --    --    --   23  13*   ALT   --    --    --   14  16     No results for input(s): PH, PCO2, PO2, HCO3, FIO2 in the last 72 hours.   Recent Labs      10/22/17   0426  10/21/17   2047  10/21/17   1255   CPK  87  117  89   CKNDX  11.6*  12.4*  6.7*   TROIQ  2.50*  4.54*  1.12*     No results found for: BNPP, BNP   Lab Results   Component Value Date/Time    Culture result: NO GROWTH AFTER 14 HOURS 10/23/2017 03:36 PM    Culture result: NO GROWTH 1 DAY 10/22/2017 04:26 AM    Culture result: NO GROWTH 1 DAY 07/30/2017 02:51 AM     Lab Results   Component Value Date/Time    CK 87 10/22/2017 04:26 AM     10/21/2017 08:47 PM     Lab Results   Component Value Date/Time    Color RED 10/23/2017 03:41 PM    Appearance TURBID 10/23/2017 03:41 PM    Specific gravity 1.010 10/23/2017 03:41 PM    pH (UA) 6.0 10/23/2017 03:41 PM    Protein 100 10/23/2017 03:41 PM    Glucose NEGATIVE  10/23/2017 03:41 PM    Ketone NEGATIVE  10/23/2017 03:41 PM    Bilirubin NEGATIVE  10/23/2017 03:41 PM    Blood LARGE 10/23/2017 03:41 PM    Urobilinogen 0.2 10/23/2017 03:41 PM    Nitrites NEGATIVE  10/23/2017 03:41 PM    Leukocyte Esterase LARGE 10/23/2017 03:41 PM    WBC  10/23/2017 03:41 PM    RBC >100 10/23/2017 03:41 PM    Bacteria NEGATIVE  10/23/2017 03:41 PM       No results found for: IRON, FE, TIBC, IBCT, PSAT, FERR  Lab Results   Component Value Date/Time    TSH 0.81 10/22/2017 04:26 AM      No results found for: TOXA1, RPR, HBCM, HBSAG, HAAB, HCAB1, HAAT, G6PD, CRYAC, HIVGT, HIVR, HIV1, HIV12, HIVPC, HIVRPI      Imaging:  I have personally reviewed the patients radiographs and have reviewed the reports:          Results from Hospital Encounter encounter on 10/21/17   XR CHEST PORT   Narrative EXAM:  XR CHEST PORT    INDICATION: Acute renal failure, non-ST elevated myocardial infarction. Evaluation for Sepsis    COMPARISON:  10/21/2017    FINDINGS: A portable AP radiograph of the chest was obtained at 1210 hours. The  patient is on a cardiac monitor. The lungs are clear. The cardiac and  mediastinal contours and pulmonary vascularity are normal.  The bones and soft  tissues are grossly within normal limits. Impression IMPRESSION: No acute cardiopulmonary process seen              Results from Hospital Encounter encounter on 10/21/17   CT ABD PELV WO CONT   Narrative INDICATION: abd pain, free air? h/o ulcer    COMPARISON: October 21, 2017    TECHNIQUE:   Noncontrast thin axial images were obtained through the abdomen and pelvis. Coronal and sagittal reconstructions were generated. CT dose reduction was  achieved through use of a standardized protocol tailored for this examination  and automatic exposure control for dose modulation. FINDINGS:   LUNG BASES: Bibasilar airspace disease and trace pleural effusions. LIVER: No mass or biliary dilatation. GALLBLADDER: Several gallstones within the gallbladder. SPLEEN: No enlargement or lesion. PANCREAS: No mass or ductal dilatation. ADRENALS: No mass. KIDNEYS: Significant bilateral hydronephrosis similar to the prior study. Several calcifications in the dependent portion of the right lower pole  collecting system. GI TRACT:  No bowel obstruction.  Difficult to assess bowel wall thickening given  lack of oral contrast material.  PERITONEUM: No free air or free fluid. APPENDIX: Not clearly visualized. RETROPERITONEUM: No lymphadenopathy or aortic aneurysm. ADDITIONAL COMMENTS: N/A.    URINARY BLADDER: Interval placement of Amaya catheter, with decompression of the  bladder. There are multiple hyperdense clots around the catheter in the bladder. There is also a new defect/tract in the left anterior bladder wall extending  toward the left extraperitoneal space just deep to the rectus muscle, containing  fluid, gas locules, and blood product. Small amount of ill-defined fluid/blood  also extends superiorly along the rectal remnant toward the umbilicus. However,  there is no significant blood/fluid associated with bowel loops in the lower  abdomen or pelvis. Minimal if any fluid left paracolic gutter. REPRODUCTIVE ORGANS: Unremarkable. LYMPH NODES:  None enlarged. FREE FLUID:  Minimal presacral edema. BONES: No destructive bone lesion. ADDITIONAL COMMENTS: N/A. Impression IMPRESSION:  Interval placement of Amaya catheter in the urinary bladder which is  decompressed, though does contain intraluminal clots. There is also a  tract/defect in the left anterior bladder wall extending toward an  extraperitoneal ill-defined collection of fluid, blood, and locules of gas. This  also extends along the rectal remnant toward the umbilicus. Findings most  consistent with extraperitoneal bladder rupture. The findings were called to Dr. Sandhya Draper on 10/24/2017 at 11:15 AM by myself. [id number]             My assessment/plan was discussed with:  nursing    respiratory therapy Dr. oneil      I have provided   35    minutes of critical care time rendering care exclusive of any procedures.  During this entire length of time I was immediately available to the patient.      The reason for providing this level of medical care was due to a critical illness that impaired one or more vital organ systems, such that there was a high probability of imminent or life threatening deterioration in the patient's condition. Pt's condition is unstable and unpredictable. This care involved high complexity decision making which includes reviewing the patient's past medical records, current laboratory results, medications that were immediately available to me and actual Xray films in order to assess, support vital system function, and to treat this degree of vital organ system failure, and to prevent further life threatening deterioration of the patients condition. Thank you for allowing us to participate in the care of this patient. We will be happy to follow along with you.     Arsh Tamez MD

## 2017-10-24 NOTE — CONSULTS
GI Consult---to be dictated    Pt seen and examined. Hx of perforated DU repaired by Dr. Jude Rose. Has developed extreme tenderness in LLQ with rebound. CT just completed.  Will review and leave full note

## 2017-10-24 NOTE — PROGRESS NOTES
Hospitalist Progress Note    NAME: Hyun Hughes   :  1950   MRN:  [id number]       Assessment / Plan:  Summary:   79 y.o.  male with Hx HTN, CAD, s/p PCI/Stent 2017, SVT, S/P CVA, DM-2. Dyslipidemia, urolithiasis, presenting to ED with complaint noted above. Patient was DC-ed from [facility] ~ 2 wks ago, after hospitalization for hematuria. He has prolonged history with [facility] s/p multiple stents last in 2017 and comment as to a \"query narrow ureter\". He had what sounds like cystoscopy - no source of bleed and no intervention per patient report. Sent to [facility]  for further work up and evaluation. There, he had initial remission of bleeding, but it returned ~ 1wk ago. No BRB in urine, only dark clots. He notes it has been persistent, since. No LH/Dizzy with standing. Was able to participate in therapy. He notes no f/c/n/v/d. No change to appetite, no change to bowels. No hematuria/hemoptysis. On 10/21/17, he contacted staff for sensation of palpitations. 's by EMS. With vagal maneuver, no drugs, patient converted back to sinus rhythm. Has remained in this rhythm at ED at [facility]. He ntoes hx of sinus tachycardia followed, by Dr Fritz Smith. In ED work up showed anemia. Guaiac neg. Given the anemia, ARF, admitted for work up, evaluation and management. PROBLEMS:     1. Sepsis/Complicarted UTI: Wcc 86.5 on admission, tachycardic, positive urinary sediment, temp 102. On IV Vanco/Cefepime, as well as supportive treatment.      2. Hematuria/Acute blood loss anemia: Patient presented with recurrent hematuria. HB 7.9. Following HB, transfusing prn. Urology on board, and patient on bladder irrigation.      3. Hydronephrosisis/ARF: Bun 19, creatinine 1.3 on admission. Abdo/Pelvic CT-chronic right hydronephrosis & new left hydronephrosis. Creatinine bumped to 2.53 on 10/23/17. ACE-I, Metformin, and other nephrotoxins on hold. Urology following. Stenting planned ,when stable.     4. Bladder perforation/Acute abdomen: Patient developed acute abdominal pain in night of 10/23/17. Peritonitic signs on abdo exam today. Abdo/pelvic CT-bladder contains intraluminal clots. There is also a tract/defect in the left anterior bladder wall extending toward an extraperitoneal ill-defined collection of fluid, blood, and locules of gas. NPO. Surgery consulted. Awaiting recommendations. 5. Tachyarrhythmia/Hypotension: HR climbed to 1498 this AM, SVT on monitor. Improved to 138 after lopressor, and changed to A. Fib. No chest pain. SBP 92. ordered IV Digoxin 500 mcg stat/IV  ml. Defer to Cardiology. Amiodarone started , per cardiology. 6. Elevated troponin /NSTEMI: Per cardiology, this is not a new problem. Stent in place for just over 3 months, continue brilinta, BB, statin. Brilinta on hold due to #4. Diamond trending down. 4. Hypomagnesemia: Repleting as indicated. Following lytes. Body mass index is 30.21 kg/(m^2). Code Status: Full Code  DVT Prophylaxis: SCD's  GI Prophylaxis: PPI       Subjective:     Chief Complaint / Reason for Physician Visit  Has abdominal; pain. Discussed with RN events overnight. Review of Systems:  Symptom Y/N Comments  Symptom Y/N Comments   Fever/Chills N   Chest Pain N    Poor Appetite N   Edema N    Cough N   Abdominal Pain Y    Sputum N   Joint Pain N    SOB/FLORES N   Pruritis/Rash N    Nausea/vomit N   Tolerating PT/OT     Diarrhea N   Tolerating Diet     Constipation N   Other       Could NOT obtain due to:      Objective:     VITALS:   Last 24hrs VS reviewed since prior progress note.  Most recent are:  Patient Vitals for the past 24 hrs:   Temp Pulse Resp BP SpO2   10/24/17 1100 - (!) 114 23 110/56 97 %   10/24/17 0938 - - - (!) 126/95 -   10/24/17 0908 - (!) 123 27 117/49 92 %   10/24/17 0900 - (!) 113 25 (!) 118/37 93 %   10/24/17 0856 - (!) 112 23 101/46 94 %   10/24/17 0800 - (!) 115 24 139/82 95 %   10/24/17 0730 97.6 °F (36.4 °C) (!) 112 28 125/60 96 %   10/24/17 0700 - (!) 105 22 100/85 97 %   10/24/17 0600 - (!) 113 25 135/56 96 %   10/24/17 0500 - 90 20 (!) 117/32 95 %   10/24/17 0445 - 84 18 114/66 95 %   10/24/17 0430 - 92 16 110/69 97 %   10/24/17 0415 - 89 18 116/57 97 %   10/24/17 0400 98.7 °F (37.1 °C) 92 17 93/66 98 %   10/24/17 0345 - 88 19 117/59 97 %   10/24/17 0330 - 87 20 120/57 98 %   10/24/17 0315 - 95 20 127/63 96 %   10/24/17 0300 - 97 25 123/47 95 %   10/24/17 0245 - 91 23 - 95 %   10/24/17 0230 - 91 22 116/51 95 %   10/24/17 0215 - 92 20 117/50 95 %   10/24/17 0200 - 94 20 111/56 95 %   10/24/17 0145 - (!) 127 20 (!) 84/48 95 %   10/24/17 0130 - (!) 129 22 91/55 96 %   10/24/17 0115 - (!) 131 20 (!) 84/42 95 %   10/24/17 0100 - (!) 129 19 (!) 83/46 95 %   10/24/17 0045 - (!) 131 21 (!) 79/51 96 %   10/24/17 0030 - 95 21 97/40 95 %   10/24/17 0015 - 97 20 101/45 96 %   10/24/17 0000 - 97 23 112/54 96 %   10/23/17 2345 - (!) 101 26 113/53 99 %   10/23/17 2330 98.9 °F (37.2 °C) 96 23 109/54 98 %   10/23/17 2315 - 92 22 94/74 100 %   10/23/17 2300 98.9 °F (37.2 °C) 95 24 107/50 98 %   10/23/17 2245 98.8 °F (37.1 °C) 90 20 102/44 100 %   10/23/17 2230 98.7 °F (37.1 °C) 90 20 113/54 96 %   10/23/17 2215 - 92 22 106/40 99 %   10/23/17 2200 98.8 °F (37.1 °C) 89 20 102/50 99 %   10/23/17 2145 98.7 °F (37.1 °C) 91 20 98/60 98 %   10/23/17 2130 98.7 °F (37.1 °C) 93 20 98/56 95 %   10/23/17 2115 99.2 °F (37.3 °C) 93 22 109/56 97 %   10/23/17 2100 98.8 °F (37.1 °C) 96 24 98/65 98 %   10/23/17 2045 99 °F (37.2 °C) 94 22 92/47 99 %   10/23/17 2030 98.7 °F (37.1 °C) 95 20 95/40 96 %   10/23/17 2015 - 95 19 (!) 89/42 98 %   10/23/17 2000 98.3 °F (36.8 °C) 96 19 (!) 82/41 97 %   10/23/17 1945 - 94 18 91/41 99 %   10/23/17 1930 - 99 22 (!) 89/36 96 %   10/23/17 1915 (!) 100.6 °F (38.1 °C) 98 22 (!) 81/64 97 %   10/23/17 1900 (!) 100.6 °F (38.1 °C) (!) 101 21 (!) 79/37 96 %   10/23/17 1845 100.4 °F (38 °C) (!) 101 21 (!) 88/44 97 % 10/23/17 1830 (!) 100.7 °F (38.2 °C) (!) 105 21 91/50 96 %   10/23/17 1815 100 °F (37.8 °C) (!) 110 25 99/54 97 %   10/23/17 1800 (!) 100.5 °F (38.1 °C) (!) 106 21 101/48 93 %   10/23/17 1749 - (!) 110 29 101/53 95 %   10/23/17 1748 (!) 100.6 °F (38.1 °C) - - - -   10/23/17 1730 - (!) 105 26 91/54 96 %   10/23/17 1700 - (!) 102 22 95/49 97 %   10/23/17 1630 - (!) 101 18 (!) 89/54 93 %   10/23/17 1600 - (!) 102 21 95/49 95 %   10/23/17 1545 99.3 °F (37.4 °C) (!) 101 22 - 95 %   10/23/17 1544 - (!) 101 20 96/42 96 %   10/23/17 1530 - (!) 102 20 (!) 83/50 97 %   10/23/17 1500 - (!) 110 21 105/50 97 %   10/23/17 1400 - (!) 113 21 140/56 99 %   10/23/17 1300 (!) 101 °F (38.3 °C) (!) 123 24 132/58 98 %   10/23/17 1230 - (!) 128 22 112/51 99 %   10/23/17 1215 - (!) 130 24 104/71 100 %   10/23/17 1200 (!) 102.5 °F (39.2 °C) (!) 132 27 116/55 100 %   10/23/17 1156 - (!) 132 23 122/54 99 %   10/23/17 1145 - (!) 137 26 - 100 %   10/23/17 1141 - (!) 141 25 128/56 98 %   10/23/17 1140 - (!) 145 25 - 100 %   10/23/17 1139 100.4 °F (38 °C) (!) 148 27 - 100 %   10/23/17 1130 - - - 124/72 -       Intake/Output Summary (Last 24 hours) at 10/24/17 1127  Last data filed at 10/24/17 0700   Gross per 24 hour   Intake          9431.24 ml   Output             3850 ml   Net          5581.24 ml        PHYSICAL EXAM:  General: WD, WN. Alert, cooperative. Has abdominal pain/tenderness.     EENT:  EOMI. Anicteric sclerae. MMM  Resp:  CTA bilaterally, no wheezing or rales. No accessory muscle use  CV:  Heart sounds normal, irregular, no murmurs, tachycardic.  No edema  GI:  Distended, tender, positive rebound. Neurologic:  Alert and oriented X 3, normal speech,   Psych:   Good insight. Not anxious nor agitated  Skin:  No rashes.   No jaundice    Reviewed most current lab test results and cultures  YES  Reviewed most current radiology test results   YES  Review and summation of old records today    NO  Reviewed patient's current orders and MAR    YES  PMH/SH reviewed - no change compared to H&P  ________________________________________________________________________  Care Plan discussed with:    Comments   Patient X    Family      RN X    Care Manager     Consultant                        Multidiciplinary team rounds were held today with , nursing, pharmacist and clinical coordinator. Patient's plan of care was discussed; medications were reviewed and discharge planning was addressed. ________________________________________________________________________  Total NON critical care TIME:  <30   Minutes    Total CRITICAL CARE TIME Spent:   Minutes non procedure based      Comments   >50% of visit spent in counseling and coordination of care     ________________________________________________________________________  Monica Hollingsworth MD     Procedures: see electronic medical records for all procedures/Xrays and details which were not copied into this note but were reviewed prior to creation of Plan. LABS:  I reviewed today's most current labs and imaging studies. Pertinent labs include:  Recent Labs      10/24/17   0530  10/23/17   2353  10/23/17   1536   10/22/17   0426   WBC  15.7*   --   12.9*   --   10.0   HGB  9.0*  8.7*  6.1*  6.2*   < >  7.3*   HCT  26.7*  25.5*  18.8*  18.6*   < >  22.8*   PLT  263   --   306   --   391    < > = values in this interval not displayed.      Recent Labs      10/24/17   0953  10/24/17   0530  10/23/17   1536  10/22/17   0426  10/21/17   1255   NA  141  139  138  134*  135*   K  4.9  5.1  5.0  4.4  4.5   CL  113*  115*  113*  105  104   CO2  16*  15*  19*  23  23   GLU  82  98  94  114*  115*   BUN  32*  32*  26*  19  21*   CREA  3.15*  3.21*  2.58*  1.43*  1.83*   CA  7.7*  7.9*  7.2*  8.3*  8.3*   MG   --    --    --   3.2*  1.3*   PHOS   --    --    --   3.5   --    ALB   --    --    --   2.4*  2.6*   TBILI   --    --    --   0.8  0.5   SGOT   --    --    --   23  13*   ALT   --    -- --   14  16   INR  1.4*   --    --    --    --        Signed: Javed Colón MD

## 2017-10-24 NOTE — PROGRESS NOTES
Pharmacy Automatic Renal Dosing Protocol - Antimicrobials    Indication for Antimicrobials: UTI    Current Regimen of Each Antimicrobial:  Cefepime 2 gm IV every 12 hours (Started 10/23/17; Day #2)  Vancomycin 1250 mg IV every 18 hours (Started 10/23/17; Day #2)    Previous Antimicrobial Therapy:  None    Goal Level: VANCOMYCIN TROUGH GOAL RANGE  Vancomycin Trough: 10 - 15 mcg/mL    Significant Cultures:   10/23/17 Blood culture = No growth x 14 hours (Results pending)  10/22/17 Urine culture = No growth (FINAL)    Labs:  Recent Labs      10/24/17   0953  10/24/17   0530  10/23/17   1536  10/22/17   0426   CREA  3.15*  3.21*  2.58*  1.43*   BUN  32*  32*  26*  19   WBC   --   15.7*  12.9*  10.0     Temp (24hrs), Av.8 °F (37.7 °C), Min:97.6 °F (36.4 °C), Max:102.6 °F (39.2 °C)    Creatinine Clearance (mL/min) or Dialysis: Less than 25 mL/min (IBW)    Impression/Plan:   - Based on renal function decline, cefepime dose adjusted to 2 gm IV every 24 hours. - Based on SCr rise, vancomycin random level ordered with AM labs on 10/25/17. Vancomycin doses held until after the random level is assessed. Pharmacy will follow daily and adjust medications as appropriate for renal function and/or serum levels.     Thank you,  Naomi Hartley, PHARMD

## 2017-10-24 NOTE — PROGRESS NOTES
Orders received and chart reviewed. Spoke with RN and requested to hold therapy for now. Patient just returned from CT and is in pain. Will defer and follow up at a later time. Thank you    Attempted to see patient again this afternoon for PT evaluation. Per RN, pt had an episode of rapid SVT and now on amiodarone drip; requested to defer therapy for today and attempt tomorrow. Thank you.     Kenrick Villaseñor, PT, DPT

## 2017-10-24 NOTE — PROGRESS NOTES
[address] [place] [address]  [phone]      Cardiology Progress Note      10/24/2017 1230PM    Admit Date: 10/21/2017    Admit Diagnosis:   Acute renal failure (ARF) (HCC)    Subjective:     Blake Nesbitt is a 79 y.o. male with PMH DM, HTN, CVA, SVT, CAD s/p stent who was admitted for SKYLER. Overnight events:  -called back to room for tachycardia up to 200   -no further fevers;  Brief need for pressors overnight;  Remains tachy   -H/H improved after transfusions to 9.0/26.7;  Creat 3.21; CO2 15; K 5.1  -Mr. Clark states he is feeling well today and has no pain. When asked specifically about abdominal pain he does endorse having lower abd pain. He denies chest pain, SOB, palpitations.       Visit Vitals    BP 98/53 (BP 1 Location: Right arm, BP Patient Position: Supine)    Pulse (!) 158    Temp 99 °F (37.2 °C)    Resp 28    Ht 5' 9\" (1.753 m)    Wt 92.8 kg (204 lb 9.4 oz)    SpO2 92%    BMI 30.21 kg/m2       Current Facility-Administered Medications   Medication Dose Route Frequency    insulin lispro (HUMALOG) injection   SubCUTAneous Q6H    metoprolol (LOPRESSOR) injection 5 mg  5 mg IntraVENous Q6H    pantoprazole (PROTONIX) 40 mg in sodium chloride 0.9 % 10 mL injection  40 mg IntraVENous Q12H    sodium bicarbonate (8.4%) 150 mEq in sterile water 1,000 mL infusion   IntraVENous CONTINUOUS    albumin human 25% (BUMINATE) solution 12.5 g  12.5 g IntraVENous Q6H    sodium bicarbonate 8.4 % (1 mEq/mL) injection        cefepime (MAXIPIME) 2 g in 0.9% sodium chloride (MBP/ADV) 100 mL  2 g IntraVENous Q24H    [START ON 10/25/2017] VANCOMYCIN RANDOM LEVEL  1 Each Other ONCE    VANCOMYCIN INFORMATION NOTE   Other DAILY    amiodarone (CORDARONE) 900 mg in dextrose 5% 250 mL infusion  1 mg/min IntraVENous CONTINUOUS    amiodarone (CORDARONE) 900 mg/250 ml D5W infusion  0.5 mg/min IntraVENous CONTINUOUS    morphine injection 2 mg  2 mg IntraVENous Q4H PRN    0.9% sodium chloride infusion 250 mL  250 mL IntraVENous PRN    sodium chloride (NS) flush 5-10 mL  5-10 mL IntraVENous PRN    0.9% sodium chloride infusion 250 mL  250 mL IntraVENous PRN    mupirocin (BACTROBAN) 2 % ointment   Both Nostrils Q12H    NOREPINephrine (LEVOPHED) 8,000 mcg in dextrose 5% 250 mL infusion  2-16 mcg/min IntraVENous TITRATE    cycloSPORINE (RESTASIS) 0.05 % ophthalmic emulsion 1 Drop  1 Drop Both Eyes BID    timolol (TIMOPTIC) 0.25% ophthalmic solution  1 Drop Both Eyes BID    acetaminophen (TYLENOL) tablet 650 mg  650 mg Oral Q4H PRN    ondansetron (ZOFRAN) injection 4 mg  4 mg IntraVENous Q4H PRN    labetalol (NORMODYNE;TRANDATE) injection 10 mg  10 mg IntraVENous Q2H PRN    glucose chewable tablet 16 g  4 Tab Oral PRN    dextrose (D50W) injection syrg 12.5-25 g  12.5-25 g IntraVENous PRN    glucagon (GLUCAGEN) injection 1 mg  1 mg IntraMUSCular PRN    sodium chloride (NS) flush 5-10 mL  5-10 mL IntraVENous Q8H    sodium chloride (NS) flush 5-10 mL  5-10 mL IntraVENous PRN       Objective:      Physical Exam:  General Appearance:  ill-appearing  male resting in bed in NAD   Chest:  clear  Cardiovascular:  rapid rate and rhythm, no murmur.   Abdomen:   Soft, non-tender, bowel sounds are active.   Extremities: weak distal pulses. 1+ edema BUE   Skin:  Warm and dry. Pale. Data Review:   Recent Labs      10/24/17   0530  10/23/17   2353  10/23/17   1536   10/22/17   0426   WBC  15.7*   --   12.9*   --   10.0   HGB  9.0*  8.7*  6.1*  6.2*   < >  7.3*   HCT  26.7*  25.5*  18.8*  18.6*   < >  22.8*   PLT  263   --   306   --   391    < > = values in this interval not displayed.      Recent Labs      10/24/17   0953  10/24/17   0530  10/23/17   1536  10/22/17   0426  10/21/17   1255   NA  141  139  138  134*  135*   K  4.9  5.1  5.0  4.4  4.5   CL  113*  115*  113*  105  104   CO2  16*  15*  19*  23  23   GLU  82  98  94  114*  115*   BUN  32*  32*  26*  19  21*   CREA  3.15* 3.21*  2.58*  1.43*  1.83*   CA  7.7*  7.9*  7.2*  8.3*  8.3*   MG   --    --    --   3.2*  1.3*   PHOS   --    --    --   3.5   --    ALB   --    --    --   2.4*  2.6*   TBILI   --    --    --   0.8  0.5   SGOT   --    --    --   23  13*   ALT   --    --    --   14  16   INR  1.4*   --    --    --    --        Recent Labs      10/22/17   0426  10/21/17   2047  10/21/17   1255   TROIQ  2.50*  4.54*  1.12*   CPK  87  117  89   CKMB  10.1*  14.5*  6.0*         Intake/Output Summary (Last 24 hours) at 10/24/17 1240  Last data filed at 10/24/17 1230   Gross per 24 hour   Intake         91123.98 ml   Output             4350 ml   Net          6954.98 ml        Telemetry: ST 110s  EKG: ST            (repeat 10/24)  SVT, possibly some PAF. Not a STEMI- ischemic changes due to SVT and  RCA. Cxray: no acute process      Assessment:     Active Problems:    Acute renal failure (ARF) (HCC) (3/1/2017)      SVT (supraventricular tachycardia) ([facility] [address] (4/19/2017)      Overview: Approximately 220 BPM on tele 4/19/17      Recurrent at 160 BPM on tele 7/7/17      NSTEMI (non-ST elevated myocardial infarction) ([facility] [address] (10/22/2017)      Hematuria (10/23/2017)      Anemia (10/23/2017)        Plan:     NSTEMI:  Recent MONICA 07/17 in pRCA  with another  in distal RCA not opened, and SVT on arrival.  Peak troponin 4.54. Patient without cardiac complaints. Remains ST.    · Stent in place for just over 3 months, continue BB, statin. Brilinta on hold for abd concerns/anemia. · BB switched to IV this morning by intensivist   · Plan for revisit of  at a later date    SVT/ possible PAF: had resolved, then burst up to 200 today, then held around ~150s. · BB switched to IV this morning by intensivist due to patient's abd concerns  · amio bolus and drip for SVT/tachycardia. Will hopefully not need to be placed back on juan carlos.    · Continue IVF and treatment of underlying process     Anemia/hematuria:  H/H continues to be low despite transfusions. Patient on brilinta for recent stent as above. Patient with recent cysto with interventions. CT abd/pelv abnormal this morning with concern for bladder rupture. · Hold Brilinta with possible surgical needs. Restart as soon as able. · Urology/nephrology/surgery following       SKYLER: creat abrupt increase to greater than 3 yesterday afternoon. 3.21 this morning. · ACEi on hold  · CT with concerns of bladder rupture. Mariusz Sanches NP  DNP, RN, [facility]    Patient seen and examined with nurse Luis Daniel Up. I agree with the assessment and plan as noted. Rhythm OK now on IV amio.

## 2017-10-24 NOTE — WOUND CARE
Wound care consult from staff nurse for POA \"unstageable sacrum\". Patient is a 78 y/o CM with admitting dx: Acute renal failure and found to have acute blood loss anemia and hematuria. 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    small cecal polyp    DM (diabetes mellitus) ([facility] [address] 12/14/2009    Hypercholesterolemia     Hyperlipidemia 12/14/2009    Hypertension 12/14/2009    Nephrolithiasis 12/14/2009    Nephrolithiasis 12/14/2009    Perforated duodenal bulb ulcer ([facility] [address] 4/18/2017    Recurrent UTI 12/14/2009    S/P coronary artery stent placement 7/7/2017 7/6/17 PCI/MONICA  prox RCA    Stroke ([facility] [address]     SVT (supraventricular tachycardia) ([facility] [address] 4/19/2017    Unstable angina ([facility] [address] 7/6/2017     Patient transferred to CCU yesterday, got a PICC line and was hemodynamically unstable and found to have a perforated bladder. Patient was turned and found a Stage 2 PI to sacrum and patient incontinent of medium size brown soft stool. Patient cleaned and Sensicare zinc cream applied to skin.   WOUND POA CONDITIONS        Wound Sacral/coccyx (Active)   DRESSING STATUS Intact 10/23/2017 11:45 AM   DRESSING TYPE Open to air 10/24/2017  2:18 PM   Pressure Injury Stage ll 10/24/2017  2:18 PM   Wound Length (cm) 1 cm 10/24/2017  2:18 PM   Wound Width (cm) 1 cm 10/24/2017  2:18 PM   Wound Depth (cm) 0.1 10/24/2017  2:18 PM   Wound Surface area (cm^3) 0.1 cm^2 10/24/2017  2:18 PM   Condition of [place] 10/24/2017  2:18 PM   Condition of Edges Open 10/24/2017  2:18 PM   Tissue Type Pink 10/24/2017  2:18 PM   Tissue Type Percent Pink 100 10/24/2017  2:18 PM   Drainage Amount  None 10/24/2017  2:18 PM   Wound Odor None 10/24/2017  2:18 PM   Periwound Skin Condition Erythema, blanchable 10/24/2017  2:18 PM   Cleansing and Cleansing Agents  Soap and water 10/24/2017  2:18 PM   Dressing Type Applied Zinc based paste 10/24/2017  2:18 PM   Procedure Tolerated Well 10/24/2017  2:18 PM   Number of days:3           Recommend:  Sacrum stage 2 PI: cleanse gently with soap and water, pat dry and apply Sensicare zinc cream as needed. Keep patient on incontinence chuxs to decrease moisture, turn xq2 hrs.     Roseanne Pereira RN, CWON, zone ph# [phone]

## 2017-10-24 NOTE — PROGRESS NOTES
Bedside report received from Jenni Cortez RN                 Assessment, Background, Procedure summary, Intake/Output, MAR, and recent results discussed. Care assumed. Verbal report given to oncoming nurse America Primrose, RN    Report consisted of patients Situation, Background, Assessment, and   Recommendations    Information was reviewed with the receiving nurse. Opportunity for questions and clarification was provided.       Niya Dumont RN

## 2017-10-24 NOTE — PROGRESS NOTES
Dr. Genia byrnes @ [phone] to call Dr. Scottie Mathis regarding CT results. CBI stopped at 1058 per Dr. Scottie Mathis. 1109-Dr. Song returned page. Notified of abnormal CT per Dr. Scottie Mathis. Okay to stop CBI until her further evaluates.

## 2017-10-24 NOTE — PROGRESS NOTES
UROLOGY    The patient developed increasing LLQ pain this am.  Evaluated by GI and Gen Surg. Repeat non contrast CT reveals probable new extraperitoneal bladder rupture into LLQ anterior abd wall area. Few small clots remain in bladder. This is thought not to be intraperitoneal rupture. Discussed with Elizabeth Hollingsworth(radiology) and Magdaleno Moreno. Was on saline CBI--this has been stopped. Exam---diallo is clear off of CBI. He has LLQ abd tenderness c/w CT findings. Not significantly tender in LUQ or RUQ. Assess:  Hematuria is improved. Cr up this am related to hydro and recent days of clot retention/irrigation vs contribution from likely extraperitoneal bladder rupture. Plan:  Avoid blood thinners. If diallo drains clear then diallo drainage is treatment of choice without open closure for extraperitoneal bladder rupture. If he does not improve with this or diallo is difficult to manage then next step is consideration of bilateral perc nephrostomy tubes. Try to avoid anesthesia/open repair if possible given recent medical issues.

## 2017-10-24 NOTE — ROUTINE PROCESS
Bedside and Verbal shift change report received from MACIE Loja RN (offgoing nurse). Report included the following information SBAR, Kardex, ED Summary, Procedure Summary, Intake/Output, MAR, Accordion, Recent Results, Med Rec Status and Cardiac Rhythm sinus tachycardia. He is awake, pleasant and cooperative with his plan of care. 0815:Repositioned for comfort to his left side. Noted with abdominal discomfort particularly on the LUQ. Bowel sounds are hypoactive. The Indwelling diallo catheter is with continuous irrigation to keep it pink.  in  0820:Dr.Wong BUSTILLO, see new orders  0855: at the bedside, see new orders  0915:Sodium Bicarbonate administered prior to transporting off the unit to the radiology department for the CT of the Abdomen. The cardiac monitor denotes sinus tachycardia. 0940:Returned to the CCU, the cardiac monitor denotes sinus tachycardia, /95  1030: in; the Bladder irrigation is discontinued because of the findings of the CT scan. 1130: The cardiac monitor alarmed, rapid Atrial tachycardia rhythm, EKG completed, Scheduled Metoprolol administered and  paged.  is asymptomatic. 1135: at the bedside,   1136:Atrial tachycardia noted with frequent PAC's.   1225:Amiodarone bolus in process,   1235: The cardiac monitor denotes sinus tachycardia with PAC's, continue the current plan of care. 1355:Updates given , continue the current plan of care. 1450: at the bedside  1530:Bedside and Verbal shift change report given to LUIS Cornell (oncoming nurse) by sinus tachycardia (offgoing nurse). Report included the following information SBAR, Kardex, ED Summary, Procedure Summary, Intake/Output, MAR, Accordion, Recent Results, Med Rec Status and Cardiac Rhythm unchanged. Janet Bloom

## 2017-10-24 NOTE — CONSULTS
Surgery      Surgical Consult requested on this 78 yo male operated on by Dr Cassidy Sanchez in April of this year for a perforated duodenal ulcer. Admitted several days ago with cardiac and urologic issues. Has now developed evidence of peritonitis so a Surgical consult has been requested. On exam the patient has diffuse tenderness with evidence of rebound, more pronounced towards the pelvis. WBC was wnl on admission and has been rising and was 15.7 earlier today. Pt has been on Brilinta up until last night    Awaiting CT findings. Will go review with Radiology. Maco Rodriguez Seen  [id number] [address] [facility]

## 2017-10-25 NOTE — PROGRESS NOTES
Occupational Therapy Screening:  Services are indicated. Order is required once pt stabilizes. An InBasket screening referral was triggered for occupational therapy based on results obtained during the nursing admission assessment. The patients chart was reviewed and the patient is appropriate for a skilled therapy evaluation. Please order a consult for occupational therapy if you are in agreement and would like an evaluation to be completed. Thank you.     Mary Au, OTR/L

## 2017-10-25 NOTE — DIALYSIS
[facility]  [phone]    Vitals   Pre   Post   Assessment   Pre   Post     Temp  Temp: 100 °F (37.8 °C) (10/25/17 1256)  unable to read Axillary LOC  Patient Intubated   Patient Wakes intermittently  Patient Intubated  Can nod yes/no when asked questions   HR   100 @1820 95 @ 2030 Lungs   Clear  Clear   B/P   106/38 @1820 81/39 @2030 Cardiac   Normal Sinus  Normal Sinus   Resp   17 @1820 19 @2030 Skin   Intact  Intact   Pain level  Pain Intensity 1: 0 (10/25/17 1645) 0 Pain Intensity @2030 Edema  Generalized     Generalized   Orders:    Duration:   Start:   Procedure Start Time: 3273 End:   2020 Total:   2hrs     Dialyzer:   Dialyzer/Set Up Inspection: Revaclear (10/25/17 1800)   K Bath:   Dialysate K (mEq/L): 4 (10/25/17 1800)   Ca Bath:   Dialysate CA (mEq/L): 2.5 (10/25/17 1800)   Na/Bicarb:   Dialysate NA (mEq/L): 140 (10/25/17 1800)   Target Fluid Removal:   Goal/Amount of Fluid to Remove (mL): 1000 mL (10/25/17 1800)    Access:  Jorge/permcath disinfected with Alcavis per policy. Each lumen aspirated for blood return and flushed with Normal Saline per policy. Dialysis initiated.       Type & Location:   RIJ non tunneled CVC, both lumens patent and good flow   Labs: Hep B    Obtained/Reviewed   Critical Results Called   Date when labs were drawn-  Hgb-    HGB   Date Value Ref Range Status   10/25/2017 8.8 (L) 12.1 - 17.0 g/dL Final     K-    Potassium   Date Value Ref Range Status   10/25/2017 4.2 3.5 - 5.1 mmol/L Final     Ca-   Calcium   Date Value Ref Range Status   10/25/2017 7.4 (L) 8.5 - 10.1 MG/DL Final     Bun-   BUN   Date Value Ref Range Status   10/25/2017 39 (H) 6 - 20 MG/DL Final     Creat-   Creatinine   Date Value Ref Range Status   10/25/2017 3.90 (H) 0.70 - 1.30 MG/DL Final        Medications/ Blood Products Given     Name   Dose   Route and Time                     Blood Volume Processed (BVP):    23 L Net Fluid   Removed:  681 mls   Comments   Time Out Done: 1335  Primary Nurse Rpt Pre: Shelton Chung  Primary Nurse Rpt Post: Boyd Hughes  Pt Education: Given to pt family. Procedural  Care Plan: Continue to follow orders by nephrologist    Tx Summary:Central line catheter flushed with normal saline per policy. Ports disinfected with Alcavis per policy and lines disconnected and capped using aseptic technique. See LDA docflowsheet for dressing information. Patient tolerated treatment fair. Blood pressure started to decrease approx. 30 minutes before the end of treatment. Cut off UF, and Primary RN continued to titrate the patient vasopressors. Patient also was being weaned off of propofol and put on continuous fentanyl during treatment. Post report given to RN. Admiting Diagnosis: SKYLER  Pt's previous clinic- N/A  Consent signed - Informed Consent Verified: Yes (10/25/17 1800)  [facility] Consent - Yes  Hepatitis Status-  Pending  Machine #- Machine Number: [id number] (10/25/17 1800)  Telemetry status- Bedside  Pre-dialysis wt. - Pre-Dialysis Weight: 95.1 kg (209 lb 10.5 oz) (10/25/17 1800)

## 2017-10-25 NOTE — PROGRESS NOTES
[facility] INT[facility]IV[facility] Consult Service Note  Pulmonary, Critical Care, and Sleep Medicine    Name: Joan Mancera MRN: [id number]   : 1950 Hospital: [facility]   Date: 10/25/2017   Hospital Day: 5       Subjective/Interval History:   I have reviewed the flowsheet and previous days notes. Seen earlier today on rounds. Reviewed the evaluation and will assist in implementing plan as outlined by Alla Parisi and team    10/24 severe abdominal pain overnight. Pt very stoic. No more bleeding? Hgb Stable. Creatinine higher. Peritoneal signs on exam. No fever. Called sister Daowod Aguilera to get more info and discuss management [phone]. CT scan ordered without contrast to look for free air. Multiple calls made to consultants as below. Appreciate everyone' s help. Extraperitoneal leak noted on CT scan. AF RVR and placed on IV amiodarone    10/25 Now Anuric and renal function worse. No gross bleeding. Day 2-3 off Brilinta. NSR. D/w Dr. Leoncio Armstrong and nursing. Pt taken to IR for taya placement. AF RVR and SBP 70's despite IV amiodarone. IV levophed started. Pt brought back to CCU for evaluation and management. IV BB given, IV amio increased. AF converted to NSR but BP remained marginal and levophed changed to IV juan carlos. Discussed management withIR then  family and pt at bedside. Nephrostomy will need to be done in prone position. His peritonitis will make breathing difficult. Explained to them that intubation with sedation aand pain management would be prudent for his safety, comfort, stability and hemodynamics. He agreed and we proceeded to intubate pt. Acute dialysis will be delayed until IR can complete tube placement. Spoke with Urology. IMPRESSION:   1. Hematuria on Brilinta; has stopped with CBI but still with clot on Ct scan  2. Acute peritonitis- collection outside bladder with air pockets outside left distal ureter; underlying fragile bladder mucosa on brilinta  3.  Anuria- SKYLER- worse  4. Anemia from acute blood loss  5. CAD: September 2017 S/p PCI/MONICA to  RCA. Plan for 2nd phase PCI to mid RCA in 4 weeks   6. NSTEMI (non-ST elevated myocardial infarction) ([facility] [address] (10/22/2017)  7. SVT @ 180 bpm +  RCA = NSTEMI.   re-attempt pending resolution of anemia and ARF. - spoke with   8. H/o nephrolithiasis  9. Multiple stents in past  10. hydroureter  11. Diabetes  12. H/o hypertension  13. H/o perforated ulcer- seen by Dr. Radha Mata and repaired by  April 2017  14. H/o stroke  15. Pt is critically ill and at high risk of end organ dysfunction and failure  16. Critical care time with pt exclusive of procedures    35 +45  minutes      RECOMMENDATIONS/PLAN:   1. CCU  2. brilinta stopped for now   3. Apprecaite Dr. Eduardo Hansen and Duncan's help  4. Bilateral nephrsotomy tubes  5. Jorge, HD  6. General surgery consult for peritonitis- appreciate Dr. Marshall Folds help  7. IV vanco and cefepime  8. Transfuse prn to keep Hgb > 9 given NSTEMI and heart disease  9. Pressors prn  10. Glycemic control  11. DVT, SUP prophylaxis. 12. Will be available to assist in medical management while in the CCU pending disposition     Risk of deterioration: high   [x] High complexity decision making was performed  [x] See my orders for details  Tubes:   Amaya  ICU disposition :Unchanged. Code: Full Code        Subjective/Initial History:   I have reviewed the flowsheet and previous days notes. I was asked by Sky Osuna MD to see Delaney Jiang in consultation for a chief complaint of severe sepsis and hematuria. Yaritza Felix is a 79 y.o.  male who was admitted due to acute blood loss anemia, hematuria, acute renal failure, hypomagnesemia and elevated troponin. He had surgery at [facility] two weeks ago and transferred to [facility] for rehab. Patient has only been at the facility for 1 week prior to admission.  He has prolonged history [facility] s/p multiple stents last in 4/2017 with staghorn calculi and right hydrooureter. The patient is followed for a long history of stones. About 25 years ago bilateral large staghorn with bilateral percutaneous procedures. Had been doing well and was on periodic daily Bactrim versus Macrobid. He was hospitalized in April 2017 for perforated duodenal bulb ulcer. He had [place] at that time. Dr. Nae Almeida and perform cystoscopy and right retrograde and insertion of right stent. There was a narrowing of the distal right ureter found at that time. There he had initial remittance of bleeding but it returned 1wk ago. No BRB in urine, only dark clots. He notes it has been consistent. No LH/Dizzy with standing. Was able to participate in therapy. He notes no f/c/n/v/d. No change to appetite, no change to bowels. No hematuria/hemoptysis. On day of admssion he contacted staff for sensation of palpitations. 's by EMS. With vagal maneuver, no drugs, patient converted back to sinus rhythm. Has remained in this rhythm at ED at [address] He has hx of sinus tachycardia followed by Dr Rosaura Francisco. Pt has MONICA of RCA in September and Is on brilinta. In ED work up showed anemia. Guaiac neg. UA has not been done yet. No US renal or bladder scan. Had SVT @ 180 bpm and was converted with adenosine. Trop bumped to 4.5. Known  RCA which Cardiology was planning to re-attempt but now on hold. No CP today. Pt seen by Urology and CBI started. Pt developed fever, hypotension and worsening anemia. Poor IV access. Pt now on IV abx. Poor IV access. PICC to be placed. 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 He also has no past medical history of Abuse; Anemia NEC; Arthritis; Asthma; Autoimmune disease ([facility] [address] Cancer ([facility] Chronic pain; Congestive heart failure, unspecified; Contact dermatitis and other eczema, due to unspecified cause; COPD; Depression; GERD (gastroesophageal reflux disease); Headache; Liver disease; Psychotic disorder; Seizures ([facility] [address] Thromboembolus ([facility] Thyroid disease; or Trauma. PSH:   has a past surgical history that includes endoscopy, colon, diagnostic (9/2/2008); heent; colorectal scrn; hi risk ind (1/15/2014); urological; and urological.   FHX: family history includes Diabetes in his father; Elevated Lipids in his mother and sister; Heart Disease in his brother and mother; Hypertension in his father, mother, and sister; Stroke in his maternal grandfather and paternal grandfather. SHX:  reports that he has quit smoking. He does not have any smokeless tobacco history on file. He reports that he does not drink alcohol or use illicit drugs. ROS:A comprehensive review of systems was negative except for that written in the HPI.     MAR reviewed and pertinent medications noted or modified as needed    Allergies   Allergen Reactions    Bactrim [Sulfamethoxazole-Trimethoprim] Nausea and Vomiting        MEDS:   Current Facility-Administered Medications   Medication    [START ON 10/26/2017] epoetin geoavni (EPOGEN;PROCRIT) injection 10,000 Units    lidocaine (XYLOCAINE) 20 mg/mL (2 %) injection 400 mg    heparinized saline 2 units/mL infusion 200 Units    heparin (porcine) pf 500 Units    cefepime (MAXIPIME) 1 g in 0.9% sodium chloride (MBP/ADV) 50 mL    vancomycin (VANCOCIN) 750 mg in 0.9% sodium chloride (MBP/ADV) 250 mL    lidocaine (XYLOCAINE) 20 mg/mL (2 %) injection 600 mg    iopamidol (ISOVUE-370) 76 % injection 200 mL    heparin (porcine) pf 500 Units    heparinized saline 2 units/mL infusion 200 Units    insulin lispro (HUMALOG) injection    metoprolol (LOPRESSOR) injection 5 mg    pantoprazole (PROTONIX) 40 mg in sodium chloride 0.9 % 10 mL injection    sodium bicarbonate (8.4%) 150 mEq in sterile water 1,000 mL infusion    VANCOMYCIN INFORMATION NOTE    amiodarone (CORDARONE) 900 mg/250 ml D5W infusion    morphine 2 mg    sodium chloride (NS) flush 5-10 mL    mupirocin (BACTROBAN) 2 % ointment    NOREPINephrine (LEVOPHED) 8,000 mcg in dextrose 5% 250 mL infusion    cycloSPORINE (RESTASIS) 0.05 % ophthalmic emulsion 1 Drop    timolol (TIMOPTIC) 0.25% ophthalmic solution    acetaminophen (TYLENOL) tablet 650 mg    ondansetron (ZOFRAN) injection 4 mg    labetalol (NORMODYNE;TRANDATE) injection 10 mg    glucose chewable tablet 16 g    dextrose (D50W) injection syrg 12.5-25 g    glucagon (GLUCAGEN) injection 1 mg    sodium chloride (NS) flush 5-10 mL    sodium chloride (NS) flush 5-10 mL        Telemetry:    sinus tachycardia    Objective:     Vital Signs: Intake/Output: Intake/Output:   Visit Vitals    /66    Pulse (!) 105    Temp 99.8 °F (37.7 °C)    Resp 20    Ht 5' 9\" (1.753 m)    Wt 95.1 kg (209 lb 10.5 oz)    SpO2 95%    BMI 30.96 kg/m2         O2 Device: Nasal cannula  O2 Flow Rate (L/min): 4 l/min    Wt Readings from Last 4 Encounters:   10/24/17 95.1 kg (209 lb 10.5 oz)   17 93.9 kg (207 lb)   17 93.9 kg (207 lb)   17 104.3 kg (230 lb)       Temp (24hrs), Av.2 °F (37.3 °C), Min:97.7 °F (36.5 °C), Max:100.5 °F (38.1 °C)     Intake/Output Summary (Last 24 hours) at 10/25/17 1112  Last data filed at 10/25/17 0826   Gross per 24 hour   Intake          5779.36 ml   Output             1625 ml   Net          4154.36 ml     Last shift:      10/25 0701 - 10/25 1900  In: 304.2 [I.V.:304.2]  Out: 0   Last 3 shifts: 10/23 1901 - 10/25 0700  In: 9553.7 [P.O.:600;  I.V.:5757.1]  Out: 3400 [Urine:325]     Hemodynamics:    CO:    CI:    CVP: CVP (mmHg): 259 mmHg (10/25/17 1100)  SVR:   PAP Systolic:    PAP Diastolic:    PVR:    LU56:        Ventilator Settings:      Mode Rate TV Press PEEP FiO2 PIP Min. Vent                              Physical Exam:    General: WM on roomair in no respiratory distress and acyanotic, alert, oriented times 3 and severely ill   HEENT: NCAT   Neck, Lymph: No abnormally enlarged lymph nodes. Chest: normal   Lungs: normal air entry   Heart: Regular rate and rhythm or S1S2 present   Abdomen: SEVERE diffuse tenderness with rebound. : diallo   Extremity: negative, clubbing;     Neuro: alert   Psych: oriented to time, place and person   Skin:less Pale;    Pulses: Bilateral, Radial, 2+ Diffusely decreased peripheral pulses   Capillary refill: abnormal:  sluggish capillary refill, pale;    Data:   Interval lab and diagnostic data was reviewed. Interval radiology images were independently viewed and available reports were reviewed. All lab results for the last 24 hours reviewed. Labs:    Recent Labs      10/25/17   0415  10/24/17   1233  10/24/17   0953  10/24/17   0530   10/23/17   1536   WBC  11.2*   --    --   15.7*   --   12.9*   HGB  7.8*  8.7*   --   9.0*   < >  6.1*  6.2*   PLT  204   --    --   263   --   306   INR  1.4*   --   1.4*   --    --    --    APTT  44.1*   --    --    --    --    --     < > = values in this interval not displayed. Recent Labs      10/25/17   0415  10/24/17   0953  10/24/17   0530  10/23/17   1536   NA  141  141  139  138   K  4.2  4.9  5.1  5.0   CL  108  113*  115*  113*   CO2  21  16*  15*  19*   GLU  77  82  98  94   BUN  39*  32*  32*  26*   CREA  3.90*  3.15*  3.21*  2.58*   CA  7.4*  7.7*  7.9*  7.2*   LAC   --    --    --   1.3   ALB  1.9*   --    --    --    SGOT  46*   --    --    --    ALT  17   --    --    --      No results for input(s): PH, PCO2, PO2, HCO3, FIO2 in the last 72 hours. No results for input(s): CPK, CKNDX, TROIQ in the last 72 hours.     No lab exists for component: CPKMB  No results found for: BNPP, BNP   Lab Results   Component Value Date/Time    Culture result: NO GROWTH 2 DAYS 10/23/2017 03:36 PM    Culture result: NO GROWTH 1 DAY 10/22/2017 04:26 AM    Culture result: NO GROWTH 1 DAY 07/30/2017 02:51 AM     Lab Results   Component Value Date/Time    CK 87 10/22/2017 04:26 AM     10/21/2017 08:47 PM     Lab Results   Component Value Date/Time    Color RED 10/23/2017 03:41 PM    Appearance TURBID 10/23/2017 03:41 PM    Specific gravity 1.010 10/23/2017 03:41 PM    pH (UA) 6.0 10/23/2017 03:41 PM    Protein 100 10/23/2017 03:41 PM    Glucose NEGATIVE  10/23/2017 03:41 PM    Ketone NEGATIVE  10/23/2017 03:41 PM    Bilirubin NEGATIVE  10/23/2017 03:41 PM    Blood LARGE 10/23/2017 03:41 PM    Urobilinogen 0.2 10/23/2017 03:41 PM    Nitrites NEGATIVE  10/23/2017 03:41 PM    Leukocyte Esterase LARGE 10/23/2017 03:41 PM    WBC  10/23/2017 03:41 PM    RBC >100 10/23/2017 03:41 PM    Bacteria NEGATIVE  10/23/2017 03:41 PM       No results found for: IRON, FE, TIBC, IBCT, PSAT, FERR  Lab Results   Component Value Date/Time    TSH 0.81 10/22/2017 04:26 AM      No results found for: TOXA1, RPR, HBCM, HBSAG, HAAB, HCAB1, HAAT, G6PD, CRYAC, HIVGT, HIVR, HIV1, HIV12, HIVPC, HIVRPI      Imaging:  I have personally reviewed the patients radiographs and have reviewed the reports:          Results from Hospital Encounter encounter on 10/21/17   XR CHEST PORT   Narrative EXAM: Portable CXR.  0400 hours. COMPARISON: 10/23/2017      INDICATION: Tube/line placement    INTERPRETATION PROVIDED FOR COMPLIANCE ONLY AT NO CHARGE    Left arm PICC line has been placed with tip in the expected position of the SVC. Otherwise unchanged and unremarkable. Impression IMPRESSION: Satisfactory PICC line placement.           Results from [place] encounter on 10/21/17   CT ABD PELV WO CONT   Narrative INDICATION: abd pain, free air? h/o ulcer    COMPARISON: October 21, 2017    TECHNIQUE:   Noncontrast thin axial images were obtained through the abdomen and pelvis. Coronal and sagittal reconstructions were generated. CT dose reduction was  achieved through use of a standardized protocol tailored for this examination  and automatic exposure control for dose modulation. FINDINGS:   LUNG BASES: Bibasilar airspace disease and trace pleural effusions. LIVER: No mass or biliary dilatation. GALLBLADDER: Several gallstones within the gallbladder. SPLEEN: No enlargement or lesion. PANCREAS: No mass or ductal dilatation. ADRENALS: No mass. KIDNEYS: Significant bilateral hydronephrosis similar to the prior study. Several calcifications in the dependent portion of the right lower pole  collecting system. GI TRACT:  No bowel obstruction. Difficult to assess bowel wall thickening given  lack of oral contrast material.  PERITONEUM: No free air or free fluid. APPENDIX: Not clearly visualized. RETROPERITONEUM: No lymphadenopathy or aortic aneurysm. ADDITIONAL COMMENTS: N/A.    URINARY BLADDER: Interval placement of Amaya catheter, with decompression of the  bladder. There are multiple hyperdense clots around the catheter in the bladder. There is also a new defect/tract in the left anterior bladder wall extending  toward the left extraperitoneal space just deep to the rectus muscle, containing  fluid, gas locules, and blood product. Small amount of ill-defined fluid/blood  also extends superiorly along the rectal remnant toward the umbilicus. However,  there is no significant blood/fluid associated with bowel loops in the lower  abdomen or pelvis. Minimal if any fluid left paracolic gutter. REPRODUCTIVE ORGANS: Unremarkable. LYMPH NODES:  None enlarged. FREE FLUID:  Minimal presacral edema. BONES: No destructive bone lesion. ADDITIONAL COMMENTS: N/A.          Impression IMPRESSION:  Interval placement of Amaya catheter in the urinary bladder which is  decompressed, though does contain intraluminal clots. There is also a  tract/defect in the left anterior bladder wall extending toward an  extraperitoneal ill-defined collection of fluid, blood, and locules of gas. This  also extends along the rectal remnant toward the umbilicus. Findings most  consistent with extraperitoneal bladder rupture. The findings were called to Dr. Jonathan Chappell on 10/24/2017 at 11:15 AM by myself. [id number]             My assessment/plan was discussed with:  nursing    respiratory therapy Dr. oneil      I have provided   35 + 45    minutes of critical care time rendering care exclusive of any procedures. During this entire length of time I was immediately available to the patient.      The reason for providing this level of medical care was due to a critical illness that impaired one or more vital organ systems, such that there was a high probability of imminent or life threatening deterioration in the patient's condition. Pt's condition is unstable and unpredictable. This care involved high complexity decision making which includes reviewing the patient's past medical records, current laboratory results, medications that were immediately available to me and actual Xray films in order to assess, support vital system function, and to treat this degree of vital organ system failure, and to prevent further life threatening deterioration of the patients condition. Thank you for allowing us to participate in the care of this patient. We will be happy to follow along with you.     Isaiah Roland MD

## 2017-10-25 NOTE — PROGRESS NOTES
Interdisciplinary team rounds were held 10/25/2017 with the following team members:Care Management, Diabetes Treatment Specialist, Nursing, Nutrition, Pharmacy, Physician and Respiratory Therapy. Plan of care discussed. Goal: Nephrostomy tube and Jorge placement, tentative for this am. See MD orders and progress notes for further  interventions and desired outcomes.

## 2017-10-25 NOTE — PROGRESS NOTES
Seen earlier this am. Patient awake and alert in no acute distress but with significant abd tenderness and peritoneal signs and decreased urine output. Plans for bilateral nephrostomy tube placement noted.      Cedric Lynn M.D.  [phone]

## 2017-10-25 NOTE — PROGRESS NOTES
Pharmacy Automatic Renal Dosing Protocol - Antimicrobials    Indication for Antimicrobials: UTI; Perforated bladder    Current Regimen of Each Antimicrobial:  Cefepime 2 gm IV every 24 hours (Started 10/23/17; Day #3)  Vancomycin dosed by pharmacy (Started 10/23/17; Day #3)    Previous Antimicrobial Therapy:  None    Goal Level: VANCOMYCIN TROUGH GOAL RANGE  Vancomycin Trough: 10 - 15 mcg/mL    Vancomycin Random Level (10/25/17 at 0415): 24.4 mcg/mL    Significant Cultures:   10/23/17 Blood culture = No growth x 2 days (Results pending)  10/22/17 Urine culture = No growth (FINAL)    Labs:  Recent Labs      10/25/17   0415  10/24/17   0953  10/24/17   0530  10/23/17   1536   CREA  3.90*  3.15*  3.21*  2.58*   BUN  39*  32*  32*  26*   WBC  11.2*   --   15.7*  12.9*     Temp (24hrs), Av.2 °F (37.3 °C), Min:97.7 °F (36.5 °C), Max:100.5 °F (38.1 °C)    Creatinine Clearance (mL/min) or Dialysis: HD to start on 10/25/17    Impression/Plan:   - Given initiation of HD, cefepime dose adjusted to 1 gm IV every 24 hours. Cefepime should be administered daily; On HD days, ensure cefepime is administered following dialysis. - Vancomycin random level above goal which is expected since patient is now anuric and HD is planning to start today. Given initiation of HD, vancomycin dose adjusted to 750 mg IV after each HD session. Pharmacy will follow daily and adjust medications as appropriate for renal function and/or serum levels.     Thank you,  Janell Maguire, PHARMD

## 2017-10-25 NOTE — PROGRESS NOTES
ICU RN called regarding Afib/Rapid heart rate. NS bolus started in Angio recovery. Rhina from  ICU arrived to assess patient.

## 2017-10-25 NOTE — PROGRESS NOTES
Hospitalist Progress Note    NAME: Paula Santacruz   :  1950   MRN:  [id number]       Assessment / Plan:  Summary:   79 y.o.  male with Hx HTN, CAD, s/p PCI/Stent 2017, SVT, S/P CVA, DM-2. Dyslipidemia, urolithiasis, presenting to ED with complaint noted above. Patient was DC-ed from [facility] ~ 2 wks ago, after hospitalization for hematuria. He has prolonged history with [facility] s/p multiple stents last in 2017 and comment as to a \"query narrow ureter\". He had what sounds like cystoscopy - no source of bleed and no intervention per patient report. Sent to [facility] for further work up and evaluation. There, he had initial remission of bleeding, but it returned ~ 1wk ago. No BRB in urine, only dark clots. He notes it has been persistent, since. No LH/Dizzy with standing. Was able to participate in therapy. He notes no f/c/n/v/d. No change to appetite, no change to bowels. No hematuria/hemoptysis. On 10/21/17, he contacted staff for sensation of palpitations. 's by EMS. With vagal maneuver, no drugs, patient converted back to sinus rhythm. Has remained in this rhythm at ED at [address] He ntoes hx of sinus tachycardia followed, by Dr Augie Machuca. In ED work up showed anemia. Guaiac neg. Given the anemia, ARF, admitted for work up, evaluation and management. PROBLEMS:     1. Sepsis/Complicarted UTI: Wcc 69.5 on admission, tachycardic, positive urinary sediment, temp 102. On IV Vanco/Cefepime, as well as supportive treatment.      2. Hematuria/Acute blood loss anemia: Patient presented with recurrent hematuria. HB 7.9. Following HB, transfusing prn. Urology on board, and patient on bladder irrigation.      3. Hydronephrosisis/ARF: Bun 19, creatinine 1.3 on admission. Abdo/Pelvic CT-chronic right hydronephrosis & new left hydronephrosis. Creatinine bumped to 2.53 on 10/23/17. ACE-I, Metformin, and other nephrotoxins on hold. Urology following.  Bilateral nephrostomies planned for today. As creatinine still climbing, HD to be commenced, per renal.       4. Bladder perforation/Acute abdomen: Patient developed acute abdominal pain in night of 10/23/17. Peritonitic signs on abdo exam today. Abdo/pelvic CT-bladder contains intraluminal clots. There is also a tract/defect in the left anterior bladder wall extending toward an extraperitoneal ill-defined collection of fluid, blood, and locules of gas. NPO. Surgery consulted. Managing conservatively. 5. Tachyarrhythmia/Hypotension: HR climbed to 148 in AM 10/25/17, SVT on monitor. Improved to 138 after Lopressor, and changed to A. Fib. No chest pain. SBP 92. ordered IV Digoxin 500 mcg stat/IV  ml. Defer to Cardiology. Amiodarone started per cardiology. 6. Elevated troponin /NSTEMI: Per cardiology, this is not a new problem. Stent in place for just over 3 months, continue brilinta, BB, statin. Brilinta on hold due to #4. Diamond trending down. 4. Hypomagnesemia: Repleting as indicated. Following lytes. Body mass index is 30.96 kg/(m^2). Code Status: Full Code  DVT Prophylaxis: SCD's  GI Prophylaxis: PPI       Subjective:     Chief Complaint / Reason for Physician Visit  Looking unwell. Discussed with RN events overnight. Review of Systems:  Symptom Y/N Comments  Symptom Y/N Comments   Fever/Chills N   Chest Pain N    Poor Appetite N   Edema N    Cough N   Abdominal Pain Y    Sputum N   Joint Pain N    SOB/FLORES N   Pruritis/Rash N    Nausea/vomit N   Tolerating PT/OT     Diarrhea N   Tolerating Diet     Constipation N   Other       Could NOT obtain due to:      Objective:     VITALS:   Last 24hrs VS reviewed since prior progress note.  Most recent are:  Patient Vitals for the past 24 hrs:   Temp Pulse Resp BP SpO2   10/25/17 1645 - 98 20 108/70 98 %   10/25/17 1630 - 97 20 105/69 98 %   10/25/17 1625 - 97 20 110/70 98 %   10/25/17 1620 - 97 20 108/69 98 %   10/25/17 1615 - 98 20 110/72 98 %   10/25/17 1610 - 97 20 101/59 98 %   10/25/17 1605 - 95 20 105/54 98 %   10/25/17 1600 - 94 20 100/55 98 %   10/25/17 1555 - 93 20 91/52 98 %   10/25/17 1550 - 92 20 110/60 98 %   10/25/17 1513 - 90 20 - 100 %   10/25/17 1500 - 90 19 91/54 100 %   10/25/17 1440 - 91 21 - 100 %   10/25/17 1422 - 92 20 - 98 %   10/25/17 1420 - 91 22 (!) 82/46 100 %   10/25/17 1400 - 99 23 95/53 97 %   10/25/17 1340 - (!) 101 25 101/54 97 %   10/25/17 1320 - 99 19 102/62 97 %   10/25/17 1300 - 97 23 100/50 99 %   10/25/17 1256 100 °F (37.8 °C) 96 22 (!) 88/60 97 %   10/25/17 1255 - 93 22 (!) 88/60 98 %   10/25/17 1251 - 93 19 99/56 97 %   10/25/17 1240 - 94 21 101/51 100 %   10/25/17 1239 99.5 °F (37.5 °C) 92 22 101/51 100 %   10/25/17 1220 - (!) 172 19 (!) 73/40 100 %   10/25/17 1217 - (!) 170 25 (!) 78/31 -   10/25/17 1200 - (!) 172 20 (!) 85/60 100 %   10/25/17 1157 - (!) 175 22 (!) 74/53 94 %   10/25/17 1155 - (!) 173 20 (!) 82/56 93 %   10/25/17 1133 98.5 °F (36.9 °C) (!) 107 20 126/64 94 %   10/25/17 1100 - (!) 105 20 - 95 %   10/25/17 1000 - (!) 102 25 120/66 95 %   10/25/17 0900 - (!) 101 17 109/70 97 %   10/25/17 0800 - (!) 104 19 106/60 95 %   10/25/17 0700 - (!) 107 26 131/48 92 %   10/25/17 0600 - 90 16 105/47 96 %   10/25/17 0500 - (!) 105 17 113/58 94 %   10/25/17 0400 99.8 °F (37.7 °C) (!) 109 20 117/46 95 %   10/25/17 0300 - (!) 107 20 122/55 93 %   10/25/17 0235 - (!) 108 20 - 94 %   10/25/17 0234 - (!) 155 20 - 93 %   10/25/17 0230 - (!) 159 19 - 91 %   10/25/17 0226 - (!) 162 22 - 92 %   10/25/17 0224 - (!) 158 22 - 91 %   10/25/17 0200 - 97 18 112/50 96 %   10/25/17 0100 - 96 18 106/54 96 %   10/25/17 0038 99.4 °F (37.4 °C) - - - -   10/25/17 0000 (!) 100.5 °F (38.1 °C) 91 21 105/54 95 %   10/24/17 2300 - (!) 102 20 117/40 98 %   10/24/17 2200 - (!) 102 19 119/58 97 %   10/24/17 2100 - (!) 110 27 117/65 92 %   10/24/17 2000 98.8 °F (37.1 °C) (!) 101 21 110/57 94 %   10/24/17 1830 - 98 20 113/53 98 %   10/24/17 1800 - 97 20 106/54 97 %   10/24/17 1730 - 93 23 97/52 96 %       Intake/Output Summary (Last 24 hours) at 10/25/17 1714  Last data filed at 10/25/17 1240   Gross per 24 hour   Intake          3711.02 ml   Output              325 ml   Net          3386.02 ml        PHYSICAL EXAM:  General: WD, WN. Alert, cooperative. Has abdominal pain/tenderness.     EENT:  EOMI. Anicteric sclerae. MMM  Resp:  CTA bilaterally, no wheezing or rales. No accessory muscle use  CV:  Heart sounds normal, irregular, no murmurs, tachycardic.  No edema  GI:  Distended, tender, positive rebound. Neurologic:  Alert and oriented X 3, normal speech,   Psych:   Good insight. Not anxious nor agitated  Skin:  No rashes. No jaundice    Reviewed most current lab test results and cultures  YES  Reviewed most current radiology test results   YES  Review and summation of old records today    NO  Reviewed patient's current orders and MAR    YES  PMH/ reviewed - no change compared to H&P  ________________________________________________________________________  Care Plan discussed with:    Comments   Patient X    Family      RN X    Care Manager     Consultant                        Multidiciplinary team rounds were held today with , nursing, pharmacist and clinical coordinator. Patient's plan of care was discussed; medications were reviewed and discharge planning was addressed. ________________________________________________________________________  Total NON critical care TIME:  <30   Minutes    Total CRITICAL CARE TIME Spent:   Minutes non procedure based      Comments   >50% of visit spent in counseling and coordination of care     ________________________________________________________________________  Monica Hollingsworth MD     Procedures: see electronic medical records for all procedures/Xrays and details which were not copied into this note but were reviewed prior to creation of Plan.       LABS:  I reviewed today's most current labs and imaging studies. Pertinent labs include:  Recent Labs      10/25/17   0415  10/24/17   1233  10/24/17   0530   10/23/17   1536   WBC  11.2*   --   15.7*   --   12.9*   HGB  7.8*  8.7*  9.0*   < >  6.1*  6.2*   HCT  23.3*  26.5*  26.7*   < >  18.8*  18.6*   PLT  204   --   263   --   306    < > = values in this interval not displayed.      Recent Labs      10/25/17   0415  10/24/17   0953  10/24/17   0530   NA  141  141  139   K  4.2  4.9  5.1   CL  108  113*  115*   CO2  21  16*  15*   GLU  77  82  98   BUN  39*  32*  32*   CREA  3.90*  3.15*  3.21*   CA  7.4*  7.7*  7.9*   ALB  1.9*   --    --    TBILI  2.0*   --    --    SGOT  46*   --    --    ALT  17   --    --    INR  1.4*  1.4*   --        Signed: Monica Hollingsworth MD

## 2017-10-25 NOTE — CONSULTS
[facility] Progress Note        NAME: Lan Marin       :  1950       MRN:  [id number]     Date/Time: 10/25/2017    Risk of deterioration: high       Assessment:    Plan:  SKYLER/acidosis  Bladder Rupture  Anemia  SVT  Hx of recent DU repair (wood)  Hx of complicated urologic hx-multiple stenting procedures/stones/hydrouretere   Renal function worse. Initiate HD as anuric-  Urology to decide on timing of PCN  EPo  Transfuse prn  D/W dr. Judith Mayen  D/W sister, Brandon Michael who gave consents       Subjective:     Chief Complaint:  Lethargic on rounds    Review of Systems:unable to give    Objective:     VITALS:   Last 24hrs VS reviewed since prior progress note.  Most recent are:  Visit Vitals    /47    Pulse 90    Temp 99.8 °F (37.7 °C)    Resp 16    Ht 5' 9\" (1.753 m)    Wt 95.1 kg (209 lb 10.5 oz)    SpO2 96%    BMI 30.96 kg/m2     SpO2 Readings from Last 6 Encounters:   10/25/17 96%   17 99%   17 98%   07/10/17 95%   17 96%   17 98%    O2 Flow Rate (L/min): 2 l/min       Intake/Output Summary (Last 24 hours) at 10/25/17 0847  Last data filed at 10/25/17 0600   Gross per 24 hour   Intake          5475.19 ml   Output             1625 ml   Net          3850.19 ml        Telemetry Reviewed    PHYSICAL EXAM:    General   WD chronically ill, toxic appearing wm  EENT  Nc, at, eomi  Respiratory   No rales, shallow resp  Cardiology  Tachy on exam  Abdominal  Firm, dist  Extremities  No edema              Lab Data Reviewed: (see below)    Medications Reviewed: (see below)    PMH/SH reviewed - no change compared to H&P  ___________________________________________________  __________________________________________________    Attending Physician: Dustin Gaming MD     ____________________________________________________  MEDICATIONS:  Current Facility-Administered Medications   Medication Dose Route Frequency    insulin lispro (HUMALOG) injection   SubCUTAneous Q6H    metoprolol (LOPRESSOR) injection 5 mg  5 mg IntraVENous Q6H    pantoprazole (PROTONIX) 40 mg in sodium chloride 0.9 % 10 mL injection  40 mg IntraVENous Q12H    sodium bicarbonate (8.4%) 150 mEq in sterile water 1,000 mL infusion   IntraVENous CONTINUOUS    cefepime (MAXIPIME) 2 g in 0.9% sodium chloride (MBP/ADV) 100 mL  2 g IntraVENous Q24H    VANCOMYCIN INFORMATION NOTE   Other DAILY    amiodarone (CORDARONE) 900 mg/250 ml D5W infusion  0.5 mg/min IntraVENous CONTINUOUS    morphine 2 mg  2 mg IntraVENous Q4H PRN    0.9% sodium chloride infusion 250 mL  250 mL IntraVENous PRN    sodium chloride (NS) flush 5-10 mL  5-10 mL IntraVENous PRN    0.9% sodium chloride infusion 250 mL  250 mL IntraVENous PRN    mupirocin (BACTROBAN) 2 % ointment   Both Nostrils Q12H    NOREPINephrine (LEVOPHED) 8,000 mcg in dextrose 5% 250 mL infusion  2-16 mcg/min IntraVENous TITRATE    cycloSPORINE (RESTASIS) 0.05 % ophthalmic emulsion 1 Drop  1 Drop Both Eyes BID    timolol (TIMOPTIC) 0.25% ophthalmic solution  1 Drop Both Eyes BID    acetaminophen (TYLENOL) tablet 650 mg  650 mg Oral Q4H PRN    ondansetron (ZOFRAN) injection 4 mg  4 mg IntraVENous Q4H PRN    labetalol (NORMODYNE;TRANDATE) injection 10 mg  10 mg IntraVENous Q2H PRN    glucose chewable tablet 16 g  4 Tab Oral PRN    dextrose (D50W) injection syrg 12.5-25 g  12.5-25 g IntraVENous PRN    glucagon (GLUCAGEN) injection 1 mg  1 mg IntraMUSCular PRN    sodium chloride (NS) flush 5-10 mL  5-10 mL IntraVENous Q8H    sodium chloride (NS) flush 5-10 mL  5-10 mL IntraVENous PRN        LABS:  Recent Labs      10/25/17   0415  10/24/17   1233  10/24/17   0530   WBC  11.2*   --   15.7*   HGB  7.8*  8.7*  9.0*   HCT  23.3*  26.5*  26.7*   PLT  204   --   263     Recent Labs      10/25/17   0415  10/24/17   0953  10/24/17   0530   NA  141  141  139   K  4.2  4.9  5.1   CL  108  113*  115*   CO2  21  16*  15*   BUN  39*  32*  32*   CREA  3.90*  3.15*  3.21*   GLU  77  82  98   CA  7.4* 7. 7*  7.9*     Recent Labs      10/25/17   0415   SGOT  46*   ALT  17   AP  46   TBILI  2.0*   TP  6.5   ALB  1.9*   GLOB  4.6*     Recent Labs      10/25/17   0415  10/24/17   0953   INR  1.4*  1.4*   PTP  14.7*  14.6*   APTT  44.1*   --       No results for input(s): FE, TIBC, PSAT, FERR in the last 72 hours. No results for input(s): PH, PCO2, PO2 in the last 72 hours. No results for input(s): CPK, CKNDX, TROIQ in the last 72 hours.     No lab exists for component: CPKMB  Lab Results   Component Value Date/Time    Glucose (POC) 81 10/25/2017 06:13 AM    Glucose (POC) 85 10/24/2017 11:45 PM    Glucose (POC) 94 10/24/2017 05:29 PM    Glucose (POC) 78 10/24/2017 12:17 PM    Glucose (POC) 88 10/24/2017 07:53 AM

## 2017-10-25 NOTE — PROGRESS NOTES
0- Report received from [facility] with SBAR. Patient resting, no signs of distress, bicarb and amiodarone infusing. No complaints of pain. Incontinent of two stools. 0000- Patient temperature elevated to 100.5. Interventions include: lowering room temperature, and removing sheet and blanket. 6110- Patient temperature rechecked, now 99.4.      0224- Patient's heart rate elevated to 160s. 0- Dr. Casimiro Michel paged  0230- Dr. Casimiro Michel updated on patient condition, orders received. 0235- Patient's heart rate decreased to 108.     0525- Dr. Tim Hammond paged  Thera Duel- Dr. Tim Hammond updated on patient condition, orders received. 7640- Report given to Carley Guerra RN with SBAR at bedside.

## 2017-10-25 NOTE — PROGRESS NOTES
Name of procedure: Bilateral Nephrostomy Tube Placement    Complications, if any, r/t procedure: none    VS : Stable     Post Procedure Care Needed/order sets in [facility]: see orders    Pt tolerated procedure well. VSS. Dressing to site D&I. No bleeding or hematoma noted to sites. Draining without difficulty. CCU RN at bedside throughout procedure. Cultures sent to lab. At end of procedure, MD gave verbal order to add one dose of levaquin. CCU RN stated she would hang at bedside on return to unit. Pt taken back to room by CCU RN, RT, and transport. CCU RN to assume care of pt. TRANSFER - OUT REPORT:    Verbal report given to Dennise Guidry RN on Gregory Clark  being transferred to CCU for routine post - op       Report consisted of patients Situation, Background, Assessment and   Recommendations(SBAR). Information from the following report(s) SBAR, Kardex, Procedure Summary, Intake/Output and MAR was reviewed with the receiving nurse. Lines:   PICC Triple Lumen 10/23/17 Left;Brachial (Active)   Central Line Being Utilized Yes 10/25/2017 12:40 PM   Criteria for Appropriate Use Irritant/vesicant medication 10/25/2017 12:40 PM   Site Assessment Clean, dry, & intact 10/25/2017 12:40 PM   Phlebitis Assessment 0 10/25/2017 12:40 PM   Infiltration Assessment 0 10/25/2017 12:40 PM   Arm Circumference (cm) 30 cm 10/24/2017  7:00 AM   Date of Last Dressing Change 10/23/17 10/25/2017  4:00 AM   Dressing Status Clean, dry, & intact 10/25/2017  4:00 AM   External Catheter Length (cm) 0 centimeters 10/24/2017  2:42 PM   Dressing Type Disk with Chlorhexadine gluconate (CHG); Transparent 10/25/2017  4:00 AM   Action Taken Open ports on tubing capped 10/25/2017  4:00 AM   Hub Color/Line Status White; Infusing 10/25/2017  4:00 AM   Positive Blood Return (Site #1) Yes 10/24/2017  8:00 PM   Hub Color/Line Status Basom Gissel; Infusing 10/25/2017  4:00 AM   Positive Blood Return (Site #2) Yes 10/24/2017  8:00 PM   Hub Color/Line Status Red;Infusing 10/25/2017  4:00 AM   Positive Blood Return (Site #3) Yes 10/24/2017  8:00 PM   Alcohol Cap Used Yes 10/25/2017  4:00 AM       Peripheral IV 10/21/17 Right Antecubital (Active)   Site Assessment Clean 10/25/2017 12:40 PM   Phlebitis Assessment 0 10/25/2017 12:40 PM   Infiltration Assessment 0 10/25/2017 12:40 PM   Dressing Status Clean, dry, & intact 10/25/2017 12:40 PM   Dressing Type Trach dressing; Tape 10/25/2017  4:00 AM   Hub Color/Line Status Pink;Capped 10/25/2017  4:00 AM   Action Taken Open ports on tubing capped 10/24/2017  7:00 AM        Opportunity for questions and clarification was provided.

## 2017-10-25 NOTE — PROGRESS NOTES
[address] [place] [address]  [phone]      Cardiology Progress Note      10/25/2017 10AM    Admit Date: 10/21/2017    Admit Diagnosis:   Acute renal failure (ARF) (HCC)    Subjective:     Rebecca Mcrae is a 79 y.o. male with PMH DM, HTN, CVA, SVT, CAD s/p stent who was admitted for SKYLER. Overnight events:  -remains tachy, but ST low 100s. Low-grade fever  -no further pressor need   -H/H decrease to 7.8/23.3;  Creat 3.90; no urine output per RN   -Mr. Clark states he is feeling poorly today. He endorses abd pain and then does not wish to talk.       Visit Vitals    /66    Pulse (!) 105    Temp 99.8 °F (37.7 °C)    Resp 20    Ht 5' 9\" (1.753 m)    Wt 95.1 kg (209 lb 10.5 oz)    SpO2 95%    BMI 30.96 kg/m2       Current Facility-Administered Medications   Medication Dose Route Frequency    [START ON 10/26/2017] epoetin geovani (EPOGEN;PROCRIT) injection 10,000 Units  10,000 Units SubCUTAneous Once per day on Thu Sat    lidocaine (XYLOCAINE) 20 mg/mL (2 %) injection 400 mg  20 mL SubCUTAneous ONCE    heparinized saline 2 units/mL infusion 200 Units  200 Units IntraSHEAth ONCE    heparin (porcine) pf 500 Units  500 Units InterCATHeter ONCE    cefepime (MAXIPIME) 1 g in 0.9% sodium chloride (MBP/ADV) 50 mL  1 g IntraVENous Q24H    vancomycin (VANCOCIN) 750 mg in 0.9% sodium chloride (MBP/ADV) 250 mL  750 mg IntraVENous DIALYSIS PRN    lidocaine (XYLOCAINE) 20 mg/mL (2 %) injection 600 mg  30 mL SubCUTAneous ONCE    iopamidol (ISOVUE-370) 76 % injection 200 mL  200 mL InterCATHeter ONCE    heparin (porcine) pf 500 Units  500 Units InterCATHeter ONCE    heparinized saline 2 units/mL infusion 200 Units  200 Units IntraSHEAth ONCE    insulin lispro (HUMALOG) injection   SubCUTAneous Q6H    metoprolol (LOPRESSOR) injection 5 mg  5 mg IntraVENous Q6H    pantoprazole (PROTONIX) 40 mg in sodium chloride 0.9 % 10 mL injection  40 mg IntraVENous Q12H    sodium bicarbonate (8. 4%) 150 mEq in sterile water 1,000 mL infusion   IntraVENous CONTINUOUS    VANCOMYCIN INFORMATION NOTE   Other DAILY    amiodarone (CORDARONE) 900 mg/250 ml D5W infusion  0.5 mg/min IntraVENous CONTINUOUS    morphine 2 mg  2 mg IntraVENous Q4H PRN    sodium chloride (NS) flush 5-10 mL  5-10 mL IntraVENous PRN    mupirocin (BACTROBAN) 2 % ointment   Both Nostrils Q12H    NOREPINephrine (LEVOPHED) 8,000 mcg in dextrose 5% 250 mL infusion  2-16 mcg/min IntraVENous TITRATE    cycloSPORINE (RESTASIS) 0.05 % ophthalmic emulsion 1 Drop  1 Drop Both Eyes BID    timolol (TIMOPTIC) 0.25% ophthalmic solution  1 Drop Both Eyes BID    acetaminophen (TYLENOL) tablet 650 mg  650 mg Oral Q4H PRN    ondansetron (ZOFRAN) injection 4 mg  4 mg IntraVENous Q4H PRN    labetalol (NORMODYNE;TRANDATE) injection 10 mg  10 mg IntraVENous Q2H PRN    glucose chewable tablet 16 g  4 Tab Oral PRN    dextrose (D50W) injection syrg 12.5-25 g  12.5-25 g IntraVENous PRN    glucagon (GLUCAGEN) injection 1 mg  1 mg IntraMUSCular PRN    sodium chloride (NS) flush 5-10 mL  5-10 mL IntraVENous Q8H    sodium chloride (NS) flush 5-10 mL  5-10 mL IntraVENous PRN       Objective:      Physical Exam:  General Appearance:  ill-appearing  male resting in bed in NAD  Chest:  clear  Cardiovascular:  rapid rate and rhythm, no murmur.   Abdomen:  slightly firm, tender, hypoactive bowel sounds   Extremities: weak distal pulses. 1+ edema BUE   Skin:  Warm and dry. Pale. Data Review:   Recent Labs      10/25/17   0415  10/24/17   1233  10/24/17   0530   10/23/17   1536   WBC  11.2*   --   15.7*   --   12.9*   HGB  7.8*  8.7*  9.0*   < >  6.1*  6.2*   HCT  23.3*  26.5*  26.7*   < >  18.8*  18.6*   PLT  204   --   263   --   306    < > = values in this interval not displayed.      Recent Labs      10/25/17   0415  10/24/17   0953  10/24/17   0530   NA  141  141  139   K  4.2  4.9  5.1   CL  108  113*  115*   CO2  21  16*  15*   GLU  77 82  98   BUN  39*  32*  32*   CREA  3.90*  3.15*  3.21*   CA  7.4*  7.7*  7.9*   ALB  1.9*   --    --    TBILI  2.0*   --    --    SGOT  46*   --    --    ALT  17   --    --    INR  1.4*  1.4*   --        No results for input(s): TROIQ, CPK, CKMB in the last 72 hours. Intake/Output Summary (Last 24 hours) at 10/25/17 1122  Last data filed at 10/25/17 0826   Gross per 24 hour   Intake          5779.36 ml   Output             1625 ml   Net          4154.36 ml        Telemetry: ST 110s;  More tachy/AF overnight   EKG: ST            (repeat 10/24)  SVT, possibly some PAF. Not a STEMI- ischemic changes due to SVT and  RCA. Cxray: no acute process      Assessment:     Active Problems:    Acute renal failure (ARF) (HCC) (3/1/2017)      SVT (supraventricular tachycardia) ([facility] (4/19/2017)      Overview: Approximately 220 BPM on tele 4/19/17      Recurrent at 160 BPM on tele 7/7/17      NSTEMI (non-ST elevated myocardial infarction) ([facility] (10/22/2017)      Hematuria (10/23/2017)      Anemia (10/23/2017)        Plan:     NSTEMI:  Recent MONICA 07/17 in pRCA  with another  in distal RCA not opened, and SVT on arrival.  Peak troponin 4.54. Patient without cardiac complaints. · Stent in place for just over 3 months, continue BB, statin. ASA Brilinta on hold for abd concerns/anemia possible need for surgery. · BB if BP tolerates   · Plan for revisit of  at a later date    SVT/PAF: currently ST;  Bursts of higher rates overnight   · BB if BP tolerates   · amio gtt  · No consideration of AC with current potentially surgical issues  · Continue IVF and treatment of underlying process     Anemia/hematuria:  H/H continues to be low despite transfusions. Patient had been on brilinta for recent stent as above. Patient with recent cysto with interventions. CT abd/pelv abnormal this morning with concern for bladder rupture. · Hold Brilinta with possible surgical needs. Restart as soon as able. · Urology/nephrology/surgery following       SKYLER: creat continues to worsen and no urine output overnight. · ACEi on hold  · Notes describe plans for dialysis and possible nephrostomy tubes    Nola Mcdaniels NP DNP, RN, [facility]    Patient seen and examined with nurse practitioner  Nola Mcdaniels. I agree with the assessment and plan as noted. Prior to placement of nephrostomy tubes, the patient had a recurrent rapid SVT. Rebolus with amiodarone and drip rate increased. We can give another bolus of IV amiodarone prior to next attempt at nephrostomy tube placement.

## 2017-10-25 NOTE — PROGRESS NOTES
Jackie Berg is in good position and may be used. Catheter placement confirmed by PCXR per Dr. Sonja Morton.

## 2017-10-25 NOTE — PROCEDURES
[facility]  Pulmonary, Critical Care, and Sleep Medicine    Name: Michael Maloney MRN: [id number]   : 1950 Hospital: [facility]   Date: 10/25/2017        Intubation Note    Procedure: elective/emergent intubation    Indication: respiratory insufficiency/ failure with hypoxia, sepsis with hypotension    Anesthesia- Etomidate 20 mg(0.5mg'kg)     After assessing the airway, the patient underwent preoxygenation with 100% FiO2 for 5 min. After adequate sedation intubation was performed. A 3.0 MAC blade; Videolaryngoscope was used to visualize the epiglottis and vocal chords. Cords were obscured by thick yellow, sticky secretions    After positive identification of the vocal chords, an 7.5 ET tube was placed into the trachea with direct visualization. The tube was seen passing through the vocal chords with some difficulty. CO2 colorimetry was employed immediately to verify tube in airway with appropriate color change indicating detection of CO2. Water vapor was seen within the ET tube, and auscultation of the abdomen revealed no bubbling sounds. Auscultation  and inspection of the chest after intubation showed symmetric chest excursion and symmetric air entry bilaterally. Chest X-ray has been ordered and is pending. The patient has been placed on a mechanical ventilator. Setting discussed with RT. There were no complications.     Kia Perales MD

## 2017-10-25 NOTE — PROGRESS NOTES
0700 - SBAR report received from [facility]. Medication, vital signs, orders and lab results reviewed. Safety and gtt check complete. Introduced self to patient and answered any questions. 1372 - Dr. Rex Ji at bs. Updated on VS, UOP, fluid/meds overnight. No orders received. 8254 - Phone consent for [facility] Enter and HD received from pt sister. [address] [facility],[address] re: CT and HD. Will return call. 3796 - Updated Dr. Bety Simmons on status. Dr. Nahun Alexandra will see pt today and decide if he will need Perc neph tubes. 0760 - Dr. Collin Contreras at bs. Confirmed that perc neph tubes need to be inserted today. 1129 - Pt transported to IR. Care transferred. 1204 - Pt returned to ICU on bed with ICU RN x2, on bed, non-rebreather at 100%. Cardiology called and updated on VS.  Orders received. 80 - Dr. Rex Ji at bs.      [phone] - Pt intubated for IR procedure. 1551 - Pt in IR. ICU RN at bs.

## 2017-10-25 NOTE — PROGRESS NOTES
Patient returned to ICU rm [phone]. Will attempt later this afternoon if anesthesia is available.

## 2017-10-25 NOTE — PROGRESS NOTES
Patient arrived to Angio recovery for procedure(s). Waiting for Dr. Karena Garcia to talk with patient about procedure.

## 2017-10-25 NOTE — PROGRESS NOTES
Responded to page requesting  support per pt's siblings. Met with siblings who indicated they would like prayer for pt before surgery. Pt was alert and semi responsive. Acknowledged  and approved of prayer which was provided. Brother Sandrine Teran became tearful.  provided supportive presence and words of reassurance. Brother Sandrine Teran and sister Sophie Miller shared they had grown up [facility] but pt hadn't attended [employer] in over 40 years. Provided encouragement and pronounced continued blessings. Advised of availability as needed. Appreciation was expressed. ANKIT Sumner. Michelet Lenz

## 2017-10-25 NOTE — PROGRESS NOTES
UROLOGY    Phone call. Plans are for Jorge line for hemodialysis by IR. By report location of that procedure would change if we are also planning percutaneous nephrostomy tubes. I have put in orders for bilateral percutaneous nephrostomy tubes as discussed yesterday with patient and family as possibility. Dr. John Tovar to see patient this morning.

## 2017-10-26 NOTE — PROGRESS NOTES
Pt developed asystole again. No ROSC. Pt  at 728 am.  NO Pupil reactivity. NOn responsive. NO pulse of carotid or femoral.  No neurologic activity. No Spontaneous respirations. Family at bedside. No autopsy desired.  at bedside.

## 2017-10-26 NOTE — PROGRESS NOTES
10/26/17 0600   ABCDEF Bundle   SBT Safety Screen Passed No   SBT Screen Reason for Failure FiO2 > 50%;PEEP > 7. 5;Vasopressor use   RN aware and wii provide SAT

## 2017-10-26 NOTE — PROGRESS NOTES
Repeat ABG's reveal: PH 7.22, CO2 37. PO2 63, HCO3 15, BE -12.  Ionized CA 1.11. RN aware of results and will be called to Pulmonary Debroah Radha)

## 2017-10-26 NOTE — PROGRESS NOTES
0700 - Received report at bedside. Dr. Keturah Sauceda at bedside. See code charting. Dr. Jeimy Guzman (urology) made aware of pt's condition.  paged to bs.      0052 - Dr. Yaima Melara at bs to declare time of death. Curt - Dr. Owen Lim at bs. No orders received.

## 2017-10-26 NOTE — PROGRESS NOTES
Pt asynchronus on Vent via Assist control with resp rate 35-40. Vent mode changed to Press Control- resp rate now 16-22, -700cc, O2 sat's 92% via 100%. Dr. Magdaleno Moreno informed of vent changes. Will repeat ABG's 1 hour after delivery of Bicarb.  RN aware of changes

## 2017-10-26 NOTE — PROGRESS NOTES
Pt had PEA, was given epi x 2, had ROSC. Meds only no shocks. ON levophed, Zachary, Vasopressin. Waiting for additional family. Will start Epi drip.

## 2017-10-26 NOTE — PROGRESS NOTES
PULMONARY ASSOCIATES [facility] INTENSIVIST Consult Service Note  Pulmonary, Critical Care, and Sleep Medicine    Name: July Sanchez MRN: [id number]   : 1950 Hospital: [address] [facility]   Date: 10/26/2017   Hospital Day: 6       Subjective/Interval History:   I have reviewed the flowsheet and previous days notes. Seen earlier today on rounds. Reviewed the evaluation and will assist in implementing plan as outlined by Angelique Gibson and team    10/24 severe abdominal pain overnight. Pt very stoic. No more bleeding? Hgb Stable. Creatinine higher. Peritoneal signs on exam. No fever. Called sister Manuel Mahajan to get more info and discuss management [phone]. CT scan ordered without contrast to look for free air. Multiple calls made to consultants as below. Appreciate everyone' s help. Extraperitoneal leak noted on CT scan. AF RVR and placed on IV amiodarone    10/25 Now Anuric and renal function worse. No gross bleeding. Day 2-3 off Brilinta. NSR. D/w Dr. Stephani Lyon and nursing. Pt taken to IR for taya placement. AF RVR and SBP 70's despite IV amiodarone. IV levophed started. Pt brought back to CCU for evaluation and management. IV BB given, IV amio increased. AF converted to NSR but BP remained marginal and levophed changed to IV juan carlos. Discussed management withIR then  family and pt at bedside. Nephrostomy will need to be done in prone position. His peritonitis will make breathing difficult. Explained to them that intubation with sedation aand pain management would be prudent for his safety, comfort, stability and hemodynamics. He agreed and we proceeded to intubate pt. Acute dialysis will be delayed until IR can complete tube placement. Spoke with Urology. 10/26 Critically ill and hemodynamically unstable all night while ventilated. Multiple calls and now on multiple pressors. PCV. Severe metabolic acidosis on iV bicarb. onIV amiodarone with intermttent bursts of tachycardia.  Hazy thick bloody fluid from percutaneous drains. persitent shock. Tropnin high. IV amio held    Spoke with sister at bedside. Other brother en route. Discussed mamagement. Will continue support but no CPR. No defib. Pt declining. ACLS meds ok. Partial code orders placed. Vent adjusted. D/w nursing and RT    IMPRESSION:   1. Hematuria on Brilinta; has stopped with CBI but still with clot on Ct scan  2. Bilateral hydronephrosis with pyonephrosis- reflux? Pyelonephritis? 3. Severe shock likely septic but possbile cardiogenic component; BP barely acceptable on IV Levophed, Vasopressin and IV juan carlos  4. PAF RVR on IV amiodarone  5. NSTEMI  6. Acute peritonitis- collection outside bladder with air pockets outside left distal ureter; underlying fragile bladder mucosa on brilinta  7. Anuria- SKYLER- worse and now on RRT- appreciate Dr. Genesis Hernandez help  8. Metabolic acidosis on IIV bicarb drip  9. Anemia from acute blood loss  10. CAD: September 2017 S/p PCI/MONICA to  RCA. Plan for 2nd phase PCI to mid RCA in 4 weeks   11. NSTEMI (non-ST elevated myocardial infarction) ([facility] [address] (10/22/2017)  12. SVT @ 180 bpm +  RCA = NSTEMI.   re-attempt pending resolution of anemia and ARF. - spoke with   13. H/o nephrolithiasis  14. Multiple stents in past  15. hydroureter  16. Diabetes  17. H/o hypertension  18. H/o perforated ulcer- seen by Dr. Rima Archer and repaired by  April 2017  19. H/o stroke  20. Pt is critically ill and at high risk of end organ dysfunction and failure  21. Critical care time with pt exclusive of procedures   45  minutes      RECOMMENDATIONS/PLAN:   1. CCU  2. IV pressors  3. CVVH?  4. IV bicarb  5. brilinta stopped for now   6. Apprecaite Dr. Stephani Lyon and Duncan's help  7. Bilateral nephrsotomy tubes  8. Jorge, HD as tolerated  9. IV vanco and cefepime,levaquin  10. Transfuse prn to keep Hgb > 9 given NSTEMI and heart disease  11. Pressors prn  12. Glycemic control  13. DVT, SUP prophylaxis.    14. Will be available to assist in medical management while in the CCU pending disposition     Risk of deterioration: high   [x] High complexity decision making was performed  [x] See my orders for details  Tubes:   Amaya  ICU disposition :Unchanged. Code: Full Code        Subjective/Initial History:   I have reviewed the flowsheet and previous days notes. I was asked by Tsering Choe MD to see Rolf Dover in consultation for a chief complaint of severe sepsis and hematuria. Tracie Trujillo is a 79 y.o.  male who was admitted due to acute blood loss anemia, hematuria, acute renal failure, hypomagnesemia and elevated troponin. He had surgery at [facility] two weeks ago and transferred to [facility] for rehab. Patient has only been at the facility for 1 week prior to admission. He has prolonged history with [facility] s/p multiple stents last in 4/2017 with staghorn calculi and right hydrooureter. The patient is followed for a long history of stones. About 25 years ago bilateral large staghorn with bilateral percutaneous procedures. Had been doing well and was on periodic daily Bactrim versus Macrobid. He was hospitalized in April 2017 for perforated duodenal bulb ulcer. He had [place] at that time. Dr. Nae Almeida and perform cystoscopy and right retrograde and insertion of right stent. There was a narrowing of the distal right ureter found at that time. There he had initial remittance of bleeding but it returned 1wk ago. No BRB in urine, only dark clots. He notes it has been consistent. No LH/Dizzy with standing. Was able to participate in therapy. He notes no f/c/n/v/d. No change to appetite, no change to bowels. No hematuria/hemoptysis. On day of admssion he contacted staff for sensation of palpitations. 's by EMS. With vagal maneuver, no drugs, patient converted back to sinus rhythm. Has remained in this rhythm at ED at [facility]. He has hx of sinus tachycardia followed by Dr Rosaura Francisco.  Pt has MONICA of RCA in September and Is on brilinta. In ED work up showed anemia. Guaiac neg. UA has not been done yet. No US renal or bladder scan. Had SVT @ 180 bpm and was converted with adenosine. Trop bumped to 4.5. Known  RCA which Cardiology was planning to re-attempt but now on hold. No CP today. Pt seen by Urology and CBI started. Pt developed fever, hypotension and worsening anemia. Poor IV access. Pt now on IV abx. Poor IV access. PICC to be placed. 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 He also has no past medical history of Abuse; Anemia NEC; Arthritis; Asthma; Autoimmune disease ([address] Cancer ([address] Chronic pain; Congestive heart failure, unspecified; Contact dermatitis and other eczema, due to unspecified cause; COPD; Depression; GERD (gastroesophageal reflux disease); Headache(784.0); Liver disease; Psychotic disorder; Seizures ([address] Thromboembolus ([address] Thyroid disease; or Trauma. PSH:   has a past surgical history that includes endoscopy, colon, diagnostic (9/2/2008); heent; colorectal scrn; hi risk ind (1/15/2014); urological; urological; and insert emergency endotrach airway (10/25/2017). FHX: family history includes Diabetes in his father; Elevated Lipids in his mother and sister; Heart Disease in his brother and mother; Hypertension in his father, mother, and sister; Stroke in his maternal grandfather and paternal grandfather. SHX:  reports that he has quit smoking. He does not have any smokeless tobacco history on file.  He reports that he does not drink alcohol or use illicit drugs. ROS:A comprehensive review of systems was negative except for that written in the HPI.     MAR reviewed and pertinent medications noted or modified as needed    Allergies   Allergen Reactions    Bactrim [Sulfamethoxazole-Trimethoprim] Nausea and Vomiting        MEDS:   Current Facility-Administered Medications   Medication    sodium bicarbonate (8.4%) 150 mEq in sterile water 1,000 mL infusion    sodium bicarbonate 8.4 % (1 mEq/mL) injection    vasopressin (VASOSTRICT) 20 Units in 0.9% sodium chloride 100 mL infusion    PHENYLephrine (NEOSYNEPHRINE) 100,000 mcg in 0.9% sodium chloride 250 mL infusion    epoetin geovani (EPOGEN;PROCRIT) injection 10,000 Units    cefepime (MAXIPIME) 1 g in 0.9% sodium chloride (MBP/ADV) 50 mL    vancomycin (VANCOCIN) 750 mg in 0.9% sodium chloride (MBP/ADV) 250 mL    0.9% sodium chloride infusion 250 mL    amiodarone (CORDARONE) 900 mg/250 ml D5W infusion    propofol (DIPRIVAN) infusion    fentaNYL (PF) 900 mcg/30 ml infusion soln    NOREPINephrine (LEVOPHED) 8,000 mcg in dextrose 5% 250 mL infusion    insulin lispro (HUMALOG) injection    metoprolol (LOPRESSOR) injection 5 mg    pantoprazole (PROTONIX) 40 mg in sodium chloride 0.9 % 10 mL injection    VANCOMYCIN INFORMATION NOTE    sodium chloride (NS) flush 5-10 mL    mupirocin (BACTROBAN) 2 % ointment    cycloSPORINE (RESTASIS) 0.05 % ophthalmic emulsion 1 Drop    timolol (TIMOPTIC) 0.25% ophthalmic solution    acetaminophen (TYLENOL) tablet 650 mg    ondansetron (ZOFRAN) injection 4 mg    labetalol (NORMODYNE;TRANDATE) injection 10 mg    glucose chewable tablet 16 g    dextrose (D50W) injection syrg 12.5-25 g    glucagon (GLUCAGEN) injection 1 mg    sodium chloride (NS) flush 5-10 mL    sodium chloride (NS) flush 5-10 mL        Telemetry:    sinus tachycardia    Objective:     Vital Signs: Intake/Output: Intake/Output:   Visit Vitals    BP 90/44    Pulse (!) 114    Temp 99.2 °F (37.3 °C)    Resp 14    Ht 5' 9\" (1.753 m)    Wt 98.7 kg (217 lb 9.5 oz)    SpO2 97%    BMI 32.13 kg/m2         O2 Device: Ventilator  O2 Flow Rate (L/min): 4 l/min    Wt Readings from Last 4 Encounters:   10/26/17 98.7 kg (217 lb 9.5 oz)   17 93.9 kg (207 lb)   17 93.9 kg (207 lb)   17 104.3 kg (230 lb)       Temp (24hrs), Av.5 °F (37.5 °C), Min:98.5 °F (36.9 °C), Max:100 °F (37.8 °C)     Intake/Output Summary (Last 24 hours) at 10/26/17 0455  Last data filed at 10/26/17 0400   Gross per 24 hour   Intake           9905.9 ml   Output             2113 ml   Net           7792.9 ml     Last shift:      10/25 1901 - 10/26 0700  In: 8977.2 [I.V.:8977.2]  Out: 2056 [Urine:1375]  Last 3 shifts: 10/24 07 - 10/25 1900  In: 6087.3 [I.V.:4659.4]  Out: 3292 [Urine:335]     Hemodynamics:    CO:    CI:    CVP: CVP (mmHg): 266 mmHg (10/25/17 2220)  SVR:   PAP Systolic:    PAP Diastolic:    PVR:    GH66:        Ventilator Settings:      Mode Rate TV Press PEEP FiO2 PIP Min. Vent   Pressure control    400 ml    8 cm H20 100 %  18 cm H2O  10.4 l/min        Physical Exam:    General: WM on vent and acyanotic, and severely ill   HEENT: NCAT, intubated   Neck, Lymph: No abnormally enlarged lymph nodes. Chest: normal   Lungs: diinished BS lower lobes   Heart: Regular rate and rhythm or S1S2 present; tachy   Abdomen: diffuse tenderness. : diallo; bilateral percutaneous nephrostomy tubes   Extremity: negative, clubbing;     Neuro: arousable   Psych: unable to assess   Skin:less Pink and Warm;    Pulses: Bilateral, Radial, Diffusely decreased peripheral pulses   Capillary refill: abnormal:  sluggish capillary refill, pale;    Data:   Interval lab and diagnostic data was reviewed. Interval radiology images were independently viewed and available reports were reviewed. All lab results for the last 24 hours reviewed.           Labs:    Recent Labs      10/26/17   0400  10/25/17   7579 10/25/17   1759  10/25/17   0415   10/24/17   0953  10/24/17   0530   WBC  9.4   --    --   11.2*   --    --   15.7*   HGB  7.7*  8.8*  8.8*  7.8*   < >   --   9.0*   PLT  148*   --    --   204   --    --   263   INR   --    --    --   1.4*   --   1.4*   --    APTT   --    --    --   44.1*   --    --    --     < > = values in this interval not displayed. Recent Labs      10/26/17   0400  10/25/17   0415  10/24/17   0953   10/23/17   1536   NA  142  141  141   < >  138   K  3.4*  4.2  4.9   < >  5.0   CL  105  108  113*   < >  113*   CO2  18*  21  16*   < >  19*   GLU  62*  77  82   < >  94   BUN  33*  39*  32*   < >  26*   CREA  3.94*  3.90*  3.15*   < >  2.58*   CA  7.4*  7.4*  7.7*   < >  7.2*   MG  1.2*   --    --    --    --    PHOS  3.8   --    --    --    --    LAC  12.6*   --    --    --   1.3   ALB   --   1.9*   --    --    --    SGOT   --   46*   --    --    --    ALT   --   17   --    --    --     < > = values in this interval not displayed. Recent Labs      10/26/17   0045  10/25/17   1815   PH  7.32*  7.49*   PCO2  28*  29*   PO2  60*  109*   HCO3  14*  21*   FIO2  100  100     No results for input(s): CPK, CKNDX, TROIQ in the last 72 hours.     No lab exists for component: CPKMB  No results found for: BNPP, BNP   Lab Results   Component Value Date/Time    Culture result: PENDING 10/25/2017 04:26 PM    Culture result: NO GROWTH 2 DAYS 10/23/2017 03:36 PM    Culture result: NO GROWTH 1 DAY 10/22/2017 04:26 AM     Lab Results   Component Value Date/Time    CK 87 10/22/2017 04:26 AM     10/21/2017 08:47 PM     Lab Results   Component Value Date/Time    Color RED 10/23/2017 03:41 PM    Appearance TURBID 10/23/2017 03:41 PM    Specific gravity 1.010 10/23/2017 03:41 PM    pH (UA) 6.0 10/23/2017 03:41 PM    Protein 100 10/23/2017 03:41 PM    Glucose NEGATIVE  10/23/2017 03:41 PM    Ketone NEGATIVE  10/23/2017 03:41 PM    Bilirubin NEGATIVE  10/23/2017 03:41 PM    Blood LARGE 10/23/2017 03:41 PM Urobilinogen 0.2 10/23/2017 03:41 PM    Nitrites NEGATIVE  10/23/2017 03:41 PM    Leukocyte Esterase LARGE 10/23/2017 03:41 PM    WBC  10/23/2017 03:41 PM    RBC >100 10/23/2017 03:41 PM    Bacteria NEGATIVE  10/23/2017 03:41 PM       No results found for: IRON, FE, TIBC, IBCT, PSAT, FERR  Lab Results   Component Value Date/Time    TSH 0.81 10/22/2017 04:26 AM      No results found for: TOXA1, RPR, HBCM, HBSAG, HAAB, HCAB1, HAAT, G6PD, CRYAC, HIVGT, HIVR, HIV1, HIV12, HIVPC, HIVRPI      Imaging:  I have personally reviewed the patients radiographs and have reviewed the reports:          Results from Hospital Encounter encounter on 10/21/17   XR CHEST PORT   Narrative INDICATION:  Tube Placement INTERPRETATION PROVIDED FOR COMPLIANCE ONLY AT NO  CHARGE    EXAM: Chest single view. COMPARISON: 10/25/2017 at 1202 hours. Ethelene Gauss FINDINGS: A single frontal view of the chest at 1710 hours shows stable  interstitial prominence diffusely with a poor inspiratory effort. ET tube has  been introduced with its tip 3 cm above the michel. Right IJ catheter is stable  with no pneumothorax. Left PICC line is stable. .  The heart, mediastinum and  pulmonary vasculature are stable . The bony thorax is unremarkable for age. .         Impression IMPRESSION:  Satisfactory ET tube placement. .  . Results from [place] encounter on 10/21/17   CT ABD PELV WO CONT   Narrative INDICATION: abd pain, free air? h/o ulcer    COMPARISON: October 21, 2017    TECHNIQUE:   Noncontrast thin axial images were obtained through the abdomen and pelvis. Coronal and sagittal reconstructions were generated. CT dose reduction was  achieved through use of a standardized protocol tailored for this examination  and automatic exposure control for dose modulation. FINDINGS:   LUNG BASES: Bibasilar airspace disease and trace pleural effusions. LIVER: No mass or biliary dilatation.   GALLBLADDER: Several gallstones within the gallbladder. SPLEEN: No enlargement or lesion. PANCREAS: No mass or ductal dilatation. ADRENALS: No mass. KIDNEYS: Significant bilateral hydronephrosis similar to the prior study. Several calcifications in the dependent portion of the right lower pole  collecting system. GI TRACT:  No bowel obstruction. Difficult to assess bowel wall thickening given  lack of oral contrast material.  PERITONEUM: No free air or free fluid. APPENDIX: Not clearly visualized. RETROPERITONEUM: No lymphadenopathy or aortic aneurysm. ADDITIONAL COMMENTS: N/A.    URINARY BLADDER: Interval placement of Amaya catheter, with decompression of the  bladder. There are multiple hyperdense clots around the catheter in the bladder. There is also a new defect/tract in the left anterior bladder wall extending  toward the left extraperitoneal space just deep to the rectus muscle, containing  fluid, gas locules, and blood product. Small amount of ill-defined fluid/blood  also extends superiorly along the rectal remnant toward the umbilicus. However,  there is no significant blood/fluid associated with bowel loops in the lower  abdomen or pelvis. Minimal if any fluid left paracolic gutter. REPRODUCTIVE ORGANS: Unremarkable. LYMPH NODES:  None enlarged. FREE FLUID:  Minimal presacral edema. BONES: No destructive bone lesion. ADDITIONAL COMMENTS: N/A. Impression IMPRESSION:  Interval placement of Amaya catheter in the urinary bladder which is  decompressed, though does contain intraluminal clots. There is also a  tract/defect in the left anterior bladder wall extending toward an  extraperitoneal ill-defined collection of fluid, blood, and locules of gas. This  also extends along the rectal remnant toward the umbilicus. Findings most  consistent with extraperitoneal bladder rupture. The findings were called to Dr. Keturah Sauceda on 10/24/2017 at 11:15 AM by myself.     1             My assessment/plan was discussed with:  [facility]    [facility]      I have provided   35   minutes of critical care time rendering care exclusive of any procedures. During this entire length of time I was immediately available to the patient.      The reason for providing this level of medical care was due to a critical illness that impaired one or more vital organ systems, such that there was a high probability of imminent or life threatening deterioration in the patient's condition. Pt's condition is unstable and unpredictable. This care involved high complexity decision making which includes reviewing the patient's past medical records, current laboratory results, medications that were immediately available to me and actual Xray films in order to assess, support vital system function, and to treat this degree of vital organ system failure, and to prevent further life threatening deterioration of the patients condition. Thank you for allowing us to participate in the care of this patient. We will be happy to follow along with you.     Agus Melgar MD

## 2017-10-26 NOTE — PROGRESS NOTES
UROLOGY    Events noted. Had bilateral perc tubes placed yesterday after intubation/sedation. Notified this am of declining status with maximum pressors. Arrived shortly after patient . Discussed with family and expressed condolences.

## 2017-10-26 NOTE — ROUTINE PROCESS
7:10 PM  Report received from Walter Ignacio, [facility].     7:25 PM  Fentanyl PCA started, attempting to wean off propofol. 7:55 PM  Assessment completed. Pt withdrawals from pain, pulses are palpable. Nephrostomy tubes in place and draining. 8:00 PM  Propofol stopped, Phenylephrine titrated up to 300 mcg/min. 8:14 PM  Pt propofol remains off at this time, Pt follow commands and shakes head appropriately. Dialysis still in progress. 9:30 PM  Pt MAP remains <65 after dialysis. Orders received from Dr. Cary Brito for Levophed gtt and HGB lab draw. 10:03 PM  Levophed gtt started, Propofol restarted. 10:25 PM  Updated Dr. Cary Brito on pt MAP <65. Orders received for 50 mg Albumin to be given NOW, and a bolus of 500 ML if that does not help with pts BP. Verbal okay to go up to 50 mcg/min with Levophed. 10:44 PM  Albumin infusing. 10:54 PM  Pt 99.5 temp, 's, Asked if pt in pain shakes his head no. Propofol titrated to 10 mcg. Fentanyl titrated to 100 mcg/hr. 11:32 PM  PT HR up to 160's. Pt suctioned and asked to cough. Pt back down to previous HR , low 100's. Albumin still infusing. Dr. Cary Brito made aware of episode and pt breathing in 30's. Despite labile blood pressure, pt remains alert, follows commands. 1:02 AM  Dextrose 10% infusing. Pt BG <42. Re-checked three times. PT ABG completed. Pt Sats 88%. Paging Dr. Cary Brito.    1:10 AM  Orders received from Dr. Cary Brito for two amps of Bicarb, Bicarb gtt instead of half NS and repeat gas one hour after starting gtt. 1:27 AM  Two amps BICARB given, 500 bolus infusing. Waiting for BICARB gtt from pharmacy. BG rechecked, 101.     1:45 AM  Dr. Cary Brito called back with orders for Calcium chloride injection 1 gram, Vasopressin order, and a verbal order to titrate Levophed up to 75 mcg/min.     3:22 AM  Made Dr. Cary Brito aware of new ABG, Orders placed for two amps of BICARB and to change Bicarb gtt rate to 150. Also said to call renal to update.       3:29 AM  Spoke with  [place] regarding pt condition. Stated they will come see patient in the morning and further assess the possibility of CVVH.     4:08 AM  Updated pt Sister on events overnight. AM blood work sent. 5:25 AM  BG 55, rechecked twice. Dextrose 10% infusing. 5:36 AM  Updated sister, stated she would be on her way up to the hospital.     5:43 AM  With Fentanyl paused, pt has + cough and gag but does not withdrawal from pain. Fentanyl PCA stopped. 6:00 AM  Paged Dr. Judith Mayen regarding pt BP.    6:20 AM  Pt POA at bedside. 6:31 AM  Spoke with [facility] Re paging Dr. Judith Mayen    6:44 AM  Dr. Judith Mayen at bedside. Two amps BICARB given. Bicarb gtt titrated to 200. Verbal order to go up on Levophed up to 100 mcg/min. Pt POA decision to not do compressions or d-fib.    6:50 AM  Code started. 7:00 AM  Dr. Stephen Merritt at bedside. 7:10 AM  Epi gtt started. 7:23 AM  Update given to Osman Tidwell RN.     7:31 AM  Pt has passed away, family decided to stop with mediation codes. Supervisor made aware, [facility] updated. Family and  at bedside.

## 2017-10-26 NOTE — DISCHARGE SUMMARY
FINAL DIAGNOSES:    1. Sepsis/Septic shock  2. Bladder rupture/peritoneal leak/peritonitis. 3. Acute pyelonephritis/Hematuria. 4. Tachyarrhythmia. 5. ARF. CLINICAL COURSE:    79 y.o.   male with Hx HTN, CAD, s/p PCI/Stent 7/2017, SVT, S/P CVA, DM-2. Dyslipidemia, urolithiasis, presenting to ED with complaint noted above. Patient was DC-ed from [facility] ~ 2 wks ago, after hospitalization for hematuria. He has prolonged history with [facility] s/p multiple stents last in 4/2017 and comment as to a \"query narrow ureter\". He had what sounds like cystoscopy - no source of bleed and no intervention per patient report. Sent to [facility] for further work up and evaluation. There, he had initial remission of bleeding, but it returned ~ 1wk ago. No BRB in urine, only dark clots. He notes it has been persistent, since. No LH/Dizzy with standing. Was able to participate in therapy. He notes no f/c/n/v/d. No change to appetite, no change to bowels. No hematuria/hemoptysis. On 10/21/17, he contacted staff for sensation of palpitations. 's by EMS. With vagal maneuver, no drugs, patient converted back to sinus rhythm. Has remained in this rhythm at ED at [facility]. He ntoes hx of sinus tachycardia followed, by Dr Mireya Moctezuma. In ED work up showed anemia. Guaiac neg. Given the anemia, ARF, admitted for work up, evaluation and management.      PROBLEMS:      Patient was noted to have Wcc 10.6 on admission, tachycardic, positive urinary sediment, temp 102, c/w sepsis/UTI. He was commenced on  IV Vanco/Cefepime, as well as supportive treatment. HB 7.9 at presentation,  HB, was followed, and patient transfused prn. Dr Senait Bower provided urology consult, and patient was initially managed with bladder irrigation. ransfusing prn. Bun 19, creatinine 1.3 on admission. Abdo/Pelvic CT-chronic right hydronephrosis & new left hydronephrosis. Creatinine bumped to 2.53 on 10/23/17.  ACE-I, Metformin, and other nephrotoxins were held, but as creatinine continued to climb, patient was commenced on IV Bicarbonate infusion, and Dr Tim Hammond provided nephrology consultation. In night of 10/23/17, patient experienced severe abdominal pain. Physical exam revealed peritonitic signs, and Abdo/pelvic CT 10/24/17-intraluminal clots in bladder, as well as a tract/defect in the left anterior bladder wall, extending toward an extraperitoneal ill-defined collection of fluid, blood, and locules of gas. Patient was made NPO, Dr Daryle Bun provided surgical consult, and conservative management was employed. HR climbed to 148 in AM 10/25/17, SVT noted on monitor, but improved to 138 after Lopressor, and changed to A. Fib. SBP 92. Amiodarone infusion was commenced by cardiology. On 10/25/17, patient became anuric and renal function, worse. He was taken to IR for Jorge placement, and during procedure, developed A. Fib/RVR, with SBP in 70's despite IV amiodarone. Pressors were commenced, patient was intubated/ventilated, after discussion with family and nephrostomy tubes placed, with evacuation of hazy, thick, blood fluid. He remained critically ill and hemodynamically unstable all night, with intermttent bursts of tachycardia, and persistent hypotension. The intensive care team discussed enoc detail with family, who opted for continued support, but no CPR. No defibrillation.  Patient went into PEA in Am 10/26/17, was managed per protocol, subsequently became asystolic, and was pronounced at 7:28 AM.

## 2017-10-26 NOTE — PROGRESS NOTES
Responded to request for support for pt's family who was not doing well. Code Blue was called on pt before  arrival.  Pt passed shortly after  arrival.  Pt's sister and brother were at bedside when  arrived. Provided prayer at bedside with family. Provided support and facilitation (told RN) when family decided to stop all measures to prolong pt's life. Provided assurance for family that the love that one shares while here on this earth provides its own accounting.  stepped out with family as pt was being cleaned up so family can spend additional time at bedside.  remains available as needed.       Carrillo Sagastume.

## 2017-10-27 LAB
BACTERIA SPEC CULT: ABNORMAL
BACTERIA SPEC CULT: ABNORMAL
BACTERIA SPEC CULT: NORMAL
GRAM STN SPEC: ABNORMAL
HBV SURFACE AB SER QL: NONREACTIVE
HBV SURFACE AB SER-ACNC: <3.1 MIU/ML
SERVICE CMNT-IMP: ABNORMAL
SERVICE CMNT-IMP: NORMAL

## 2017-10-27 NOTE — PROGRESS NOTES
Quality: Chart reviewed for death and restraints. Restraints (bilateral soft wrist) noted on 10/25/17 and 10/26/17. Restraints not a contributing factor in patient death. Documented for tracking according to CMS guidelines at 1054 on 10/27/17.

## 2017-10-29 LAB
BACTERIA SPEC CULT: NORMAL
SERVICE CMNT-IMP: NORMAL

## 2021-03-26 NOTE — PROGRESS NOTES
Pt arrived, VS table, TR band intact, no complaints.      1755- TR band removal started VS stable no

## 2021-06-29 NOTE — PROGRESS NOTES
Problem: Mobility Impaired (Adult and Pediatric)  Goal: *Acute Goals and Plan of Care (Insert Text)  Physical Therapy Goals  Initiated 4/26/2017  1. Patient will move from supine to sit and sit to supine in bed with supervision/set-up within 7 day(s). 2. Patient will transfer from bed to chair and chair to bed with minimal assistance/contact guard assist using the least restrictive device within 7 day(s). 3. Patient will perform sit to stand with minimal assistance/contact guard assist within 7 day(s). 4. Patient will ambulate with moderate assistance for 20 feet with the least restrictive device within 7 day(s). PHYSICAL THERAPY TREATMENT  Patient: Jered Viveros (41 y.o. male)  Date: 5/3/2017  Diagnosis: Perforated Viscous, Perforated duodenal bulb ulcer ([facility] [address] KIDNEY STONE, HYDRONEPHROSIS      Procedure(s) (LRB):  CYSTOSCOPY RIGHT PYELOGRAM INSERTION RIGHT URETERAL STENT (Right) 12 Days Post-Op      Precautions: Fall  Chart, physical therapy assessment, plan of care and goals were reviewed. ASSESSMENT: pt with good improvement today, needed less assistance for bed mob and a stand-pivot with 3-4 steps to chair, good motivation today, vc's for safety and proper RW use, would not be safe with a SPC at this time, had some posterior lean that needed min-mod assist to correct. Progression toward goals:  [ ]      Improving appropriately and progressing toward goals  [X]      Improving very slowly and progressing toward goals  [ ]      Not making progress toward goals and plan of care will be adjusted       PLAN:  Patient continues to benefit from skilled intervention to address the above impairments. Continue treatment per established plan of care.   Discharge Recommendations:  Cyril Fleming  Further Equipment Recommendations for Discharge:  Has straight cane and rolling walker       OBJECTIVE DATA SUMMARY:      Critical Behavior:  Neurologic State: Alert, Appropriate for age  Orientation Writer phones patient and relays provider recommendations.  Patient agreeable with plan and will present to Urgent Care   Level: Oriented X4  Cognition: Appropriate for age attention/concentration, Follows commands  Safety/Judgement: Awareness of environment, Decreased insight into deficits, Decreased awareness of need for safety, Decreased awareness of need for assistance      Functional Mobility Training:  Bed Mobility:  Rolling: Stand-by asssistance  Supine to Sit: Moderate assistance;Assist x1;Additional time  Scooting: Minimum assistance;Assist x1;Additional time  Level of Assistance: Moderate assistance              Interventions: Tactile cues; Verbal cues      Transfers:  Sit to Stand: Moderate assistance;Assist x1;Additional time  Stand to Sit: Minimum assistance;Assist x1  Stand Pivot Transfers: Moderate assistance  Bed to Chair: Moderate assistance;Assist x2  Interventions: Tactile cues; Verbal cues  Level of Assistance: Moderate assistance      Balance:  Sitting: Intact; Without support  Standing: Impaired; With support  Standing - Static: Fair;Constant support  Standing - Dynamic : Poor      Ambulation/Gait Training:     Pain:  Pain Scale 1: Numeric (0 - 10)  Pain Intensity 1: 0     Activity Tolerance: poor     After treatment:   [X] Patient left in no apparent distress sitting up in chair  [ ] Patient left in no apparent distress in bed  [X] Call bell left within reach  [X] Nursing notified  [ ] Caregiver present  [X] Bed alarm activated      COMMUNICATION/COLLABORATION:   The patients plan of care was discussed with: Registered Nurse     Sam Pimentel PTA   Time Calculation: 20 mins

## 2022-02-03 NOTE — ED NOTES
Bedside and Verbal shift change report given to [facility] (oncoming nurse) by Harlan Carter (offgoing nurse). Report included the following information SBAR, ED Summary, Intake/Output, MAR and Recent Results. Pt on monitor x 2. Call bell in reach. Family at bedside. Pt reports left sided chest pain, mid sternal to left shoulder. This is happening at same time pt having his post procedure EKG.  Text paged BONNIE; no answer, notified CCL Charge, she called DR Morel. DR Morel at bedside, reviewed EKG and used US at bedside over heart. He reports to pt all looks good; pt reports may be heartburn; DR Morel gave verbal order for antacid. Will continue to monitor. Pt reports back pain is better after PO pain med and hot packs.

## 2022-08-24 NOTE — PROGRESS NOTES
7/9/2017 8:10 AM    Admit Date: 7/6/2017    Admit Diagnosis: Unstable angina ([facility] [address] Angina, class III (HCC)    Subjective:     Rolf Clark   denies chest pain, chest pressure/discomfort, dyspnea, palpitations, irregular heart beats, near-syncope.     Visit Vitals    /53    Pulse 77    Temp 97.5 °F (36.4 °C)    Resp 14    Ht 6' 1\" (1.854 m)    Wt 230 lb (104.3 kg)    SpO2 96%    BMI 30.34 kg/m2     Current Facility-Administered Medications   Medication Dose Route Frequency    lisinopril (PRINIVIL, ZESTRIL) tablet 10 mg  10 mg Oral DAILY    lidocaine (XYLOCAINE) 10 mg/mL (1 %) injection        ticagrelor (BRILINTA) 90 mg tablet        aspirin (ASPIRIN) 325 mg tablet        acetaminophen (TYLENOL) tablet 500 mg  500 mg Oral Q4H PRN    amLODIPine (NORVASC) tablet 5 mg  5 mg Oral DAILY    aspirin delayed-release tablet 81 mg  81 mg Oral DAILY    atorvastatin (LIPITOR) tablet 40 mg  40 mg Oral QHS    colesevelam (WELCHOL) tablet 1,250 mg  1,250 mg Oral BID WITH MEALS    gabapentin (NEURONTIN) capsule 300 mg  300 mg Oral BID    HYDROcodone-acetaminophen (NORCO) 5-325 mg per tablet 1 Tab  1 Tab Oral Q4H PRN    insulin regular (NOVOLIN R, HUMULIN R) injection   SubCUTAneous AC&HS    metoprolol tartrate (LOPRESSOR) tablet 12.5 mg  12.5 mg Oral BID    timolol (TIMOPTIC) 0.25% ophthalmic solution  1 Drop Both Eyes BID    sodium chloride (NS) flush 5-10 mL  5-10 mL IntraVENous Q8H    sodium chloride (NS) flush 5-10 mL  5-10 mL IntraVENous PRN    acetaminophen (TYLENOL) tablet 650 mg  650 mg Oral Q4H PRN    naloxone (NARCAN) injection 0.4 mg  0.4 mg IntraVENous PRN    ticagrelor (BRILINTA) tablet 90 mg  90 mg Oral Q12H    cycloSPORINE 0.05 % drop 1 Drop  1 Drop Both Eyes BID         Objective:      Visit Vitals    /53    Pulse 77    Temp 97.5 °F (36.4 °C)    Resp 14    Ht 6' 1\" (1.854 m)    Wt 230 lb (104.3 kg)    SpO2 96%    BMI 30.34 kg/m2       Physical Consultation - Cardiology   Momo Bah 59 y o  male MRN: [id number]  Unit/Bed#: ED 24 Encounter: [id number]  08/24/22  10:45 AM    Assessment/ Plan:  1  Chest pain  · The patient presented after 2 episodes of chest pain radiating to jaw and left arm  Patient denies chest pain at this time  · HS troponin 54, 94, 102, 79  · ECG showed NSR, nonspecific ST changes  · Start aspirin and atorvastatin  · Further evaluation with TTE and pharmacologic MPI  2  Elevated troponin  · See plan above    3  Hypertension  · BP well controlled, continue amlodipine 10 mg daily and losartan 100 mg daily    4  Hyperlipidemia  · Continue atorvastatin 80 mg daily  History of Present Illness   Physician Requesting Consult: Kiran Ervin MD    Reason for Consult / Principal Problem: Chest pain    HPI: Momo Bah is a 59y o  year old male with PMH of hypertension, asthma, migraines, and GERD who presents with two episodes of chest pain radiating to the jaw and left arm  Patient denies exertion at the time of symptoms  Upon further evaluation in the ED, patient was found to have elevated troponin that peaked at 102 and nonspecific ST changes  Patient denies chest pain at this time  He denies any history of CAD or stent placement  He also denies history of diabetes  Patient does not follow with cardiologist at this time  Inpatient consult to Cardiology  Consult performed by: Karyle Mills, CRNP  Consult ordered by: Kamlesh Hodgson PA-C          EKG: NSR, nonspecific ST abnormality      Review of Systems   Constitutional: Negative for chills, diaphoresis and fever  Respiratory: Positive for shortness of breath  Negative for cough and chest tightness  Cardiovascular: Positive for chest pain (radiating to left arm and jaw)  Negative for palpitations and leg swelling  Gastrointestinal: Negative for abdominal distention, blood in stool, nausea and vomiting  Genitourinary: Negative for difficulty urinating  Exam:  Abdomen: soft, non-tender. Bowel sounds normal.   Extremities: no cyanosis, trace edema  Heart: regular rate and rhythm, S1, S2 normal, no murmur, click, rub or gallop  Lungs: clear to auscultation bilaterally  Neurologic: Grossly normal    Data Review:   Labs:    Recent Results (from the past 24 hour(s))   GLUCOSE, POC    Collection Time: 07/08/17 11:22 AM   Result Value Ref Range    Glucose (POC) 157 (H) 65 - 100 mg/dL    Performed by Jana Bench    GLUCOSE, POC    Collection Time: 07/08/17  4:52 PM   Result Value Ref Range    Glucose (POC) 125 (H) 65 - 100 mg/dL    Performed by Jana Bench    GLUCOSE, POC    Collection Time: 07/08/17  8:56 PM   Result Value Ref Range    Glucose (POC) 124 (H) 65 - 100 mg/dL    Performed by Jasvir Hernandez    GLUCOSE, POC    Collection Time: 07/09/17  7:54 AM   Result Value Ref Range    Glucose (POC) 118 (H) 65 - 100 mg/dL    Performed by Cuco Howell        Telemetry: normal sinus rhythm      Assessment:     Principal Problem:    Unstable angina ([address] (7/6/2017)    Active Problems:    Hypertension (12/14/2009)      Hyperlipidemia (12/14/2009)      DM (diabetes mellitus) ([address] (12/14/2009)      History of stroke (3/3/2017)      SVT (supraventricular tachycardia) ([address] (4/19/2017)      Overview: Approximately 220 BPM on tele 4/19/17      Recurrent at 160 BPM on tele 7/7/17      S/P coronary artery stent placement (7/7/2017)      Overview: 7/6/17 PCI/MONICA  prox RCA      Angina, class III ([address] (7/7/2017)      Debilitated (7/7/2017)      Abnormality of gait as late effect of cerebrovascular accident (CVA) (7/7/2017)        Plan:     CAD: S/p PCI/MONICA to  RCA.   Plan for 2nd phase PCI to mid RCA in 4 weeks  Continue current meds.      Placement.     Musculoskeletal: Negative for arthralgias and back pain  Neurological: Negative for dizziness, syncope, light-headedness and headaches  Psychiatric/Behavioral: Negative for agitation and confusion  The patient is not nervous/anxious  Historical Information   No past medical history on file  Past Surgical History:   Procedure Laterality Date    SEPTOPLASTY       Social History     Substance and Sexual Activity   Alcohol Use Yes    Comment: occasional alcohol use     Social History     Substance and Sexual Activity   Drug Use No     Social History     Tobacco Use   Smoking Status Current Some Day Smoker   Smokeless Tobacco Never Used   Tobacco Comment    never a smoker ( as per allscripts)       Family History:   Family History   Problem Relation Age of Onset    Aneurysm Mother     Coronary artery disease Father     Cancer Other     Stroke Other         CVA       Meds/Allergies   all current active meds have been reviewed and current meds:   Current Facility-Administered Medications   Medication Dose Route Frequency    acetaminophen (TYLENOL) tablet 650 mg  650 mg Oral Q6H PRN    albuterol (PROVENTIL HFA,VENTOLIN HFA) inhaler 2 puff  2 puff Inhalation TID PRN    amLODIPine (NORVASC) tablet 10 mg  10 mg Oral Daily    aspirin chewable tablet 81 mg  81 mg Oral Daily    atorvastatin (LIPITOR) tablet 80 mg  80 mg Oral QPM    doxazosin (CARDURA) tablet 2 mg  2 mg Oral Daily    enoxaparin (LOVENOX) subcutaneous injection 40 mg  40 mg Subcutaneous Daily    losartan (COZAAR) tablet 100 mg  100 mg Oral Daily    pantoprazole (PROTONIX) EC tablet 40 mg  40 mg Oral Daily    topiramate (TOPAMAX) tablet 50 mg  50 mg Oral Q12H    traZODone (DESYREL) tablet 100 mg  100 mg Oral HS    venlafaxine (EFFEXOR-XR) 24 hr capsule 150 mg  150 mg Oral Daily     No Known Allergies    Objective   Vitals: Blood pressure 125/58, pulse (!) 54, temperature 97 6 °F (36 4 °C), temperature source Temporal, resp   rate 17, SpO2 99 %  , There is no height or weight on file to calculate BMI ,   Orthostatic Blood Pressures    Flowsheet Row Most Recent Value   Blood Pressure 125/58 filed at 08/24/2022 0800   Patient Position - Orthostatic VS Lying filed at 08/24/2022 1184          Systolic (81DBV), DZI:653 , Min:125 , UXV:314     Diastolic (44ZHY), TCJ:94, Min:58, Max:106      No intake or output data in the 24 hours ending 08/24/22 1045    Invasive Devices  Report    Peripheral Intravenous Line  Duration           Peripheral IV 08/23/22 Right Arm <1 day                    Physical Exam:  Physical Exam  Vitals and nursing note reviewed  Constitutional:       Appearance: He is well-developed  HENT:      Head: Normocephalic and atraumatic  Eyes:      Conjunctiva/sclera: Conjunctivae normal    Cardiovascular:      Rate and Rhythm: Normal rate and regular rhythm  Heart sounds: Normal heart sounds  No murmur heard  Pulmonary:      Effort: Pulmonary effort is normal  No respiratory distress  Breath sounds: Normal breath sounds  Abdominal:      Palpations: Abdomen is soft  Tenderness: There is no abdominal tenderness  Musculoskeletal:      Cervical back: Neck supple  Right lower leg: No edema  Left lower leg: No edema  Skin:     General: Skin is warm and dry  Neurological:      Mental Status: He is alert and oriented to person, place, and time     Psychiatric:         Mood and Affect: Mood normal          Behavior: Behavior normal           Lab Results:     Cardiac Profile:   Results from last 7 days   Lab Units 08/24/22  0016 08/23/22  2250 08/23/22 2035 08/23/22  1842   HS TNI 0HR ng/L 79*  --   --  54*   HS TNI 2HR ng/L  --   --  94*  --    HSTNI D2 ng/L  --   --  40*  --    HS TNI 4HR ng/L  --  102*  --   --    HSTNI D4 ng/L  --  48*  --   --        CBC with diff:   Results from last 7 days   Lab Units 08/24/22  0411 08/23/22  1842   WBC Thousand/uL 6 34 5 17   HEMOGLOBIN g/dL 14 0 14 9   HEMATOCRIT % 40 6 42 1   MCV fL 92 92   PLATELETS Thousands/uL 147* 144*   MCH pg 31 7 32 6   MCHC g/dL 34 5 35 4   RDW % 13 4 13 8   MPV fL 10 2 10 1   NRBC AUTO /100 WBCs  --  0         CMP:   Results from last 7 days   Lab Units 08/24/22  0411 08/23/22  1842   POTASSIUM mmol/L 3 4* 3 6   CHLORIDE mmol/L 110* 112*   CO2 mmol/L 24 20*   BUN mg/dL 22 26*   CREATININE mg/dL 0 89 1 07   CALCIUM mg/dL 8 1* 8 0*   AST U/L 16 17   ALT U/L 27 29   ALK PHOS U/L 47 52   EGFR ml/min/1 73sq m 90 72
